# Patient Record
Sex: FEMALE | Race: WHITE | NOT HISPANIC OR LATINO | Employment: OTHER | URBAN - METROPOLITAN AREA
[De-identification: names, ages, dates, MRNs, and addresses within clinical notes are randomized per-mention and may not be internally consistent; named-entity substitution may affect disease eponyms.]

---

## 2017-03-07 ENCOUNTER — AMBULATORY - HEALTHEAST (OUTPATIENT)
Dept: PHYSICAL MEDICINE AND REHAB | Facility: CLINIC | Age: 72
End: 2017-03-07

## 2017-03-07 ENCOUNTER — COMMUNICATION - HEALTHEAST (OUTPATIENT)
Dept: SCHEDULING | Facility: CLINIC | Age: 72
End: 2017-03-07

## 2017-03-08 ENCOUNTER — HOSPITAL ENCOUNTER (OUTPATIENT)
Dept: PHYSICAL MEDICINE AND REHAB | Facility: CLINIC | Age: 72
Discharge: HOME | End: 2017-03-08
Attending: EMERGENCY MEDICINE

## 2017-03-08 DIAGNOSIS — M47.816 LUMBAR FACET ARTHROPATHY: ICD-10-CM

## 2017-03-08 DIAGNOSIS — M51.26 LUMBAR DISC HERNIATION: ICD-10-CM

## 2017-03-08 DIAGNOSIS — M48.061 LUMBAR SPINAL STENOSIS: ICD-10-CM

## 2017-03-08 ASSESSMENT — MIFFLIN-ST. JEOR: SCORE: 1444.77

## 2017-03-09 ENCOUNTER — OFFICE VISIT - HEALTHEAST (OUTPATIENT)
Dept: PHYSICAL THERAPY | Facility: REHABILITATION | Age: 72
End: 2017-03-09

## 2017-03-09 DIAGNOSIS — M62.89 MUSCLE TIGHTNESS: ICD-10-CM

## 2017-03-09 DIAGNOSIS — M48.061 LUMBAR SPINAL STENOSIS: ICD-10-CM

## 2017-03-09 DIAGNOSIS — M53.86 DECREASED ROM OF LUMBAR SPINE: ICD-10-CM

## 2017-03-12 ENCOUNTER — COMMUNICATION - HEALTHEAST (OUTPATIENT)
Dept: FAMILY MEDICINE | Facility: CLINIC | Age: 72
End: 2017-03-12

## 2017-03-15 ENCOUNTER — HOSPITAL ENCOUNTER (OUTPATIENT)
Dept: PHYSICAL MEDICINE AND REHAB | Facility: CLINIC | Age: 72
Discharge: HOME | End: 2017-03-15
Attending: PHYSICIAN ASSISTANT

## 2017-03-15 DIAGNOSIS — M47.816 LUMBAR FACET ARTHROPATHY: ICD-10-CM

## 2017-03-15 DIAGNOSIS — M51.26 LUMBAR DISC HERNIATION: ICD-10-CM

## 2017-03-15 DIAGNOSIS — M48.061 LUMBAR SPINAL STENOSIS: ICD-10-CM

## 2017-03-15 ASSESSMENT — MIFFLIN-ST. JEOR: SCORE: 1447.49

## 2017-03-20 ENCOUNTER — AMBULATORY - HEALTHEAST (OUTPATIENT)
Dept: NURSING | Facility: CLINIC | Age: 72
End: 2017-03-20

## 2017-03-20 ENCOUNTER — COMMUNICATION - HEALTHEAST (OUTPATIENT)
Dept: FAMILY MEDICINE | Facility: CLINIC | Age: 72
End: 2017-03-20

## 2017-03-20 DIAGNOSIS — Z23 NEED FOR TD VACCINE: ICD-10-CM

## 2017-05-08 ENCOUNTER — RECORDS - HEALTHEAST (OUTPATIENT)
Dept: MAMMOGRAPHY | Facility: CLINIC | Age: 72
End: 2017-05-08

## 2017-05-08 DIAGNOSIS — Z12.31 ENCOUNTER FOR SCREENING MAMMOGRAM FOR MALIGNANT NEOPLASM OF BREAST: ICD-10-CM

## 2017-08-08 ENCOUNTER — COMMUNICATION - HEALTHEAST (OUTPATIENT)
Dept: FAMILY MEDICINE | Facility: CLINIC | Age: 72
End: 2017-08-08

## 2017-09-25 ENCOUNTER — RECORDS - HEALTHEAST (OUTPATIENT)
Dept: ADMINISTRATIVE | Facility: OTHER | Age: 72
End: 2017-09-25

## 2017-09-25 ENCOUNTER — OFFICE VISIT - HEALTHEAST (OUTPATIENT)
Dept: FAMILY MEDICINE | Facility: CLINIC | Age: 72
End: 2017-09-25

## 2017-09-25 ENCOUNTER — RECORDS - HEALTHEAST (OUTPATIENT)
Dept: BONE DENSITY | Facility: CLINIC | Age: 72
End: 2017-09-25

## 2017-09-25 DIAGNOSIS — E78.5 HYPERLIPIDEMIA: ICD-10-CM

## 2017-09-25 DIAGNOSIS — Z00.00 ROUTINE GENERAL MEDICAL EXAMINATION AT A HEALTH CARE FACILITY: ICD-10-CM

## 2017-09-25 DIAGNOSIS — Z78.0 ASYMPTOMATIC MENOPAUSAL STATE: ICD-10-CM

## 2017-09-25 DIAGNOSIS — Z78.0 MENOPAUSE: ICD-10-CM

## 2017-09-25 LAB
CHOLEST SERPL-MCNC: 180 MG/DL
FASTING STATUS PATIENT QL REPORTED: YES
HDLC SERPL-MCNC: 41 MG/DL
LDLC SERPL CALC-MCNC: 116 MG/DL
TRIGL SERPL-MCNC: 113 MG/DL

## 2017-09-25 ASSESSMENT — MIFFLIN-ST. JEOR: SCORE: 1391.47

## 2018-11-15 ENCOUNTER — COMMUNICATION - HEALTHEAST (OUTPATIENT)
Dept: FAMILY MEDICINE | Facility: CLINIC | Age: 73
End: 2018-11-15

## 2018-11-26 ENCOUNTER — OFFICE VISIT - HEALTHEAST (OUTPATIENT)
Dept: FAMILY MEDICINE | Facility: CLINIC | Age: 73
End: 2018-11-26

## 2018-11-26 DIAGNOSIS — M48.061 SPINAL STENOSIS OF LUMBAR REGION WITHOUT NEUROGENIC CLAUDICATION: ICD-10-CM

## 2018-11-26 DIAGNOSIS — Z00.00 ROUTINE GENERAL MEDICAL EXAMINATION AT A HEALTH CARE FACILITY: ICD-10-CM

## 2018-11-26 DIAGNOSIS — M85.852 OSTEOPENIA OF NECKS OF BOTH FEMURS: ICD-10-CM

## 2018-11-26 DIAGNOSIS — M85.851 OSTEOPENIA OF NECKS OF BOTH FEMURS: ICD-10-CM

## 2018-11-26 DIAGNOSIS — E78.2 MIXED HYPERLIPIDEMIA: ICD-10-CM

## 2018-11-26 DIAGNOSIS — Z13.0 SCREENING FOR DEFICIENCY ANEMIA: ICD-10-CM

## 2018-11-26 DIAGNOSIS — R03.0 ELEVATED BLOOD PRESSURE READING WITHOUT DIAGNOSIS OF HYPERTENSION: ICD-10-CM

## 2018-11-26 LAB
ALBUMIN SERPL-MCNC: 3.8 G/DL (ref 3.5–5)
ALP SERPL-CCNC: 87 U/L (ref 45–120)
ALT SERPL W P-5'-P-CCNC: 16 U/L (ref 0–45)
ANION GAP SERPL CALCULATED.3IONS-SCNC: 8 MMOL/L (ref 5–18)
AST SERPL W P-5'-P-CCNC: 14 U/L (ref 0–40)
BILIRUB SERPL-MCNC: 0.6 MG/DL (ref 0–1)
BUN SERPL-MCNC: 16 MG/DL (ref 8–28)
CALCIUM SERPL-MCNC: 10.1 MG/DL (ref 8.5–10.5)
CHLORIDE BLD-SCNC: 107 MMOL/L (ref 98–107)
CHOLEST SERPL-MCNC: 174 MG/DL
CO2 SERPL-SCNC: 26 MMOL/L (ref 22–31)
CREAT SERPL-MCNC: 0.71 MG/DL (ref 0.6–1.1)
ERYTHROCYTE [DISTWIDTH] IN BLOOD BY AUTOMATED COUNT: 12.8 % (ref 11–14.5)
FASTING STATUS PATIENT QL REPORTED: YES
GFR SERPL CREATININE-BSD FRML MDRD: >60 ML/MIN/1.73M2
GLUCOSE BLD-MCNC: 85 MG/DL (ref 70–125)
HCT VFR BLD AUTO: 43.5 % (ref 35–47)
HDLC SERPL-MCNC: 47 MG/DL
HGB BLD-MCNC: 14.2 G/DL (ref 12–16)
LDLC SERPL CALC-MCNC: 105 MG/DL
MCH RBC QN AUTO: 29.2 PG (ref 27–34)
MCHC RBC AUTO-ENTMCNC: 32.7 G/DL (ref 32–36)
MCV RBC AUTO: 89 FL (ref 80–100)
PLATELET # BLD AUTO: 284 THOU/UL (ref 140–440)
PMV BLD AUTO: 6.9 FL (ref 7–10)
POTASSIUM BLD-SCNC: 4.5 MMOL/L (ref 3.5–5)
PROT SERPL-MCNC: 6.6 G/DL (ref 6–8)
RBC # BLD AUTO: 4.88 MILL/UL (ref 3.8–5.4)
SODIUM SERPL-SCNC: 141 MMOL/L (ref 136–145)
TRIGL SERPL-MCNC: 109 MG/DL
WBC: 7.1 THOU/UL (ref 4–11)

## 2018-11-26 ASSESSMENT — MIFFLIN-ST. JEOR: SCORE: 1398.27

## 2018-11-27 LAB
25(OH)D3 SERPL-MCNC: 30.7 NG/ML (ref 30–80)
25(OH)D3 SERPL-MCNC: 30.7 NG/ML (ref 30–80)

## 2018-12-10 ENCOUNTER — OFFICE VISIT - HEALTHEAST (OUTPATIENT)
Dept: FAMILY MEDICINE | Facility: CLINIC | Age: 73
End: 2018-12-10

## 2018-12-10 DIAGNOSIS — I10 BENIGN ESSENTIAL HYPERTENSION: ICD-10-CM

## 2019-02-06 ENCOUNTER — OFFICE VISIT - HEALTHEAST (OUTPATIENT)
Dept: FAMILY MEDICINE | Facility: CLINIC | Age: 74
End: 2019-02-06

## 2019-02-06 DIAGNOSIS — I10 BENIGN ESSENTIAL HYPERTENSION: ICD-10-CM

## 2019-02-06 DIAGNOSIS — H69.91 DYSFUNCTION OF RIGHT EUSTACHIAN TUBE: ICD-10-CM

## 2019-02-06 LAB
ANION GAP SERPL CALCULATED.3IONS-SCNC: 8 MMOL/L (ref 5–18)
BUN SERPL-MCNC: 26 MG/DL (ref 8–28)
CALCIUM SERPL-MCNC: 10 MG/DL (ref 8.5–10.5)
CHLORIDE BLD-SCNC: 107 MMOL/L (ref 98–107)
CO2 SERPL-SCNC: 27 MMOL/L (ref 22–31)
CREAT SERPL-MCNC: 0.8 MG/DL (ref 0.6–1.1)
GFR SERPL CREATININE-BSD FRML MDRD: >60 ML/MIN/1.73M2
GLUCOSE BLD-MCNC: 62 MG/DL (ref 70–125)
POTASSIUM BLD-SCNC: 3.7 MMOL/L (ref 3.5–5)
SODIUM SERPL-SCNC: 142 MMOL/L (ref 136–145)

## 2019-02-06 ASSESSMENT — MIFFLIN-ST. JEOR: SCORE: 1396.57

## 2019-03-04 ENCOUNTER — OFFICE VISIT - HEALTHEAST (OUTPATIENT)
Dept: FAMILY MEDICINE | Facility: CLINIC | Age: 74
End: 2019-03-04

## 2019-03-04 DIAGNOSIS — I10 BENIGN ESSENTIAL HYPERTENSION: ICD-10-CM

## 2019-03-04 DIAGNOSIS — L84 CORN OR CALLUS: ICD-10-CM

## 2019-03-04 LAB
ANION GAP SERPL CALCULATED.3IONS-SCNC: 11 MMOL/L (ref 5–18)
BUN SERPL-MCNC: 25 MG/DL (ref 8–28)
CALCIUM SERPL-MCNC: 9.6 MG/DL (ref 8.5–10.5)
CHLORIDE BLD-SCNC: 107 MMOL/L (ref 98–107)
CO2 SERPL-SCNC: 25 MMOL/L (ref 22–31)
CREAT SERPL-MCNC: 0.83 MG/DL (ref 0.6–1.1)
GFR SERPL CREATININE-BSD FRML MDRD: >60 ML/MIN/1.73M2
GLUCOSE BLD-MCNC: 90 MG/DL (ref 70–125)
POTASSIUM BLD-SCNC: 4.3 MMOL/L (ref 3.5–5)
SODIUM SERPL-SCNC: 143 MMOL/L (ref 136–145)

## 2019-03-04 ASSESSMENT — MIFFLIN-ST. JEOR: SCORE: 1395.44

## 2019-06-04 ENCOUNTER — OFFICE VISIT - HEALTHEAST (OUTPATIENT)
Dept: FAMILY MEDICINE | Facility: CLINIC | Age: 74
End: 2019-06-04

## 2019-06-04 DIAGNOSIS — R33.8 ACUTE URINARY RETENTION: ICD-10-CM

## 2019-06-04 LAB
ANION GAP SERPL CALCULATED.3IONS-SCNC: 11 MMOL/L (ref 5–18)
BUN SERPL-MCNC: 20 MG/DL (ref 8–28)
CALCIUM SERPL-MCNC: 10.2 MG/DL (ref 8.5–10.5)
CHLORIDE BLD-SCNC: 102 MMOL/L (ref 98–107)
CO2 SERPL-SCNC: 27 MMOL/L (ref 22–31)
CREAT SERPL-MCNC: 0.86 MG/DL (ref 0.6–1.1)
GFR SERPL CREATININE-BSD FRML MDRD: >60 ML/MIN/1.73M2
GLUCOSE BLD-MCNC: 94 MG/DL (ref 70–125)
POTASSIUM BLD-SCNC: 4.1 MMOL/L (ref 3.5–5)
SODIUM SERPL-SCNC: 140 MMOL/L (ref 136–145)

## 2019-06-04 ASSESSMENT — MIFFLIN-ST. JEOR: SCORE: 1368.22

## 2019-06-12 ENCOUNTER — COMMUNICATION - HEALTHEAST (OUTPATIENT)
Dept: FAMILY MEDICINE | Facility: CLINIC | Age: 74
End: 2019-06-12

## 2019-06-12 DIAGNOSIS — I10 BENIGN ESSENTIAL HYPERTENSION: ICD-10-CM

## 2019-08-27 ENCOUNTER — RECORDS - HEALTHEAST (OUTPATIENT)
Dept: ADMINISTRATIVE | Facility: OTHER | Age: 74
End: 2019-08-27

## 2019-10-04 ENCOUNTER — AMBULATORY - HEALTHEAST (OUTPATIENT)
Dept: NURSING | Facility: CLINIC | Age: 74
End: 2019-10-04

## 2019-10-04 DIAGNOSIS — Z23 FLU VACCINE NEED: ICD-10-CM

## 2019-10-23 ENCOUNTER — COMMUNICATION - HEALTHEAST (OUTPATIENT)
Dept: SCHEDULING | Facility: CLINIC | Age: 74
End: 2019-10-23

## 2019-10-24 ENCOUNTER — COMMUNICATION - HEALTHEAST (OUTPATIENT)
Dept: FAMILY MEDICINE | Facility: CLINIC | Age: 74
End: 2019-10-24

## 2019-10-24 ENCOUNTER — HOSPITAL ENCOUNTER (OUTPATIENT)
Dept: CT IMAGING | Facility: CLINIC | Age: 74
Discharge: HOME | End: 2019-10-24
Attending: NURSE PRACTITIONER

## 2019-10-24 ENCOUNTER — AMBULATORY - HEALTHEAST (OUTPATIENT)
Dept: FAMILY MEDICINE | Facility: CLINIC | Age: 74
End: 2019-10-24

## 2019-10-24 ENCOUNTER — OFFICE VISIT - HEALTHEAST (OUTPATIENT)
Dept: FAMILY MEDICINE | Facility: CLINIC | Age: 74
End: 2019-10-24

## 2019-10-24 DIAGNOSIS — R82.998 LEUKOCYTES IN URINE: ICD-10-CM

## 2019-10-24 DIAGNOSIS — I10 BENIGN ESSENTIAL HYPERTENSION: ICD-10-CM

## 2019-10-24 DIAGNOSIS — R10.32 LLQ ABDOMINAL PAIN: ICD-10-CM

## 2019-10-24 DIAGNOSIS — R19.7 DIARRHEA, UNSPECIFIED TYPE: ICD-10-CM

## 2019-10-24 LAB
ALBUMIN SERPL-MCNC: 2.8 G/DL (ref 3.5–5)
ALBUMIN UR-MCNC: ABNORMAL MG/DL
ALP SERPL-CCNC: 94 U/L (ref 45–120)
ALT SERPL W P-5'-P-CCNC: 16 U/L (ref 0–45)
ANION GAP SERPL CALCULATED.3IONS-SCNC: 10 MMOL/L (ref 5–18)
APPEARANCE UR: CLEAR
AST SERPL W P-5'-P-CCNC: 13 U/L (ref 0–40)
BACTERIA #/AREA URNS HPF: ABNORMAL HPF
BILIRUB SERPL-MCNC: 1 MG/DL (ref 0–1)
BILIRUB UR QL STRIP: ABNORMAL
BUN SERPL-MCNC: 31 MG/DL (ref 8–28)
CALCIUM SERPL-MCNC: 9 MG/DL (ref 8.5–10.5)
CHLORIDE BLD-SCNC: 100 MMOL/L (ref 98–107)
CO2 SERPL-SCNC: 25 MMOL/L (ref 22–31)
COLOR UR AUTO: YELLOW
CREAT SERPL-MCNC: 0.91 MG/DL (ref 0.6–1.1)
ERYTHROCYTE [DISTWIDTH] IN BLOOD BY AUTOMATED COUNT: 11.6 % (ref 11–14.5)
GFR SERPL CREATININE-BSD FRML MDRD: 60 ML/MIN/1.73M2
GLUCOSE BLD-MCNC: 97 MG/DL (ref 70–125)
GLUCOSE UR STRIP-MCNC: NEGATIVE MG/DL
HCT VFR BLD AUTO: 39.7 % (ref 35–47)
HGB BLD-MCNC: 12.9 G/DL (ref 12–16)
HGB UR QL STRIP: ABNORMAL
KETONES UR STRIP-MCNC: NEGATIVE MG/DL
LEUKOCYTE ESTERASE UR QL STRIP: ABNORMAL
MCH RBC QN AUTO: 29.7 PG (ref 27–34)
MCHC RBC AUTO-ENTMCNC: 32.6 G/DL (ref 32–36)
MCV RBC AUTO: 91 FL (ref 80–100)
MUCOUS THREADS #/AREA URNS LPF: ABNORMAL LPF
NITRATE UR QL: NEGATIVE
PH UR STRIP: 6.5 [PH] (ref 5–8)
PLATELET # BLD AUTO: 376 THOU/UL (ref 140–440)
PMV BLD AUTO: 7.3 FL (ref 7–10)
POTASSIUM BLD-SCNC: 3.2 MMOL/L (ref 3.5–5)
PROT SERPL-MCNC: 6 G/DL (ref 6–8)
RBC # BLD AUTO: 4.36 MILL/UL (ref 3.8–5.4)
RBC #/AREA URNS AUTO: ABNORMAL HPF
SODIUM SERPL-SCNC: 135 MMOL/L (ref 136–145)
SP GR UR STRIP: 1.01 (ref 1–1.03)
SQUAMOUS #/AREA URNS AUTO: ABNORMAL LPF
TRANS CELLS #/AREA URNS HPF: ABNORMAL LPF
UROBILINOGEN UR STRIP-ACNC: ABNORMAL
WBC #/AREA URNS AUTO: ABNORMAL HPF
WBC: 22.2 THOU/UL (ref 4–11)

## 2019-10-24 ASSESSMENT — MIFFLIN-ST. JEOR: SCORE: 1304.72

## 2019-10-25 ENCOUNTER — AMBULATORY - HEALTHEAST (OUTPATIENT)
Dept: FAMILY MEDICINE | Facility: CLINIC | Age: 74
End: 2019-10-25

## 2019-10-25 ENCOUNTER — COMMUNICATION - HEALTHEAST (OUTPATIENT)
Dept: FAMILY MEDICINE | Facility: CLINIC | Age: 74
End: 2019-10-25

## 2019-10-25 DIAGNOSIS — R19.7 DIARRHEA, UNSPECIFIED TYPE: ICD-10-CM

## 2019-10-25 LAB — BACTERIA SPEC CULT: NO GROWTH

## 2019-10-27 ENCOUNTER — SURGERY - HEALTHEAST (OUTPATIENT)
Dept: GASTROENTEROLOGY | Facility: HOSPITAL | Age: 74
End: 2019-10-27

## 2019-10-27 ASSESSMENT — MIFFLIN-ST. JEOR
SCORE: 1299.84
SCORE: 1299.84

## 2019-10-28 ENCOUNTER — ANESTHESIA - HEALTHEAST (OUTPATIENT)
Dept: SURGERY | Facility: HOSPITAL | Age: 74
End: 2019-10-28

## 2019-10-28 ENCOUNTER — COMMUNICATION - HEALTHEAST (OUTPATIENT)
Dept: FAMILY MEDICINE | Facility: CLINIC | Age: 74
End: 2019-10-28

## 2019-10-28 ENCOUNTER — SURGERY - HEALTHEAST (OUTPATIENT)
Dept: SURGERY | Facility: HOSPITAL | Age: 74
End: 2019-10-28

## 2019-10-29 ASSESSMENT — MIFFLIN-ST. JEOR
SCORE: 1304.83
SCORE: 1304.83

## 2019-10-30 ASSESSMENT — MIFFLIN-ST. JEOR
SCORE: 1300.75
SCORE: 1300.75

## 2019-11-01 ENCOUNTER — HOME CARE/HOSPICE - HEALTHEAST (OUTPATIENT)
Dept: HOME HEALTH SERVICES | Facility: HOME HEALTH | Age: 74
End: 2019-11-01

## 2019-11-01 ASSESSMENT — MIFFLIN-ST. JEOR
SCORE: 1375.59
SCORE: 1375.59

## 2019-11-02 ASSESSMENT — MIFFLIN-ST. JEOR
SCORE: 1369.24
SCORE: 1369.24

## 2019-11-03 ASSESSMENT — MIFFLIN-ST. JEOR
SCORE: 1377.86
SCORE: 1377.86

## 2019-11-05 ENCOUNTER — COMMUNICATION - HEALTHEAST (OUTPATIENT)
Dept: CARE COORDINATION | Facility: CLINIC | Age: 74
End: 2019-11-05

## 2019-11-06 ENCOUNTER — OFFICE VISIT - HEALTHEAST (OUTPATIENT)
Dept: FAMILY MEDICINE | Facility: CLINIC | Age: 74
End: 2019-11-06

## 2019-11-06 ENCOUNTER — COMMUNICATION - HEALTHEAST (OUTPATIENT)
Dept: FAMILY MEDICINE | Facility: CLINIC | Age: 74
End: 2019-11-06

## 2019-11-06 DIAGNOSIS — K61.1 PERIRECTAL ABSCESS: ICD-10-CM

## 2019-11-06 DIAGNOSIS — I10 ESSENTIAL HYPERTENSION, BENIGN: ICD-10-CM

## 2019-11-06 DIAGNOSIS — Z93.3 COLOSTOMY IN PLACE (H): ICD-10-CM

## 2019-11-06 LAB
ANION GAP SERPL CALCULATED.3IONS-SCNC: 9 MMOL/L (ref 5–18)
BUN SERPL-MCNC: 11 MG/DL (ref 8–28)
CALCIUM SERPL-MCNC: 9.1 MG/DL (ref 8.5–10.5)
CHLORIDE BLD-SCNC: 106 MMOL/L (ref 98–107)
CO2 SERPL-SCNC: 26 MMOL/L (ref 22–31)
CREAT SERPL-MCNC: 0.7 MG/DL (ref 0.6–1.1)
ERYTHROCYTE [DISTWIDTH] IN BLOOD BY AUTOMATED COUNT: 12 % (ref 11–14.5)
GFR SERPL CREATININE-BSD FRML MDRD: >60 ML/MIN/1.73M2
GLUCOSE BLD-MCNC: 85 MG/DL (ref 70–125)
HCT VFR BLD AUTO: 37 % (ref 35–47)
HGB BLD-MCNC: 12 G/DL (ref 12–16)
MAGNESIUM SERPL-MCNC: 1.9 MG/DL (ref 1.8–2.6)
MCH RBC QN AUTO: 29.1 PG (ref 27–34)
MCHC RBC AUTO-ENTMCNC: 32.4 G/DL (ref 32–36)
MCV RBC AUTO: 90 FL (ref 80–100)
PLATELET # BLD AUTO: 563 THOU/UL (ref 140–440)
PMV BLD AUTO: 6.5 FL (ref 7–10)
POTASSIUM BLD-SCNC: 3.8 MMOL/L (ref 3.5–5)
RBC # BLD AUTO: 4.11 MILL/UL (ref 3.8–5.4)
SODIUM SERPL-SCNC: 141 MMOL/L (ref 136–145)
WBC: 7.2 THOU/UL (ref 4–11)

## 2019-11-21 ENCOUNTER — RECORDS - HEALTHEAST (OUTPATIENT)
Dept: ADMINISTRATIVE | Facility: OTHER | Age: 74
End: 2019-11-21

## 2019-12-16 ENCOUNTER — AMBULATORY - HEALTHEAST (OUTPATIENT)
Dept: SCHEDULING | Facility: CLINIC | Age: 74
End: 2019-12-16

## 2019-12-16 DIAGNOSIS — Z71.89 OSTOMY NURSE CONSULTATION: ICD-10-CM

## 2019-12-17 ENCOUNTER — OFFICE VISIT - HEALTHEAST (OUTPATIENT)
Dept: WOUND CARE | Facility: HOSPITAL | Age: 74
End: 2019-12-17

## 2019-12-17 DIAGNOSIS — Z71.89 OSTOMY NURSE CONSULTATION: ICD-10-CM

## 2019-12-19 ENCOUNTER — COMMUNICATION - HEALTHEAST (OUTPATIENT)
Dept: VASCULAR SURGERY | Facility: CLINIC | Age: 74
End: 2019-12-19

## 2019-12-20 ENCOUNTER — AMBULATORY - HEALTHEAST (OUTPATIENT)
Dept: CARE COORDINATION | Facility: CLINIC | Age: 74
End: 2019-12-20

## 2019-12-20 DIAGNOSIS — I10 ESSENTIAL HYPERTENSION, BENIGN: ICD-10-CM

## 2019-12-20 DIAGNOSIS — M48.061 LUMBAR STENOSIS: ICD-10-CM

## 2019-12-23 ENCOUNTER — COMMUNICATION - HEALTHEAST (OUTPATIENT)
Dept: NURSING | Facility: CLINIC | Age: 74
End: 2019-12-23

## 2019-12-26 ENCOUNTER — COMMUNICATION - HEALTHEAST (OUTPATIENT)
Dept: FAMILY MEDICINE | Facility: CLINIC | Age: 74
End: 2019-12-26

## 2019-12-26 DIAGNOSIS — E78.2 MIXED HYPERLIPIDEMIA: ICD-10-CM

## 2019-12-27 ENCOUNTER — COMMUNICATION - HEALTHEAST (OUTPATIENT)
Dept: NURSING | Facility: CLINIC | Age: 74
End: 2019-12-27

## 2020-01-21 ENCOUNTER — RECORDS - HEALTHEAST (OUTPATIENT)
Dept: ADMINISTRATIVE | Facility: OTHER | Age: 75
End: 2020-01-21

## 2020-01-22 ENCOUNTER — HOSPITAL ENCOUNTER (OUTPATIENT)
Dept: MRI IMAGING | Facility: HOSPITAL | Age: 75
Discharge: HOME | End: 2020-01-22
Attending: COLON & RECTAL SURGERY

## 2020-01-22 DIAGNOSIS — K60.40 RECTAL FISTULA: ICD-10-CM

## 2020-01-22 LAB
CREAT BLD-MCNC: 0.9 MG/DL (ref 0.6–1.1)
GFR SERPL CREATININE-BSD FRML MDRD: >60 ML/MIN/1.73M2

## 2020-02-07 ENCOUNTER — COMMUNICATION - HEALTHEAST (OUTPATIENT)
Dept: SCHEDULING | Facility: CLINIC | Age: 75
End: 2020-02-07

## 2020-02-12 ENCOUNTER — OFFICE VISIT - HEALTHEAST (OUTPATIENT)
Dept: FAMILY MEDICINE | Facility: CLINIC | Age: 75
End: 2020-02-12

## 2020-02-12 DIAGNOSIS — M48.061 SPINAL STENOSIS OF LUMBAR REGION WITHOUT NEUROGENIC CLAUDICATION: ICD-10-CM

## 2020-02-12 DIAGNOSIS — I10 ESSENTIAL HYPERTENSION, BENIGN: ICD-10-CM

## 2020-02-12 DIAGNOSIS — G58.9 MONONEUROPATHY: ICD-10-CM

## 2020-02-12 DIAGNOSIS — N32.81 OVERACTIVE BLADDER: ICD-10-CM

## 2020-02-12 DIAGNOSIS — E78.2 MIXED HYPERLIPIDEMIA: ICD-10-CM

## 2020-02-12 DIAGNOSIS — Z93.3 COLOSTOMY IN PLACE (H): ICD-10-CM

## 2020-03-06 ENCOUNTER — RECORDS - HEALTHEAST (OUTPATIENT)
Dept: ADMINISTRATIVE | Facility: OTHER | Age: 75
End: 2020-03-06

## 2020-03-06 ENCOUNTER — TRANSFERRED RECORDS (OUTPATIENT)
Dept: HEALTH INFORMATION MANAGEMENT | Facility: CLINIC | Age: 75
End: 2020-03-06
Payer: COMMERCIAL

## 2020-03-12 ENCOUNTER — RECORDS - HEALTHEAST (OUTPATIENT)
Dept: ADMINISTRATIVE | Facility: OTHER | Age: 75
End: 2020-03-12

## 2020-03-13 ENCOUNTER — RECORDS - HEALTHEAST (OUTPATIENT)
Dept: ADMINISTRATIVE | Facility: OTHER | Age: 75
End: 2020-03-13

## 2020-03-13 ENCOUNTER — AMBULATORY - HEALTHEAST (OUTPATIENT)
Dept: LAB | Facility: CLINIC | Age: 75
End: 2020-03-13

## 2020-03-13 DIAGNOSIS — R19.00 ABDOMINAL LUMP: ICD-10-CM

## 2020-03-13 DIAGNOSIS — R19.00 PELVIC MASS IN FEMALE: ICD-10-CM

## 2020-03-13 DIAGNOSIS — R97.8 OTHER ABNORMAL TUMOR MARKERS: ICD-10-CM

## 2020-03-17 ENCOUNTER — COMMUNICATION - HEALTHEAST (OUTPATIENT)
Dept: FAMILY MEDICINE | Facility: CLINIC | Age: 75
End: 2020-03-17

## 2020-04-29 ENCOUNTER — RECORDS - HEALTHEAST (OUTPATIENT)
Dept: ADMINISTRATIVE | Facility: OTHER | Age: 75
End: 2020-04-29

## 2020-05-19 ENCOUNTER — TRANSFERRED RECORDS (OUTPATIENT)
Dept: HEALTH INFORMATION MANAGEMENT | Facility: CLINIC | Age: 75
End: 2020-05-19
Payer: COMMERCIAL

## 2020-05-19 ENCOUNTER — RECORDS - HEALTHEAST (OUTPATIENT)
Dept: ADMINISTRATIVE | Facility: OTHER | Age: 75
End: 2020-05-19

## 2020-05-20 ENCOUNTER — RECORDS - HEALTHEAST (OUTPATIENT)
Dept: ADMINISTRATIVE | Facility: OTHER | Age: 75
End: 2020-05-20

## 2020-05-29 ENCOUNTER — COMMUNICATION - HEALTHEAST (OUTPATIENT)
Dept: FAMILY MEDICINE | Facility: CLINIC | Age: 75
End: 2020-05-29

## 2020-05-29 DIAGNOSIS — E78.00 PURE HYPERCHOLESTEROLEMIA: ICD-10-CM

## 2020-05-29 DIAGNOSIS — I10 ESSENTIAL HYPERTENSION, BENIGN: ICD-10-CM

## 2020-07-08 ENCOUNTER — AMBULATORY - HEALTHEAST (OUTPATIENT)
Dept: LAB | Facility: CLINIC | Age: 75
End: 2020-07-08

## 2020-07-08 DIAGNOSIS — R19.00 ABDOMINAL LUMP: ICD-10-CM

## 2020-07-08 DIAGNOSIS — R97.8 OTHER ABNORMAL TUMOR MARKERS: ICD-10-CM

## 2020-07-08 LAB — CEA SERPL-MCNC: 5.6 NG/ML (ref 0–3)

## 2020-07-14 ENCOUNTER — AMBULATORY - HEALTHEAST (OUTPATIENT)
Dept: LAB | Facility: CLINIC | Age: 75
End: 2020-07-14

## 2020-07-14 DIAGNOSIS — E78.00 PURE HYPERCHOLESTEROLEMIA: ICD-10-CM

## 2020-07-14 DIAGNOSIS — I10 ESSENTIAL HYPERTENSION, BENIGN: ICD-10-CM

## 2020-07-15 ENCOUNTER — COMMUNICATION - HEALTHEAST (OUTPATIENT)
Dept: FAMILY MEDICINE | Facility: CLINIC | Age: 75
End: 2020-07-15

## 2020-07-15 LAB
ALBUMIN SERPL-MCNC: 3.8 G/DL (ref 3.5–5)
ALP SERPL-CCNC: 79 U/L (ref 45–120)
ALT SERPL W P-5'-P-CCNC: 14 U/L (ref 0–45)
ANION GAP SERPL CALCULATED.3IONS-SCNC: 14 MMOL/L (ref 5–18)
AST SERPL W P-5'-P-CCNC: 12 U/L (ref 0–40)
BILIRUB SERPL-MCNC: 0.4 MG/DL (ref 0–1)
BUN SERPL-MCNC: 15 MG/DL (ref 8–28)
CALCIUM SERPL-MCNC: 9.9 MG/DL (ref 8.5–10.5)
CHLORIDE BLD-SCNC: 104 MMOL/L (ref 98–107)
CHOLEST SERPL-MCNC: 211 MG/DL
CO2 SERPL-SCNC: 25 MMOL/L (ref 22–31)
CREAT SERPL-MCNC: 0.77 MG/DL (ref 0.6–1.1)
FASTING STATUS PATIENT QL REPORTED: YES
GFR SERPL CREATININE-BSD FRML MDRD: >60 ML/MIN/1.73M2
GLUCOSE BLD-MCNC: 86 MG/DL (ref 70–125)
HDLC SERPL-MCNC: 44 MG/DL
LDLC SERPL CALC-MCNC: 139 MG/DL
MAGNESIUM SERPL-MCNC: 2.2 MG/DL (ref 1.8–2.6)
POTASSIUM BLD-SCNC: 4.5 MMOL/L (ref 3.5–5)
PROT SERPL-MCNC: 6.5 G/DL (ref 6–8)
SODIUM SERPL-SCNC: 143 MMOL/L (ref 136–145)
TRIGL SERPL-MCNC: 139 MG/DL

## 2020-07-27 ENCOUNTER — HOSPITAL ENCOUNTER (OUTPATIENT)
Dept: MRI IMAGING | Facility: HOSPITAL | Age: 75
Discharge: HOME | End: 2020-07-27
Attending: COLON & RECTAL SURGERY

## 2020-07-27 DIAGNOSIS — R19.00 PELVIC MASS IN FEMALE: ICD-10-CM

## 2020-09-06 ENCOUNTER — COMMUNICATION - HEALTHEAST (OUTPATIENT)
Dept: FAMILY MEDICINE | Facility: CLINIC | Age: 75
End: 2020-09-06

## 2020-09-06 DIAGNOSIS — I10 BENIGN ESSENTIAL HYPERTENSION: ICD-10-CM

## 2020-11-30 ENCOUNTER — AMBULATORY - HEALTHEAST (OUTPATIENT)
Dept: OTHER | Facility: CLINIC | Age: 75
End: 2020-11-30

## 2020-12-09 ENCOUNTER — OFFICE VISIT - HEALTHEAST (OUTPATIENT)
Dept: FAMILY MEDICINE | Facility: CLINIC | Age: 75
End: 2020-12-09

## 2020-12-09 ENCOUNTER — RECORDS - HEALTHEAST (OUTPATIENT)
Dept: GENERAL RADIOLOGY | Facility: CLINIC | Age: 75
End: 2020-12-09

## 2020-12-09 DIAGNOSIS — M85.89 OSTEOPENIA OF MULTIPLE SITES: ICD-10-CM

## 2020-12-09 DIAGNOSIS — R42 LIGHTHEADEDNESS: ICD-10-CM

## 2020-12-09 DIAGNOSIS — M16.12 PRIMARY OSTEOARTHRITIS OF LEFT HIP: ICD-10-CM

## 2020-12-09 DIAGNOSIS — G47.33 OSA (OBSTRUCTIVE SLEEP APNEA): ICD-10-CM

## 2020-12-09 DIAGNOSIS — Z00.00 ROUTINE GENERAL MEDICAL EXAMINATION AT A HEALTH CARE FACILITY: ICD-10-CM

## 2020-12-09 DIAGNOSIS — M48.061 SPINAL STENOSIS OF LUMBAR REGION WITHOUT NEUROGENIC CLAUDICATION: ICD-10-CM

## 2020-12-09 DIAGNOSIS — N32.81 OVERACTIVE BLADDER: ICD-10-CM

## 2020-12-09 DIAGNOSIS — R10.32 LEFT LOWER QUADRANT PAIN: ICD-10-CM

## 2020-12-09 DIAGNOSIS — F40.240 CLAUSTROPHOBIA: ICD-10-CM

## 2020-12-09 DIAGNOSIS — I10 ESSENTIAL HYPERTENSION, BENIGN: ICD-10-CM

## 2020-12-09 DIAGNOSIS — R79.9 ABNORMAL FINDING OF BLOOD CHEMISTRY, UNSPECIFIED: ICD-10-CM

## 2020-12-09 DIAGNOSIS — E83.42 HYPOMAGNESEMIA: ICD-10-CM

## 2020-12-09 DIAGNOSIS — K21.9 GASTROESOPHAGEAL REFLUX DISEASE WITHOUT ESOPHAGITIS: ICD-10-CM

## 2020-12-09 DIAGNOSIS — E78.00 PURE HYPERCHOLESTEROLEMIA: ICD-10-CM

## 2020-12-09 LAB
ANION GAP SERPL CALCULATED.3IONS-SCNC: 10 MMOL/L (ref 5–18)
BUN SERPL-MCNC: 26 MG/DL (ref 8–28)
CALCIUM SERPL-MCNC: 10 MG/DL (ref 8.5–10.5)
CHLORIDE BLD-SCNC: 106 MMOL/L (ref 98–107)
CHOLEST SERPL-MCNC: 242 MG/DL
CO2 SERPL-SCNC: 27 MMOL/L (ref 22–31)
CREAT SERPL-MCNC: 0.8 MG/DL (ref 0.6–1.1)
ERYTHROCYTE [DISTWIDTH] IN BLOOD BY AUTOMATED COUNT: 11.8 % (ref 11–14.5)
FASTING STATUS PATIENT QL REPORTED: YES
FERRITIN SERPL-MCNC: 140 NG/ML (ref 10–130)
GFR SERPL CREATININE-BSD FRML MDRD: >60 ML/MIN/1.73M2
GLUCOSE BLD-MCNC: 106 MG/DL (ref 70–125)
HCT VFR BLD AUTO: 46.1 % (ref 35–47)
HDLC SERPL-MCNC: 45 MG/DL
HGB BLD-MCNC: 15.5 G/DL (ref 12–16)
LDLC SERPL CALC-MCNC: 170 MG/DL
MCH RBC QN AUTO: 30.4 PG (ref 27–34)
MCHC RBC AUTO-ENTMCNC: 33.6 G/DL (ref 32–36)
MCV RBC AUTO: 90 FL (ref 80–100)
PLATELET # BLD AUTO: 305 THOU/UL (ref 140–440)
PMV BLD AUTO: 6.7 FL (ref 7–10)
POTASSIUM BLD-SCNC: 4.3 MMOL/L (ref 3.5–5)
RBC # BLD AUTO: 5.1 MILL/UL (ref 3.8–5.4)
SODIUM SERPL-SCNC: 143 MMOL/L (ref 136–145)
TRIGL SERPL-MCNC: 136 MG/DL
WBC: 8.1 THOU/UL (ref 4–11)

## 2020-12-09 RX ORDER — MENTHOL 40 MG/ML
GEL TOPICAL
Status: SHIPPED | COMMUNITY
Start: 2020-12-09

## 2020-12-09 ASSESSMENT — MIFFLIN-ST. JEOR: SCORE: 1378.43

## 2020-12-14 ENCOUNTER — COMMUNICATION - HEALTHEAST (OUTPATIENT)
Dept: FAMILY MEDICINE | Facility: CLINIC | Age: 75
End: 2020-12-14

## 2020-12-14 DIAGNOSIS — E78.00 PURE HYPERCHOLESTEROLEMIA: ICD-10-CM

## 2020-12-17 ENCOUNTER — RECORDS - HEALTHEAST (OUTPATIENT)
Dept: BONE DENSITY | Facility: CLINIC | Age: 75
End: 2020-12-17

## 2020-12-17 ENCOUNTER — RECORDS - HEALTHEAST (OUTPATIENT)
Dept: ADMINISTRATIVE | Facility: OTHER | Age: 75
End: 2020-12-17

## 2020-12-17 DIAGNOSIS — M85.89 OTHER SPECIFIED DISORDERS OF BONE DENSITY AND STRUCTURE, MULTIPLE SITES: ICD-10-CM

## 2020-12-22 ENCOUNTER — RECORDS - HEALTHEAST (OUTPATIENT)
Dept: ADMINISTRATIVE | Facility: OTHER | Age: 75
End: 2020-12-22

## 2020-12-22 ENCOUNTER — HOSPITAL ENCOUNTER (OUTPATIENT)
Dept: MRI IMAGING | Facility: HOSPITAL | Age: 75
Discharge: HOME | End: 2020-12-22
Attending: NURSE PRACTITIONER

## 2020-12-22 DIAGNOSIS — M48.061 SPINAL STENOSIS OF LUMBAR REGION WITHOUT NEUROGENIC CLAUDICATION: ICD-10-CM

## 2021-01-08 ENCOUNTER — COMMUNICATION - HEALTHEAST (OUTPATIENT)
Dept: FAMILY MEDICINE | Facility: CLINIC | Age: 76
End: 2021-01-08

## 2021-01-08 ENCOUNTER — AMBULATORY - HEALTHEAST (OUTPATIENT)
Dept: SURGERY | Facility: CLINIC | Age: 76
End: 2021-01-08

## 2021-01-08 DIAGNOSIS — Z11.59 ENCOUNTER FOR SCREENING FOR OTHER VIRAL DISEASES: ICD-10-CM

## 2021-01-14 ENCOUNTER — OFFICE VISIT - HEALTHEAST (OUTPATIENT)
Dept: FAMILY MEDICINE | Facility: CLINIC | Age: 76
End: 2021-01-14

## 2021-01-14 DIAGNOSIS — G47.33 OSA (OBSTRUCTIVE SLEEP APNEA): ICD-10-CM

## 2021-01-14 DIAGNOSIS — Z93.3 COLOSTOMY IN PLACE (H): ICD-10-CM

## 2021-01-14 DIAGNOSIS — Z20.9 EXPOSURE TO POTENTIAL INFECTION: ICD-10-CM

## 2021-01-14 DIAGNOSIS — Z86.718 HISTORY OF BLOOD CLOTS: ICD-10-CM

## 2021-01-14 DIAGNOSIS — Z01.818 PREOP GENERAL PHYSICAL EXAM: ICD-10-CM

## 2021-01-14 LAB
ATRIAL RATE - MUSE: 62 BPM
DIASTOLIC BLOOD PRESSURE - MUSE: NORMAL
INTERPRETATION ECG - MUSE: NORMAL
P AXIS - MUSE: 59 DEGREES
PR INTERVAL - MUSE: 218 MS
QRS DURATION - MUSE: 80 MS
QT - MUSE: 410 MS
QTC - MUSE: 416 MS
R AXIS - MUSE: -25 DEGREES
SYSTOLIC BLOOD PRESSURE - MUSE: NORMAL
T AXIS - MUSE: -14 DEGREES
VENTRICULAR RATE- MUSE: 62 BPM

## 2021-01-22 ENCOUNTER — AMBULATORY - HEALTHEAST (OUTPATIENT)
Dept: LAB | Facility: CLINIC | Age: 76
End: 2021-01-22

## 2021-01-22 DIAGNOSIS — Z20.9 EXPOSURE TO POTENTIAL INFECTION: ICD-10-CM

## 2021-01-22 DIAGNOSIS — Z11.59 ENCOUNTER FOR SCREENING FOR OTHER VIRAL DISEASES: ICD-10-CM

## 2021-01-22 ASSESSMENT — MIFFLIN-ST. JEOR: SCORE: 1373.32

## 2021-01-23 ENCOUNTER — COMMUNICATION - HEALTHEAST (OUTPATIENT)
Dept: SCHEDULING | Facility: CLINIC | Age: 76
End: 2021-01-23

## 2021-01-26 ENCOUNTER — ANESTHESIA - HEALTHEAST (OUTPATIENT)
Dept: SURGERY | Facility: CLINIC | Age: 76
End: 2021-01-26

## 2021-01-26 ENCOUNTER — SURGERY - HEALTHEAST (OUTPATIENT)
Dept: SURGERY | Facility: CLINIC | Age: 76
End: 2021-01-26

## 2021-01-26 ASSESSMENT — MIFFLIN-ST. JEOR: SCORE: 1374.01

## 2021-01-27 ENCOUNTER — HOME CARE/HOSPICE - HEALTHEAST (OUTPATIENT)
Dept: HOME HEALTH SERVICES | Facility: HOME HEALTH | Age: 76
End: 2021-01-27

## 2021-02-02 ENCOUNTER — RECORDS - HEALTHEAST (OUTPATIENT)
Dept: ADMINISTRATIVE | Facility: OTHER | Age: 76
End: 2021-02-02

## 2021-02-23 ENCOUNTER — AMBULATORY - HEALTHEAST (OUTPATIENT)
Dept: NURSING | Facility: CLINIC | Age: 76
End: 2021-02-23

## 2021-03-05 ENCOUNTER — COMMUNICATION - HEALTHEAST (OUTPATIENT)
Dept: FAMILY MEDICINE | Facility: CLINIC | Age: 76
End: 2021-03-05

## 2021-03-05 DIAGNOSIS — I10 BENIGN ESSENTIAL HYPERTENSION: ICD-10-CM

## 2021-03-08 ENCOUNTER — RECORDS - HEALTHEAST (OUTPATIENT)
Dept: ADMINISTRATIVE | Facility: OTHER | Age: 76
End: 2021-03-08

## 2021-03-09 ENCOUNTER — COMMUNICATION - HEALTHEAST (OUTPATIENT)
Dept: INTERNAL MEDICINE | Facility: CLINIC | Age: 76
End: 2021-03-09

## 2021-03-09 DIAGNOSIS — E78.00 PURE HYPERCHOLESTEROLEMIA: ICD-10-CM

## 2021-03-09 RX ORDER — ROSUVASTATIN CALCIUM 10 MG/1
10 TABLET, COATED ORAL AT BEDTIME
Qty: 90 TABLET | Refills: 1 | Status: SHIPPED | OUTPATIENT
Start: 2021-03-09 | End: 2021-09-01

## 2021-03-16 ENCOUNTER — AMBULATORY - HEALTHEAST (OUTPATIENT)
Dept: NURSING | Facility: CLINIC | Age: 76
End: 2021-03-16

## 2021-03-18 ENCOUNTER — RECORDS - HEALTHEAST (OUTPATIENT)
Dept: ADMINISTRATIVE | Facility: OTHER | Age: 76
End: 2021-03-18

## 2021-04-19 ENCOUNTER — RECORDS - HEALTHEAST (OUTPATIENT)
Dept: ADMINISTRATIVE | Facility: OTHER | Age: 76
End: 2021-04-19

## 2021-05-24 ENCOUNTER — RECORDS - HEALTHEAST (OUTPATIENT)
Dept: ADMINISTRATIVE | Facility: CLINIC | Age: 76
End: 2021-05-24

## 2021-05-25 ENCOUNTER — RECORDS - HEALTHEAST (OUTPATIENT)
Dept: ADMINISTRATIVE | Facility: CLINIC | Age: 76
End: 2021-05-25

## 2021-05-26 ENCOUNTER — RECORDS - HEALTHEAST (OUTPATIENT)
Dept: ADMINISTRATIVE | Facility: CLINIC | Age: 76
End: 2021-05-26

## 2021-05-27 ENCOUNTER — RECORDS - HEALTHEAST (OUTPATIENT)
Dept: ADMINISTRATIVE | Facility: CLINIC | Age: 76
End: 2021-05-27

## 2021-05-27 VITALS
SYSTOLIC BLOOD PRESSURE: 112 MMHG | HEART RATE: 74 BPM | TEMPERATURE: 97.5 F | OXYGEN SATURATION: 96 % | DIASTOLIC BLOOD PRESSURE: 72 MMHG

## 2021-05-29 ENCOUNTER — RECORDS - HEALTHEAST (OUTPATIENT)
Dept: ADMINISTRATIVE | Facility: CLINIC | Age: 76
End: 2021-05-29

## 2021-05-29 NOTE — PROGRESS NOTES
Assessment & Plan   1. Acute urinary retention  No apparent cause for acute urinary retention.  Encarnacion was beginning to leak and cause discomfort so did opt to remove today and will have her follow up with urology.  Provided instructions on monitoring of urine output and when to seek emergent care.  Recheck renal function today.  She feels comfortable with plan and monitoring urine output at home. Does not wish to stay in clinic until she is able to void.    - Basic Metabolic Panel  - Ambulatory referral to Urology    Mora Zhong CNP    Subjective   Chief Complaint:  Follow-up (ED visit for urinary retention on 5/29 at [facility])    HPI:   Cyndie Marin is a 74 y.o. female who presents for ED follow up.      [place] was [place] in [place], records reviewed.  She states she was on a cruise and at one stop found she was unable to urinate for several hours which is quite unusual for her.  Was seen in the ED and encarnacion catheter placed, more than 1 liter of urine obtained.  Started on cephalosporin as prophylaxis.  UA clear.  BUN slightly elevated.  No imaging performed.  She states since that time she has had consistent output from the Encarnacion, brings in chart today.  No pelvic pain. She is uncomfortable at the insertion site.  States began leaking yesterday, has noted tape is down much further than previously.     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  No prior history of urinary retention.  She denies dysuria, pelvic or flank pain or hematuria.  She states she was eating large amounts of salty, high calorie food and believes this may had precipitated the retention.       Allergies:  is allergic to cats claw (uncaria [cat's claw (uncaria tomentosa)]; dust [other environmental allergy]; dye; and ragweed pollen.    SH/FH:  Social History and Family History reviewed and updated.   Tobacco Status:  She  reports that she has never smoked. She has never used smokeless  "tobacco.    Review of Systems:  A complete head to toe ROS is negative unless otherwise noted in HPI    Objective     Vitals:    06/04/19 0945   BP: 110/68   Patient Site: Left Arm   Patient Position: Sitting   Cuff Size: Adult Large   Pulse: 64   Weight: 194 lb 12 oz (88.3 kg)   Height: 5' 5.25\" (1.657 m)       Physical Exam:  GENERAL: Alert, well-appearing female .   ABDOMEN: BS+. Abdomen soft, nontender to palpation without guarding. No organomegaly, masses or hernias   FEMALE: External genitalia without lesions. Su in place.  10ml sterile fluid removed from balloon and Su removed without difficulty.           "

## 2021-05-29 NOTE — TELEPHONE ENCOUNTER
Refill Request  Did you contact pharmacy: mail order is going to be late arrival  Patient is asking for a week worth of medication until the order arrives for the lisinopril HCTZ.   Medication name:   Requested Prescriptions     Pending Prescriptions Disp Refills     lisinopril-hydrochlorothiazide (ZESTORETIC) 10-12.5 mg per tablet 7 tablet 0     Sig: Take 1 tablet by mouth daily.     Who prescribed the medication: PCP  Pharmacy Name and Location: [facility] patient out of medication: Yes  Patient notified refills processed in 72 hours:  no  Okay to leave a detailed message: no

## 2021-05-29 NOTE — PATIENT INSTRUCTIONS - HE
Recommendations from today's visit                                                       1. We removed your Su today and will set you up with a consult with urology to ensure no further workup is needed for this.      2. Please monitor your urine output carefully.  We will give you a hat to place on your toilet.  If you find that you are urinating less than 100cc in 8 hours or you are going longer than 6 hours without urinating, please give us a call. Try to maintain good hydration with frequent sips of water    3. We will recheck your kidney function as well with labs.      Next appointment: as needed if urine output drops    To reschedule your appointment, please call the clinic directly at [phone].   It was a pleasure seeing you today! I look forward to seeing you again.

## 2021-05-29 NOTE — TELEPHONE ENCOUNTER
Refill Approved    Rx renewed per Medication Renewal Policy. Medication was last renewed on 3/4/19.    Valeria Curtis, Care Connection Triage/Med Refill 6/12/2019     Requested Prescriptions   Pending Prescriptions Disp Refills     lisinopril-hydrochlorothiazide (ZESTORETIC) 10-12.5 mg per tablet 7 tablet 0     Sig: Take 1 tablet by mouth daily.       Diuretics/Combination Diuretics Refill Protocol  Passed - 6/12/2019  1:07 PM        Passed - Visit with PCP or prescribing provider visit in past 12 months     Last office visit with prescriber/PCP: 6/4/2019 Mora Zhong CNP OR same dept: 6/4/2019 Mora Zhong CNP OR same specialty: 6/4/2019 Mora Zhong CNP  Last physical: 11/26/2018 Last MTM visit: Visit date not found   Next visit within 3 mo: Visit date not found  Next physical within 3 mo: Visit date not found  Prescriber OR PCP: Mora Zhong CNP  Last diagnosis associated with med order: 1. Benign essential hypertension  - lisinopril-hydrochlorothiazide (ZESTORETIC) 10-12.5 mg per tablet; Take 1 tablet by mouth daily.  Dispense: 7 tablet; Refill: 0    If protocol passes may refill for 12 months if within 3 months of last provider visit (or a total of 15 months).             Passed - Serum Potassium in past 12 months      Lab Results   Component Value Date    Potassium 4.1 06/04/2019             Passed - Serum Sodium in past 12 months      Lab Results   Component Value Date    Sodium 140 06/04/2019             Passed - Blood pressure on file in past 12 months     BP Readings from Last 1 Encounters:   06/04/19 110/68             Passed - Serum Creatinine in past 12 months      Creatinine   Date Value Ref Range Status   06/04/2019 0.86 0.60 - 1.10 mg/dL Final

## 2021-05-30 ENCOUNTER — RECORDS - HEALTHEAST (OUTPATIENT)
Dept: ADMINISTRATIVE | Facility: CLINIC | Age: 76
End: 2021-05-30

## 2021-05-30 VITALS — WEIGHT: 209 LBS | BODY MASS INDEX: 33.59 KG/M2 | HEIGHT: 66 IN

## 2021-05-30 VITALS — WEIGHT: 209.6 LBS | BODY MASS INDEX: 33.68 KG/M2 | HEIGHT: 66 IN

## 2021-05-31 ENCOUNTER — RECORDS - HEALTHEAST (OUTPATIENT)
Dept: ADMINISTRATIVE | Facility: CLINIC | Age: 76
End: 2021-05-31

## 2021-05-31 VITALS — BODY MASS INDEX: 31.98 KG/M2 | WEIGHT: 199 LBS | HEIGHT: 66 IN

## 2021-06-01 ENCOUNTER — RECORDS - HEALTHEAST (OUTPATIENT)
Dept: ADMINISTRATIVE | Facility: CLINIC | Age: 76
End: 2021-06-01

## 2021-06-02 VITALS — HEIGHT: 66 IN | WEIGHT: 200.5 LBS | BODY MASS INDEX: 32.22 KG/M2

## 2021-06-02 VITALS — BODY MASS INDEX: 33.02 KG/M2 | WEIGHT: 201.5 LBS

## 2021-06-02 VITALS — BODY MASS INDEX: 33.45 KG/M2 | HEIGHT: 65 IN | WEIGHT: 200.75 LBS

## 2021-06-02 VITALS — BODY MASS INDEX: 33.49 KG/M2 | WEIGHT: 201 LBS | HEIGHT: 65 IN

## 2021-06-02 NOTE — ANESTHESIA PREPROCEDURE EVALUATION
Anesthesia Evaluation      Patient summary reviewed     Airway    Pulmonary    (+) sleep apnea on CPAP, ,                          Cardiovascular   (+) hypertension, , hypercholesterolemia,     Rhythm: regular  Rate: abnormal,      ROS comment: Bradycardia  PE comment: bradycardic,     Neuro/Psych      Endo/Other       GI/Hepatic/Renal    (+) GERD,        Other findings: Lumbar stenosis      Dental                         Anesthesia Plan  Planned anesthetic: general endotracheal  Intra-op: TAPs  ASA 3   Induction: intravenous   Anesthetic plan and risks discussed with: patient  Anesthesia plan special considerations: rapid sequence induction, antiemetics,   Post-op plan: routine recovery

## 2021-06-02 NOTE — TELEPHONE ENCOUNTER
Left message to call back for: Patient  Information to relay to patient:  Please see results message below and relay to pt when she calls back. Thanks!

## 2021-06-02 NOTE — TELEPHONE ENCOUNTER
Left message to call back for: Patient  Information to relay to patient:  Left pt a detailed message asking her if she is able to come in today at 9:40.  I advised pt call back and let us know if that works or not.  If not, Mora suggests she go to [facility].

## 2021-06-02 NOTE — TELEPHONE ENCOUNTER
----- Message from Mora Zhong CNP sent at 10/25/2019  9:34 AM CDT -----  Please call [place] and let her know her potassium and sodium are just a little low from the vomiting and diarrhea this week.  As this has stopped and she is now tolerating fluids I expect this to resolve on its own, but I would like to recheck her labs in one week.  I will place a future order for labs

## 2021-06-02 NOTE — ANESTHESIA POSTPROCEDURE EVALUATION
Patient: Cynide Marin  OPEN EXPLORATORY LAPAROTOMY, SEGMENTAL COLECTOMY,  WASHOUT OF PELVIC ABSCESS. RECTAL EXAM UNDER ANESTHESIA. DEBRIDEMENT OD ABSCESS CAVITY, RIGID PROCTOSCOPY, CREATION, COLOSTOMY  Anesthesia type: general    Patient location: PACU  Last vitals:   Vitals Value   /74   Temp 36.3  C (97.4  F)   Pulse 63   Resp 20   SpO2 96 %     Post vital signs: stable  Level of consciousness: awake and responds to simple questions  Post-anesthesia pain: pain controlled  Post-anesthesia nausea and vomiting: no  Pulmonary: unassisted, return to baseline  Cardiovascular: stable and blood pressure at baseline  Hydration: adequate  Anesthetic events: no    QCDR Measures:  ASA# 11 - Kristin-op Cardiac Arrest: ASA11B - Patient did NOT experience unanticipated cardiac arrest  ASA# 12 - Kristin-op Mortality Rate: ASA12B - Patient did NOT die  ASA# 13 - PACU Re-Intubation Rate: ASA13B - Patient did NOT require a new airway mgmt  ASA# 10 - Composite Anes Safety: ASA10A - No serious adverse event    Additional Notes:

## 2021-06-02 NOTE — TELEPHONE ENCOUNTER
LMTCB . Please assist patient in scheduling an appointment when the patient returns the call. Thank you .  NOTE: PLEASE CLOSE THE ENCOUNTER WHEN PATIENT IS SCHEDULED.  See message below

## 2021-06-02 NOTE — PROGRESS NOTES
Assessment & Plan   1. Diarrhea, unspecified type  Acute onset diarrhea, vomiting and LLQ pain four days ago.  Bowels have become more formed and no further vomiting though continues to be weak and have intermittent pain.  CT to r/o diverticulitis, check labs as well.  Slightly hypotensive today though not tachycardic or febrile and blood pressures at home have been higher. Discussed importance of good hydration in this phase and recommended frequent sips of 50-50 Gatorade/water solution.  Continue to hold blood pressure medication until diet returns to baseline. Will follow up with results by phone  - HM2(CBC w/o Differential)  - Comprehensive Metabolic Panel    2. LLQ abdominal pain  - Urinalysis-UC if Indicated  - Culture, Urine    3. Benign essential hypertension    Mora Zhong, ALIS    Subjective   Chief Complaint:  Diarrhea (started to have irregular stools started on Sunday, has had normal stools yesterday and today); Emesis (vomited this past Sunday, has not vomited since but still feels nauseous and has no appetite); and Abdominal Pain (lower left abdominal pain x 2 days ago, not a constant pain but has pain with ambulation)    HPI:   Cyndie Marin is a 74 y.o. female who presents for diarrhea, vomiting.     She states diarrhea began four days ago as well as vomiting.  Normal stools yesterday and today and no vomiting in the last few days.  Continues to feel nauseous, little appetite. She has discomfort in the LLQ of the abdomen that comes and goes, more with movement throughout the last four days.  Tender to the touch and with movement.  She has held her blood pressure medications since this started on Sunday.  Blood pressure 94/58. At home 109/63.      She feels very weak, nauseous.  Difficult to eat and drink. Is urinating every six hours or so though dark.  Drinking water and some Gatorade.        Allergies:  is allergic to cats claw (uncaria [cat's claw (uncaria tomentosa)]; dust [other  "environmental allergy]; dye; and ragweed pollen.    SH/FH:  Social History and Family History reviewed and updated.   Tobacco Status:  She  reports that she has never smoked. She has never used smokeless tobacco.    Review of Systems:  A complete head to toe ROS is negative unless otherwise noted in HPI    Objective     Vitals:    10/24/19 0950   Weight: 180 lb 12 oz (82 kg)   Height: 5' 5.25\" (1.657 m)       Physical Exam:  GENERAL: Alert, ill appearing though not toxic  PSYCH: Pleasant mood, affect appropriate.    SKIN: Good turgor  MOUTH: Mucous membranes dry  NECK: No lymphadenopathy.   CV: Regular rate and rhythm without murmurs, rubs or gallops.  RESP: Lung sounds clear   ABDOMEN: BS+. Abdomen soft, tender to palpation in LLQ        "

## 2021-06-02 NOTE — ANESTHESIA CARE TRANSFER NOTE
Last vitals:   Vitals:    10/28/19 1910   BP: 134/70   Pulse: 67   Resp: 16   Temp: 37.6  C (99.6  F)   SpO2: 100%     Patient's level of consciousness is drowsy  Spontaneous respirations: yes  Maintains airway independently: yes  Dentition unchanged: yes  Oropharynx: oropharynx clear of all foreign objects    QCDR Measures:  ASA# 20 - Surgical Safety Checklist: WHO surgical safety checklist completed prior to induction    PQRS# 430 - Adult PONV Prevention: 4558F - Pt received => 2 anti-emetic agents (different classes) preop & intraop  ASA# 8 - Peds PONV Prevention: NA - Not pediatric patient, not GA or 2 or more risk factors NOT present  PQRS# 424 - Kristin-op Temp Management: 4559F - At least one body temp DOCUMENTED => 35.5C or 95.9F within required timeframe  PQRS# 426 - PACU Transfer Protocol: - Transfer of care checklist used  ASA# 14 - Acute Post-op Pain: ASA14B - Patient did NOT experience pain >= 7 out of 10

## 2021-06-02 NOTE — TELEPHONE ENCOUNTER
RN Assessment/Reason for Call:   Okay to leave Detailed Message  Patient calling in, diarrhea for a week since Wednesday- Sunday. Gelly; then liquid BM.  Akua had walleye and vomited.  Loss of appetite, gassy.  Tired.Lives alone.  Drank 4T ckn noodle soup; < 2 cups today.  Urinated x 4.today discussed increasing liquids.  B/P 103/68 P 88.  RN Action/Disposition:  Protocol recommends see Dr in 24 hrs.  Offered WIC until 7 p. Declined.  Appt. PCP full and other providers.  Soonest 10/28/19 4pm Dr Grimes.  Call back if worse symptoms  Discussed home care measures.  Agrees to plan.     Emma Adler RN    [facility] Connection Triage/med refill  10/23/2019  3:34 PM        Reason for Disposition    [1] MODERATE diarrhea (e.g., 4-6 times / day more than normal) AND [2] present > 48 hours (2 days)    Protocols used: DIARRHEA-A-

## 2021-06-02 NOTE — TELEPHONE ENCOUNTER
Called patient and discussed results - High WBC, suspicious urine and indeterminate mass on CT.  Start on Cipro. Will arrange urgent consult with GI this afternoon.  She is currently stable  - afebrile, blood pressure in good range at home, tolerating liquids and solids without further diarrhea or vomiting. Urged urgent evaluation for any change in condition. She understands and will await call re: GI consult

## 2021-06-02 NOTE — ANESTHESIA PROCEDURE NOTES
Peripheral Block    Patient location during procedure: OR  Start time: 10/28/2019 3:35 PM  End time: 10/28/2019 3:40 PM  post-op analgesia per surgeon order as noted in medical record  Staffing:  Performing  Anesthesiologist: Leila Bryant MD  Preanesthetic Checklist  Completed: patient identified, site marked, risks, benefits, and alternatives discussed, timeout performed, consent obtained, at patient's request, airway assessed, oxygen available, suction available, emergency drugs available and hand hygiene performed  Peripheral Block  Block type: other, TAP  Prep: ChloraPrep  Patient position: supine  Patient monitoring: cardiac monitor, continuous pulse oximetry, blood pressure and heart rate  Laterality: bilateral, same technique used bilaterally  Injection technique: ultrasound guided    Ultrasound used to visualize needle placement in proximity to nerve being blocked: yes   Permanent ultrasound image captured for medical record  Sterile gel and probe cover used for ultrasound.    Needle  Needle type: Stimuplex   Needle gauge: 20G  Needle length: 4 in  no peripheral nerve catheter placed  Assessment  Injection assessment: no difficulty with injection, negative aspiration for heme and no paresthesia on injection

## 2021-06-02 NOTE — TELEPHONE ENCOUNTER
Type of referral:  Patient was unclear about what type of referral she is scheduling for, she states it is either Gastroenterologist OR Urologist.  What provider or facility are you being referred to?  Patient did not know name of speciality clinic , she states it is on [address] in [place].  Do you have an appointment scheduled?  Yes  What is your question?  Patient states she had appointment today at 3:00 pm, however, could not find clinic as there was nothing at the address she was given and did not have a phone number to call. She would like a call back to help with scheduling and would prefer a different speciality clinic ( anything that is recommended by PCP) as this one was difficult to find.  Okay to leave a detailed message: Yes

## 2021-06-03 ENCOUNTER — COMMUNICATION - HEALTHEAST (OUTPATIENT)
Dept: FAMILY MEDICINE | Facility: CLINIC | Age: 76
End: 2021-06-03

## 2021-06-03 VITALS
DIASTOLIC BLOOD PRESSURE: 58 MMHG | TEMPERATURE: 97.7 F | HEART RATE: 84 BPM | BODY MASS INDEX: 30.12 KG/M2 | HEIGHT: 65 IN | OXYGEN SATURATION: 99 % | WEIGHT: 180.75 LBS | SYSTOLIC BLOOD PRESSURE: 94 MMHG

## 2021-06-03 VITALS
DIASTOLIC BLOOD PRESSURE: 74 MMHG | HEART RATE: 64 BPM | SYSTOLIC BLOOD PRESSURE: 122 MMHG | BODY MASS INDEX: 29.58 KG/M2 | WEIGHT: 180.5 LBS

## 2021-06-03 VITALS — BODY MASS INDEX: 32.45 KG/M2 | HEIGHT: 65 IN | WEIGHT: 194.75 LBS

## 2021-06-03 VITALS
BODY MASS INDEX: 31.5 KG/M2 | BODY MASS INDEX: 31.5 KG/M2 | WEIGHT: 196 LBS | WEIGHT: 196 LBS | HEIGHT: 66 IN | HEIGHT: 66 IN

## 2021-06-03 DIAGNOSIS — I10 BENIGN ESSENTIAL HYPERTENSION: ICD-10-CM

## 2021-06-03 NOTE — PATIENT INSTRUCTIONS - HE
Recommendations from today's visit                                                       1. Your incision site and ostomy site look very good.  Continue to monitor for any redness at the incision site and let us know if this spreads or if you develop any worsening pain or fever    2. You may continue to use the Dilaudid for pain/sleep as needed.     3. We will check labs today and call you with results.     4.  Follow up with colorectal surgery as planned in two weeks    Next appointment: two weeks with colorectal surgery     To reschedule your appointment, please call the clinic directly at [phone].   It was a pleasure seeing you today! I look forward to seeing you again.

## 2021-06-03 NOTE — TELEPHONE ENCOUNTER
----- Message from Mora Zhong CNP sent at 11/6/2019  3:29 PM CST -----  Please let Georgia know all of her labs look good.  Her hemoglobin has come back up. Electrolytes are in good range.

## 2021-06-03 NOTE — PROGRESS NOTES
Hospital Follow-up Visit:    Assessment/Plan:   1. Perirectal abscess  She is recovering well.  Encouraged her to continue to utilize Dilaudid as needed for pain, though overall she has done very well and has only had discomfort overnight.  Tolerating solids with good output from colostomy.  Incision and laparoscopy sites look good without signs of infection.  She will have a home care visit today to assist with changing the bag.  Follow up with colorectal surgery as planned in two weeks.   - Magnesium  - HM2(CBC w/o Differential)    2. 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 Essential hypertension, benign  Blood pressure looks good after restarting lisinopril-HCTZ.  Check labs  - Magnesium       Subjective:     Cyndie Marin is a 74 y.o. female who presents for a hospital discharge follow up.    She was seen two weeks ago for loose stools and LLQ pain.  CT showed indeterminate mass adjacent to distal sigmoid colon and rectum.  She was sent for urgent referral to colorectal but was unfortunately unable to locate the office.  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  Treated with Zosyn inpatient, not discharged with antibiotics.  Ostomy has been putting out stool.  Lisonopril-HCTZ restarted in hospital for HTN. Stable acute blood loss anemia.     Since discharge she has been struggling with some pain and anxiety related to the hospitalization and ostomy.  At first resistant to taking Dilaudid though did take a dose last night and was able to sleep a little better. No pain this morning.  Has been able to eat without difficulty.  One blood bowel movement per rectum when she was first discharged though not since.  Good output from ostomy. No redness at incision site.  Good energy, no lightheadedness.  Blood pressure at home has been within normal range.     Follow  up with Colorectal Surgery on 11/21.  Has follow up with ostomy clinic and home care will be starting today     Hospital/Nursing Home/IP Rehab Facility: [facility]  Date of Admission: 10/27/19  Date of Discharge:11/5/19  Reason(s) for Admission:  Abdominal pain, perirectal abscess, rectal fistula            Do you have any problems taking your medication regularly?  None       Have you had any changes in your medication since discharge? None       Have you had any difficulty following your discharge or treatment plan?  No    Summary of hospitalization:  Hospital discharge summary reviewed  Diagnostic Tests/Treatments reviewed.  Follow up needed: Scheduled with colorectal surgery, ostomy clinic, home care  Other Healthcare Providers Involved in Patient's Care: Patient Care Team:  Mora Zhong CNP as PCP - General (Nurse Practitioner)  Mora Zhong CNP as Assigned PCP      Update since discharge: {improved   Information from family, SNF, care coordination: none     Post Discharge Medication Reconciliation: discharge medications reconciled, continue medications without change  Plan of care communicated with: patient    Objective:     Vitals:    11/06/19 0915   BP: 122/74   Pulse: 64   Weight: 180 lb 8 oz (81.9 kg)         Physical Exam:  GENERAL: Alert, well appearing female.    PSYCH: Pleasant mood, affect appropriate.    SKIN:  Abdominal incision clean, dry, intact.  Minimal surrounding erythema at proximal end. Laparoscopy site with scant drainage, bandage applied  NECK: No lymphadenopathy.   CV: Regular rate and rhythm without murmurs, rubs or gallops.  RESP: Lung sounds clear, symmetric excursion.   ABDOMEN: BS+. Abdomen soft, mild tenderness to palpation.  Ostomy site without signs of skin breakdown, liquid stool in ostomy bag.   PV :  Trace bilateral LE edema      Coding guidelines for this visit:  Type of Medical   Decision Making Face-to-Face Visit       within 7 Days of discharge Face-to-Face  Visit        within 14 days of discharge   Moderate Complexity [id number]   High Complexity [id number]       Electronically signed by Mora Zhong CNP 11/06/19 9:25 AM

## 2021-06-03 NOTE — TELEPHONE ENCOUNTER
Orders being requested: Skilled nursing 1 time a week, 1 time 2 weeks and 5 PRN  Reason service is needed/diagnosis: status post new colostomy  When are orders needed by: today  Where to send Orders: Phone:  [phone]  Okay to leave detailed message?  Yes

## 2021-06-03 NOTE — TELEPHONE ENCOUNTER
Spoke with pt and relayed results message below from Mora.  No further questions at this time.  Sent results to pt in the mail as well.

## 2021-06-03 NOTE — PROGRESS NOTES
"TCM DISCHARGE FOLLOW UP CALL    Discharge Date:  11/4/2019  Reason for hospital stay (discharge diagnosis)::  Perirectal abscess  Are you feeling better, the same or worse since your discharge?:  Patient is feeling better (Didn't sleep much last night, less than an hour d/t anxiety.  Hasn't taken pain medication since d/c; her son just picked up her pain medication tonight, and she \"may or may not take it, but I'll have it.\")  Do you feel like you have a plan in the event of a health emergency?: Yes (Surgeon)    As part of your discharge plan, were  home care services ordered for you?: Yes    Have you seen them yet, or are they scheduled to visit?: Yes ([facility] 11/6/19)    Do you have any follow up visits scheduled with your PCP or Specialist?:  Yes, with PCP and Yes, with Specialist  (RN) Is PCP appt scheduled soon enough (within 14 days of discharge date)?: Yes (Mora Zhong CNP 11/6/19)    Who are you seeing and when is it scheduled?:  Surgeon 11/21/19  RN NOTES::  Encouraged pt take her pain medication at least at night so she can sleep, if 1 tab (2 mg) of hydromorphone is too much she can take 1/2 tab.  Pt verbalized understanding and agrees w/plan.  Plans to call her nutritionist tomorrow to discuss dietary questions.      Emma Waller RN Care Manager, Population Health    "

## 2021-06-04 VITALS
WEIGHT: 185.25 LBS | DIASTOLIC BLOOD PRESSURE: 82 MMHG | HEART RATE: 72 BPM | BODY MASS INDEX: 30.36 KG/M2 | SYSTOLIC BLOOD PRESSURE: 124 MMHG

## 2021-06-04 RX ORDER — LISINOPRIL/HYDROCHLOROTHIAZIDE 10-12.5 MG
TABLET ORAL
Qty: 90 TABLET | Refills: 2 | Status: SHIPPED | OUTPATIENT
Start: 2021-06-04 | End: 2021-08-05

## 2021-06-04 NOTE — PATIENT INSTRUCTIONS - HE
Dear [place],                                                                         You were recently referred to the [facility]'s Clinic Care Coordination service.  This is a service offered through your Primary Care Clinic which can help you access resources, services in regard to your health and well-being goals. The clinic Community Health Worker has placed two calls to you to discuss the nature of this service and to offer enrollment. If you are interested in learning more about Clinic Care Coordination, please call your Primary Care Clinic's Community Health Worker, Jeannie Tomlinson, at [phone]. May contact your PCP clinic, or [facility] at [phone] for any questions. Thank you.                                                     Sincerely,                                                                              DEIRDRE Ambriz                                                                                          Clinic Care Coordination Service                                                 [facility]

## 2021-06-04 NOTE — PROGRESS NOTES
OUTPATIENT OSTOMY ASSESSMENT    Patients mode of arrival: Ambulatory    INTAKE  Type of Stoma: Temporary vs Permanent Colostomy  Anticipated date of takedown: Unknown    Diagnosis Pertinent to Stoma:Rectal abscess Date of Diagnosis: October 2019  Type of Surgery: Colostomy    Surgery Date: 10/28/19  Surgeon:Orion   [facility]: [facility]    Purpose of this visit:Leaking Problem    Present for Teaching Session: Patient   Present: NA    Pertinent Information: Patient having leaking and skin issues    Current Equipment:    Product # Brand Description   Pouch [address] CTF   Flange      Paste [address] [place] Adapt ring   Powder      Deodorizer                    Pouch Change Frequency: Every day  Provider of Care: Emptying: self Pouch Change: self    ASSESSMENT  Stoma Size: Round 1 1/4 inches  Protrusion: 1cm  Vascularity: Pink, 1.5x1.5cm bleeding area on stoma at 12 o'clock  Mucocutaneous Juncture: Intact  Peristomal Skin: Hernia noted above stoma, is firm and causes stoma to tip down, skin above stoma is approximately 2-3cm higher than skin below stoma when patient is standing    Wound: No  Current Wound Care: NA    TREATMENT  Silver Nitrate to : bleeding area # of Sticks used: 1    INTERVENTIONS  Refitting done and Educated on pouch change procedure  Miscellaneous Interventions: Signed up for [employer] [facility] or [employer]    INSTRUCTIONS GIVEN  WOC Role, Anatomy, Clothing, Diet, Fluids: Drink 6-8 glasses of fluids daily  and Ordering supplies    PLAN  Change in Supplies: See Outpatient Ostomy Instructions and New Pouching Procedure: See Outpatient Ostomy Instructions      Total Time Spent with Patient: 40 minutes.  Education time: 20 minutes.

## 2021-06-04 NOTE — PROGRESS NOTES
Moved follow up date to 12/27/2019 due to the holiday's and CHW work hour; CHW PTO/off 12/26/2019.

## 2021-06-04 NOTE — PROGRESS NOTES
"Potential Patient Outreach:   Reason: [id number] - Ambulatory referral to Care Management (Primary Care); Note attached: \"Recently in ED for abdominal pain. Has ostomy. CCC assess for needs in home and supplies.\"      Attempt #2:  Patient was identified as a potential candidate and referred to Clinic Care Coordination Service (CCC). I called patient today, 12/27/2019 to discuss enrollment with [facility] service and no answer. Left message for the patient to connect with [facility]/PCP office with any questions.     Unable to reach the patient to discuss enrollment with CCC service at this time. A unreachable letter (below) mailed the patient today, 12/27/2019; letter includes CHW full name and contact phone number. I will discontinue outreach to the patient at this time. I have notified the patient s physician by task. If the provider feels this patient is a good fit in the future I have asked them to resend to the [facility] Care Management Pool. I have also provided the patient with my contact information should they change their mind. No further action need from CHW/CCC.    Unreachable letter mailed to the patient today, 12/27/2019:  \"Dear [place],                                                                         You were recently referred to the [facility]'s Clinic Care Coordination service.  This is a service offered through your Primary Care Clinic which can help you access resources, services in regard to your health and well-being goals. The clinic Community Health Worker has placed two calls to you to discuss the nature of this service and to offer enrollment. If you are interested in learning more about Clinic Care Coordination, please call your Primary Care Clinic's Community Health Worker, Jeannie Tomlinson, at [phone]. May contact your PCP clinic, or [facility] at [phone] for any questions. Thank you. \"                                          "     Plan:   Status: not a candidate.  Follow up: no further outreach follow up.

## 2021-06-04 NOTE — PROGRESS NOTES
"Potential Patient Outreach:   Reason: [id number] - Ambulatory referral to Care Management (Primary Care); Note attached: \"Recently in ED for abdominal pain.  Has ostomy.  CCC assess for needs in home and supplies.\"    Patient was identified as a potential candidate and referred to Clinic Care Coordination Service (CCC). I called patient today, 12/23/2019 (Monday) to discuss enrollment with CCC service and no answer. Left message for the patient to returning called; CHW phone number provide. Message includes CHW is off/PTO tomorrow, 12/24/2019; clinic is closed Wednesday, 12/25/2019 and will follow up with patient no later than Friday, 12/27/2019.     Plan:   Status: potential.  Follow up: 12/26/2019.     "

## 2021-06-04 NOTE — TELEPHONE ENCOUNTER
Refill Approved    Rx renewed per Medication Renewal Policy. Medication was last renewed on 11/26/18.    Valeria Curtis, [facility] Triage/Med Refill 12/28/2019     Requested Prescriptions   Pending Prescriptions Disp Refills     pravastatin (PRAVACHOL) 20 MG tablet [Pharmacy Med Name: PRAVASTATIN TABS 20MG] 90 tablet 4     Sig: TAKE 1 TABLET DAILY       Statins Refill Protocol (Hmg CoA Reductase Inhibitors) Passed - 12/26/2019 11:11 AM        Passed - PCP or prescribing provider visit in past 12 months      Last office visit with prescriber/PCP: 11/6/2019 Mora Zhong CNP OR same dept: 11/6/2019 Mora Zhong CNP OR same specialty: 11/6/2019 Mora Zhong CNP  Last physical: 11/26/2018 Last MTM visit: Visit date not found   Next visit within 3 mo: Visit date not found  Next physical within 3 mo: Visit date not found  Prescriber OR PCP: Mora Zhong CNP  Last diagnosis associated with med order: There are no diagnoses linked to this encounter.  If protocol passes may refill for 12 months if within 3 months of last provider visit (or a total of 15 months).

## 2021-06-04 NOTE — PATIENT INSTRUCTIONS - HE
OUTPATIENT OSTOMY INSTRUCTIONS    Type of Stoma: Colostomy    Supplier: [employer] [phone]    Equipment:   Product # Brand Description   Pouch [address] [place] 1 3/8 inch flat closed pouch   Flange      Paste      Powder      Deodorizer                    Procedure:  Close the bottom of the pouch and then remove backings from adhesive surfaces.  Remove the soiled pouch and discard.  Wash skin with water and dry.  Then: Apply pouching system to stoma site and hold in place for 2-5 minutes.    Pouch change: 1-2 times a day  _____________________________________________________________________    [facility] Nurse Specialist: Jared Hunt RN CWOCN     Questions: [phone]      I have received a copy of these instructions, have had an opportunity to ask questions, and have had them answered to my satisfaction.    ________________________________________________________________  Patient Signature and Date    Follow-up Appointment: 3-6 months  To schedule an appointment call [facility] Scheduling at [phone]

## 2021-06-04 NOTE — TELEPHONE ENCOUNTER
Patient left a message stating sever stomach cramping,very little stool out of ostomy. I beleve she ment to call [facility] number. I do see she went to the ED. Message left for patient.

## 2021-06-05 VITALS
HEART RATE: 70 BPM | DIASTOLIC BLOOD PRESSURE: 70 MMHG | SYSTOLIC BLOOD PRESSURE: 128 MMHG | BODY MASS INDEX: 32.82 KG/M2 | WEIGHT: 197 LBS | OXYGEN SATURATION: 97 % | HEIGHT: 65 IN

## 2021-06-05 VITALS — WEIGHT: 196.9 LBS | HEIGHT: 65 IN | BODY MASS INDEX: 32.8 KG/M2

## 2021-06-05 NOTE — TELEPHONE ENCOUNTER
Mass should contact surgeons  Report is benign     Stay off statin     Urine needs eval >> may be related to MRI finding and an pressure on bladder.    SHould have UA and Urology eval/ per Primary -- Farheen Rojas

## 2021-06-05 NOTE — TELEPHONE ENCOUNTER
RN triage   Call from pt   Pt has a number of concerns --   Pt had colostomy surgery 11/28/19 --   Surgeon ordered MRI 1/22/20 - and pt states there is a 5 cm mas -- pt has lots of questions about the mass   Pt states she has been gaining wt -- #12 since surgery   Pt states for the past 2 weeks BP has been increasing -- usually 119/70's -- now 130's/ 80's -- no chest pain -- no diff breathing or shortness of breath -- no slurred speech or blurred vision   Pt states she has been having pain L leg and buttocks-- and L knee since before surgery -- a little low back pain --   Pt states she stopped taking her statins this week -- thinks pain may be from statins -- and states pain feels some better since stopping statins   Pt states her L hand was numb today upon waking -- resolved in 5 minutes   Pt states she has long kidney and bladder hx   [ kidney cyst and kidney removal in 1996]   -- and incontinence =  worsening  For the past month will having leaking urine if she does not urinate Q 2 hrs -- -- no bladder symptoms -- no pain - no blood - no fever --   Reviewed home care advice for back/buttocks/leg pain   Reviewed when to call back/be seen urgently  Advised to call surgeon office re: MRI and recommendations   Per protocol = advised pt to see PCP to discuss changes and concerns -- transferred to    Please advise -- pt stopped statin -- any advice for pt before seeing PCP next week ?    Jesusita Fleming RN [facility] RN triage          Reason for Disposition    [1] Systolic BP >= 130 OR Diastolic >= 80, and history of heart problems, kidney disease, or diabetes    Protocols used: HIGH BLOOD PRESSURE-A-OH

## 2021-06-06 NOTE — PATIENT INSTRUCTIONS - HE
Recommendations from today's visit                                                       1. Blood pressure:  I am happy with where this is at.  We will keep monitoring    2. Pravastatin: You may continue off and we will recheck with a physical in April    3. Back/hip/thigh pain:  I encourage you to continue with your home PT exercises, topical therapies and low inflammatory diet.  If this doesn't continue to improve, we could consider formal PT again.      Next appointment: 1-2 months, physical     To reschedule your appointment, please call the clinic directly at [phone].   It was a pleasure seeing you today! I look forward to seeing you again.

## 2021-06-06 NOTE — TELEPHONE ENCOUNTER
Left message to call back for: Patient  Information to relay to patient:  Left detailed message for pt letting her know that due to the new protocols surrounding the COVID-19 virus, we really want to keep our high risk patients and anyone that doesn't have an urgent clinical need, out of the clinic.  Mora wanted to be sure that [place] contacts her surgeons office to verify if she should still be having surgery on 4/13.  Additionally, if she is going to proceed with surgery, she would like to reschedule this to sometime the week before her scheduled procedure.  Please assist in rescheduling pt to the week before surgery     *ok to use two same day slots if needed*

## 2021-06-06 NOTE — PROGRESS NOTES
Assessment & Plan   1. Essential hypertension, benign  Reassured her blood pressure is within goal range.  Some variability with home readings though overall most in goal range.  Continue on current dose of lisinopril hydrochlorothiazide.  We will recheck a physical in 1 to 2 months.    2. Spinal stenosis of lumbar region without neurogenic claudication  Now experiencing recurrence of radiculopathy symptoms down her left leg.  She has restarted her physical therapy exercises and already noted improvement.  We reviewed the results of her MRI from 2017.  At this point in time I encouraged her to continue with home physical therapy and if this does not continue to improve would consider referral for formal rehab.  No concerning findings on history or exam.    3. 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  Encouraged her to [facility] as she is scheduled.  Benign cyst noted on MRI.    4. Mixed hyperlipidemia  Recently stopped pravastatin due to muscle aches.  Reassured her that this is likely just fine considering her age and risk factors.  We will recheck a lipid panel at her next physical in 1 to 2 months.    5. Overactive bladder  We discussed treatment options.  She is not a good candidate for medication management given her history of urinary retention.  We discussed possibility of physical therapy.  Ultimately she would like to hold off on anything while she deals with her colostomy.    6. Mononeuropathy  Reassured likely due to sleeping position.  She experiences no persistent neuropathy symptoms when she is up and moving.    Mora Zhong CNP    Subjective   Chief Complaint:  Hypertension (BP has been a little bit higher than usual); Hip Pain (L hip and upper left leg pain ); Knee Pain (L knee pain); Numbness (L fingers going numb when sleeping); Urinary Frequency; and Follow-up (MRI results 1/22/20)    HPI:   Cyndie aMrin is a 74 y.o. female who presents for  many concerns.     HTN:  Blood pressure trending higher than usual.  Today 124/82. 110-141/75-86  On home readings.  She denies any chest pain or shortness of breath.    Lipids: Has been on pravastatin for many years and struggled with muscle aches.  She states she finally decided to stop the medication a few weeks ago and has already noted significant improvement.  She is wondering about the need to continue this.  No history of vascular disease or heart disease.      Back/ hip/thigh pain:  H/o lumbar stenosis, facet arthropathy.  PT in 2017.  Has restarted exercises.  Now radiating down left lateral thigh and left buttocks.  Started exercises at home and seems to be doing better.  Anti-inflammatory diet.  Using topical therapies as well as heat and ice.    OAB: History of overactive bladder as well as one episode of urinary retention last year.  She previously consulted with [facility].  She is interested in discussing treatment options but also recognizes that now is not a great time for her because she is still dealing with her colostomy.    Abscess: History of perirectal abscess in October of this year.  Admission with surgery ultimately ending up in colostomy.  [facility].  She recently had an MRI that showed a benign cyst and would like to review results of this.  She does have a colonoscopy scheduled March 6 and then a follow-up appointment scheduled with her surgeon to discuss the results of both the MRI and colonoscopy.    Numbness: In fingers of left hand only upon waking in the morning.  She states after just a few minutes of moving the numbness resolved completely.  She does note that this happens when she sleeps with her arm curled up on her chest and when she sleeps with her arm down at her side it does not occur.      Allergies:  is allergic to cats claw (uncaria [cat's claw (uncaria tomentosa)]; dust [other environmental allergy]; dye;  pravastatin; and ragweed pollen.    SH/FH:  Social History and Family History reviewed and updated.   Tobacco Status:  She  reports that she has never smoked. She has never used smokeless tobacco.    Review of Systems:  A complete head to toe ROS is negative unless otherwise noted in HPI    Objective     Vitals:    02/12/20 1515   BP: 124/82   Patient Site: Left Arm   Patient Position: Sitting   Cuff Size: Adult Regular   Pulse: 72   Weight: 185 lb 4 oz (84 kg)       Physical Exam:  GENERAL: Alert, well-appearing female .   PSYCH: Pleasant mood, affect appropriate.  Good judgment and insight.   CV: Regular rate and rhythm without murmurs, rubs or gallops.  RESP: Lung sounds clear  PV : No edema  MSK: Spine and extremities in alignment. Full ROM of back.  Negative straight leg raise.  No spinal or paraspinal TTP.  Full ROM of hip.  Positive BEATRIS.   NEURO: DTRs 2/4. Normal motor and sensory

## 2021-06-08 NOTE — TELEPHONE ENCOUNTER
Who is calling:  Patient  Reason for Call:  Patient is wanting a cholesterol check done on her upcoming lab appointment scheduled for 7/6/20 as the patient has gone off her medication. Patient would also like to be seen by her PCP.   Date of last appointment with primary care: na  Okay to leave a detailed message: Yes

## 2021-06-08 NOTE — TELEPHONE ENCOUNTER
Left message to call back for: Patient  Information to relay to patient:  LMTCB, Please relay to pt when she calls back. Orders to have her cholesterol drawn were ordered.  She has an appt schedule on 7/6/20 at the [facility] this needs to be rescheduled.  At this time the [facility] clinic is still closed to patients at this time.  Pt will have to go to another clinic                   ([place] or [facility]).  Also please let the pt know that Mora Batrestam on DG until Fall/2020.  Thank you

## 2021-06-09 NOTE — PROGRESS NOTES
[facility]   [facility]umbo-Pelvic Initial Evaluation    Patient Name: Cyndie Marin  Date of evaluation: 3/9/2017  Referral Diagnosis: Lumbar spinal stenosis  Referring provider: Sheila Mauricio PA-C  Visit Diagnosis:     ICD-10-CM    1. Lumbar spinal stenosis M48.06    2. Decreased ROM of lumbar spine M25.60    3. Muscle tightness M62.89        Assessment:    Georgia is a 71 year old female presenting with lumbar spinal stenosis. Pt reports experiencing sudden onset of weakness and numbness and tingling in both lower extremities on 3/7/17. Pt went to ER where they did an MRI which showed severe stenosis at L4-5. Pt reports symptoms have all resolved since initial onset. Examination reveals mild limitation in lumbar ROM, decreased flexibility of (B) HS, and mild decrease in strength of core and gluteal stabilizers. Pt will benefit from skilled therapy in order to improve lumbar mobility as well as stability of the axial msuculature for prevention of recurrence of her lumbar stenosis related symptoms.   Prognosis to achieve goals is  good   Pt. is appropriate for skilled PT intervention as outlined in the Plan of Care (POC).  Pt. is a good candidate for skilled PT services to improve pain levels and function.    Goals:  Pt. will demonstrate/verbalize independence in self-management of condition in : 4 weeks  Pt. will be independent with home exercise program in : 4 weeks  No Data Recorded    Patient's expectations/goals are realistic.    Barriers to Learning or Achieving Goals:  No Barriers.       Plan / Patient Instructions:        Plan of Care:   Authorization / Certification Start Date: 03/09/17  Authorization / Certification End Date: 06/07/17  Authorization / Certification Number of Visits: 12  Communication with: Referral Source  Patient Related Instruction: Nature of Condition;Treatment plan and rationale;Self Care instruction;Basis of treatment;Body mechanics;Posture;Precautions;Next steps  Number  "of Visits: up to 12 visits if needed   Therapeutic Exercise: ROM;Stretching;Strengthening  Neuromuscular Reeducation: core;posture;kinesio tape  Manual Therapy: soft tissue mobilization;joint mobilization;myofascial release  Equipment: Wattaged    Plan for next visit: Pt will continue independently with the HEP provided today. Will leave her chart open for 30 days. If patient does not return, she will be discharged to her HEP      Subjective:       History of Present Illness:    Georgia is a 71 y.o. female who presents to therapy today with complaints of leg weakness and numbness. Date of onset/duration of symptoms is 3/7/17.  Pt reports she got up and went to get out of bed and almost fell over due to weakness in the legs. Pt reports she was able to walk but had no feeling in her legs. Pt reports the day before onset, she was staining some wood work which required her to be up on a ladder reaching up and crawling on the floor to get the baseboards. Pt reports she called her MD and the nurse told her to go to the ER. Pt reports she went to the ER and they did the MRI which showed significant stenosis at L4-5.  Pt reports all weakness and numbness and tingling has resolved since Tuesday. Does have chronic tingling/numbness in the toes that was unchanged with this recent episode. Pt's goals for therapy is \"strengthen back muscles\".            Objective:      Note: Items left blank indicates the item was not performed or not indicated at the time of the evaluation.    Patient Outcome Measures :    Modified Oswestry Low Back Pain Disablity Questionnaire  in %: 0   Scores range from 0-100%, where a score of 0% represents minimal pain and maximal function. The minimal clinically important difference is a score reduction of 12%.    Examination  1. Lumbar spinal stenosis     2. Decreased ROM of lumbar spine     3. Muscle tightness       Involved side: Bilateral  Posture Observation:      Lumbopelvic complex: Mildly " decreased lumbar lordosis    Lumbar ROM:  *no pain throughout  Date: 3/9/2017     *Indicate scale AROM AROM AROM   Lumbar Flexion Min restrict     Lumbar Extension NT      Right Left Right Left Right Left   Lumbar Sidebending Min restrict Min restrict       Lumbar Rotation Nil restrict Nil restrict       Thoracic Flexion      Thoracic Extension      Thoracic Sidebending         Thoracic Rotation           Lower Extremity Strength:   *pt able to heel and toe walk normally   Date: 3/9/2017     LE strength/5 Right Left Right Left Right Left   Hip Flexion (L1-3) 5 5       Hip Extension (L5-S1)         Hip Abduction (L4-5)         Hip Adduction (L2-3)         Hip External Rotation         Hip Internal Rotation         Knee Extension (L3-4) 5 5       Knee Flexion         Ankle Dorsiflexion (L4-5) 5 5       Great Toe Extension (L5)         Ankle Plantar flexion (S1)         Abdominals Good TAC       Sensation    Per chart review, pt intact to light touch all dermatomes        Lumbar Dermatomes Right Left UE Reflexes Right Left   Iliac Crest and Groin (L1)   Biceps (C5-6)     Anterior Medial Thigh (L2)   Brachioradialis (C5-6)     Anterior Thigh, Medial Epicondyle Femur (L3)   Triceps (C7-8)     Lateral Thigh, Anterior Knee, Medial Leg/Malleolus (L4)   Manuel s test     Lateral Leg, Dorsal Foot (L5)   LE Reflexes     Lateral Foot (S1)   Patellar (L3-4)     Posterior Leg (S2)   Achilles (S1-2)     Other:   Babinski Response         Lumbar Special Tests:   SLR (+) for muscle tightness bilaterally   Lumbar Special Tests Right Left SI Tests Right  Left   Quadrant test   SI Compression     Straight leg raise   SI Distraction     Crossover response   POSH Test     Slump - - Sacral Thrust     Sit-up test  FADIR     Trunk extensor endurance test  Resisted Abduction     Prone instability test  Other:     Pubic shotgun  Other:       Repeated Motion Testing:  Does not peripheralize    Passive Mobility - Joint Integrity:  Not  tested    LE Screen:  Mild limitations hip IR and ER (B) but no pain throughout     Treatment Today     TREATMENT MINUTES COMMENTS   Evaluation 16    Self-care/ Home management     Manual therapy     Neuromuscular Re-education     Therapeutic Activity     Therapeutic Exercises 24 Educated patient about PT diagnosis, prognosis, POC, and HEP; see exercise flow sheet for HEP    Gait training     Modality__________________                Total 40    Blank areas are intentional and mean the treatment did not include these items.     PT Evaluation Code: (Please list factors)  Patient History/Comorbidities: Obesity   Examination: Low back   Clinical Presentation: Stable   Clinical Decision Making: Low     Patient History/  Comorbidities Examination  (body structures and functions, activity limitations, and/or participation restrictions) Clinical Presentation Clinical Decision Making (Complexity)   No documented Comorbidities or personal factors 1-2 Elements Stable and/or uncomplicated Low   1-2 documented comorbidities or personal factor 3 Elements Evolving clinical presentation with changing characteristics Moderate   3-4 documented comorbidities or personal factors 4 or more Unstable and unpredictable High                Fabi Gil  3/9/2017  2:19 PM               [facility] Discharge Summary  Patient Name: Cyndie Marin  Date: 4/3/2017  Referral Diagnosis: Lumbar spinal stenosis   Referring provider: Sheila Mauricio PA-C  Visit Diagnosis:   1. Lumbar spinal stenosis     2. Decreased ROM of lumbar spine     3. Muscle tightness         Goals:  Pt. will demonstrate/verbalize independence in self-management of condition in : 4 weeks  Pt. will be independent with home exercise program in : 4 weeks  No Data Recorded    Patient was seen for 1 visit on 3/9/17 with 0 missed appointments.  The patient wanted to continue independently with her exercise program. Per chart review, [facility], the patient indicated she did not want to return for any follow up sessions and will continue with the home exercise program provided at the first visit.     Therapy will be discontinued at this time.  The patient will need a new referral to resume.    Thank you for your referral.  Fabi Gil  4/3/2017  11:43 AM

## 2021-06-09 NOTE — PROGRESS NOTES
Assessment:   Cyndie Marin is a 71 y.o. y.o. female with past medical history significant for hyperlipidemia, sleep apnea who presents today for follow-up regarding an episode of numbness and weakness in the lower extremities which occurred on March 7, 2017 and resolved within 1 day.  An MRI of the lumbar spine showed severe lumbar spinal stenosis at L4-5 secondary to a disc bulge, eccentric to the right, and facet arthropathy.  The patient has not had any additional episodes of numbness or weakness in her legs since I saw her on March 8, 2017.  She is neurologically intact.       Plan:     A shared decision making plan was used.  The patient's values and choices were respected.  The following represents what was discussed and decided upon by the physician assistant and the patient.      1.  DIAGNOSTIC TESTS: I reviewed the MRI of lumbar spine.  No further diagnostic tests were ordered.    2.  PHYSICAL THERAPY: The patient attended 1 session of physical therapy.  She indicated that she is not interested in attending additional sessions.  She plans on doing the home exercises on her own and going to [employer].    3.  MEDICATIONS: No changes are made to the patient's medications.  She is currently asymptomatic.  She is not taking any pain relief medications.    4.  INTERVENTIONS: No interventions were ordered as the patient is completely symptom-free.  If the patient's symptoms were to recur, she may benefit from a bilateral L4-5 transfemoral epidural steroid injection.  If that does not help, we could consider an L5-S1 interlaminar epidural steroid injection.  The patient does have a history of contrast dye allergy.    5.  PATIENT EDUCATION: I again reiterated to the patient that if she were to develop numbness and tingling again in her legs, weakness, or loss of bowel or bladder control she should present to the emergency department.  -I discussed with the patient that I recommend that she avoid heavy  lifting and repetitive bending and twisting in order to prevent future flareups of symptoms.  -We discussed the importance of working on core strengthening.  -The patient is in agreement with the above plan.  All questions were answered.    6.  FOLLOW-UP: I offered to follow back up with the patient in several weeks but she declined.  If she has any questions or concerns, she should not hesitate to call.    Subjective:     Cyndie Marin is a 71 y.o. female who presents today for follow-up regarding an episode of bilateral lower extremity numbness and weakness which occurred on March 7, 2017 and resolved within 1 day.  I saw the patient in consultation on March 8, 2017.  At that time I referred the patient to physical therapy.  The patient attended 1 session of physical therapy on March 9, 2017.  The patient states that she has not had any additional occurrences of lower extremity numbness or weakness and she was last seen.    The patient denies any back or leg pain whatsoever.  The patient states that she has chronic numbness and tingling in the toes of both feet which is unchanged.  She rates her pain today as a 0 out of 10.  She denies any weakness.  The patient denies any change in her bowel or bladder function.  The patient states that she tends to become constipated and she uses Metamucil for this.  She denies any loss of bowel control.  The patient also states that she has a history of stress urinary incontinence. She states that she leaks some urine when she coughs or sneezes.  This is stable.  She denies any saddle anesthesia.    As mentioned above, the patient went to one session of physical therapy.  She tells me that she is not planning on returning for any additional sessions.  She does do her home exercises and she goes to [employer].  The patient is not taking any medications for pain.    Past medical history is reviewed and is unchanged in the interim.    Family history is reviewed and is  unchanged in the interim.    Review of Systems:  Negative for numbness/tingling, loss of bowel/bladder control, footdrop, weakness, headache, dizziness, nausea/vomiting, blurry vision, balance changes.     Objective:   CONSTITUTIONAL:  Vital signs as above.  No acute distress.  The patient is well nourished and well groomed.    PSYCHIATRIC:  The patient is awake, alert, oriented to person, place and time.  The patient is answering questions appropriately with clear speech.  Normal affect.  HEENT: Normocephalic, atraumatic.  Sclera clear.    SKIN:  Skin over the face, posterior torso, bilateral upper and lower extremities is clean, dry, intact without rashes.  MUSCULOSKELETAL:  Gait is non-antalgic.  The patient has 5/5 strength for the bilateral hip flexors, knee flexors/extensors, ankle dorsiflexors/plantar flexors, ankle evertors/invertors.    NEUROLOGICAL:    Sensation to light touch is intact in the bilateral L4, L5, and S1 dermatomes.       RESULTS:  MRI of the lumbar spine from [facility] stated March 7, 2017 is reviewed. At L4-5 there is moderate to advanced disc and endplate degeneration with moderate facet arthropathy. There is a circumferential disc bulge and more focal right paracentral protrusion which results in severe spinal canal stenosis and mild to moderate foraminal stenosis. There are less pronounced degenerative changes elsewhere in the lumbar spine without any additional high-grade central canal or neural foraminal stenosis. Please see report for further details.

## 2021-06-09 NOTE — PROGRESS NOTES
ASSESSMENT: Cyndie Marin is a 71 y.o. female with past medical history significant for hyperlipidemia, sleep apnea who presents today for new patient evaluation of a 1 day history of a sensation of weakness and numbness in the record and left lower extremity.  An MRI of the brain was negative.  An MRI of the lumbar spine showed severe lumbar spinal stenosis at L4-5 secondary to a disc bulge, eccentric to the right, and facet arthropathy.  The patient's symptoms have completely resolved over the past 12-16 hours.  She is neurologically intact on exam other than chronic subjective altered sensation in the toes of both feet.  The patient's strength is normal throughout the lower extremities.  She does not have any bowel or bladder dysfunction.  She does not have any saddle anesthesia.    VASQUEZ: 0  WHO 5: 20    PLAN:  A shared decision making model was used.  The patient's values and choices were respected.  The following represents what was discussed and decided upon by the physician assistant and the patient.      1.  DIAGNOSTIC TESTS:  I reviewed the MRI of the lumbar spine.  No further diagnostic tests were ordered.    2.  PHYSICAL THERAPY:  Discussed the importance of core strengthening, ROM, stretching exercises with the patient and how each of these entities is important in decreasing pain.  Explained to the patient that the purpose of physical therapy is to teach the patient a home exercise program.  These exercises need to be performed every day in order to decrease pain and prevent future occurrences of pain.  I placed an order for the patient to begin physical therapy at the [place] [facility].    3.  MEDICATIONS:  No changes are made to the patient's medications.  She is not having any pain whatsoever.  She prefers not to take pain relief medications.  She avoids NSAIDs because she only has one kidney.  I did not start gabapentin as her symptoms have resolved and she states that she has  some baseline dizziness due to vertigo.  Since dizziness is a common side effect of gabapentin, I would not want to increase her fall risk further.    4.  INTERVENTIONS:  No interventions were ordered.  If the patient's pain were to return and not responding to conservative treatment, she may benefit from interventional pain management.  I would likely start with a bilateral L4-5 transforaminal epidural steroid injection.  If that did not help, we could consider an L5-S1 intralaminar epidural steroid injection.  The patient does have a history of contrast allergy.    5.  PATIENT EDUCATION: Since the patient's symptoms have completely resolved and her neurologic exam was normal, I did not refer the patient to a surgeon today.  I did tell the patient that sometimes people with spinal stenosis due ultimately need surgery.   I discussed with the patient that if she were to develop numbness and tingling again in her legs, weakness, or loss of bowel or bladder control she should present to the emergency department again.  She voiced understanding to this.    -The patient was in agreement with the above plan.  All questions were answered.    6.  FOLLOW-UP:   I will see the patient back in clinic in 1 week.  If she has any questions or concerns in the meantime, she should not hesitate to contact our clinic.      SUBJECTIVE:  Cyndie Marin  Is a 71 y.o. female who presents today as an emergency Department referral for new patient evaluation of bilateral lower extremity numbness and weakness.  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  She states that the following morning she woke up and upon standing she sensed numbness in the right greater than left lower extremity and she had a sensation of weakness.  The patient states that the entire right leg felt numb from the thigh down to the foot.  On the left she only felt  numbness in the lower leg and foot.  She was able to ambulate independently, but she states she felt that her balance was different.  Due to these changes she went to the emergency department.  The patient states that when she was examined by the emergency department physician she was unable to rise on her toes and heels.  An MRI of the head was obtained which was negative.  An MRI of the lumbar spine was also obtained which revealed severe spinal stenosis at L4-5 secondary to facet arthropathy and a broad-based disc bulge with a right paracentral protrusion..  She was referred to our clinic for further evaluation and treatment.      The patient states that after returning home from the emergency department she rested and by the time she went to bed her numbness and weakness had improved significantly.  The patient states that upon waking up today, her symptoms have completely resolved.  She states that she no longer feels any weakness in her legs.  She states that she has chronic numbness and tingling in the toes of both feet which is intermittent and unchanged.  She states that the diffuse numbness in the right leg and the numbness in the left lower leg and foot that she experienced yesterday has completely resolved.  The patient denies any saddle anesthesia.  She denies any change in her bowel or bladder function.  The patient states that yesterday she was unable to identify any aggravating or alleviating factors for her symptoms.  The numb and weak sensation was present regardless of whether she was standing, walking, sitting, or laying down.      The patient states that prior to yesterday's incident, she did not have any back problems.  She did not have any limited walking tolerance.  The patient denies any neck pain.  She denies any pain radiating down her arms.  She denies any numbness, tingling, or weakness in her upper extremities.  The patient does state that she has had a problem with her balance which  she attributes to vertigo.  The patient has not had any treatment for back problems in the past.  She does not go to a chiropractor.  She hasn't had physical therapy.  She has never had any spine surgery or spinal injections.  The patient does not take any pain relieving medications.    Current Outpatient Prescriptions on File Prior to Encounter   Medication Sig Dispense Refill     loratadine-pseudoephedrine (CLARITIN D 12-HR) 5-120 mg Tb12 Take 1 tablet by mouth every 12 (twelve) hours as needed.        pravastatin (PRAVACHOL) 20 MG tablet Take 20 mg by mouth bedtime.           Allergies   Allergen Reactions     Dye Hives and Swelling     Cat scan dye         Past Medical History:   Diagnosis Date     GERD (gastroesophageal reflux disease)      Hyperlipidemia      Hypertension      Seasonal allergies         Past Surgical History:   Procedure Laterality Date     NM  DELIVERY ONLY      Description:  Section;  Recorded: 2008;  Comments: x2     NM EXCIS BARTHOLIN GLAND/CYST      Description: Excision Of Bartholin's Gland Or Cyst;  Recorded: 2013;     NM REMV KIDNEY,W/RIB RESECTION      Description: Nephrectomy;  Recorded: 2008;     TOOTH EXTRACTION         Family History   Problem Relation Age of Onset     Breast cancer Paternal Grandmother      Prostate cancer Father      Heart disease Other      many on fathers side     Ovarian cancer Mother      Heart disease Maternal Grandfather      Brain cancer Cousin 47    paternal grandfather and grandmother had a stroke  Maternal grandfather had a brain aneurysm    Social History     Social History     Marital status:      Spouse name: N/A     Number of children: N/A     Years of education: N/A     Social History Main Topics     Smoking status: Passive Smoke Exposure - Never Smoker     Smokeless tobacco: Never Used     Alcohol use 0.0 oz/week     0 Standard drinks or equivalent per week     Drug use: No     Sexual activity: Yes      Partners: Male     Other Topics Concern     None     Social History Narrative     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  Specifically negative for bowel/bladder dysfunction, fevers,chills, appetite changes, unexplained weight loss.   Otherwise 13 systems reviewed are negative.  Please see the patient's intake questionnaire from today for details.      OBJECTIVE:  PHYSICAL EXAMINATION:    CONSTITUTIONAL:  Vital signs as above.  No acute distress.  The patient is well nourished and well groomed.  PSYCHIATRIC:  The patient is awake, alert, oriented to person, place, time and answering questions appropriately with clear speech.    HEENT: Normocephalic, atraumatic.  Sclera clear.  Neck is supple.  SKIN:  Skin over the face, bilateral lower extremities, and posterior torso is clean, dry, intact without rashes.    GAIT:  Gait is non-antalgic.  The patient is able to rise on her toes and heels without any difficulty.  Tandem gait is intact.  STANDING EXAMINATION:  Mild tenderness to palpation of the bilateral lower lumbar paraspinous muscles.  Lumbar range of motion is within normal limits.  MUSCLE STRENGTH:  The patient has 5/5 strength for the bilateral hip flexors, knee flexors/extensors, ankle dorsiflexors/plantar flexors, great toe extensors, ankle evertors/invertors.  NEUROLOGICAL:  1+ biceps, triceps, brachioradialis, patellar, and achilles reflexes bilaterally.  Negative Manuel's bilaterally.  Negative Babinski's bilaterally.  No ankle clonus bilaterally.  Subjective diminished/altered sensation over the toes of the feet bilaterally, chronic per the patient.  Sensation to light touch is intact throughout the remainder of the bilateral upper and lower extremities.  VASCULAR:  2/4 dorsalis pedis pulses bilaterally.  Bilateral lower extremities are warm.  There is no pitting edema of the bilateral lower extremities.  ABDOMINAL:  Soft, non-distended,  non-tender throughout all quadrants.  No pulsatile mass palpated in the left lower quadrant.  LYMPH NODES:  No palpable or tender inguinal lymph nodes.  MUSCULOSKELETAL:  Straight leg raise is negative bilaterally.    RESULTS:  MRI of the lumbar spine from [facility] stated March 7, 2017 is reviewed.  At L4-5 there is moderate to advanced disc and endplate degeneration with moderate facet arthropathy.  There is a circumferential disc bulge and more focal right paracentral protrusion which results in severe spinal canal stenosis and mild to moderate foraminal stenosis.  There are less pronounced degenerative changes elsewhere in the lumbar spine without any additional high-grade central canal or neural foraminal stenosis.  Please see report for further details.

## 2021-06-11 NOTE — TELEPHONE ENCOUNTER
Refill Approved    Rx renewed per Medication Renewal Policy. Medication was last renewed on 06/12/2019.  Last office visit was 02/12/2020 with PCP.    Mirela Lees, [facility] Triage/Med Refill 9/7/2020     Requested Prescriptions   Pending Prescriptions Disp Refills     lisinopriL-hydrochlorothiazide (PRINZIDE,ZESTORETIC) 10-12.5 mg per tablet [Pharmacy Med Name: LISINOPRIL/HCTZ TABS 10/12.5MG] 90 tablet 3     Sig: TAKE 1 TABLET DAILY       Diuretics/Combination Diuretics Refill Protocol  Passed - 9/6/2020  5:27 AM        Passed - Visit with PCP or prescribing provider visit in past 12 months     Last office visit with prescriber/PCP: 2/12/2020 Mora Zhong CNP OR same dept: 2/12/2020 Mora Zhong CNP OR same specialty: 2/12/2020 Mora Zhong CNP  Last physical: 11/26/2018 Last MTM visit: Visit date not found   Next visit within 3 mo: Visit date not found  Next physical within 3 mo: Visit date not found  Prescriber OR PCP: Mora Zhong CNP  Last diagnosis associated with med order: 1. Benign essential hypertension  - lisinopriL-hydrochlorothiazide (PRINZIDE,ZESTORETIC) 10-12.5 mg per tablet [Pharmacy Med Name: LISINOPRIL/HCTZ TABS 10/12.5MG]; TAKE 1 TABLET DAILY  Dispense: 90 tablet; Refill: 3    If protocol passes may refill for 12 months if within 3 months of last provider visit (or a total of 15 months).             Passed - Serum Potassium in past 12 months      Lab Results   Component Value Date    Potassium 4.5 07/14/2020             Passed - Serum Sodium in past 12 months      Lab Results   Component Value Date    Sodium 143 07/14/2020             Passed - Blood pressure on file in past 12 months     BP Readings from Last 1 Encounters:   02/12/20 124/82             Passed - Serum Creatinine in past 12 months      Creatinine   Date Value Ref Range Status   07/14/2020 0.77 0.60 - 1.10 mg/dL Final

## 2021-06-13 NOTE — PROGRESS NOTES
FEMALE ADULT PREVENTIVE EXAM    CHIEF COMPLAINT:  Female preventive exam.    SUBJECTIVE:  Cyndie Marin is a 72 y.o. female who presents for her routine physical exam.    Patient would like to address the following concerns today: Feeling well; denies any problems      GYNE HISTORY  Menses: no  Sexually Active:   yes  Contraception:   no  Last Pap:  na  Abnormal Pap:  no  Vaginal Discharge:  no      She  has a past medical history of GERD (gastroesophageal reflux disease); Hyperlipidemia; Hypertension; and Seasonal allergies.    Lab Results   Component Value Date    WBC 6.0 2017    HGB 15.1 2017    HCT 45.6 2017    MCV 91 2017     2017     2017    K 4.1 2017    BUN 20 2017     Lab Results   Component Value Date    CHOL 184 2016    HDL 44 (L) 2016    LDLCALC 120 2016    TRIG 99 2016     Lab Results   Component Value Date    TSH 2.9 10/06/2009     BP Readings from Last 3 Encounters:   17 118/88   03/15/17 142/77   17 178/85       Surgeries:    Past Surgical History:   Procedure Laterality Date     HI  DELIVERY ONLY      Description:  Section;  Recorded: 2008;  Comments: x2     HI EXCIS BARTHOLIN GLAND/CYST      Description: Excision Of Bartholin's Gland Or Cyst;  Recorded: 2013;     HI REMV KIDNEY,W/RIB RESECTION      Description: Nephrectomy;  Recorded: 2008;     TOOTH EXTRACTION         Family History:  Her family history includes Brain cancer (age of onset: 47) in her cousin; Breast cancer in her paternal grandmother; Heart disease in her maternal grandfather and other; Ovarian cancer in her mother; Prostate cancer in her father.    Social History:  She  reports that she is a non-smoker but has been exposed to tobacco smoke. She has never used smokeless tobacco. She reports that she drinks alcohol. She reports that she does not use illicit drugs.    Medications:    Current  Outpatient Prescriptions:      loratadine-pseudoephedrine (CLARITIN-D 24-HOUR)  mg per 24 hr tablet, Take 1 tablet by mouth daily as needed for allergies., Disp: , Rfl:      pravastatin (PRAVACHOL) 20 MG tablet, TAKE 1 TABLET BY MOUTH DAILY, Disp: 90 tablet, Rfl: 2  HELD MEDICATIONS: None.    Allergies:  No latex allergies.  Allergies   Allergen Reactions     Dye Hives and Swelling     Cat scan dye              RISK BEHAVIOR & HEALTH HABITS  Self Breast Exam:  no  Regular Exercise:  yes  Balanced diet:  yes  Seat Belt Use: yes  Calcium intake/Osteoporosis prevention:  Discussed calcium and Vitamin D recommendations  Dexa:  11-10 DUE FOR THIS  Colonoscopy: 8-13  Mammogram:  DUE FOR THIS    Guns: NO  Sun Screen: YES  Dental Care: YES    REVIEW OF SYSTEMS:  Complete head to toe review of systems is otherwise negative except as above.    OBJECTIVE:  VITAL SIGNS:    Vitals:    09/25/17 1049   BP: 118/88   Pulse: (!) 59   Resp: 20   Temp: 97.4  F (36.3  C)   SpO2: 97%     GENERAL:  Patient alert, in no acute distress.  EYES: PERRLA. Extraoccular movements intact, pupils equal, reactive to light and accommodation.  ENT:  Hearing grossly normal.  Normal appearance to ears and nose.  Bilateral TM s, external canals, oropharynx normal. Normal lips, gums and teeth.  Normal nasal mucosa, septum and turbinates.  NECK:  Supple, without thyromegaly or mass.  RESP:  Clear to auscultation without crackles, wheezes or distress.  Normal respiratory effort.   CV:  Regular rate and rhythm without murmurs, rubs or gallops.  Normal carotid, abdominal aorta, femoral and pedal pulses.  No varicosities or edema.  ABDOMEN:  Soft, non-tender, without hepatosplenomegaly, masses, or hernias.   BREASTS:  Nontender, without masses, nipple discharge, erythema, or axillary adenopathy.    :    External genitalia:  Normal without lesions.  Urethra: Normal appearing  Vagina: Normal without discharge  Cervix: Cervix palpably normal. No  tenderness on cervical motion.  Uterus:  Nontender, mobile, without masses  Ovaries: Normal without masses  LYMPHATIC: Normal palpation of neck, groin and axilla..  No lymphadenopathy.  No bruising.  NEURO:  Non-focal and intact.  PSYCHIATRIC:  Alert & oriented with normal mood and affect.  Good judgment and insight.  SKIN:  Normal inspection and palpation.  MUSCULOSKELETAL: Normal gait and station.      ASSESSMENT & PLAN  [place] was seen today for annual exam and flu vaccine.    Diagnoses and all orders for this visit:    Hyperlipidemia  -     HM1(CBC and Differential)  -     Lipid Cascade  -     Comprehensive Metabolic Panel  -     [facility] (CBC with Diff)    Menopause  -     DXA Bone Density Scan; Future    Other orders  -     pravastatin (PRAVACHOL) 20 MG tablet; TAKE 1 TABLET BY MOUTH DAILY  -     Influenza High Dose, Seasonal 65+ yrs      General  Immunizations reviewed and updated .  Recommended adequate calcium intake/osteoporosis prevention.  Discussed colon cancer screening at age 50, 45 if -American.  Diet and exercise reviewed,
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
## 2021-06-14 NOTE — TELEPHONE ENCOUNTER
New Appointment Needed  What is the reason for the visit:    Pre-Op Appt Request  When is the surgery? :  1/26/2021  Where is the surgery?:   [facility]  Who is the surgeon? :  Dr Yancey  What type of surgery is being done?: hip replacement  Provider Preference: PCP only  How soon do you need to be seen?: before 1/26  Waitlist offered?: No  Okay to leave a detailed message:  Yes

## 2021-06-14 NOTE — ANESTHESIA POSTPROCEDURE EVALUATION
Patient: Cyndie Marin  Procedure(s):  DIRECT ANTERIOR TOTAL HIP ARTHROPLASTY (Left)  Anesthesia type: spinal    Patient location: PACU  Last vitals:   Vitals Value Taken Time   BP 78/50 01/26/21 1307   Temp 36.2  C (97.1  F) 01/26/21 1324   Pulse 40 01/26/21 1329   Resp 12 01/26/21 1324   SpO2 100 % 01/26/21 1324     Post vital signs: stable  Level of consciousness: awake and return to baseline  Post-anesthesia pain: pain controlled  Post-anesthesia nausea and vomiting: no  Pulmonary: unassisted, return to baseline  Cardiovascular: stable and blood pressure at baseline  Hydration: adequate  Anesthetic events: no, SAB resolving    QCDR Measures:  ASA# 11 - Kristin-op Cardiac Arrest: ASA11B - Patient did NOT experience unanticipated cardiac arrest  ASA# 12 - Kristin-op Mortality Rate: ASA12B - Patient did NOT die  ASA# 13 - PACU Re-Intubation Rate: NA - No ETT / LMA used for case  ASA# 10 - Composite Anes Safety: ASA10A - No serious adverse event    Additional Notes:

## 2021-06-14 NOTE — ANESTHESIA PROCEDURE NOTES
Spinal Block    Patient location during procedure: OR  Start time: 1/26/2021 10:12 AM  End time: 1/26/2021 10:16 AM  Reason for block: primary anesthetic    Staffing:  Performing  Anesthesiologist: Wan Clark MD    Preanesthetic Checklist  Completed: patient identified, risks, benefits, and alternatives discussed, timeout performed, consent obtained, airway assessed, oxygen available, suction available, emergency drugs available and hand hygiene performed  Spinal Block  Patient position: sitting  Prep: ChloraPrep and site prepped and draped  Patient monitoring: heart rate, cardiac monitor, continuous pulse ox and blood pressure  Approach: midline  Location: L3-4  Injection technique: single-shot  Needle type: pencil-tip   Needle gauge: 24 G    Assessment  Sensory level: T6

## 2021-06-14 NOTE — TELEPHONE ENCOUNTER
01/08/21 I left a message for pt to call back to schedule preop with [facility] does not have any openings

## 2021-06-14 NOTE — ANESTHESIA CARE TRANSFER NOTE
Last vitals:   Vitals:    01/26/21 1201   BP: 106/54   Pulse: 60   Resp: 12   Temp: 35.9  C (96.7  F)   SpO2: 100%     Patient's level of consciousness is drowsy  Spontaneous respirations: yes  Maintains airway independently: yes  Dentition unchanged: yes  Oropharynx: oropharynx clear of all foreign objects    QCDR Measures:  ASA# 20 - Surgical Safety Checklist: WHO surgical safety checklist completed prior to induction    PQRS# 430 - Adult PONV Prevention: 4558F - Pt received => 2 anti-emetic agents (different classes) preop & intraop  ASA# 8 - Peds PONV Prevention: NA - Not pediatric patient, not GA or 2 or more risk factors NOT present  PQRS# 424 - Kristin-op Temp Management: 4559F - At least one body temp DOCUMENTED => 35.5C or 95.9F within required timeframe  PQRS# 426 - PACU Transfer Protocol: - Transfer of care checklist used  ASA# 14 - Acute Post-op Pain: ASA14B - Patient did NOT experience pain >= 7 out of 10

## 2021-06-14 NOTE — PATIENT INSTRUCTIONS - HE
"Nice to meet you [place]    Stay clinically well  Report any changes in your clinical condition    You should not have to hold any of your regular medications  Unless otherwise told by your surgeon you can take these with clear sips of liquids the day before and hold on the day of surgery    Avoid any aspirin, ibuprofen (Motrin, Advil) and naproxen (Aleve) as these are blood thinners 7 days prior    Avoid any supplements 7 days prior    Your COVID-19 test will be done on January 22 we will try to help you schedule this    Continue to use your CPAP machine regularly        Preparing for Your Surgery  Getting started  A surgery nurse will call you to review your health history and instructions. They will give you an arrival time based on your scheduled surgery time.  Please be ready to share the following:    Your doctor's clinic name and phone number    Your medical, surgical and anesthesia history    A list of allergies and sensitivities    A list of medicines, including herbal treatments and over-the-counter drugs    Whether the patient has a legal guardian (ask how to send us the papers in advance)  If your child is having surgery, please ask for a copy of Preparing for Your Child's Surgery.    Preparing for surgery    Within 30 days of surgery: Have an exam at your family clinic (primary care clinic), or go to a pre-operative clinic. This exam is called a \"History and Physical,\" or H&P.    At your H&P exam, talk to your care team about all medicines you take. If you need to stop any medicines before surgery, ask when to start taking them again.  ? We do this for your safety. Many medicines can make you bleed too much during surgery. Some change how well surgery (anesthesia) drugs work.    Call your insurance company to see what it will and won't pay for. Ask if they need to pre-approve the surgery. (If no insurance, call [phone].)    Call your surgeon's clinic if there's any change in your health. This " includes signs of a cold or flu (sore throat, runny nose, cough, rash, fever). It also includes a scrape or scratch near the surgery site.    If you have questions on the day of surgery, call your surgery center.  Eating and drinking guidelines  For your safety: Unless your surgeon tells you otherwise, follow the guidelines below.    Eat and drink as usual until 8 hours before surgery. After that, no food or milk.    Drink clear liquids until 2 hours before surgery. These are liquids you can see through, like water, Gatorade and Propel Water. You may also have black coffee and tea (no cream or milk).    Nothing by mouth within 2 hours of surgery. This includes gum, candy and breath mints.    Stop alcohol the midnight before surgery.    If your family clinic tells you to take medicine on the morning of surgery, it's okay to take it with a sip of water.  Preventing infection    Shower or bathe the night before and morning of your surgery. Follow the instructions your clinic gave you. (If no instructions, use regular soap.)    Don't shave or clip hair near your surgery site. This can lead to skin infection.    Don't smoke the morning of surgery. Smoking increases the risk of infection. You may chew nicotine gum up to 2 hours before surgery. A nicotine patch is okay.  ? Note: Some surgeries require you to completely quit smoking and nicotine. Check with your surgeon.    Your care team will make every effort to keep you safe from infection. We will:  ? Clean our hands often with soap and water (or an alcohol-based hand rub).  ? Clean the skin at your surgery site with a special soap that kills germs. We'll also remove hair from the site as needed.  ? Wear special hair covers, masks, gowns and gloves during surgery.  ? Give antibiotic medicine, if prescribed. Not all surgeries need antibiotics.  What to bring on the day of surgery    Photo ID and insurance card    Copy of your health care directive, if you have  one    Glasses and hearing aides (bring cases)  ? You can't wear contacts during surgery    Inhaler and eye drops, if you use them (tell us about these when you arrive)    CPAP machine or breathing device, if you use them    A few personal items, if spending the night    If you have . . .  ? A pacemaker or ICD (cardiac defibrillator): Bring the ID card.  ? An implanted stimulator: Bring the remote control.  ? A legal guardian: Bring a copy of the certified (court-stamped) guardianship papers.  Please remove any jewelry, including body piercings. Leave jewelry and other valuables at home.  If you're going home the day of surgery  Important: If you don't follow the rules below, we must cancel your surgery.     Arrange for someone to drive you home after surgery. You may not drive, take a taxi or take public transportation by yourself (unless you'll have local anesthesia only).    Arrange for a responsible adult to stay with you overnight. If you don't, we may keep you in the hospital overnight, and you may need to pay the costs yourself.  Questions?   If you have any questions for your care team, list them here: _________________________________________________________________________________________________________________________________________________________________________________________________________________________________________________________________________________________________________________________  For informational purposes only. Not to replace the advice of your health care provider. Copyright   7381-6173 [facility]. All rights reserved. Clinically reviewed by Maggy Lino MD. [employer] [id number] - REV 07/19.

## 2021-06-14 NOTE — ANESTHESIA PREPROCEDURE EVALUATION
Anesthesia Evaluation      Patient summary reviewed   No history of anesthetic complications     Airway   Mallampati: II  Neck ROM: full   Pulmonary - negative ROS and normal exam    breath sounds clear to auscultation  (+) sleep apnea on CPAP, ,                          Cardiovascular - negative ROS and normal exam  (+) hypertension, ,     ECG reviewed  Rhythm: regular  Rate: normal,         Neuro/Psych - negative ROS     Endo/Other    (+) arthritis, obesity,      GI/Hepatic/Renal    (+) GERD,        Other findings: Fair dentition      Dental                         Anesthesia Plan  Planned anesthetic: spinal    ASA 3     Anesthetic plan and risks discussed with: patient  Anesthesia plan special considerations: antiemetics,   Post-op plan: routine recovery

## 2021-06-14 NOTE — PROGRESS NOTES
[facility]  [address] [facility] [address]  Dept: [phone]  Dept Fax: [phone]  Primary Provider: Mora Zhong CNP  Pre-op Performing Provider: ANA ROSA LOPEZ    PREOPERATIVE EVALUATION:  Today's date: 1/14/2021    Cyndie Marin is a 75 y.o. female who presents for a preoperative evaluation.    Surgical Information:  Surgery/Procedure: Left hip replacement.   Surgery Location: [facility]  Surgeon: Dr. Alexander Owens   Surgery Date: 1/26/2021   Time of Surgery: TBD   Where patient plans to recover: At home with family  Fax number for surgical facility: Note does not need to be faxed, will be available electronically in Epic.    Type of Anesthesia Anticipated: to be determined    Subjective     HPI related to upcoming procedure:     Left Hip   Couple of years  Now progressively worse  \"thought is was arthritis\"  11/2019 had infection in intestines Colostomy   8/2020   Pain   \"Can't walk or sleep\"        Preop Questions 1/14/2021   Have you ever had a heart attack or stroke? No   Have you ever had surgery on your heart or blood vessels, such as a stent placement, a coronary artery bypass, or surgery on an artery in your head, neck, heart, or legs? No   Do you have chest pain with activity? YES - If I go up stairs \"rarely\"  Don't do activity  \"I can't do it because of this hip\"     Do you have a history of  heart failure? No   Do you currently have a cold, bronchitis or symptoms of other infection? No   Do you have a cough, shortness of breath, or wheezing? NO    Do you or anyone in your family have previous history of blood clots? YES - Prior Blot CLot \"2000, working and bruised foot , chest pain, 2007, Coumadin 1 year   Do you or does anyone in your family have a serious bleeding problem such as prolonged bleeding following surgeries or cuts? No   Have you ever had problems with anemia or been told to take iron pills? No   Have you had any abnormal blood " loss such as black, tarry or bloody stools, or abnormal vaginal bleeding? No   Have you ever had a blood transfusion? No   Are you willing to have a blood transfusion if it is medically needed before, during, or after your surgery? Yes   Have you or any of your relatives ever had problems with anesthesia? No   Do you have sleep apnea, excessive snoring or daytime drowsiness? YES - CPAP / SHAUNA    Do you have a CPAP machine? Yes  7 CM H20    Do you have any artifical heart valves or other implanted medical devices like a pacemaker, defibrillator, or continuous glucose monitor? No   Do you have artificial joints? No   Are you allergic to latex? No     Health Care Directive:  Patient has a Health Care Directive on file.     Preoperative Review of :    reviewed - no record of controlled substances prescribed.  69}  SLEEP PROBLEM - Patient has a longstanding history of no sleeping well .. Patient has tried OTC medications with limited success.       Review of Systems  Constitutional, neuro, ENT, endocrine, pulmonary, cardiac, gastrointestinal, genitourinary, musculoskeletal, integument and psychiatric systems are negative, except as otherwise noted.      Patient Active Problem List    Diagnosis Date Noted     Overactive bladder 02/12/2020     Gastroesophageal reflux disease without esophagitis      Hypomagnesemia      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     Perirectal abscess 10/29/2019     Essential hypertension, benign 12/10/2018     Allergic rhinitis due to pollen 11/26/2018     Lumbar stenosis 11/26/2018     SHAUNA (obstructive sleep apnea)      Hyperlipidemia      Past Medical History:   Diagnosis Date     GERD (gastroesophageal reflux disease)      Hyperlipidemia      Hypertension      Seasonal allergies      Sleep apnea     uses CPAP     Past Surgical History:   Procedure Laterality Date     COLOSTOMY N/A 10/28/2019    Procedure: CREATION, COLOSTOMY;  Surgeon: Mylene Sears MD;  Location: [facility];   Service: General     OH  DELIVERY ONLY      Description:  Section;  Recorded: 2008;  Comments: x2     OH EXCIS BARTHOLIN GLAND/CYST      Description: Excision Of Bartholin's Gland Or Cyst;  Recorded: 2013;     OH PART REMOVAL COLON W ANASTOMOSIS N/A 10/28/2019    Procedure: OPEN EXPLORATORY LAPAROTOMY, SEGMENTAL COLECTOMY,  WASHOUT OF PELVIC ABSCESS. RECTAL EXAM UNDER ANESTHESIA. DEBRIDEMENT OD ABSCESS CAVITY;  Surgeon: Mylene Sears MD;  Location: [facility];  Service: General     OH REMV KIDNEY,W/RIB RESECTION      Description: Nephrectomy;  Recorded: 2008;     PROCTOSCOPY N/A 10/28/2019    Procedure: RIGID PROCTOSCOPY;  Surgeon: Mylene Sears MD;  Location: [facility];  Service: General     SIGMOIDOSCOPY N/A 10/27/2019    Procedure: SIGMOIDOSCOPY with biopsy;  Surgeon: Tj Gavin MD;  Location: [facility];  Service: Gastroenterology     TOOTH EXTRACTION       Current Outpatient Medications   Medication Sig Dispense Refill     acetaminophen (TYLENOL) 500 MG tablet Take 500 mg by mouth every 6 (six) hours as needed for pain.       cholecalciferol, vitamin D3, 5,000 unit Tab Take by mouth.       lisinopriL-hydrochlorothiazide (PRINZIDE,ZESTORETIC) 10-12.5 mg per tablet TAKE 1 TABLET DAILY 90 tablet 1     menthol (BIOFREEZE, MENTHOL,) 4 % Gel Apply topically.       pseudoephedrine HCl (SUDAFED 24 HR) 240 mg Tb24 tablet Take 240 mg by mouth daily.       rosuvastatin (CRESTOR) 10 MG tablet Take 1 tablet (10 mg total) by mouth at bedtime. 90 tablet 1     No current facility-administered medications for this visit.        Allergies   Allergen Reactions     Cats Claw (Uncaria [Cat's Claw (Uncaria Tomentosa)]      Dust [Other Environmental Allergy]      Dye Hives and Swelling     Cat scan dye       Ragweed Pollen        Social History     Tobacco Use     Smoking status: Never Smoker     Smokeless tobacco: Never Used   Substance Use Topics     Alcohol use: Yes      Alcohol/week: 0.0 standard drinks      No significant family history  Social History     Substance and Sexual Activity   Drug Use No        Objective     /72 (Patient Site: Left Arm, Patient Position: Sitting, Cuff Size: Adult Regular)   Pulse 74   Temp 97.5  F (36.4  C) (Oral)   SpO2 96%   Physical Exam      Physical:  General Appearance: Healthy-appearingy.   Head:  No deformity  Eyes: Sclerae white, pupils equal and reactive, extra ocular movements intact mild cataracts  Ears: Well-positioned, well-formed pinnae; TM pearly white, translucent, no bulging   Throat: Lips, tongue, and mucosa are moist, pink and intact; tongue no thrush oral pharynx no injection or lesions no dentures  Neck: Supple, symmetric ROM no nodes   No carotid Buits  Chest: Lungs clear to auscultation, no retractions  Heart: Regular rate & rhythm, S1 S2, no murmur  Abdomen: Soft, non-tender, no masses; umbilical area normal large pericolostomy hernia nontender nonerythematous  Pulses: Equal femoral pulses  Extremities: Well-perfused, warm and dry, No Edema  Palpable Pulses Bilateral  Neuro: Easily aroused good tone NO tremor   Skin  No Rash  ;    Recent Labs   Lab Test 12/09/20  0949 07/14/20  1422 12/19/19  1405 10/27/19  0843 10/27/19  0843   HGB 15.5  --  14.6   < > 12.0     --  405   < > 472*   INR  --   --   --   --  1.05    143 138   < > 138   K 4.3 4.5 3.7   < > 3.2*   CREATININE 0.80 0.77 0.75   < > 0.78    < > = values in this interval not displayed.        PRE-OP Diagnostics:   No labs were ordered during this visit.  EKG: appears normal, NSR, pending EKG shows flipped T waves V2 V3 these were present in 2019 as well as 2007 old Q waves in aVF    REVISED CARDIAC RISK INDEX (RCRI)   The patient has the following serious cardiovascular risks for perioperative complications:   - High risk surgery (>5% cardiac complication risk) = 1 point    RCRI INTERPRETATION: 1 point: Class II (low risk - 0.9% complication  rate)         Assessment & Plan      The proposed surgical procedure is considered INTERMEDIATE risk.    [place] was seen today for pre-op exam.    Diagnoses and all orders for this visit:    Exposure to potential infection  -     Asymptomatic COVID-19 Virus (CORONAVIRUS) PCR; Future    Colostomy in place (H)    History of blood clots    Preop general physical exam  -     Electrocardiogram Perform and Read    SHAUNA (obstructive sleep apnea)         Risks and Recommendations:  The patient has the following additional risks and recommendations for perioperative complications:   - No identified additional risk factors other than previously addressed   -Patient has fairly sedentary lifestyle  No worsening chest pain chest discomfort shortness of breath  She is limited in activity by her left hip    She does have a history of a deep venous thrombosis left thigh post trauma  Coumadin for 1 year no recurrence      Medication Instructions:  Patient is to take all scheduled medications on the day before surgery    RECOMMENDATION:  APPROVAL GIVEN to proceed with proposed procedure, without further diagnostic evaluation.    Signed Electronically by: Toby Henson MD    Copy of this evaluation report is provided to requesting physician.    Preop [facility] Preop Guidelines    Revised Cardiac Risk Index

## 2021-06-15 NOTE — TELEPHONE ENCOUNTER
Reason for Call:  Medication or medication refill:    Do you use a [facility]?  Name of the pharmacy and phone number for the current request: [facility]    Name of the medication requested:   ROSUVASTATIN 10MG      Other request: N/A    Can we leave a detailed message on this number? Yes    Phone number patient can be reached at: Home number on file [phone] (home)    Best Time: ANYTIME    Call taken on 3/9/2021 at 1:19 PM by Elizabeth Faye

## 2021-06-16 PROBLEM — M48.061 LUMBAR STENOSIS: Status: ACTIVE | Noted: 2018-11-26

## 2021-06-16 PROBLEM — I10 ESSENTIAL HYPERTENSION, BENIGN: Status: ACTIVE | Noted: 2018-12-10

## 2021-06-16 PROBLEM — N32.81 OVERACTIVE BLADDER: Status: ACTIVE | Noted: 2020-02-12

## 2021-06-16 PROBLEM — Z93.3 COLOSTOMY IN PLACE (H): Status: ACTIVE | Noted: 2019-10-29

## 2021-06-16 PROBLEM — J30.1 ALLERGIC RHINITIS DUE TO POLLEN: Status: ACTIVE | Noted: 2018-11-26

## 2021-06-17 NOTE — TELEPHONE ENCOUNTER
Telephone Encounter by Jennifer Pitts LPN at 6/4/2020 11:31 AM     Author: Jennifer Pitts LPN Service: -- Author Type: Licensed Nurse    Filed: 6/4/2020 11:33 AM Encounter Date: 5/29/2020 Status: Signed    : Jennifer Pitts LPN (Licensed Nurse)       Patient Returning Call  Reason for call:  Patient returning call   Information relayed to patient:  Marlene Mendoza CMA           10:27 AM   Note      Left message to call back for: Patient  Information to relay to patient:  LMTCB, Please relay to pt when she calls back. Orders to have her cholesterol drawn were ordered.  She has an appt schedule on 7/6/20 at the [facility] this needs to be rescheduled.  At this time the [facility] clinic is still closed to patients at this time.  Pt will have to go to another clinic                   ([place] or [facility]).  Also please let the pt know that Mora Zhong on DG until Fall/2020.  Thank you       Patient has additional questions:  No  If YES, what are your questions/concerns:  Patient verbalized understanding above message and transferred to scheduling  Okay to leave a detailed message?: No

## 2021-06-19 NOTE — LETTER
Letter by Mora Zhong CNP at      Author: Mora Zhong CNP Service: -- Author Type: --    Filed:  Encounter Date: 11/6/2019 Status: Signed         [place] MITCH Tania  [address]  [place] [address]             November 6, 2019         Dear MsZaheer Marin,    Below are the results from your recent visit:    Resulted Orders   Magnesium   Result Value Ref Range    Magnesium 1.9 1.8 - 2.6 mg/dL   HM2(CBC w/o Differential)   Result Value Ref Range    WBC 7.2 4.0 - 11.0 thou/uL    RBC 4.11 3.80 - 5.40 mill/uL    Hemoglobin 12.0 12.0 - 16.0 g/dL    Hematocrit 37.0 35.0 - 47.0 %    MCV 90 80 - 100 fL    MCH 29.1 27.0 - 34.0 pg    MCHC 32.4 32.0 - 36.0 g/dL    RDW 12.0 11.0 - 14.5 %    Platelets 563 (H) 140 - 440 thou/uL    MPV 6.5 (L) 7.0 - 10.0 fL   Basic Metabolic Panel   Result Value Ref Range    Sodium 141 136 - 145 mmol/L    Potassium 3.8 3.5 - 5.0 mmol/L    Chloride 106 98 - 107 mmol/L    CO2 26 22 - 31 mmol/L    Anion Gap, Calculation 9 5 - 18 mmol/L    Glucose 85 70 - 125 mg/dL    Calcium 9.1 8.5 - 10.5 mg/dL    BUN 11 8 - 28 mg/dL    Creatinine 0.70 0.60 - 1.10 mg/dL    GFR MDRD Af Amer >60 >60 mL/min/1.73m2    GFR MDRD Non Af Amer >60 >60 mL/min/1.73m2    Narrative    Fasting Glucose reference range is 70-99 mg/dL per  American Diabetes Association (ADA) guidelines.     COMMENT:   Her labs look good.  Her hemoglobin has come back up. Electrolytes are in good range.     Please call with questions or contact us using [employer].    Sincerely,        Electronically signed by Mora Zhong CNP

## 2021-06-19 NOTE — LETTER
Letter by Mora Zhong CNP at      Author: Mora Zhong CNP Service: -- Author Type: --    Filed:  Encounter Date: 11/6/2019 Status: Signed         November 6, 2019     Patient: Cyndie Marin   YOB: 1945   Date of Visit: 11/6/2019       To Whom It May Concern:    It is my medical opinion that Cyndie Marin may return to mild to moderate physical activity as tolerated.  She may walk on the treadmill, do a stationary bike, upper body resistance exercise and lower body resistance exercise.  She is advised to refrain from anything that would increase intra-abdominal pressure such as crunches, sit ups, leg press.      If you have any questions or concerns, please don't hesitate to call.    Sincerely,        Electronically signed by Mora Zhong CNP

## 2021-06-21 NOTE — PROGRESS NOTES
Assessment and Plan:   1. Routine general medical examination at a health care facility    2. Elevated blood pressure reading without diagnosis of hypertension  Elevated reading today, no prior hypertension.  Will have her return in two weeks for recheck and start on antihypertensive if still elevated at that time.  Encouraged her to check at the pharmacy and bring in readings.    - Comprehensive Metabolic Panel    3. Osteopenia of necks of both femurs  - Vitamin D, Total (25-Hydroxy)    4. Mixed hyperlipidemia  - Lipid Cascade    5. Screening for deficiency anemia  - HM2(CBC w/o Differential)    6. Spinal stenosis of lumbar region without neurogenic claudication:  Asymptomatic currently.     The patient's current medical problems were reviewed.    I have had an Advance Directives discussion with the patient.  The following health maintenance schedule was reviewed with the patient and provided in printed form in the after visit summary:   Health Maintenance   Topic Date Due     ZOSTER VACCINES (2 of 3) 01/30/2008     INFLUENZA VACCINE RULE BASED (1) 08/01/2018     DXA SCAN  09/25/2019     FALL RISK ASSESSMENT  11/26/2019     COLONOSCOPY  08/05/2023     ADVANCE DIRECTIVES DISCUSSED WITH PATIENT  11/26/2023     TD 18+ HE  03/20/2027     PNEUMOCOCCAL POLYSACCHARIDE VACCINE AGE 65 AND OVER  Completed     PNEUMOCOCCAL CONJUGATE VACCINE FOR ADULTS (PCV13 OR PREVNAR)  Completed        Subjective:   Chief Complaint: Cyndie Marin is an 73 y.o. female here for an Annual Wellness visit.   HPI:    She is a previous patient of Dr. Shoemaker.  PMH of hyperlipidemia, SHAUNA, lumbar stenosis.     She has been well.   passed away in December of last year.  She has been coping okay.  She lives with her son and her adoptive son just had a new baby.      BP elevated today.  144/90, 160/98 on recheck.  She notes history of HTN in the distant past prior to nephrectomy for renal cyst in the 90s.  Since that time denies  elevated blood pressure.     Review of Systems: Please see above.  The rest of the review of systems are negative for all systems.    Patient Care Team:  Mora Zhong CNP as PCP - General (Nurse Practitioner)     Patient Active Problem List   Diagnosis     Hyperlipidemia     Adult Sleep Apnea     Allergic rhinitis due to pollen     Past Medical History:   Diagnosis Date     GERD (gastroesophageal reflux disease)      Hyperlipidemia      Hypertension      Seasonal allergies       Past Surgical History:   Procedure Laterality Date     CO  DELIVERY ONLY      Description:  Section;  Recorded: 2008;  Comments: x2     CO EXCIS BARTHOLIN GLAND/CYST      Description: Excision Of Bartholin's Gland Or Cyst;  Recorded: 2013;     CO REMV KIDNEY,W/RIB RESECTION      Description: Nephrectomy;  Recorded: 2008;     TOOTH EXTRACTION        Family History   Problem Relation Age of Onset     Breast cancer Paternal Grandmother      Prostate cancer Father      Heart disease Other         many on fathers side     Ovarian cancer Mother      Heart disease Maternal Grandfather      Brain cancer Cousin 47      Social History     Socioeconomic History     Marital status:      Spouse name: Not on file     Number of children: Not on file     Years of education: Not on file     Highest education level: Not on file   Social Needs     Financial resource strain: Not on file     Food insecurity - worry: Not on file     Food insecurity - inability: Not on file     Transportation needs - medical: Not on file     Transportation needs - non-medical: Not on file   Occupational History     Not on file   Tobacco Use     Smoking status: Never Smoker     Smokeless tobacco: Never Used   Substance and Sexual Activity     Alcohol use: Yes     Alcohol/week: 0.0 oz     Drug use: No     Sexual activity: Yes     Partners: Male   Other Topics Concern     Not on file   Social History Narrative     gravdia 3 para 2 is a  "retired       Current Outpatient Medications   Medication Sig Dispense Refill     aspirin 325 MG tablet Take 325 mg by mouth daily.       loratadine-pseudoephedrine (CLARITIN-D 24-HOUR)  mg per 24 hr tablet Take 1 tablet by mouth daily as needed for allergies.       polyethylene glycol (GLYCOLAX) 17 gram/dose powder Take 17 g by mouth daily.       pravastatin (PRAVACHOL) 20 MG tablet TAKE 1 TABLET BY MOUTH DAILY 90 tablet 3     No current facility-administered medications for this visit.       Objective:   Vital Signs:   Visit Vitals  /90 (Patient Site: Left Arm, Patient Position: Sitting, Cuff Size: Adult Large)   Pulse 72   Ht 5' 5.5\" (1.664 m)   Wt 200 lb 8 oz (90.9 kg)   BMI 32.86 kg/m         VisionScreening:  No exam data present     PHYSICAL EXAM  GENERAL: Alert, well appearing female  PSYCH: Pleasant mood, affect appropriate.    SKIN: No atypical lesions.  SKs, acrochordon  EYES: Conjunctiva pink, sclera white, no exudates. DANIEL.  EOMs intact. Corneal light reflex bilaterally, red reflex present.Undilated fundoscopic exam normal  EARS: TMs pearly grey, no bulging, redness, retraction.   MOUTH: Pharynx moist, pink without exudate. No tonsillar enlargement  NECK: No lymphadenopathy. Thyroid borders smooth without enlargement, nodules.   CV: Regular rate and rhythm without murmurs, rubs or gallops.  RESP: Lung sounds clear  ABDOMEN: BS+. Abdomen soft, nontender to palpation without guarding. No organomegaly  PV : LEs warm, pink, symmetric hair distribution. No edema  BREASTS: Breasts symmetric, no dimpling, masses or skin discolorations seen. Areolas and nipples symmetric without discharge. On palpation, breast tissue supple and nontender. No masses or nodules. Axillary and epitrochlear lymph nodes nonpalpable.       Assessment Results 11/26/2018   Activities of Daily Living No help needed   Instrumental Activities of Daily Living No help needed   Get Up and Go Score Less than 12 " seconds   Mini Cog Total Score 5   Some recent data might be hidden     A Mini-Cog score of 0-2 suggests the possibility of dementia, score of 3-5 suggests no dementia    Identified Health Risks:     Information on urinary incontinence and treatment options given to patient.  She is at risk for falling and has been provided with information to reduce the risk of falling at home.  Patient's advanced directive was discussed and I am comfortable with the patient's wishes.

## 2021-06-22 ENCOUNTER — TRANSFERRED RECORDS (OUTPATIENT)
Dept: HEALTH INFORMATION MANAGEMENT | Facility: CLINIC | Age: 76
End: 2021-06-22
Payer: COMMERCIAL

## 2021-06-22 NOTE — PROGRESS NOTES
Assessment & Plan   1. Benign essential hypertension  Blood pressure persistently elevated on multiple occassions. Will start on HCTZ 12.5mg and plant to recheck in 3-4 weeks.  Advised on possible side effects.  Encouraged healthy dietary changes and regular exercise as well.  She has a cuff at home and will bring log of readings to her next appointment.   - hydroCHLOROthiazide (HYDRODIURIL) 12.5 MG tablet; Take 1 tablet (12.5 mg total) by mouth daily.  Dispense: 90 tablet; Refill: 0    Mora Zhong CNP    Subjective   Chief Complaint:  Hypertension    HPI:   Cyndie Marin is a 73 y.o. female who presents for blood pressure check.     She was seen two weeks ago for physical and BP elevated at that time. No prior history of HTN. 144/90, 16/98.  Today 142/96, 142/94.  She has not been checking at home.  She does not exercise regularly.  Diet has been admittedly poor since the passing of her .  Labs checked at last visit unremarkable.       Allergies:  is allergic to cats claw (uncaria [cat's claw (uncaria tomentosa)]; dust [other environmental allergy]; dye; and ragweed pollen.    SH/FH:  Social History and Family History reviewed and updated.   Tobacco Status:  She  reports that  has never smoked. she has never used smokeless tobacco.    Review of Systems:  A complete head to toe ROS is negative unless otherwise noted in HPI    Objective     Vitals:    12/10/18 0905   BP: (!) 142/96   Patient Site: Right Arm   Patient Position: Sitting   Cuff Size: Adult Large   Pulse: 76   Weight: 201 lb 8 oz (91.4 kg)       Physical Exam:  GENERAL: Alert, well-appearing female  CV: Regular rate and rhythm without murmurs, rubs or gallops.  RESP: Lung sounds clear  PV :  No edema

## 2021-06-23 NOTE — PROGRESS NOTES
Assessment & Plan   1. Benign essential hypertension  Diastolic elevated today in clinic and majority of home readings elevated for both systolic and diastolic.  We will add on lisinopril and plan to recheck in 1 month.  BMP today.  Advised on possible side effects.  - lisinopril-hydrochlorothiazide (ZESTORETIC) 10-12.5 mg per tablet; Take 1 tablet by mouth daily.  Dispense: 30 tablet; Refill: 1  - Basic Metabolic Panel    2. Dysfunction of right eustachian tube  Right-sided effusion on exam.  Recommended use of nasal corticosteroid spray.  This is beginning to resolve on its own so she will prefer to monitor at this time.    Mora Zhong CNP    Subjective   Chief Complaint:  Blood Pressure Check; Medication Check; and Cerumen Impaction    HPI:   Cyndie Marin is a 73 y.o. female who presents for medication check.      HTN:  Started on HCTZ two months ago for persistently elevated blood pressure.  She states she has been tolerating the medication well.  No side effects noted.  She brings in blood pressure records from home.  She has been measuring daily and readings range from 135-160 systolic and  diastolic.  Vast majority above goal range with either systolic or diastolic.  Blood pressure today in clinic 118/90, 112/92 on recheck.    Ears: Vacation to [place] couple of weeks ago.  Since that time she has noted persistent pressure in the right ear.  Does seem to be improving slightly.      Allergies:  is allergic to cats claw (uncaria [cat's claw (uncaria tomentosa)]; dust [other environmental allergy]; dye; and ragweed pollen.    SH/FH:  Social History and Family History reviewed and updated.   Tobacco Status:  She  reports that  has never smoked. she has never used smokeless tobacco.    Review of Systems:  A complete head to toe ROS is negative unless otherwise noted in HPI    Objective     Vitals:    02/06/19 0905   BP: 118/90   Patient Site: Left Arm   Patient Position: Sitting   Cuff Size: Adult  "Regular   Pulse: 68   SpO2: 98%   Weight: 201 lb (91.2 kg)   Height: 5' 5.25\" (1.657 m)       Physical Exam:  GENERAL: Alert, well-appearing female .   EARS: Right sided effusion. TMs pearly grey, no bulging, redness, retraction.   CV: Regular rate and rhythm without murmurs, rubs or gallops.  RESP: Lung sounds clear        "

## 2021-06-23 NOTE — PATIENT INSTRUCTIONS - HE
Recommendations from today's visit                                                       1. Blood pressure:  We will switch your medication to a combination pill.  Discard the medication you have at home and restart the new pill.  We will recheck in 3-4 weeks.      Next appointment: 3-4 weeks     To reschedule your appointment, please call the clinic directly at [phone].   It was a pleasure seeing you today! I look forward to seeing you again.

## 2021-06-24 NOTE — PATIENT INSTRUCTIONS - HE
Recommendations from today's visit                                                       1. For the corn, soak in warm water and exfoliate prior to applying over the counter salicylic acid 40% this can come in a pad or liquid form.     2. Blood pressure: looks much improved! We will continue on the lisinopril-HCTZ and recheck in six months.  You can schedule your physical in the fall.      Next appointment: 6-8 months    To reschedule your appointment, please call the clinic directly at [phone].   It was a pleasure seeing you today! I look forward to seeing you again.

## 2021-06-24 NOTE — PROGRESS NOTES
"Assessment & Plan   1. Benign essential hypertension  Now within goal range on lisinopril hydrochlorothiazide.  Home readings consistent as well.  We will recheck BMP today and refill for 6 months.  Physical and med check at that time.  - lisinopril-hydrochlorothiazide (ZESTORETIC) 10-12.5 mg per tablet; Take 1 tablet by mouth daily.  Dispense: 90 tablet; Refill: 1  - Basic Metabolic Panel    2. Corn or callus  Appearance consistent with corn.  Pared down today and advised on over-the-counter treatment with salicylic acid.    Mora Zhong CNP    Subjective   Chief Complaint:  Hypertension (f/u) and Foot look at    HPI:   Cyndie Marin is a 73 y.o. female who presents for hypertension.     HTN : On lisinopril-HCTZ 10-12.5.  Started three months ago for new diagnosis.  Lisinopril added one month ago.  She has been tolerating this well.  Today in clinic 126/72.  She brings in blood pressure logs from home.  Initially elevated after beginning the lisinopril though after 2 weeks the vast majority within normal range.  Occasional diastolic in the low 90s though typically 120s over 80s.  She has not noted any side effects with the medication.    Sore: Left her lateral side of foot at the base of the fifth metatarsal.  Has been using a pad on this area that she purchased over-the-counter without improvement.    Allergies:  is allergic to cats claw (uncaria [cat's claw (uncaria tomentosa)]; dust [other environmental allergy]; dye; and ragweed pollen.    SH/FH:  Social History and Family History reviewed and updated.   Tobacco Status:  She  reports that  has never smoked. she has never used smokeless tobacco.    Review of Systems:  A complete head to toe ROS is negative unless otherwise noted in HPI    Objective     Vitals:    03/04/19 0904   BP: 126/72   Patient Site: Left Arm   Patient Position: Sitting   Cuff Size: Adult Large   Pulse: 70   SpO2: 93%   Weight: 200 lb 12 oz (91.1 kg)   Height: 5' 5.25\" (1.657 m) "       Physical Exam:  GENERAL: Alert, well-appearing female .   SKIN: Base of left fifth metatarsal, lateral side with a 1.5 cm hyperkeratotic lesion.  Pared down with a #15 blade.  CV: Regular rate and rhythm without murmurs, rubs or gallops.  RESP: Lung sounds clear  PV : No edema

## 2021-06-25 NOTE — TELEPHONE ENCOUNTER
Refill Approved    Rx renewed per Medication Renewal Policy. Medication was last renewed on 3/5/21.    Issac Anderson, [facility] Triage/Med Refill 6/4/2021     Requested Prescriptions   Pending Prescriptions Disp Refills     lisinopriL-hydrochlorothiazide (PRINZIDE,ZESTORETIC) 10-12.5 mg per tablet [Pharmacy Med Name: LISINOPRIL/HCTZ TABS 10/12.5MG] 90 tablet 3     Sig: TAKE 1 TABLET DAILY       Diuretics/Combination Diuretics Refill Protocol  Passed - 6/3/2021 12:27 AM        Passed - Visit with PCP or prescribing provider visit in past 12 months     Last office visit with prescriber/PCP: 2/12/2020 Mora Zhong CNP OR same dept: Visit date not found OR same specialty: 2/12/2020 Mora Zhong CNP  Last physical: 12/9/2020 Last MTM visit: Visit date not found   Next visit within 3 mo: Visit date not found  Next physical within 3 mo: Visit date not found  Prescriber OR PCP: Mora Zhong CNP  Last diagnosis associated with med order: 1. Benign essential hypertension  - lisinopriL-hydrochlorothiazide (PRINZIDE,ZESTORETIC) 10-12.5 mg per tablet [Pharmacy Med Name: LISINOPRIL/HCTZ TABS 10/12.5MG]; TAKE 1 TABLET DAILY  Dispense: 90 tablet; Refill: 3    If protocol passes may refill for 12 months if within 3 months of last provider visit (or a total of 15 months).             Passed - Serum Potassium in past 12 months      Lab Results   Component Value Date    Potassium 4.3 01/27/2021             Passed - Serum Sodium in past 12 months      Lab Results   Component Value Date    Sodium 143 12/09/2020             Passed - Blood pressure on file in past 12 months     BP Readings from Last 1 Encounters:   01/27/21 106/53             Passed - Serum Creatinine in past 12 months      Creatinine   Date Value Ref Range Status   01/27/2021 0.79 0.60 - 1.10 mg/dL Final

## 2021-06-26 ENCOUNTER — HEALTH MAINTENANCE LETTER (OUTPATIENT)
Age: 76
End: 2021-06-26

## 2021-08-05 ENCOUNTER — OFFICE VISIT (OUTPATIENT)
Dept: FAMILY MEDICINE | Facility: CLINIC | Age: 76
End: 2021-08-05
Payer: COMMERCIAL

## 2021-08-05 VITALS
HEIGHT: 65 IN | DIASTOLIC BLOOD PRESSURE: 68 MMHG | HEART RATE: 58 BPM | WEIGHT: 201.4 LBS | OXYGEN SATURATION: 98 % | BODY MASS INDEX: 33.55 KG/M2 | TEMPERATURE: 97.5 F | SYSTOLIC BLOOD PRESSURE: 118 MMHG

## 2021-08-05 DIAGNOSIS — E83.42 HYPOMAGNESEMIA: ICD-10-CM

## 2021-08-05 DIAGNOSIS — Z13.0 SCREENING FOR DEFICIENCY ANEMIA: ICD-10-CM

## 2021-08-05 DIAGNOSIS — Z96.642 S/P HIP REPLACEMENT, LEFT: ICD-10-CM

## 2021-08-05 DIAGNOSIS — E78.2 MIXED HYPERLIPIDEMIA: ICD-10-CM

## 2021-08-05 DIAGNOSIS — I10 ESSENTIAL HYPERTENSION, BENIGN: ICD-10-CM

## 2021-08-05 DIAGNOSIS — Z00.00 ENCOUNTER FOR MEDICARE ANNUAL WELLNESS EXAM: Primary | ICD-10-CM

## 2021-08-05 DIAGNOSIS — M85.89 OSTEOPENIA OF MULTIPLE SITES: ICD-10-CM

## 2021-08-05 DIAGNOSIS — Z11.59 NEED FOR HEPATITIS C SCREENING TEST: ICD-10-CM

## 2021-08-05 LAB
ALBUMIN SERPL-MCNC: 3.7 G/DL (ref 3.5–5)
ALP SERPL-CCNC: 82 U/L (ref 45–120)
ALT SERPL W P-5'-P-CCNC: 32 U/L (ref 0–45)
ANION GAP SERPL CALCULATED.3IONS-SCNC: 11 MMOL/L (ref 5–18)
AST SERPL W P-5'-P-CCNC: 28 U/L (ref 0–40)
BILIRUB SERPL-MCNC: 0.6 MG/DL (ref 0–1)
BUN SERPL-MCNC: 17 MG/DL (ref 8–28)
CALCIUM SERPL-MCNC: 10.1 MG/DL (ref 8.5–10.5)
CHLORIDE BLD-SCNC: 106 MMOL/L (ref 98–107)
CHOLEST SERPL-MCNC: 146 MG/DL
CO2 SERPL-SCNC: 25 MMOL/L (ref 22–31)
CREAT SERPL-MCNC: 0.77 MG/DL (ref 0.6–1.1)
ERYTHROCYTE [DISTWIDTH] IN BLOOD BY AUTOMATED COUNT: 14.4 % (ref 10–15)
FASTING STATUS PATIENT QL REPORTED: YES
GFR SERPL CREATININE-BSD FRML MDRD: 74 ML/MIN/1.73M2
GLUCOSE BLD-MCNC: 83 MG/DL (ref 70–125)
HCT VFR BLD AUTO: 44.7 % (ref 35–47)
HDLC SERPL-MCNC: 46 MG/DL
HGB BLD-MCNC: 14.1 G/DL (ref 11.7–15.7)
LDLC SERPL CALC-MCNC: 79 MG/DL
MAGNESIUM SERPL-MCNC: 2.1 MG/DL (ref 1.8–2.6)
MCH RBC QN AUTO: 28.8 PG (ref 26.5–33)
MCHC RBC AUTO-ENTMCNC: 31.5 G/DL (ref 31.5–36.5)
MCV RBC AUTO: 91 FL (ref 78–100)
PLATELET # BLD AUTO: 316 10E3/UL (ref 150–450)
POTASSIUM BLD-SCNC: 4.5 MMOL/L (ref 3.5–5)
PROT SERPL-MCNC: 6.5 G/DL (ref 6–8)
RBC # BLD AUTO: 4.9 10E6/UL (ref 3.8–5.2)
SODIUM SERPL-SCNC: 142 MMOL/L (ref 136–145)
TRIGL SERPL-MCNC: 103 MG/DL
WBC # BLD AUTO: 6.6 10E3/UL (ref 4–11)

## 2021-08-05 PROCEDURE — 85027 COMPLETE CBC AUTOMATED: CPT | Performed by: NURSE PRACTITIONER

## 2021-08-05 PROCEDURE — 36415 COLL VENOUS BLD VENIPUNCTURE: CPT | Performed by: NURSE PRACTITIONER

## 2021-08-05 PROCEDURE — 80053 COMPREHEN METABOLIC PANEL: CPT | Performed by: NURSE PRACTITIONER

## 2021-08-05 PROCEDURE — 69209 REMOVE IMPACTED EAR WAX UNI: CPT | Performed by: NURSE PRACTITIONER

## 2021-08-05 PROCEDURE — 80061 LIPID PANEL: CPT | Performed by: NURSE PRACTITIONER

## 2021-08-05 PROCEDURE — 86803 HEPATITIS C AB TEST: CPT | Performed by: NURSE PRACTITIONER

## 2021-08-05 PROCEDURE — 82306 VITAMIN D 25 HYDROXY: CPT | Performed by: NURSE PRACTITIONER

## 2021-08-05 PROCEDURE — 83735 ASSAY OF MAGNESIUM: CPT | Performed by: NURSE PRACTITIONER

## 2021-08-05 PROCEDURE — 99397 PER PM REEVAL EST PAT 65+ YR: CPT | Mod: 25 | Performed by: NURSE PRACTITIONER

## 2021-08-05 RX ORDER — LISINOPRIL/HYDROCHLOROTHIAZIDE 10-12.5 MG
TABLET ORAL
Qty: 90 TABLET | Refills: 3 | Status: SHIPPED | OUTPATIENT
Start: 2021-08-05 | End: 2022-03-31

## 2021-08-05 ASSESSMENT — ACTIVITIES OF DAILY LIVING (ADL): CURRENT_FUNCTION: NO ASSISTANCE NEEDED

## 2021-08-05 ASSESSMENT — MIFFLIN-ST. JEOR: SCORE: 1396.91

## 2021-08-05 NOTE — PROGRESS NOTES
"SUBJECTIVE:   Cyndie Marin is a 76 year old female who presents for Preventive Visit.    She has been well. Surgery earlier this year for left hip replacement and has recovered well.  Very active with swimming and water aerobics.  Son getting  in October.       HTN:  Lisinopril hydrochlorothiazide.  Blood pressure well controlled.     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 [facility], Dr. Sears.  Planning for more surgery.  Significant herniation. Cyst looks stable.  Likely not reversing colostomy. Considering pushing this off into the winter.      Cataract surgery planned for October.     Hyperlipidemia:  Stopped pravastatin last year d/t muscle aches.      Patient has been advised of split billing requirements and indicates understanding: Yes   Are you in the first 12 months of your Medicare coverage?  No    Healthy Habits:     In general, how would you rate your overall health?  Fair    Frequency of exercise:  6-7 days/week    Duration of exercise:  Greater than 60 minutes    Do you usually eat at least 4 servings of fruit and vegetables a day, include whole grains    & fiber and avoid regularly eating high fat or \"junk\" foods?  Yes    Taking medications regularly:  No    Barriers to taking medications:  Not applicable    Medication side effects:  None    Ability to successfully perform activities of daily living:  No assistance needed    Home Safety:  No safety concerns identified    Hearing Impairment:  Need to ask people to speak up or repeat themselves    In the past 6 months, have you been bothered by leaking of urine? Yes    In general, how would you rate your overall mental or emotional health?  Good      PHQ-2 Total Score: 0    Additional concerns today:  Yes    Do you feel safe in your environment? Yes    Have you ever done Advance Care Planning? (For example, a Health Directive, POLST, or a discussion with a medical provider or your loved " ones about your wishes): Yes, patient states has an Advance Care Planning document and will bring a copy to the clinic.       Fall risk  Fallen 2 or more times in the past year?: No  Any fall with injury in the past year?: No    Cognitive Screening   1) Repeat 3 items (Leader, Season, Table)    2) Clock draw: NORMAL  3) 3 item recall: Recalls 3 objects  Results: 3 items recalled: COGNITIVE IMPAIRMENT LESS LIKELY    Mini-CogTM Copyright RICKIE Crowe. Licensed by the author for use in [facility]; reprinted with permission (elana@[email or web address]). All rights reserved.      Do you have sleep apnea, excessive snoring or daytime drowsiness?: yes    Reviewed and updated as needed this visit by clinical staff  Tobacco   Meds              Reviewed and updated as needed this visit by Provider                Social History     Tobacco Use     Smoking status: Never Smoker     Smokeless tobacco: Never Used   Substance Use Topics     Alcohol use: Not Currently     Alcohol/week: 0.0 standard drinks         Alcohol Use 8/5/2021   Prescreen: >3 drinks/day or >7 drinks/week? No               Current providers sharing in care for this patient include:   Patient Care Team:  Mora Zhong CNP as PCP - General  Mora Zhong CNP as Assigned PCP    The following health maintenance items are reviewed in Epic and correct as of today:  Health Maintenance Due   Topic Date Due     ANNUAL REVIEW OF HM ORDERS  Never done     HEPATITIS C SCREENING  Never done     ZOSTER IMMUNIZATION (2 of 3) 01/30/2008     Lab work is in process  Mammogram Screening: Mammogram Screening - Patient over age 75, has elected to continue with screening.    Mammogram Screening - Patient over age 75, has elected to continue with screening.  Pertinent mammograms are reviewed under the imaging tab.    Review of Systems  Constitutional, HEENT, cardiovascular, pulmonary, gi and gu systems are negative, except as otherwise noted.    OBJECTIVE:   /68 (BP Location:  "Left arm, Patient Position: Sitting, Cuff Size: Adult Large)   Pulse 58   Temp 97.5  F (36.4  C) (Tympanic)   Ht 1.655 m (5' 5.16\")   Wt 91.4 kg (201 lb 6.4 oz)   SpO2 98%   BMI 33.35 kg/m   Estimated body mass index is 33.35 kg/m  as calculated from the following:    Height as of this encounter: 1.655 m (5' 5.16\").    Weight as of this encounter: 91.4 kg (201 lb 6.4 oz).  Physical Exam  GENERAL APPEARANCE: healthy, alert and no distress  EYES: Eyes grossly normal to inspection, PERRL and conjunctivae and sclerae normal  HENT: ear canals and TM's normal, nose and mouth without ulcers or lesions, oropharynx clear and oral mucous membranes moist  NECK: no adenopathy, no asymmetry, masses, or scars and thyroid normal to palpation  RESP: lungs clear to auscultation - no rales, rhonchi or wheezes  BREAST: normal without masses, tenderness or nipple discharge and no palpable axillary masses or adenopathy  CV: regular rate and rhythm, normal S1 S2, no S3 or S4, no murmur, click or rub, no peripheral edema and peripheral pulses strong  ABDOMEN: soft, nontender and colostomy bag site clean dry and intact  MS: no musculoskeletal defects are noted and gait is age appropriate without ataxia  SKIN: no suspicious lesions or rashes  NEURO: Normal strength and tone, sensory exam grossly normal, mentation intact and speech normal  PSYCH: mentation appears normal and affect normal/bright    Diagnostic Test Results:  Labs reviewed in Epic    ASSESSMENT / PLAN:   1. Encounter for Medicare annual wellness exam  Fasting labs.  Counseled on mammography guidelines and she has opted to continue screening.      2. Essential hypertension, benign  Well controlled on lisinopril hydrochlorothiazide.  Check labs and meds refilled.    - Comprehensive metabolic panel (BMP + Alb, Alk Phos, ALT, AST, Total. Bili, TP); Future  - lisinopril-hydrochlorothiazide (ZESTORETIC) 10-12.5 MG tablet; [LISINOPRIL-HYDROCHLOROTHIAZIDE (PRINZIDE,ZESTORETIC) " "10-12.5 MG PER TABLET] TAKE 1 TABLET DAILY  Dispense: 90 tablet; Refill: 3  - Comprehensive metabolic panel (BMP + Alb, Alk Phos, ALT, AST, Total. Bili, TP)    3. S/P hip replacement, left  Recovering well    4. Mixed hyperlipidemia  Stopped statin last year. Recheck lipid panel  - Lipid Profile (Chol, Trig, HDL, LDL calc); Future  - Lipid Profile (Chol, Trig, HDL, LDL calc)    5. Hypomagnesemia  - Magnesium; Future  - Magnesium    6. Need for hepatitis C screening test  - Hepatitis C Screen Reflex to HCV RNA Quant and Genotype; Future  - Hepatitis C Screen Reflex to HCV RNA Quant and Genotype    7. Screening for deficiency anemia  - CBC with platelets; Future  - CBC with platelets    8. Osteopenia of multiple sites  DEXA last year. Check vitamin D  - Vitamin D Deficiency; Future  - Vitamin D Deficiency    Patient has been advised of split billing requirements and indicates understanding: Yes  COUNSELING:  Reviewed preventive health counseling, as reflected in patient instructions       Regular exercise       Healthy diet/nutrition    Estimated body mass index is 33.35 kg/m  as calculated from the following:    Height as of this encounter: 1.655 m (5' 5.16\").    Weight as of this encounter: 91.4 kg (201 lb 6.4 oz).    Weight management plan: Discussed healthy diet and exercise guidelines    She reports that she has never smoked. She has never used smokeless tobacco.      Appropriate preventive services were discussed with this patient, including applicable screening as appropriate for cardiovascular disease, diabetes, osteopenia/osteoporosis, and glaucoma.  As appropriate for age/gender, discussed screening for colorectal cancer, prostate cancer, breast cancer, and cervical cancer. Checklist reviewing preventive services available has been given to the patient.    Reviewed patients plan of care and provided an AVS. The Basic Care Plan (routine screening as documented in Health Maintenance) for [place] meets the Care " Plan requirement. This Care Plan has been established and reviewed with the Patient.    Counseling Resources:  ATP IV Guidelines  Pooled Cohorts Equation Calculator  Breast Cancer Risk Calculator  Breast Cancer: Medication to Reduce Risk  FRAX Risk Assessment  ICSI Preventive Guidelines  Dietary Guidelines for Americans, 2010  USDA's MyPlate  ASA Prophylaxis  Lung CA Screening    Mora Zhong CNP  [facility]    Identified Health Risks:      The patient was provided with suggestions to help her develop a healthy physical lifestyle.  The patient was provided with written information regarding signs of hearing loss.  Information on urinary incontinence and treatment options given to patient.

## 2021-08-05 NOTE — PROGRESS NOTES
Cyndie Marin is a 76 year old female who presents in clinic with complaint of impacted ear wax (cerumen).  Per the order of Mora Zhong, ear wax was removed from right side by flushing with warm water and hydrogen peroxide solution and manual debridement has been performed. Patient denies pain/dizziness/discharge/drainage  (if yes, stop procedure and huddle with provider).  Ear wax has been successfully removed. (If not, huddle with provider).   Zack Cook MA

## 2021-08-05 NOTE — PATIENT INSTRUCTIONS
Patient Education   Personalized Prevention Plan  You are due for the preventive services outlined below.  Your care team is available to assist you in scheduling these services.  If you have already completed any of these items, please share that information with your care team to update in your medical record.  Health Maintenance Due   Topic Date Due     ANNUAL REVIEW OF  ORDERS  Never done     Hepatitis C Screening  Never done     Zoster (Shingles) Vaccine (2 of 3) 01/30/2008       Signs of Hearing Loss      Hearing much better with one ear can be a sign of hearing loss.   Hearing loss is a problem shared by many people. In fact, it is one of the most common health problems, particularly as people age. Most people age 65 and older have some hearing loss. By age 80, almost everyone does. Hearing loss often occurs slowly over the years. So you may not realize your hearing has gotten worse.  Have your hearing checked  Call your healthcare provider if you:    Have to strain to hear normal conversation    Have to watch other people s faces very carefully to follow what they re saying    Need to ask people to repeat what they ve said    Often misunderstand what people are saying    Turn the volume of the television or radio up so high that others complain    Feel that people are mumbling when they re talking to you    Find that the effort to hear leaves you feeling tired and irritated    Notice, when using the phone, that you hear better with one ear than the other  [employer] last reviewed this educational content on 1/1/2020 2000-2021 [employer]. All rights reserved. This information is not intended as a substitute for professional medical care. Always follow your healthcare professional's instructions.          Urinary Incontinence, Female (Adult)   Urinary incontinence means loss of bladder control. This problem affects many women, especially as they get older. If you have incontinence, you may  be embarrassed to ask for help. But know that this problem can be treated.   Types of Incontinence  There are different types of incontinence. Two of the main types are described here. You can have more than one type.     Stress incontinence. With this type, urine leaks when pressure (stress) is put on the bladder. This may happen when you cough, sneeze, or laugh. Stress incontinence most often occurs because the pelvic floor muscles that support the bladder and urethra are weak. This can happen after pregnancy and vaginal childbirth or a hysterectomy. It can also be due to excess body weight or hormone changes.    Urge incontinence (also called overactive bladder). With this type, a sudden urge to urinate is felt often. This may happen even though there may not be much urine in the bladder. The need to urinate often during the night is common. Urge incontinence most often occurs because of bladder spasms. This may be due to bladder irritation or infection. Damage to bladder nerves or pelvic muscles, constipation, and certain medicines can also lead to urge incontinence.  Treatment depends on the cause. Further evaluation is needed to find the type you have. This will likely include an exam and certain tests. Based on the results, you and your healthcare provider can then plan treatment. Until a diagnosis is made, the home care tips below can help ease symptoms.   Home care    Do pelvic floor muscle exercises, if they are prescribed. The pelvic floor muscles help support the bladder and urethra. Many women find that their symptoms improve when doing special exercises that strengthen these muscles. To do the exercises, contract the muscles you would use to stop your stream of urine. But do this when you re not urinating. Hold for 10 seconds, then relax. Repeat 10 to 20 times in a row, at least 3 times a day. Your healthcare provider may give you other instructions for how to do the exercises and how often.    Keep a  bladder diary. This helps track how often and how much you urinate over a set period of time. Bring this diary with you to your next visit with the provider. The information can help your provider learn more about your bladder problem.    Lose weight, if advised to by your provider. Extra weight puts pressure on the bladder. Your provider can help you create a weight-loss plan that s right for you. This may include exercising more and making certain diet changes.    Don't have foods and drinks that may irritate the bladder. These can include alcohol and caffeinated drinks.    Quit smoking. Smoking and other tobacco use can lead to a long-term (chronic) cough that strains the pelvic floor muscles. Smoking may also damage the bladder and urethra. Talk with your provider about treatments or methods you can use to quit smoking.    If drinking large amounts of fluid makes you have symptoms, you may be advised to limit your fluid intake. You may also be advised to drink most of your fluids during the day and to limit fluids at night.    If you re worried about urine leakage or accidents, you may wear absorbent pads to catch urine. Change the pads often. This helps reduce discomfort. It may also reduce the risk of skin or bladder infections.    Follow-up care  Follow up with your healthcare provider, or as directed. It may take some to find the right treatment for your problem. But healthy lifestyle changes can be made right away. These include such things as exercising on a regular basis, eating a healthy diet, losing weight (if needed), and quitting smoking. Your treatment plan may include special therapies or medicines. Certain procedures or surgery may also be options. Talk about any questions you have with your provider.   When to seek medical advice  Call the healthcare provider right away if any of these occur:    Fever of 100.4 F (38 C) or higher, or as directed by your provider    Bladder pain or fullness    Belly  swelling    Nausea or vomiting    Back pain    Weakness, dizziness, or fainting  [employer] last reviewed this educational content on 1/1/2020 2000-2021 [employer]. All rights reserved. This information is not intended as a substitute for professional medical care. Always follow your healthcare professional's instructions.

## 2021-08-06 LAB
DEPRECATED CALCIDIOL+CALCIFEROL SERPL-MC: 38 UG/L (ref 30–80)
HCV AB SERPL QL IA: NONREACTIVE

## 2021-08-06 NOTE — PATIENT INSTRUCTIONS - HE
Patient Instructions by Mora Zhong CNP at 12/9/2020  8:40 AM     Author: Mora Zhong CNP Service: -- Author Type: Nurse Practitioner    Filed: 12/9/2020  9:45 AM Encounter Date: 12/9/2020 Status: Signed    : Mora Zhong CNP (Nurse Practitioner)    Related Notes: Original Note by Mora Zhong CNP (Nurse Practitioner) filed at 12/9/2020  8:56 AM       Recommendations from today's visit                                                       1. Check with your insurance on coverage for the Shingrix vaccine. This will likely be covered at your pharmacy.      2. Left hip/leg pain:  I suspect this is related to lumbar spinal stenosis. We will update your  MRI and refer you to the spine center for consult.  We will also check a hip xray today due to the groin pain.      3. Urinary incontinence:  Please let me know if you would like to pursue physical therapy for this next year.     4.  Your blood pressure looks great. We will keep your medication stable.      5.  Dizziness:  If you have ANY other dizziness spells, please let me know.      Next appointment: one year, annual physical     To reschedule your appointment, please call the clinic directly at [phone].   It was a pleasure seeing you today! I look forward to seeing you again.            Patient Education     Your Health Risk Assessment indicates you feel you are not in good physical health.    A healthy lifestyle helps keep the body fit and the mind alert. It helps protect you from disease, helps you fight disease, and helps prevent chronic disease (disease that doesn't go away) from getting worse. This is important as you get older and begin to notice twinges in muscles and joints and a decline in the strength and stamina you once took for granted. A healthy lifestyle includes good healthcare, good nutrition, weight control, recreation, and regular exercise. Avoid harmful substances and do what you can to keep safe. Another part of a healthy  lifestyle is stay mentally active and socially involved.    Good healthcare     Have a wellness visit every year.     If you have new symptoms, let us know right away. Don't wait until the next checkup.     Take medicines exactly as prescribed and keep your medicines in a safe place. Tell us if your medicine causes problems.   Healthy diet and weight control     Eat 3 or 4 small, nutritious, low-fat, high-fiber meals a day. Include a variety of fruits, vegetables, and whole-grain foods.     Make sure you get enough calcium in your diet. Calcium, vitamin D, and exercise help prevent osteoporosis (bone thinning).     If you live alone, try eating with others when you can. That way you get a good meal and have company while you eat it.     Try to keep a healthy weight. If you eat more calories than your body uses for energy, it will be stored as fat and you will gain weight.     Recreation   Recreation is not limited to sports and team events. It includes any activity that provides relaxation, interest, enjoyment, and exercise. Recreation provides an outlet for physical, mental, and social energy. It can give a sense of worth and achievement. It can help you stay healthy.       Patient Education     Exercise for a Healthier Heart  You may wonder how you can improve the health of your heart. If youre thinking about exercise, youre on the right track. You dont need to become an athlete, but you do need a certain amount of brisk exercise to help strengthen your heart. If you have been diagnosed with a heart condition, your doctor may recommend exercise to help stabilize your condition. To help make exercise a habit, choose safe, fun activities.       Be sure to check with your health care provider before starting an exercise program.    Why exercise?  Exercising regularly offers many healthy rewards. It can help you do all of the following:    Improve your blood cholesterol levels to help prevent further heart  trouble    Lower your blood pressure to help prevent a stroke or heart attack    Control diabetes, or reduce your risk of getting this disease    Improve your heart and lung function    Reach and maintain a healthy weight    Make your muscles stronger and more limber so you can stay active    Prevent falls and fractures by slowing the loss of bone mass (osteoporosis)    Manage stress better  Exercise tips  Ease into your routine. Set small goals. Then build on them.  Exercise on most days. Aim for a total of 150 or more minutes of moderate to  vigorous intensity activity each week. Consider 40 minutes, 3 to 4 times a week. For best results, activity should last for 40 minutes on average. It is OK to work up to the 40 minute period over time. Examples of moderate-intensity activity is walking one mile in 15 minutes or 30 to 45 minutes of yard work.  Step up your daily activity level. Along with your exercise program, try being more active throughout the day. Walk instead of drive. Do more household tasks or yard work.  Choose one or more activities you enjoy. Walking is one of the easiest things you can do. You can also try swimming, riding a bike, or taking an exercise class.  Stop exercising and call your doctor if you:    Have chest pain or feel dizzy or lightheaded    Feel burning, tightness, pressure, or heaviness in your chest, neck, shoulders, back, or arms    Have unusual shortness of breath    Have increased joint or muscle pain    Have palpitations or an irregular heartbeat      [id number] [employer]. [address] [place] [id number]. All rights reserved. This information is not intended as a substitute for professional medical care. Always follow your healthcare professional's instructions.         Patient Education   Signs of Hearing Loss  Hearing loss is a problem shared by many people. In fact, it is one of the most common health conditions, particularly as people age. Most people  over age 65 have some hearing loss, and by age 80, almost everyone does. Because hearing loss usually occurs slowly over the years, you may not realize your hearing ability has gotten worse.       Have your hearing checked  Contact your [facility] care provider if you:    Have to strain to hear normal conversation.    Have to watch other peoples faces very carefully to follow what theyre saying.    Need to ask people to repeat what theyve said.    Often misunderstand what people are saying.    Turn the volume of the television or radio up so high that others complain.    Feel that people are mumbling when theyre talking to you.    Find that the effort to hear leaves you feeling tired and irritated.    Notice, when using the phone, that you hear better with 1 ear than the other.    [id number] [employer]. [address] [place] [address], [place] [address]. All rights reserved. This information is not intended as a substitute for professional medical care. Always follow your healthcare professional's instructions.         Patient Education   Urinary Incontinence, Female (Adult)  Urinary incontinence means loss of control of the bladder. This problem affects many women, especially as they get older. If you have incontinence, you may be embarrassed to ask for help. But know that this problem can be treated.  Types of Incontinence  There are different types of incontinence. Two of the main types are described here. You can have more than one type.    Stress incontinence. With this type, urine leaks when pressure (stress) is put on the bladder. This may happen when you cough, sneeze, or laugh. Stress incontinence most often occurs because the pelvic floor muscles that support the bladder and urethra are weak. This can happen after pregnancy and vaginal childbirth or a hysterectomy. It can also be due to excess body weight or hormone changes.    Urge incontinence (also called overactive bladder). With this type, a sudden  urge to urinate is felt often. This may happen even though there may not be much urine in the bladder. The need to urinate often during the night is common. Urge incontinence most often occurs because of bladder spasms. This may be due to bladder irritation or infection. Damage to bladder nerves or pelvic muscles, constipation, and certain medicines can also lead to urge incontinence.  Treatment of urinary incontinence depends on the cause. Further evaluation is needed to find the type you have. This will likely include an exam and certain tests. Based on the results, you and your healthcare provider can then plan treatment. Until a diagnosis is made, the home care tips below can help relieve symptoms.  Home care    Do pelvic floor muscle exercises, if they are prescribed. The pelvic floor muscles help support the bladder and urethra. Many women find that their symptoms improve when doing special exercises that strengthen these muscles. To do the exercises contract the muscles you would use to stop your stream of urine, but do this when youre not urinating. Hold for 10 seconds, then relax. Repeat 10 to 20 times in a row, at least 3 times a day. Your provider may give you other instructions for how to do the exercises and how often.    Keep a bladder diary. This helps track how often and how much you urinate over a set period of time. Bring this diary with you to your next visit with the provider. The information can help your provider learn more about your bladder problem.    Lose weight, if advised to by your provider. Excess weight puts pressure on the bladder. Your provider can help you create a weight-loss plan thats right for you. This may include exercising more and making certain diet changes.    Don't consume foods and drinks that may irritate the bladder. These can include alcohol and caffeinated drinks.    Quit smoking. Smoking and other tobacco use can lead to chronic cough that strains the pelvic floor  muscles. Smoking may also damage the bladder and urethra. Talk with your provider about treatments or methods you can use to quit smoking.    If drinking large amounts of fluid causes you to have symptoms, you may be advised to limit your fluid intake. You may also be advised to drink most of your fluids during the day and to limit fluids at night.    If youre worried about urine leakage or accidents, you may wear absorbent pads to catch urine. Change the pads often. This helps reduce discomfort. It may also reduce the risk of skin or bladder infections.  Follow-up care  Follow up with your healthcare provider, or as directed. It may take some to find the right treatment for your problem. Your treatment plan may include special therapies or medicines. Certain procedures or surgery may also be options. Be sure to discuss any questions you have with your provider.  When to seek medical advice  Call the healthcare provider right away if any of these occur:    Fever of 100.4 F (38 C) or higher, or as directed by your provider    Bladder pain or fullness    Abdominal swelling    Nausea or vomiting    Back pain    Weakness, dizziness or fainting  Date Last Reviewed: 10/1/2017    8428-2839 [employer]. [address] [place] [address]. All rights reserved. This information is not intended as a substitute for professional medical care. Always follow your healthcare professional's instructions.     Patient Education   Your Health Risk Assessment indicates you feel you are not in good emotional health.    Recreation   Recreation is not limited to sports and team events. It includes any activity that provides relaxation, interest, enjoyment, and exercise. Recreation provides an outlet for physical, mental, and social energy. It can give a sense of worth and achievement. It can help you stay healthy.    Mental Exercise and Social Involvement  Mental and emotional health is as important as physical health.  Keep in touch with friends and family. Stay as active as possible. Continue to learn and challenge yourself.   Things you can do to stay mentally active are:    Learn something new, like a foreign language or musical instrument.     Play SCRABBLE or do crossword puzzles. If you cannot find people to play these games with you at home, you can play them with others on your computer through the Internet.     Join a games club--anything from card games to chess or checkers or lawn bowling.     Start a new hobby.     Go back to school.     Volunteer.     Read.     Keep up with world events.       Patient Education   Preventing Falls in the Home  As you get older, falls are more likely. Thats because your reaction time slows. Your muscles and joints may also get stiffer, making them less flexible. Illness, medications, and vision changes can also affect your balance. A fall could leave you unable to live on your own. To make your home safer, follow these tips:    Floors    Put nonskid pads under area rugs.    Remove throw rugs.    Replace worn floor coverings.    Tack carpets firmly to each step on carpeted stairs. Put nonskid strips on the edges of uncarpeted stairs.    Keep floors and stairs free of clutter and cords.    Arrange furniture so there are clear pathways.    Clean up any spills right away.    Bathrooms    Install grab bars in the tub or shower.    Apply nonskid strips or put a nonskid rubber mat in the tub or shower.    Sit on a bath chair to bathe.    Use bathmats with nonskid backing.    Lighting    Keep a flashlight in each room.    Put a nightlight along the pathway between the bedroom and the bathroom.    [id number] [employer]. [address] [place] [address]. All rights reserved. This information is not intended as a substitute for professional medical care. Always follow your healthcare professional's instructions.           Advance Directive  Patients advance directive was  discussed and I am comfortable with the patients wishes.  Patient Education   Personalized Prevention Plan  You are due for the preventive services outlined below.  Your care team is available to assist you in scheduling these services.  If you have already completed any of these items, please share that information with your care team to update in your medical record.  Health Maintenance   Topic Date Due   ? HEPATITIS C SCREENING  1945   ? ZOSTER VACCINES (2 of 3) 01/30/2008   ? DEXA SCAN  09/25/2020   ? MEDICARE ANNUAL WELLNESS VISIT  12/09/2021   ? FALL RISK ASSESSMENT  12/09/2021   ? LIPID  07/14/2025   ? ADVANCE CARE PLANNING  11/30/2025   ? TD 18+ HE  03/20/2027   ? Pneumococcal Vaccine: 65+ Years  Completed   ? INFLUENZA VACCINE RULE BASED  Completed   ? Pneumococcal Vaccine: Pediatrics (0 to 5 Years) and At-Risk Patients (6 to 64 Years)  Aged Out   ? HEPATITIS B VACCINES  Aged Out

## 2021-09-01 DIAGNOSIS — E78.00 PURE HYPERCHOLESTEROLEMIA: ICD-10-CM

## 2021-09-01 RX ORDER — ROSUVASTATIN CALCIUM 10 MG/1
10 TABLET, COATED ORAL AT BEDTIME
Qty: 90 TABLET | Refills: 3 | Status: SHIPPED | OUTPATIENT
Start: 2021-09-01 | End: 2022-08-29

## 2021-09-01 NOTE — TELEPHONE ENCOUNTER
"Last Written Prescription Date:  3/9/21  Last Fill Quantity: 90,  # refills: 1   Last office visit provider:  8/5/21     Requested Prescriptions   Pending Prescriptions Disp Refills     rosuvastatin (CRESTOR) 10 MG tablet [Pharmacy Med Name: ROSUVASTATIN TABS 10MG] 90 tablet 3     Sig: TAKE 1 TABLET AT BEDTIME       Statins Protocol Passed - 9/1/2021 12:37 AM        Passed - LDL on file in past 12 months     Recent Labs   Lab Test 08/05/21  1229   LDL 79             Passed - No abnormal creatine kinase in past 12 months     No lab results found.             Passed - Recent (12 mo) or future (30 days) visit within the authorizing provider's specialty     Patient has had an office visit with the authorizing provider or a provider within the authorizing providers department within the previous 12 mos or has a future within next 30 days. See \"Patient Info\" tab in inbasket, or \"Choose Columns\" in Meds & Orders section of the refill encounter.              Passed - Medication is active on med list        Passed - Patient is age 18 or older        Passed - No active pregnancy on record        Passed - No positive pregnancy test in past 12 months             Issac Anderson RN 09/01/21 3:22 PM  "

## 2021-09-15 ENCOUNTER — OFFICE VISIT (OUTPATIENT)
Dept: FAMILY MEDICINE | Facility: CLINIC | Age: 76
End: 2021-09-15
Payer: COMMERCIAL

## 2021-09-15 VITALS
BODY MASS INDEX: 32.96 KG/M2 | WEIGHT: 199 LBS | SYSTOLIC BLOOD PRESSURE: 112 MMHG | HEART RATE: 52 BPM | DIASTOLIC BLOOD PRESSURE: 70 MMHG | OXYGEN SATURATION: 98 %

## 2021-09-15 DIAGNOSIS — G47.33 OSA (OBSTRUCTIVE SLEEP APNEA): ICD-10-CM

## 2021-09-15 DIAGNOSIS — Z01.818 PREOP GENERAL PHYSICAL EXAM: Primary | ICD-10-CM

## 2021-09-15 DIAGNOSIS — I10 ESSENTIAL HYPERTENSION, BENIGN: ICD-10-CM

## 2021-09-15 DIAGNOSIS — H25.9 AGE-RELATED CATARACT OF BOTH EYES, UNSPECIFIED AGE-RELATED CATARACT TYPE: ICD-10-CM

## 2021-09-15 PROCEDURE — 90662 IIV NO PRSV INCREASED AG IM: CPT | Performed by: NURSE PRACTITIONER

## 2021-09-15 PROCEDURE — G0008 ADMIN INFLUENZA VIRUS VAC: HCPCS | Performed by: NURSE PRACTITIONER

## 2021-09-15 PROCEDURE — 99214 OFFICE O/P EST MOD 30 MIN: CPT | Mod: 25 | Performed by: NURSE PRACTITIONER

## 2021-09-15 NOTE — PATIENT INSTRUCTIONS

## 2021-09-15 NOTE — PROGRESS NOTES
[facility]  [address] [place] [address]  Phone: [phone]  Fax: [phone]  Primary Provider: Roderick Bright  Pre-op Performing Provider: RODERICK BRIGHT      PREOPERATIVE EVALUATION:  Today's date: 9/15/2021    Cyndie Marin is a 76 year old female who presents for a preoperative evaluation.    Surgical Information:  Surgery/Procedure: Cataract,   Surgery Location: [facility] in [place]  Surgeon: Dr Felix Eng  Surgery Date: 10/15-right, 10/29 left  Time of Surgery: TBT  Where patient plans to recover: At home with family  Fax number for surgical facility: [facility]    Type of Anesthesia Anticipated: Local with MAC    Assessment & Plan     The proposed surgical procedure is considered LOW risk.    1. Preop general physical exam    2. Age-related cataract of both eyes, unspecified age-related cataract type    3. Essential hypertension, benign  Well controlled with current medications. May take with small sip of water day of surgery    4. SHAUNA (obstructive sleep apnea)  Wears CPAP nightly         Risks and Recommendations:  The patient has the following additional risks and recommendations for perioperative complications:   - No identified additional risk factors other than previously addressed    Medication Instructions:  Patient is to take all scheduled medications on the day of surgery    RECOMMENDATION:  APPROVAL GIVEN to proceed with proposed procedure, without further diagnostic evaluation.    Subjective     HPI related to upcoming procedure:     Will be undergoing cataract surgery.     History otherwise notable for hypertension, SHAUNA, colostomy.     HTN well controlled with lisinopril-hydrochlorothiazide 10-12.5.     SHAUNA: wears CPAP nightly    Preop Questions 9/13/2021   1. Have you ever had a heart attack or stroke? No   2. Have you ever had surgery on your heart or blood vessels, such as a stent placement, a coronary artery bypass, or surgery on  an artery in your head, neck, heart, or legs? No   3. Do you have chest pain with activity? No   4. Do you have a history of  heart failure? No   5. Do you currently have a cold, bronchitis or symptoms of other infection? No   6. Do you have a cough, shortness of breath, or wheezing? No   7. Do you or anyone in your family have previous history of blood clots? YES - not in immediate famil   8. Do you or does anyone in your family have a serious bleeding problem such as prolonged bleeding following surgeries or cuts? No   9. Have you ever had problems with anemia or been told to take iron pills? No   10. Have you had any abnormal blood loss such as black, tarry or bloody stools, or abnormal vaginal bleeding? No   11. Have you ever had a blood transfusion? No   12. Are you willing to have a blood transfusion if it is medically needed before, during, or after your surgery? Yes   13. Have you or any of your relatives ever had problems with anesthesia? No   14. Do you have sleep apnea, excessive snoring or daytime drowsiness? YES - SHAUNA treated with CPAP   14a. Do you have a CPAP machine? Yes   15. Do you have any artifical heart valves or other implanted medical devices like a pacemaker, defibrillator, or continuous glucose monitor? No   16. Do you have artificial joints? YES - Left hip    17. Are you allergic to latex? No       Health Care Directive:  Patient does not have a Health Care Directive or Living Will: Advance Directive received and scanned. Click on Code in the patient header to view.    Preoperative Review of :   reviewed - no record of controlled substances prescribed.      Status of Chronic Conditions:  See problem list for active medical problems.  Problems all longstanding and stable, except as noted/documented.  See ROS for pertinent symptoms related to these conditions.      Review of Systems  CONSTITUTIONAL: NEGATIVE for fever, chills, change in weight  ENT/MOUTH: NEGATIVE for ear, mouth and  throat problems  RESP: NEGATIVE for significant cough or SOB  CV: NEGATIVE for chest pain, palpitations or peripheral edema    Patient Active Problem List    Diagnosis Date Noted     S/P hip replacement, left 2021     Priority: Medium     SHAUNA (obstructive sleep apnea)      Priority: Medium     Created by Conversion  Replacement Utility updated for latest IMO load         Overactive bladder 2020     Priority: Medium     Gastroesophageal reflux disease without esophagitis      Priority: Medium     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     Priority: Medium     Essential hypertension, benign 12/10/2018     Priority: Medium     Allergic rhinitis due to pollen 2018     Priority: Medium     Lumbar stenosis 2018     Priority: Medium     Hyperlipidemia      Priority: Medium     Created by Conversion          Past Medical History:   Diagnosis Date     Abdominal tumor      Arthritis      Claustrophobia      Claustrophobia      GERD (gastroesophageal reflux disease)      History of blood clots      Hyperlipidemia      Hypertension      Incontinence      Perirectal abscess 10/29/2019     Seasonal allergies      Sleep apnea     uses CPAP     Past Surgical History:   Procedure Laterality Date     C  DELIVERY ONLY      Description:  Section;  Recorded: 2008;  Comments: x2     C PART REMOVAL COLON W ANASTOMOSIS N/A 10/28/2019    Procedure: OPEN EXPLORATORY LAPAROTOMY, SEGMENTAL COLECTOMY,  WASHOUT OF PELVIC ABSCESS. RECTAL EXAM UNDER ANESTHESIA. DEBRIDEMENT OD ABSCESS CAVITY;  Surgeon: Mylene Sears MD;  Location: [facility] OR;  Service: General     C REMV KIDNEY,W/RIB RESECTION      Description: Nephrectomy;  Recorded: 2008;     C TOTAL HIP ARTHROPLASTY Left 2021    Procedure: LEFT DIRECT ANTERIOR TOTAL HIP ARTHROPLASTY;  Surgeon: Alexander Yancey MD;  Location: [facility] OR;  Service: Orthopedics     COLOSTOMY N/A 10/28/2019    Procedure: CREATION, COLOSTOMY;   Surgeon: Mylene Sears MD;  Location: [facility] OR;  Service: General     MA EXCIS BARTHOLIN GLAND/CYST      Description: Excision Of Bartholin's Gland Or Cyst;  Recorded: 03/31/2013;     PROCTOSCOPY N/A 10/28/2019    Procedure: RIGID PROCTOSCOPY;  Surgeon: Mylene Sears MD;  Location: [facility] OR;  Service: General     SIGMOIDOSCOPY FLEXIBLE N/A 10/27/2019    Procedure: SIGMOIDOSCOPY with biopsy;  Surgeon: Tj Gavin MD;  Location: [facility] GI;  Service: Gastroenterology     TOOTH EXTRACTION       Current Outpatient Medications   Medication Sig Dispense Refill     lisinopril-hydrochlorothiazide (ZESTORETIC) 10-12.5 MG tablet [LISINOPRIL-HYDROCHLOROTHIAZIDE (PRINZIDE,ZESTORETIC) 10-12.5 MG PER TABLET] TAKE 1 TABLET DAILY 90 tablet 3     loratadine (CLARITIN) 10 mg tablet [LORATADINE (CLARITIN) 10 MG TABLET] Take 10 mg by mouth daily as needed.        menthol (BIOFREEZE, MENTHOL,) 4 % Gel [MENTHOL (BIOFREEZE, MENTHOL,) 4 % GEL] Apply topically at bedtime as needed.        rosuvastatin (CRESTOR) 10 MG tablet Take 1 tablet (10 mg) by mouth At Bedtime 90 tablet 3     acetaminophen (TYLENOL) 325 MG tablet [ACETAMINOPHEN (TYLENOL) 325 MG TABLET] Take 2 tablets (650 mg total) by mouth every 4 (four) hours as needed for pain (mild pain). 100 tablet 0     cholecalciferol, vitamin D3, 5,000 unit Tab Take 1,000 Units by mouth daily       polyethylene glycol (MIRALAX) 17 gram packet [POLYETHYLENE GLYCOL (MIRALAX) 17 GRAM PACKET] Take 1 packet (17 g total) by mouth daily. 7 packet 0       Allergies   Allergen Reactions     Cats Claw (Uncaria [Uncaria Tomentosa (Cats Claw)] Unknown     Dye [External Allergen Needs Reconciliation - See Comment] Hives and Swelling     Cat scan dye     Other Drug Allergy (See Comments) Hives and Swelling     Cat scan dye     Other Environmental Allergy Unknown     DUST     Ragweed Pollen [Ragweeds] Unknown        Social History     Tobacco Use     Smoking status: Never  Smoker     Smokeless tobacco: Never Used   Substance Use Topics     Alcohol use: Not Currently     Alcohol/week: 0.0 standard drinks     Family History   Problem Relation Age of Onset     Breast Cancer Paternal Grandmother      Prostate Cancer Father      Heart Disease Other         many on fathers side     Ovarian Cancer Mother      Heart Disease Maternal Grandfather      Brain Cancer Cousin 47.00     History   Drug Use No         Objective     There were no vitals taken for this visit.    Physical Exam  GENERAL APPEARANCE: healthy, alert and no distress  HENT: ear canals and TM's normal and nose and mouth without ulcers or lesions  RESP: lungs clear to auscultation - no rales, rhonchi or wheezes  CV: regular rate and rhythm, normal S1 S2, no S3 or S4 and no murmur, click or rub   ABDOMEN: soft, nontender, no HSM or masses and bowel sounds normal  NEURO: Normal strength and tone, sensory exam grossly normal, mentation intact and speech normal    Recent Labs   Lab Test 08/05/21  1229 01/27/21  0608 01/26/21  1346 01/26/21  0807 12/09/20  0949 10/27/19  1940 10/27/19  0843   0000   HGB 14.1 9.5*   < >  --  15.5   < > 12.0  --      --   --   --  305   < > 472*  --    INR  --   --   --  1.02  --   --  1.05  --      --   --   --  143   < > 138  --    POTASSIUM 4.5 4.3  --   --  4.3   < > 3.2*   < >   CR 0.77 0.79  --   --  0.80   < > 0.78   < >    < > = values in this interval not displayed.        Diagnostics:  No labs were ordered during this visit.   No EKG required for low risk surgery (cataract, skin procedure, breast biopsy, etc).    Revised Cardiac Risk Index (RCRI):  The patient has the following serious cardiovascular risks for perioperative complications:   - No serious cardiac risks = 0 points     RCRI Interpretation: 0 points: Class I (very low risk - 0.4% complication rate)           Signed Electronically by: Mora Zhong CNP  Copy of this evaluation report is provided to requesting  physician.

## 2021-12-22 ENCOUNTER — IMMUNIZATION (OUTPATIENT)
Dept: NURSING | Facility: CLINIC | Age: 76
End: 2021-12-22
Payer: COMMERCIAL

## 2021-12-22 PROCEDURE — 91300 PR COVID VAC PFIZER DIL RECON 30 MCG/0.3 ML IM: CPT

## 2021-12-22 PROCEDURE — 0004A PR COVID VAC PFIZER DIL RECON 30 MCG/0.3 ML IM: CPT

## 2022-02-28 DIAGNOSIS — I10 ESSENTIAL HYPERTENSION, BENIGN: ICD-10-CM

## 2022-03-01 RX ORDER — LISINOPRIL/HYDROCHLOROTHIAZIDE 10-12.5 MG
TABLET ORAL
Qty: 90 TABLET | Refills: 3 | OUTPATIENT
Start: 2022-03-01

## 2022-03-24 ENCOUNTER — TRANSFERRED RECORDS (OUTPATIENT)
Dept: HEALTH INFORMATION MANAGEMENT | Facility: CLINIC | Age: 77
End: 2022-03-24
Payer: COMMERCIAL

## 2022-03-24 ENCOUNTER — MEDICAL CORRESPONDENCE (OUTPATIENT)
Dept: HEALTH INFORMATION MANAGEMENT | Facility: CLINIC | Age: 77
End: 2022-03-24
Payer: COMMERCIAL

## 2022-03-25 ENCOUNTER — TRANSCRIBE ORDERS (OUTPATIENT)
Dept: OTHER | Age: 77
End: 2022-03-25
Payer: COMMERCIAL

## 2022-03-25 DIAGNOSIS — K62.89 RECTAL CYST: Primary | ICD-10-CM

## 2022-03-31 DIAGNOSIS — I10 ESSENTIAL HYPERTENSION, BENIGN: ICD-10-CM

## 2022-03-31 RX ORDER — LISINOPRIL/HYDROCHLOROTHIAZIDE 10-12.5 MG
TABLET ORAL
Qty: 90 TABLET | Refills: 1 | Status: SHIPPED | OUTPATIENT
Start: 2022-03-31 | End: 2022-09-09

## 2022-03-31 NOTE — TELEPHONE ENCOUNTER
Reason for Call:  Medication or medication refill:    Do you use a [facility]?  Name of the pharmacy and phone number for the current request:  [facility]    Name of the medication requested:   lisinopril-hydrochlorothiazide (ZESTORETIC) 10-12.5 MG tablet 90 tablet 3 8/5/2021  No   Sig: [LISINOPRIL-HYDROCHLOROTHIAZIDE (PRINZIDE,ZESTORETIC) 10-12.5 MG PER TABLET] TAKE 1 TABLET DAILY         Other request: was sent to [facility] in August...    Can we leave a detailed message on this number? YES    Phone number patient can be reached at: Home number on file [phone] (home)    Best Time: any    Call taken on 3/31/2022 at 4:32 PM by Pam J. Behr

## 2022-04-18 ENCOUNTER — IMMUNIZATION (OUTPATIENT)
Dept: NURSING | Facility: CLINIC | Age: 77
End: 2022-04-18
Payer: COMMERCIAL

## 2022-04-18 PROCEDURE — 0054A COVID-19,PF,PFIZER (12+ YRS): CPT

## 2022-04-18 PROCEDURE — 91305 COVID-19,PF,PFIZER (12+ YRS): CPT

## 2022-06-14 NOTE — PROGRESS NOTES
[facility] Note    RE: Cyndie Marin.  : 1945.  JUANA: 2022.    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    HPI:     77 year old female who presented with pelvic abscess back in 2019.  Underwent exploratory laparotomy with pelvic dissection by Dr. Mylene Sears on 10/28/2019.  There was an abscess cavity below the peritoneal reflection that required extensive pelvic dissection.  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  This area was drained and debrided and fecal diversion was performed at that time with a an end sigmoid colostomy.  Dr. Sears also performed a partial resection of the colon before maturing the colostomy.  Initial imaging showed:    CT A/P (10/24/19):  IMPRESSION:  1.  Large collection adjacent to the distal sigmoid colon and rectum containing air and and fluid / debris. Possible tract extending from the rectum into this collection. Uncertain if findings reflect severe colitis / proctitis with perforation or necrotic neoplasm. Recommend GI consultation.  2.  Minimal air seen in the vagina and uterus suggesting fistula.  3.  Pelvic inflammatory changes and minimal free fluid noted.  4.  Remaining bowel unremarkable.  5.  Cholelithiasis.  6.  Absent left kidney.  7.  Other findings in the report.    Pathology showed:        Georgia recovered well after this and follow-up imaging showed:    MR Pelvis (20)  IMPRESSION:  1.  In retrospect, there is a benign-appearing cystic mass inferior right mesorectal space that has broad contact with the right lateral wall of the inferior rectum at  CT which could represent a tailgut cyst. Perforation/erosion into this cystic structure likely accounts for the current clinical situation.  2.  Persistent 0.5 cm defect right lateral wall lower rectum leading to the thick-walled right  mesorectal space cavity and a second smaller thin-walled posterior cavity (detailed above) which have decreased in size.      Colonoscopy (3/6/20)          MR Pelvis (20)          MR Pelvis (21)        Latest Reference Range & Units 10/27/19 08:55 20 13:37   CEA 0.0 - 3.0 ng/mL 13.0 (H) 5.6 (H)       [place]ia is currently doing well.  She is tolerating her diet well.  Denies abdominal pain fevers or chills.  Her colostomy is functioning well and denies pouching issues.  She did have some mucus and inspissated stool per rectum right after her abdominal operation but this has since subsided.  Denies pelvic pressure or pain.    Medical history:  Past Medical History:   Diagnosis Date     Abdominal tumor      Arthritis      Claustrophobia      Claustrophobia      GERD (gastroesophageal reflux disease)      History of blood clots      Hyperlipidemia      Hypertension      Incontinence      Perirectal abscess 10/29/2019     Seasonal allergies      Sleep apnea     uses CPAP       Surgical history:  Past Surgical History:   Procedure Laterality Date     COLOSTOMY N/A 10/28/2019    Procedure: CREATION, COLOSTOMY;  Surgeon: Mylene Sears MD;  Location[facility];  Service: General     PA EXCIS BARTHOLIN GLAND/CYST      Description: Excision Of Bartholin's Gland Or Cyst;  Recorded: 2013;     PROCTOSCOPY N/A 10/28/2019    Procedure: RIGID PROCTOSCOPY;  Surgeon: Mylene Sears MD;  Location: [facility] OR;  Service: General     SIGMOIDOSCOPY FLEXIBLE N/A 10/27/2019    Procedure: SIGMOIDOSCOPY with biopsy;  Surgeon: Tj Gavin MD;  Location: [facility] GI;  Service: Gastroenterology     TOOTH EXTRACTION       [facility]  DELIVERY ONLY      Description:  Section;  Recorded: 2008;  Comments: x2     C PART REMOVAL COLON W ANASTOMOSIS N/A 10/28/2019    Procedure: OPEN EXPLORATORY LAPAROTOMY, SEGMENTAL COLECTOMY,  WASHOUT OF PELVIC ABSCESS. RECTAL EXAM UNDER ANESTHESIA.  DEBRIDEMENT OD ABSCESS CAVITY;  Surgeon: Mylene Sears MD;  Location: [facility] OR;  Service: General     [facility] REMV KIDNEY,W/RIB RESECTION      Description: Nephrectomy;  Recorded: 06/26/2008;     [facility] TOTAL HIP ARTHROPLASTY Left 1/26/2021    Procedure: LEFT DIRECT ANTERIOR TOTAL HIP ARTHROPLASTY;  Surgeon: Alexander Yancey MD;  Location: [facility] OR;  Service: Orthopedics       Family history:  Family History   Problem Relation Age of Onset     Breast Cancer Paternal Grandmother      Prostate Cancer Father      Heart Disease Other         many on fathers side     Ovarian Cancer Mother      Heart Disease Maternal Grandfather      Brain Cancer Cousin 47.00       Medications:  Current Outpatient Medications   Medication Sig Dispense Refill     cholecalciferol, vitamin D3, 5,000 unit Tab Take 1,000 Units by mouth daily       lisinopril-hydrochlorothiazide (ZESTORETIC) 10-12.5 MG tablet [LISINOPRIL-HYDROCHLOROTHIAZIDE (PRINZIDE,ZESTORETIC) 10-12.5 MG PER TABLET] TAKE 1 TABLET DAILY 90 tablet 1     loratadine (CLARITIN) 10 mg tablet [LORATADINE (CLARITIN) 10 MG TABLET] Take 10 mg by mouth daily as needed.        menthol (BIOFREEZE, MENTHOL,) 4 % Gel [MENTHOL (BIOFREEZE, MENTHOL,) 4 % GEL] Apply topically at bedtime as needed.        Naproxen Sodium (ALEVE PO)        rosuvastatin (CRESTOR) 10 MG tablet Take 1 tablet (10 mg) by mouth At Bedtime 90 tablet 3     acetaminophen (TYLENOL) 325 MG tablet [ACETAMINOPHEN (TYLENOL) 325 MG TABLET] Take 2 tablets (650 mg total) by mouth every 4 (four) hours as needed for pain (mild pain). 100 tablet 0     polyethylene glycol (MIRALAX) 17 gram packet [POLYETHYLENE GLYCOL (MIRALAX) 17 GRAM PACKET] Take 1 packet (17 g total) by mouth daily. 7 packet 0       Allergies:  Allergies   Allergen Reactions     Cats Claw (Uncaria [Uncaria Tomentosa (Cats Claw)] Unknown     Dye [External Allergen Needs Reconciliation - See Comment] Hives and Swelling     Cat scan dye     Other Drug  "Allergy (See Comments) Hives and Swelling     Cat scan dye     Other Environmental Allergy Unknown     DUST     Ragweed Pollen [Ragweeds] Unknown       Social history:   Social History     Tobacco Use     Smoking status: Never Smoker     Smokeless tobacco: Never Used   Substance Use Topics     Alcohol use: Not Currently     Alcohol/week: 0.0 standard drinks     Marital status: .    Physical Examination:  /74   Pulse 63   Ht 5' 5.16\"   Wt 205 lb 14.4 oz   SpO2 97%   BMI 34.10 kg/m    General: Well hydrated. No acute distress.  Abdomen: Obese, soft, and NT.  Left-sided colostomy viable and functioning.  Large parastomal hernia with surrounding loss of abdominal domain.  No evidence of hernia strangulation or incarceration.      Investigations:  None.    Procedures:  None.    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  Underwent exploratory laparotomy with abscess drainage and pelvic dissection at that time, as well as fecal diversion with an end sigmoid colostomy.  Imaging has shown no significant changes in the radiologic characteristics of her presacral cyst apart from the fistula towards the rectum.  No concerning findings for malignancy or increasing infection or abscess.  The patient is currently doing well clinically.  We had a long discussion with regards to excision of her tailgut cyst that would require resection of the majority of the rectum, the possibility of reestablishing GI continuity with colostomy takedown which would be a very low colorectal or a coloanal anastomosis with significant functional sequela I and the need for diverting loop ileostomy.  I also discussed with Vito woodall that the risk of malignant degeneration is exceedingly rare. The risk of recurrent or persistent pelvic infection is slightly higher although with current fecal diversion this is probably " much lower. She understands this well.  All questions were answered to her satisfaction.    PLAN  1.  [place]ia would like to avoid having more surgery and is happy with her colostomy at this time.  She will be purchasing a hernia belt for her parastomal hernia.  2.  Would recommend 3T MR pelvis in 6 months and if there is no evidence of growth of the tailgut cyst, infection, or signs of malignant degeneration, would recommend repeat MR pelvis in 1 year.    60 minutes spent on the date of encounter (excluding time performing procedures with or without biopsy) performing chart review, history and exam, documentation and further activities as noted above.      Alex Cameron M.D., M.Sc.     Division of Colon and Rectal Surgery  [facility]    Referring Provider:  Mylene Sears MD  [facility]  [address]  [place] [address]     Primary Care Provider:  Mora Zhong

## 2022-06-29 ENCOUNTER — PRE VISIT (OUTPATIENT)
Dept: SURGERY | Facility: CLINIC | Age: 77
End: 2022-06-29

## 2022-06-29 NOTE — TELEPHONE ENCOUNTER
Diagnosis, Referred by & from: Dx: Presacral Cyst Referred by Dr. Mylene Sears at [facility]   Appt date: 07/08/22   NOTES STATUS DETAILS   OFFICE NOTE from referring provider Internal  03/25/2022 [facility]   OFFICE NOTE from other specialist N/A      DISCHARGE SUMMARY from hospital N/A      DISCHARGE REPORT from the ER N/A    OPERATIVE REPORT N/A    MEDICATION LIST Internal    LABS     ANAL PAP N/A    BIOPSIES/PATHOLOGY RELATED TO DIAGNOSIS Internal 03/29/2022 [facility]  10/28/2019  10/27/2019   DIAGNOSTIC PROCEDURES     PFC TESTING (from the Pelvic floor center includes Manometry, PDNL, EMG, etc.) N/A    COLONOSCOPY Internal 03/29/2022 at [facility]  [facility]11/2020 at [facility]        UPPER ENDOSCOPY (EGD) N/A      FLEX SIGMOIDOSCOPY N/A      ERCP N/A    IMAGING (DISC & REPORT)      CT Internal 10/27/2019 Chest  10/24/2019 Abdomen    MRI Internal 06/22/2021 Pelvis   07/27/2020 Pelvis      XRAY Internal 01/26/2021 Pelvis   ULTRASOUND  (ENDOANAL/ENDORECTAL) N/A

## 2022-07-08 ENCOUNTER — OFFICE VISIT (OUTPATIENT)
Dept: SURGERY | Facility: CLINIC | Age: 77
End: 2022-07-08
Payer: COMMERCIAL

## 2022-07-08 VITALS
OXYGEN SATURATION: 97 % | HEART RATE: 63 BPM | WEIGHT: 205.9 LBS | BODY MASS INDEX: 34.3 KG/M2 | DIASTOLIC BLOOD PRESSURE: 74 MMHG | SYSTOLIC BLOOD PRESSURE: 114 MMHG | HEIGHT: 65 IN

## 2022-07-08 DIAGNOSIS — Z93.3 COLOSTOMY STATUS (H): Primary | ICD-10-CM

## 2022-07-08 DIAGNOSIS — R19.09 PRESACRAL MASS: ICD-10-CM

## 2022-07-08 PROCEDURE — 99205 OFFICE O/P NEW HI 60 MIN: CPT | Performed by: COLON & RECTAL SURGERY

## 2022-07-08 ASSESSMENT — PAIN SCALES - GENERAL: PAINLEVEL: NO PAIN (0)

## 2022-07-08 NOTE — LETTER
2022         RE: Cyndie Marin  [address] [place] [address]        Dear Colleague,    Thank you for referring your patient, Cyndie Marin, to the [facility] [place]. Please see a copy of my visit note below.    Colon and Rectal Surgery Clinic Note    RE: Cyndie Marin.  : 1945.  JUANA: 2022.    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    HPI:     77 year old female who presented with pelvic abscess back in 2019.  Underwent exploratory laparotomy with pelvic dissection by Dr. Mylene Sears on 10/28/2019.  There was an abscess cavity below the peritoneal reflection that required extensive pelvic dissection.  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  This area was drained and debrided and fecal diversion was performed at that time with a an end sigmoid colostomy.  Dr. Sears also performed a partial resection of the colon before maturing the colostomy.  Initial imaging showed:    CT A/P (10/24/19):  IMPRESSION:  1.  Large collection adjacent to the distal sigmoid colon and rectum containing air and and fluid / debris. Possible tract extending from the rectum into this collection. Uncertain if findings reflect severe colitis / proctitis with perforation or necrotic neoplasm. Recommend GI consultation.  2.  Minimal air seen in the vagina and uterus suggesting fistula.  3.  Pelvic inflammatory changes and minimal free fluid noted.  4.  Remaining bowel unremarkable.  5.  Cholelithiasis.  6.  Absent left kidney.  7.  Other findings in the report.    Pathology showed:        Georgia recovered well after this and follow-up imaging showed:    MR Pelvis (20)  IMPRESSION:  1.  In retrospect, there is a benign-appearing cystic mass inferior right mesorectal space that has broad contact with the right lateral wall of the inferior  rectum at  CT which could represent a tailgut cyst. Perforation/erosion into this cystic structure likely accounts for the current clinical situation.  2.  Persistent 0.5 cm defect right lateral wall lower rectum leading to the thick-walled right mesorectal space cavity and a second smaller thin-walled posterior cavity (detailed above) which have decreased in size.      Colonoscopy (3/6/20)          MR Pelvis (20)          MR Pelvis (21)        Latest Reference Range & Units 10/27/19 08:55 20 13:37   CEA 0.0 - 3.0 ng/mL 13.0 (H) 5.6 (H)       Georgia is currently doing well.  She is tolerating her diet well.  Denies abdominal pain fevers or chills.  Her colostomy is functioning well and denies pouching issues.  She did have some mucus and inspissated stool per rectum right after her abdominal operation but this has since subsided.  Denies pelvic pressure or pain.    Medical history:  Past Medical History:   Diagnosis Date     Abdominal tumor      Arthritis      Claustrophobia      Claustrophobia      GERD (gastroesophageal reflux disease)      History of blood clots      Hyperlipidemia      Hypertension      Incontinence      Perirectal abscess 10/29/2019     Seasonal allergies      Sleep apnea     uses CPAP       Surgical history:  Past Surgical History:   Procedure Laterality Date     COLOSTOMY N/A 10/28/2019    Procedure: CREATION, COLOSTOMY;  Surgeon: Mylene Sears MD;  [place] [facility] [place];  Service: General     VA EXCIS BARTHOLIN GLAND/CYST      Description: Excision Of Bartholin's Gland Or Cyst;  Recorded: 2013;     PROCTOSCOPY N/A 10/28/2019    Procedure: RIGID PROCTOSCOPY;  Surgeon: Mylene Sears MD;  Location: [facility] OR;  Service: General     SIGMOIDOSCOPY FLEXIBLE N/A 10/27/2019    Procedure: SIGMOIDOSCOPY with biopsy;  Surgeon: Tj Gavin MD;  Location: [facility] GI;  Service: Gastroenterology     TOOTH EXTRACTION       ZZC  DELIVERY ONLY       Description:  Section;  Recorded: 2008;  Comments: x2     [facility] PART REMOVAL COLON W ANASTOMOSIS N/A 10/28/2019    Procedure: OPEN EXPLORATORY LAPAROTOMY, SEGMENTAL COLECTOMY,  WASHOUT OF PELVIC ABSCESS. RECTAL EXAM UNDER ANESTHESIA. DEBRIDEMENT OD ABSCESS CAVITY;  Surgeon: Mylene Sears MD;  Location: [facility] OR;  Service: General     [facility] REMV KIDNEY,W/RIB RESECTION      Description: Nephrectomy;  Recorded: 2008;     [facility] TOTAL HIP ARTHROPLASTY Left 2021    Procedure: LEFT DIRECT ANTERIOR TOTAL HIP ARTHROPLASTY;  Surgeon: Alexander Yancey MD;  Location: [facility] OR;  Service: Orthopedics       Family history:  Family History   Problem Relation Age of Onset     Breast Cancer Paternal Grandmother      Prostate Cancer Father      Heart Disease Other         many on fathers side     Ovarian Cancer Mother      Heart Disease Maternal Grandfather      Brain Cancer Cousin 47.00       Medications:  Current Outpatient Medications   Medication Sig Dispense Refill     cholecalciferol, vitamin D3, 5,000 unit Tab Take 1,000 Units by mouth daily       lisinopril-hydrochlorothiazide (ZESTORETIC) 10-12.5 MG tablet [LISINOPRIL-HYDROCHLOROTHIAZIDE (PRINZIDE,ZESTORETIC) 10-12.5 MG PER TABLET] TAKE 1 TABLET DAILY 90 tablet 1     loratadine (CLARITIN) 10 mg tablet [LORATADINE (CLARITIN) 10 MG TABLET] Take 10 mg by mouth daily as needed.        menthol (BIOFREEZE, MENTHOL,) 4 % Gel [MENTHOL (BIOFREEZE, MENTHOL,) 4 % GEL] Apply topically at bedtime as needed.        Naproxen Sodium (ALEVE PO)        rosuvastatin (CRESTOR) 10 MG tablet Take 1 tablet (10 mg) by mouth At Bedtime 90 tablet 3     acetaminophen (TYLENOL) 325 MG tablet [ACETAMINOPHEN (TYLENOL) 325 MG TABLET] Take 2 tablets (650 mg total) by mouth every 4 (four) hours as needed for pain (mild pain). 100 tablet 0     polyethylene glycol (MIRALAX) 17 gram packet [POLYETHYLENE GLYCOL (MIRALAX) 17 GRAM PACKET] Take 1 packet (17 g total) by mouth  "daily. 7 packet 0       Allergies:  Allergies   Allergen Reactions     Cats Claw (Uncaria [Uncaria Tomentosa (Cats Claw)] Unknown     Dye [External Allergen Needs Reconciliation - See Comment] Hives and Swelling     Cat scan dye     Other Drug Allergy (See Comments) Hives and Swelling     Cat scan dye     Other Environmental Allergy Unknown     DUST     Ragweed Pollen [Ragweeds] Unknown       Social history:   Social History     Tobacco Use     Smoking status: Never Smoker     Smokeless tobacco: Never Used   Substance Use Topics     Alcohol use: Not Currently     Alcohol/week: 0.0 standard drinks     Marital status: .    Physical Examination:  /74   Pulse 63   Ht 5' 5.16\"   Wt 205 lb 14.4 oz   SpO2 97%   BMI 34.10 kg/m    General: Well hydrated. No acute distress.  Abdomen: Obese, soft, and NT.  Left-sided colostomy viable and functioning.  Large parastomal hernia with surrounding loss of abdominal domain.  No evidence of hernia strangulation or incarceration.      Investigations:  None.    Procedures:  None.    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  Underwent exploratory laparotomy with abscess drainage and pelvic dissection at that time, as well as fecal diversion with an end sigmoid colostomy.  Imaging has shown no significant changes in the radiologic characteristics of her presacral cyst apart from the fistula towards the rectum.  No concerning findings for malignancy or increasing infection or abscess.  The patient is currently doing well clinically.  We had a long discussion with regards to excision of her tailgut cyst that would require resection of the majority of the rectum, the possibility of reestablishing GI continuity with colostomy takedown which would be a very low colorectal or a coloanal anastomosis with significant functional sequela I and the need for " diverting loop ileostomy.  I also discussed with Vito her that the risk of malignant degeneration is exceedingly rare. The risk of recurrent or persistent pelvic infection is slightly higher although with current fecal diversion this is probably much lower. She understands this well.  All questions were answered to her satisfaction.    PLAN  1.  Georgia would like to avoid having more surgery and is happy with her colostomy at this time.  She will be purchasing a hernia belt for her parastomal hernia.  2.  Would recommend 3T MR pelvis in 6 months and if there is no evidence of growth of the tailgut cyst, infection, or signs of malignant degeneration, would recommend repeat MR pelvis in 1 year.    60 minutes spent on the date of encounter (excluding time performing procedures with or without biopsy) performing chart review, history and exam, documentation and further activities as noted above.      Alex Cameron M.D., M.Sc.     Division of Colon and Rectal Surgery  [facility]    Referring Provider:  Mylene Sears MD  [facility]  [address] [place] [address] [address]  [place] [address]     Primary Care Provider:  Mora Zhong      Again, thank you for allowing me to participate in the care of your patient.        Sincerely,        Alex Cameron MD

## 2022-08-08 ENCOUNTER — OFFICE VISIT (OUTPATIENT)
Dept: INTERNAL MEDICINE | Facility: CLINIC | Age: 77
End: 2022-08-08

## 2022-08-08 VITALS
HEIGHT: 65 IN | OXYGEN SATURATION: 98 % | WEIGHT: 204 LBS | SYSTOLIC BLOOD PRESSURE: 126 MMHG | DIASTOLIC BLOOD PRESSURE: 76 MMHG | HEART RATE: 72 BPM | BODY MASS INDEX: 33.99 KG/M2

## 2022-08-08 DIAGNOSIS — E78.5 HYPERLIPIDEMIA LDL GOAL <100: ICD-10-CM

## 2022-08-08 DIAGNOSIS — M25.511 CHRONIC RIGHT SHOULDER PAIN: ICD-10-CM

## 2022-08-08 DIAGNOSIS — I10 ESSENTIAL HYPERTENSION, BENIGN: ICD-10-CM

## 2022-08-08 DIAGNOSIS — K21.9 GASTROESOPHAGEAL REFLUX DISEASE WITHOUT ESOPHAGITIS: ICD-10-CM

## 2022-08-08 DIAGNOSIS — G89.29 CHRONIC RIGHT SHOULDER PAIN: ICD-10-CM

## 2022-08-08 DIAGNOSIS — K80.20 CALCULUS OF GALLBLADDER WITHOUT CHOLECYSTITIS WITHOUT OBSTRUCTION: ICD-10-CM

## 2022-08-08 DIAGNOSIS — G47.33 OSA (OBSTRUCTIVE SLEEP APNEA): Primary | ICD-10-CM

## 2022-08-08 DIAGNOSIS — Z87.448 HISTORY OF URINARY TRACT OBSTRUCTION: ICD-10-CM

## 2022-08-08 DIAGNOSIS — K63.89 COLONIC MASS: ICD-10-CM

## 2022-08-08 DIAGNOSIS — N39.3 SUI (STRESS URINARY INCONTINENCE, FEMALE): ICD-10-CM

## 2022-08-08 DIAGNOSIS — Z93.3 COLOSTOMY IN PLACE (H): ICD-10-CM

## 2022-08-08 LAB
ALBUMIN SERPL BCG-MCNC: 4.2 G/DL (ref 3.5–5.2)
ALP SERPL-CCNC: 92 U/L (ref 35–104)
ALT SERPL W P-5'-P-CCNC: 14 U/L (ref 10–35)
ANION GAP SERPL CALCULATED.3IONS-SCNC: 13 MMOL/L (ref 7–15)
AST SERPL W P-5'-P-CCNC: 23 U/L (ref 10–35)
BILIRUB SERPL-MCNC: 0.5 MG/DL
BUN SERPL-MCNC: 16.6 MG/DL (ref 8–23)
CALCIUM SERPL-MCNC: 10.2 MG/DL (ref 8.8–10.2)
CHLORIDE SERPL-SCNC: 103 MMOL/L (ref 98–107)
CHOLEST SERPL-MCNC: 124 MG/DL
CREAT SERPL-MCNC: 0.69 MG/DL (ref 0.51–0.95)
DEPRECATED HCO3 PLAS-SCNC: 25 MMOL/L (ref 22–29)
ERYTHROCYTE [DISTWIDTH] IN BLOOD BY AUTOMATED COUNT: 13.1 % (ref 10–15)
GFR SERPL CREATININE-BSD FRML MDRD: 88 ML/MIN/1.73M2
GLUCOSE SERPL-MCNC: 84 MG/DL (ref 70–99)
HCT VFR BLD AUTO: 45.8 % (ref 35–47)
HDLC SERPL-MCNC: 49 MG/DL
HGB BLD-MCNC: 15 G/DL (ref 11.7–15.7)
LDLC SERPL CALC-MCNC: 59 MG/DL
MCH RBC QN AUTO: 29.5 PG (ref 26.5–33)
MCHC RBC AUTO-ENTMCNC: 32.8 G/DL (ref 31.5–36.5)
MCV RBC AUTO: 90 FL (ref 78–100)
NONHDLC SERPL-MCNC: 75 MG/DL
PLATELET # BLD AUTO: 253 10E3/UL (ref 150–450)
POTASSIUM SERPL-SCNC: 4.1 MMOL/L (ref 3.4–5.3)
PROT SERPL-MCNC: 7.2 G/DL (ref 6.4–8.3)
RBC # BLD AUTO: 5.09 10E6/UL (ref 3.8–5.2)
SODIUM SERPL-SCNC: 141 MMOL/L (ref 136–145)
TRIGL SERPL-MCNC: 82 MG/DL
WBC # BLD AUTO: 9 10E3/UL (ref 4–11)

## 2022-08-08 PROCEDURE — 80053 COMPREHEN METABOLIC PANEL: CPT | Performed by: INTERNAL MEDICINE

## 2022-08-08 PROCEDURE — 80061 LIPID PANEL: CPT | Performed by: INTERNAL MEDICINE

## 2022-08-08 PROCEDURE — G0439 PPPS, SUBSEQ VISIT: HCPCS | Performed by: INTERNAL MEDICINE

## 2022-08-08 PROCEDURE — 99214 OFFICE O/P EST MOD 30 MIN: CPT | Mod: 25 | Performed by: INTERNAL MEDICINE

## 2022-08-08 PROCEDURE — 36415 COLL VENOUS BLD VENIPUNCTURE: CPT | Performed by: INTERNAL MEDICINE

## 2022-08-08 PROCEDURE — 85027 COMPLETE CBC AUTOMATED: CPT | Performed by: INTERNAL MEDICINE

## 2022-08-08 NOTE — LETTER
August 9, 2022      Cyndie Marin  [address]  [place] [address]        Dear ,    We are writing to inform you of your test results.    Your tests are all good.     Resulted Orders   CBC with platelets   Result Value Ref Range    WBC Count 9.0 4.0 - 11.0 10e3/uL    RBC Count 5.09 3.80 - 5.20 10e6/uL    Hemoglobin 15.0 11.7 - 15.7 g/dL    Hematocrit 45.8 35.0 - 47.0 %    MCV 90 78 - 100 fL    MCH 29.5 26.5 - 33.0 pg    MCHC 32.8 31.5 - 36.5 g/dL    RDW 13.1 10.0 - 15.0 %    Platelet Count 253 150 - 450 10e3/uL   Lipid panel reflex to direct LDL Fasting   Result Value Ref Range    Cholesterol 124 <200 mg/dL    Triglycerides 82 <150 mg/dL    Direct Measure HDL 49 (L) >=50 mg/dL    LDL Cholesterol Calculated 59 <=100 mg/dL    Non HDL Cholesterol 75 <130 mg/dL    Narrative    Cholesterol  Desirable:  <200 mg/dL    Triglycerides  Normal:  Less than 150 mg/dL  Borderline High:  150-199 mg/dL  High:  200-499 mg/dL  Very High:  Greater than or equal to 500 mg/dL    Direct Measure HDL  Female:  Greater than or equal to 50 mg/dL   Male:  Greater than or equal to 40 mg/dL    LDL Cholesterol  Desirable:  <100mg/dL  Above Desirable:  100-129 mg/dL   Borderline High:  130-159 mg/dL   High:  160-189 mg/dL   Very High:  >= 190 mg/dL    Non HDL Cholesterol  Desirable:  130 mg/dL  Above Desirable:  130-159 mg/dL  Borderline High:  160-189 mg/dL  High:  190-219 mg/dL  Very High:  Greater than or equal to 220 mg/dL   Comprehensive metabolic panel   Result Value Ref Range    Sodium 141 136 - 145 mmol/L    Potassium 4.1 3.4 - 5.3 mmol/L    Creatinine 0.69 0.51 - 0.95 mg/dL    Urea Nitrogen 16.6 8.0 - 23.0 mg/dL    Chloride 103 98 - 107 mmol/L    Carbon Dioxide (CO2) 25 22 - 29 mmol/L    Anion Gap 13 7 - 15 mmol/L    Glucose 84 70 - 99 mg/dL    Calcium 10.2 8.8 - 10.2 mg/dL    Protein Total 7.2 6.4 - 8.3 g/dL    Albumin 4.2 3.5 - 5.2 g/dL    Bilirubin Total 0.5 <=1.2 mg/dL    Alkaline Phosphatase 92 35 - 104 U/L     AST 23 10 - 35 U/L    ALT 14 10 - 35 U/L    GFR Estimate 88 >60 mL/min/1.73m2      Comment:      Effective December 21, 2021 eGFRcr in adults is calculated using the 2021 CKD-EPI creatinine equation which includes age and gender (Nadia et al., NEJM, DOI: 10.1056/HJLVvt9674319)       If you have any questions or concerns, please call the clinic at the number listed above.       Sincerely,      Roni Horowitz MD

## 2022-08-08 NOTE — PROGRESS NOTES
SUBJECTIVE:   Cyndie Marin is a 77 year old female who presents for Preventive Visit.    HPI:  She continues to explore having rectal surgery for mass that is not thought to be cancer.  Functioning colostomy now.  Plan is repeat MRI later this year.  No bleeding or pain.  Having increase in chronic right shoulder pain, particularly with raising above the shoulder.  No trauma.  Ongoing eye evaluation for her cataracts and ?glaucoma.  No unusual dyspnea or cough or dysuria.  Some left costal margin pain, first thing in the AM.    Are you in the first 12 months of your Medicare coverage?  No    HPI  Do you feel safe in your environment? Yes    Cognitive Screening   1) Repeat 3 items (Leader, Season, Table)    2) Clock draw: NORMAL  3) 3 item recall: Recalls 3 objects  Results: 3 items recalled: COGNITIVE IMPAIRMENT LESS LIKELY    Mini-CogTM Copyright S Sebastien. Licensed by the author for use in [facility]; reprinted with permission ([email or web address]@[email or web address]). All rights reserved.      Do you have sleep apnea, excessive snoring or daytime drowsiness?: yes    Reviewed and updated as needed this visit by clinical staff   Tobacco  Allergies  Meds           Reviewed and updated as needed this visit by Provider     Social History     Tobacco Use     Smoking status: Never Smoker     Smokeless tobacco: Never Used   Substance Use Topics     Alcohol use: Not Currently     Alcohol/week: 0.0 standard drinks     Alcohol Use 8/5/2021   Prescreen: >3 drinks/day or >7 drinks/week? No     Current providers sharing in care for this patient include:   Patient Care Team:  Roni Horowitz MD Assigned PCP  Alex Cameron MD as Assigned Surgical Provider    The following health maintenance items are reviewed in Epic and correct as of today:  Health Maintenance Due   Topic Date Due     ZOSTER IMMUNIZATION (2 of 3) 01/30/2008     PHQ-2 (once per calendar year)  01/01/2022     ANNUAL REVIEW OF HM ORDERS  08/05/2022     FALL  "RISK ASSESSMENT  08/05/2022     MEDICARE ANNUAL WELLNESS VISIT  08/05/2022     Pertinent mammograms are reviewed under the imaging tab.    Review of Systems  See HPI    OBJECTIVE:   /76 (BP Location: Left arm, Patient Position: Sitting, Cuff Size: Adult Regular)   Pulse 72   Ht 1.651 m (5' 5\")   Wt 92.5 kg (204 lb)   SpO2 98%   BMI 33.95 kg/m   Estimated body mass index is 33.95 kg/m  as calculated from the following:    Height as of this encounter: 1.651 m (5' 5\").    Weight as of this encounter: 92.5 kg (204 lb).    PHYSICAL EXAM:  General Appearance: In no acute distress  NECK:  without cervical or axillary adenopathy  RESPIRATORY: Clear  CARDIOVASCULAR: S1, S2  ABDOMEN: soft, flat, and non-tender, without mass, rebound, or guarding, colostomy is in place  EXTREMITIES: pain and limitation of abduction on the right shoulder   NEUROLOGIC: No arm or leg  weakness, speech is clear, gait is normal    ASSESSMENT / PLAN:     Georgia was seen today for wellness visit.    Diagnoses and all orders for this visit:    SHAUNA (obstructive sleep apnea)  Uses CPAP    Essential hypertension  Good control with current treatment.     Colostomy in place     Colonic mass  Ongoing plan for MRI later this year.     Hyperlipidemia LDL goal <100  On statin therapy.     Gastroesophageal reflux disease without esophagitis    Chronic right shoulder pain  Xray reveals what looks like old AC arthritis and glenohumeral arthritis. Will refer to orthopedic surgery.   -     XR Shoulder Right G/E 3 Views; Future    Stress urinary incontinence  Managing with diapers, not severe.     Patient has been advised of split billing requirements and indicates understanding: Yes    COUNSELING:  Reviewed preventive health counseling, as reflected in patient instructions       Regular exercise       Healthy diet/nutrition       Vision screening       Colon cancer screening       (Kristin)menopause management    Weight management plan: Discussed healthy " diet and exercise guidelines    She reports that she has never smoked. She has never used smokeless tobacco.    Appropriate preventive services were discussed with this patient, including applicable screening as appropriate for cardiovascular disease, diabetes, osteopenia/osteoporosis, and glaucoma.  As appropriate for age/gender, discussed screening for colorectal cancer, prostate cancer, breast cancer, and cervical cancer. Checklist reviewing preventive services available has been given to the patient.    Reviewed patients plan of care and provided an AVS. The Basic Care Plan (routine screening as documented in Health Maintenance) for Georgia meets the Care Plan requirement. This Care Plan has been established and reviewed with the Patient.    Roni Horowitz MD  [facility]    Identified Health Risks: colostomy, rotator cuff shoulder injury

## 2022-08-15 NOTE — TELEPHONE ENCOUNTER
DIAGNOSIS: chronic R shoulder pain/Dr. Horowitz/XR   APPOINTMENT DATE: 8.25.22   NOTES STATUS DETAILS   OFFICE NOTE from referring provider Internal 8.8.22 Dr Roni Horowitz, [facility] IM   MEDICATION LIST Internal    DEXA (osteoporosis/bone health) Internal    XRAYS (IMAGES & REPORTS) Internal 8.8.22 L shoulder

## 2022-08-24 NOTE — PROGRESS NOTES
[facility]  Sports Medicine Clinic  Clinics and Surgery Center           SUBJECTIVE       Cyndie Marin is a 77 year old female presenting to clinic today with left shoulder pain. Today, the patient reports that she has had left shoulder pain for 1.5 years without injury. 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 The pain is located over the posterior shoulder and radiates to the anterior aspect of the shoulder. Pain is worse with overhead activities. She does a swimming aerobics class for exercise. She has never received any steroid injections or been to physical therapy for the left shoulder. She is right hand dominant.     Background:   Date of injury: Chronic  Duration of symptoms: 1.5 years   Mechanism of Injury: Chronic   Intensity: 6/10   Aggravating factors: overhead activities, lifting   Relieving Factors: Aleve    Prior Evaluation: Yes, PCP  Study Result    Narrative & Impression   EXAM: XR SHOULDER LEFT G/E 3 VIEWS  LOCATION: [facility]  DATE/TIME: 8/8/2022 2:43 PM     INDICATION:  Chronic right shoulder pain, Chronic right shoulder pain  COMPARISON: None.                                                                      IMPRESSION: No fracture. Severe degenerative arthritis of the glenohumeral joint. Ossific bodies projecting over the scapula near the coracoid process probably within subscapular joint recess. Mild degenerative arthrosis AC joint.     Degenerative changes in the spine.          PMH, Medications and Allergies were reviewed and updated as needed.    ROS:  As noted above otherwise negative.    Patient Active Problem List   Diagnosis     SHAUNA (obstructive sleep apnea)     Allergic rhinitis due to pollen     Lumbar stenosis     Essential hypertension, benign     Colostomy in place (H)     Gastroesophageal reflux disease without esophagitis     Overactive bladder     S/P hip replacement,  "left     Colonic mass     Hyperlipidemia LDL goal <100     Shoulder pain, right     Calculus of gallbladder without cholecystitis without obstruction     History of urinary tract obstruction     RAJ (stress urinary incontinence, female)       Current Outpatient Medications   Medication Sig Dispense Refill     acetaminophen (TYLENOL) 325 MG tablet [ACETAMINOPHEN (TYLENOL) 325 MG TABLET] Take 2 tablets (650 mg total) by mouth every 4 (four) hours as needed for pain (mild pain). 100 tablet 0     cholecalciferol, vitamin D3, 5,000 unit Tab Take 1,000 Units by mouth daily       lisinopril-hydrochlorothiazide (ZESTORETIC) 10-12.5 MG tablet [LISINOPRIL-HYDROCHLOROTHIAZIDE (PRINZIDE,ZESTORETIC) 10-12.5 MG PER TABLET] TAKE 1 TABLET DAILY 90 tablet 1     loratadine (CLARITIN) 10 mg tablet [LORATADINE (CLARITIN) 10 MG TABLET] Take 10 mg by mouth daily as needed.        menthol (BIOFREEZE, MENTHOL,) 4 % Gel [MENTHOL (BIOFREEZE, MENTHOL,) 4 % GEL] Apply topically at bedtime as needed.        Naproxen Sodium (ALEVE PO)        polyethylene glycol (MIRALAX) 17 gram packet [POLYETHYLENE GLYCOL (MIRALAX) 17 GRAM PACKET] Take 1 packet (17 g total) by mouth daily. 7 packet 0     rosuvastatin (CRESTOR) 10 MG tablet Take 1 tablet (10 mg) by mouth At Bedtime 90 tablet 3            OBJECTIVE:       Vitals:   Vitals:    08/25/22 0936   Weight: 92.5 kg (204 lb)   Height: 1.651 m (5' 5\")     BMI: Body mass index is 33.95 kg/m .    Gen:  Well nourished and in no acute distress  HEENT: Extraocular movement intact  Neck: Supple  Pulm:  Breathing Comfortably. No increased respiratory effort.  Psych: Euthymic. Appropriately answers questions    MSK: Left shoulder without visible asymmetry, ecchymosis, or erythema.  No discernible tenderness to palpation of the anterior, lateral, or posterior shoulder topically.  Tenderness to palpation at the AC joint.  Limited range of motion in flexion and abduction to 90 and 90 respectively.  Rotator cuff " strength testing mildly painful of the supraspinatus and infraspinatus, strength at a 4.5/5.  Positive cross body and empty can.  Positive [place].  Negative [place] bilaterally.      XRAY : Discussed in great detail with the patient.          ASSESSMENT and PLAN:     Georgia was seen today for pain.    Diagnoses and all orders for this visit:    Localized osteoarthritis of left shoulder  -     Physical Therapy Referral; Future    Arthrosis of left acromioclavicular joint  -     Physical Therapy Referral; Future    Tendinopathy of left rotator cuff  -     Physical Therapy Referral; Future    Other orders  -     Orthopedic  Referral    77-year-old female presenting to clinic today with concerns for chronic left shoulder pain.  The patient has a history of a left hip replacement as well as a kidney removal in the past, which has limited her use of NSAIDs.  X-ray was obtained in August which did show severe degenerative changes of the before meals and glenohumeral joints.  The patient and her presentation are consistent with the x-ray on my physical exam.  Given the fact the patient has not tried any conservative measures to assist in this pain, I do think an ultrasound-guided glenohumeral injection would be ideal.  The patient is interested.  We have assisted her in scheduling this.  Secondly, the patient may need an AC joint injection, but I do think addressing the more problematic area first would be more beneficial for her.  We also placed the patient in physical therapy.      Options for treatment and/or follow-up care were reviewed with the patient was actively involved in the decision making process. Patient verbalized understanding and was in agreement with the plan.    Geovanny Mcneal DO  , Sports Medicine  Department of Family [facility] and [facility]

## 2022-08-25 ENCOUNTER — OFFICE VISIT (OUTPATIENT)
Dept: ORTHOPEDICS | Facility: CLINIC | Age: 77
End: 2022-08-25
Attending: INTERNAL MEDICINE
Payer: COMMERCIAL

## 2022-08-25 ENCOUNTER — PRE VISIT (OUTPATIENT)
Dept: ORTHOPEDICS | Facility: CLINIC | Age: 77
End: 2022-08-25

## 2022-08-25 VITALS — HEIGHT: 65 IN | BODY MASS INDEX: 33.99 KG/M2 | WEIGHT: 204 LBS

## 2022-08-25 DIAGNOSIS — M67.912 TENDINOPATHY OF LEFT ROTATOR CUFF: ICD-10-CM

## 2022-08-25 DIAGNOSIS — M25.519 PAIN IN JOINT, SHOULDER REGION: Primary | ICD-10-CM

## 2022-08-25 DIAGNOSIS — M19.012 ARTHROSIS OF LEFT ACROMIOCLAVICULAR JOINT: ICD-10-CM

## 2022-08-25 DIAGNOSIS — M19.012 LOCALIZED OSTEOARTHRITIS OF LEFT SHOULDER: Primary | ICD-10-CM

## 2022-08-25 PROCEDURE — 99204 OFFICE O/P NEW MOD 45 MIN: CPT | Performed by: STUDENT IN AN ORGANIZED HEALTH CARE EDUCATION/TRAINING PROGRAM

## 2022-08-25 NOTE — LETTER
8/25/2022      RE: Cyndie Marin  [address] [place] [address]     Dear Colleague,    Thank you for referring your patient, Cyndie Marin, to the [facility]. Please see a copy of my visit note below.    [facility]  Sports Medicine Clinic  Clinics and Surgery Center           SUBJECTIVE       Cyndie Marin is a 77 year old female presenting to clinic today with left shoulder pain. Today, the patient reports that she has had left shoulder pain for 1.5 years without injury. 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 The pain is located over the posterior shoulder and radiates to the anterior aspect of the shoulder. Pain is worse with overhead activities. She does a swimming aerobics class for exercise. She has never received any steroid injections or been to physical therapy for the left shoulder. She is right hand dominant.     Background:   Date of injury: Chronic  Duration of symptoms: 1.5 years   Mechanism of Injury: Chronic   Intensity: 6/10   Aggravating factors: overhead activities, lifting   Relieving Factors: Aleve    Prior Evaluation: Yes, PCP  Study Result    Narrative & Impression   EXAM: XR SHOULDER LEFT G/E 3 VIEWS  LOCATION: [facility]  DATE/TIME: 8/8/2022 2:43 PM     INDICATION:  Chronic right shoulder pain, Chronic right shoulder pain  COMPARISON: None.                                                                      IMPRESSION: No fracture. Severe degenerative arthritis of the glenohumeral joint. Ossific bodies projecting over the scapula near the coracoid process probably within subscapular joint recess. Mild degenerative arthrosis AC joint.     Degenerative changes in the spine.          PMH, Medications and Allergies were reviewed and updated as needed.    ROS:  As noted above otherwise negative.    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 100 tablet 0     cholecalciferol, vitamin D3, 5,000 unit Tab Take 1,000 Units by mouth daily       lisinopril-hydrochlorothiazide (ZESTORETIC) 10-12.5 MG tablet [LISINOPRIL-HYDROCHLOROTHIAZIDE (PRINZIDE,ZESTORETIC) 10-12.5 MG PER TABLET] TAKE 1 TABLET DAILY 90 tablet 1     loratadine (CLARITIN) 10 mg tablet [LORATADINE (CLARITIN) 10 MG TABLET] Take 10 mg by mouth daily as needed.        menthol (BIOFREEZE, MENTHOL,) 4 % Gel [MENTHOL (BIOFREEZE, MENTHOL,) 4 % GEL] Apply topically at bedtime as needed.        Naproxen Sodium (ALEVE PO)        polyethylene glycol (MIRALAX) 17 gram packet [POLYETHYLENE GLYCOL (MIRALAX) 17 GRAM PACKET] Take 1 packet (17 g total) by mouth daily. 7 packet 0     rosuvastatin (CRESTOR) 10 MG tablet Take 1 tablet (10 mg) by mouth At Bedtime 90 tablet 3            OBJECTIVE:       Vitals:   Vitals:    08/25/22 0936   Weight: 92.5 kg (204 lb)   Height: 1.651 m (5' 5\")     BMI: Body mass index is 33.95 kg/m .    Gen:  Well nourished and in no acute distress  HEENT: Extraocular movement intact  Neck: Supple  Pulm:  Breathing Comfortably. No increased respiratory effort.  Psych: Euthymic. Appropriately answers questions    MSK: Left shoulder without visible " asymmetry, ecchymosis, or erythema.  No discernible tenderness to palpation of the anterior, lateral, or posterior shoulder topically.  Tenderness to palpation at the AC joint.  Limited range of motion in flexion and abduction to 90 and 90 respectively.  Rotator cuff strength testing mildly painful of the supraspinatus and infraspinatus, strength at a 4.5/5.  Positive cross body and empty can.  Positive [place].  Negative [place] bilaterally.      XRAY : Discussed in great detail with the patient.          ASSESSMENT and PLAN:     [place] was seen today for pain.    Diagnoses and all orders for this visit:    Localized osteoarthritis of left shoulder  -     Physical Therapy Referral; Future    Arthrosis of left acromioclavicular joint  -     Physical Therapy Referral; Future    Tendinopathy of left rotator cuff  -     Physical Therapy Referral; Future    Other orders  -     Orthopedic  Referral    77-year-old female presenting to clinic today with concerns for chronic left shoulder pain.  The patient has a history of a left hip replacement as well as a kidney removal in the past, which has limited her use of NSAIDs.  X-ray was obtained in August which did show severe degenerative changes of the before meals and glenohumeral joints.  The patient and her presentation are consistent with the x-ray on my physical exam.  Given the fact the patient has not tried any conservative measures to assist in this pain, I do think an ultrasound-guided glenohumeral injection would be ideal.  The patient is interested.  We have assisted her in scheduling this.  Secondly, the patient may need an AC joint injection, but I do think addressing the more problematic area first would be more beneficial for her.  We also placed the patient in physical therapy.      Options for treatment and/or follow-up care were reviewed with the patient was actively involved in the decision making process. Patient verbalized understanding and  was in agreement with the plan.    Geovanny Mcneal DO  , Sports Medicine  Department of Family Medicine and [facility]

## 2022-08-29 DIAGNOSIS — E78.00 PURE HYPERCHOLESTEROLEMIA: ICD-10-CM

## 2022-08-29 RX ORDER — ROSUVASTATIN CALCIUM 10 MG/1
10 TABLET, COATED ORAL AT BEDTIME
Qty: 90 TABLET | Refills: 3 | Status: SHIPPED | OUTPATIENT
Start: 2022-08-29 | End: 2023-08-22

## 2022-08-29 NOTE — TELEPHONE ENCOUNTER
"Routing refill request to provider for review/approval because:  No PCP    Last Written Prescription Date:  9/1/21  Last Fill Quantity: 90,  # refills: 3   Last office visit provider:  8/8/22     Requested Prescriptions   Pending Prescriptions Disp Refills     rosuvastatin (CRESTOR) 10 MG tablet [Pharmacy Med Name: ROSUVASTATIN TABS 10MG] 90 tablet 3     Sig: TAKE 1 TABLET AT BEDTIME       Statins Protocol Passed - 8/29/2022 10:19 AM        Passed - LDL on file in past 12 months     Recent Labs   Lab Test 08/08/22  1536   LDL 59             Passed - No abnormal creatine kinase in past 12 months     No lab results found.             Passed - Recent (12 mo) or future (30 days) visit within the authorizing provider's specialty     Patient has had an office visit with the authorizing provider or a provider within the authorizing providers department within the previous 12 mos or has a future within next 30 days. See \"Patient Info\" tab in inbasket, or \"Choose Columns\" in Meds & Orders section of the refill encounter.              Passed - Medication is active on med list        Passed - Patient is age 18 or older        Passed - No active pregnancy on record        Passed - No positive pregnancy test in past 12 months             Issac Anderson RN 08/29/22 10:19 AM  "

## 2022-09-08 DIAGNOSIS — I10 ESSENTIAL HYPERTENSION, BENIGN: ICD-10-CM

## 2022-09-09 RX ORDER — LISINOPRIL/HYDROCHLOROTHIAZIDE 10-12.5 MG
TABLET ORAL
Qty: 90 TABLET | Refills: 3 | Status: SHIPPED | OUTPATIENT
Start: 2022-09-09 | End: 2023-09-04

## 2022-09-09 NOTE — TELEPHONE ENCOUNTER
"Last Written Prescription Date:  3/31/22  Last Fill Quantity: 90,  # refills: 1   Last office visit provider:  8/8/22     Requested Prescriptions   Pending Prescriptions Disp Refills     lisinopril-hydrochlorothiazide (ZESTORETIC) 10-12.5 MG tablet [Pharmacy Med Name: LISINOPRIL/HCTZ TABS 10/12.5MG] 90 tablet 3     Sig: TAKE 1 TABLET DAILY       Diuretics (Including Combos) Protocol Passed - 9/8/2022 11:37 PM        Passed - Blood pressure under 140/90 in past 12 months     BP Readings from Last 3 Encounters:   08/08/22 126/76   07/08/22 114/74   09/15/21 112/70                 Passed - Recent (12 mo) or future (30 days) visit within the authorizing provider's specialty     Patient has had an office visit with the authorizing provider or a provider within the authorizing providers department within the previous 12 mos or has a future within next 30 days. See \"Patient Info\" tab in inbasket, or \"Choose Columns\" in Meds & Orders section of the refill encounter.              Passed - Medication is active on med list        Passed - Patient is age 18 or older        Passed - No active pregancy on record        Passed - Normal serum creatinine on file in past 12 months     Recent Labs   Lab Test 08/08/22  1536   CR 0.69              Passed - Normal serum potassium on file in past 12 months     Recent Labs   Lab Test 08/08/22  1536   POTASSIUM 4.1                    Passed - Normal serum sodium on file in past 12 months     Recent Labs   Lab Test 08/08/22  1536                 Passed - No positive pregnancy test in past 12 months       ACE Inhibitors (Including Combos) Protocol Passed - 9/8/2022 11:37 PM        Passed - Blood pressure under 140/90 in past 12 months     BP Readings from Last 3 Encounters:   08/08/22 126/76   07/08/22 114/74   09/15/21 112/70                 Passed - Recent (12 mo) or future (30 days) visit within the authorizing provider's specialty     Patient has had an office visit with the " "authorizing provider or a provider within the authorizing providers department within the previous 12 mos or has a future within next 30 days. See \"Patient Info\" tab in inbasket, or \"Choose Columns\" in Meds & Orders section of the refill encounter.              Passed - Medication is active on med list        Passed - Patient is age 18 or older        Passed - No active pregnancy on record        Passed - Normal serum creatinine on file in past 12 months     Recent Labs   Lab Test 08/08/22  1536   CR 0.69       Ok to refill medication if creatinine is low          Passed - Normal serum potassium on file in past 12 months     Recent Labs   Lab Test 08/08/22  1536   POTASSIUM 4.1             Passed - No positive pregnancy test within past 12 months             Valeria Curtis RN 09/09/22 5:21 PM  "

## 2022-09-12 ENCOUNTER — OFFICE VISIT (OUTPATIENT)
Dept: ORTHOPEDICS | Facility: CLINIC | Age: 77
End: 2022-09-12
Payer: COMMERCIAL

## 2022-09-12 DIAGNOSIS — M19.012 LOCALIZED OSTEOARTHRITIS OF LEFT SHOULDER: Primary | ICD-10-CM

## 2022-09-12 PROCEDURE — 99207 PR DROP WITH A PROCEDURE: CPT | Performed by: STUDENT IN AN ORGANIZED HEALTH CARE EDUCATION/TRAINING PROGRAM

## 2022-09-12 PROCEDURE — 20611 DRAIN/INJ JOINT/BURSA W/US: CPT | Mod: LT | Performed by: STUDENT IN AN ORGANIZED HEALTH CARE EDUCATION/TRAINING PROGRAM

## 2022-09-12 RX ORDER — TRIAMCINOLONE ACETONIDE 40 MG/ML
40 INJECTION, SUSPENSION INTRA-ARTICULAR; INTRAMUSCULAR
Status: DISCONTINUED | OUTPATIENT
Start: 2022-09-12 | End: 2024-06-25

## 2022-09-12 RX ORDER — LIDOCAINE HYDROCHLORIDE 10 MG/ML
4 INJECTION, SOLUTION EPIDURAL; INFILTRATION; INTRACAUDAL; PERINEURAL
Status: DISCONTINUED | OUTPATIENT
Start: 2022-09-12 | End: 2024-06-25

## 2022-09-12 RX ADMIN — TRIAMCINOLONE ACETONIDE 40 MG: 40 INJECTION, SUSPENSION INTRA-ARTICULAR; INTRAMUSCULAR at 16:34

## 2022-09-12 RX ADMIN — LIDOCAINE HYDROCHLORIDE 4 ML: 10 INJECTION, SOLUTION EPIDURAL; INFILTRATION; INTRACAUDAL; PERINEURAL at 16:34

## 2022-09-12 NOTE — PATIENT INSTRUCTIONS
Steroid Injection Information:    A corticosteroid injection was administered at your visit today.  The area of injection may be sore, slightly swollen for 1-2 days afterward.  Immediately after injection, you may have an area of numbness, which is caused by the local anesthetic, lidocaine (similar to novacaine) in the shot.  This medicine will wear off in about 4 hours.  In addition to the lidocaine, a steroid medication was injected, called triamcinolone acetate.  This medication is intended to provide long-acting antiinflammatory / pain relief.  It may take 2-5 days for this medication to provide noticeable relief.      After an injection, Dr. Welch recommends:    Protecting the area wearing a bandage or gauze pad for at least 24 hours.    Using ice; ice bag or frozen vegetables over the injection site every one to two hours when able (for 15 minutes at a time).      Avoid any strenuous activity even if the knee is already feeling better immediately afterward. Light stretching is encouraged but refrain from home exercise program and increasing activity level for about 48 hours.    Avoid soaking in a hot tub, bath, or pool for 48 hours after injection. Showering is fine!    Watch for signs of infection, including increasing pain, redness and swelling that last more than 48 hours after injection.

## 2022-09-12 NOTE — LETTER
2022      RE: Cyndie Marin  [address] [place] [address]     Dear Colleague,    Thank you for referring your patient, Cyndie Marin, to the [facility] MEDIC[facility] CLINIC [place]. Please see a copy of my visit note below.    PROCEDURE ENCOUNTER    [facility]  Geovanny Mcneal, DO  2022     Name: Cyndie Marin  MRN: [id number]  Age: 77 year old  : 1945    Referring provider: self  Diagnosis: L GH OA    22:    77-year-old female presenting to clinic today with concerns for chronic left shoulder pain.  The patient has a history of a left hip replacement as well as a kidney removal in the past, which has limited her use of NSAIDs.  X-ray was obtained in August which did show severe degenerative changes of the before meals and glenohumeral joints.  The patient and her presentation are consistent with the x-ray on my physical exam.  Given the fact the patient has not tried any conservative measures to assist in this pain, I do think an ultrasound-guided glenohumeral injection would be ideal.  The patient is interested.  We have assisted her in scheduling this.  Secondly, the patient may need an AC joint injection, but I do think addressing the more problematic area first would be more beneficial for her.  We also placed the patient in physical therapy.      Glenohumeral Injection - Ultrasound Guided  The patient was informed of the risks and the benefits of the procedure and a written consent was signed.  The patient s left shoulder was prepped with chlorhexidine in sterile fashion.   40 mg of triamcinolone suspension was drawn up into a 5 mL syringe with 4 mL of 1% lidocaine w/o Epi.  Injection was performed using sterile technique.  Under ultrasound guidance a 3.5-inch 22-gauge needle was used to enter the left glenohumeral joint.  Posterior approach was used with the patient in lateral recumbent position, arm in neutral position at the side.   Needle placement was visualized and documented with ultrasound.  Ultrasound visualization was necessary due to the small joint space entered.  Injection performed long axis to the probe.  Injection solution visualized within the joint space.  Images were permanently stored for the patient's record.  There were no complications. The patient tolerated the procedure well. There was negligible bleeding.   Therapy scheduled to follow for mobilization.  The patient was instructed to call or go to the emergency room with any unusual pain, swelling, redness, or if otherwise concerned.      Geovanny Mcneal D.O., [facility]  , Sports Medicine  Department of Family [facility] and [facility]          Large Joint Injection: L glenohumeral joint    Date/Time: 9/12/2022 4:34 PM  Performed by: Geovanny Mcneal DO  Authorized by: Geovanny Mcneal DO     Indications:  Pain and osteoarthritis  Needle Size:  22 G  Guidance: ultrasound    Approach:  Posterolateral  Location:  Shoulder      Site:  L glenohumeral joint  Medications:  40 mg triamcinolone 40 MG/ML; 4 mL lidocaine (PF) 1 %  Outcome:  Tolerated well, no immediate complications  Procedure discussed: discussed risks, benefits, and alternatives    Consent Given by:  Patient  Timeout: timeout called immediately prior to procedure    Prep: patient was prepped and draped in usual sterile fashion     Michael Olmstead ATC on 9/12/2022 at 4:35 PM        Again, thank you for allowing me to participate in the care of your patient.      Sincerely,    Geovanny Mcneal DO

## 2022-09-12 NOTE — PROGRESS NOTES
Large Joint Injection: L glenohumeral joint    Date/Time: 9/12/2022 4:34 PM  Performed by: Geovanny Mcneal DO  Authorized by: Geovanny Mcneal DO     Indications:  Pain and osteoarthritis  Needle Size:  22 G  Guidance: ultrasound    Approach:  Posterolateral  Location:  Shoulder      Site:  L glenohumeral joint  Medications:  40 mg triamcinolone 40 MG/ML; 4 mL lidocaine (PF) 1 %  Outcome:  Tolerated well, no immediate complications  Procedure discussed: discussed risks, benefits, and alternatives    Consent Given by:  Patient  Timeout: timeout called immediately prior to procedure    Prep: patient was prepped and draped in usual sterile fashion     Michael Olmstead ATC on 9/12/2022 at 4:35 PM

## 2022-09-12 NOTE — NURSING NOTE
[address] [address]  Dept: [phone]  ______________________________________________________________________________    Patient: Cyndie Marin   : 1945   MRN: [id number]   2022    INVASIVE PROCEDURE SAFETY CHECKLIST    Date: 22   Procedure: Left GH USG CSI  Patient Name: Cyndie Marin  MRN: [id number]  YOB: 1945    Action: Complete sections as appropriate. Any discrepancy results in a HARD COPY until resolved.     PRE PROCEDURE:  Patient ID verified with 2 identifiers (name and  or MRN): Yes  Procedure and site verified with patient/designee (when able): Yes  Accurate consent documentation in medical record: Yes  H&P (or appropriate assessment) documented in medical record: Yes  H&P must be up to 20 days prior to procedure and updates within 24 hours of procedure as applicable: NA  Relevant diagnostic and radiology test results appropriately labeled and displayed as applicable: Yes  Procedure site(s) marked with provider initials: NA    TIMEOUT:  Time-Out performed immediately prior to starting procedure, including verbal and active participation of all team members addressing the following:Yes  * Correct patient identify  * Confirmed that the correct side and site are marked  * An accurate procedure consent form  * Agreement on the procedure to be done  * Correct patient position  * Relevant images and results are properly labeled and appropriately displayed  * The need to administer antibiotics or fluids for irrigation purposes during the procedure as applicable   * Safety precautions based on patient history or medication use    DURING PROCEDURE: Verification of correct person, site, and procedures any time the responsibility for care of the patient is transferred to another member of the care team.       Prior to injection, verified patient identity using patient's name and date of  birth.  Due to injection administration, patient instructed to remain in clinic for 15 minutes  afterwards, and to report any adverse reaction to me immediately.    Joint injection was performed.      Drug Amount Wasted:  None.  Vial/Syringe: Single dose vial  Expiration Date:  6/1/23      Michael Olmstead, ATC  September 12, 2022

## 2022-09-12 NOTE — PROGRESS NOTES
PROCEDURE ENCOUNTER    [facility]  Geovanny Mcneal, DO  2022     Name: Cyndie Marin  MRN: [id number]  Age: 77 year old  : 1945    Referring provider: self  Diagnosis: MITCH GH OA    22:    77-year-old female presenting to clinic today with concerns for chronic left shoulder pain.  The patient has a history of a left hip replacement as well as a kidney removal in the past, which has limited her use of NSAIDs.  X-ray was obtained in August which did show severe degenerative changes of the before meals and glenohumeral joints.  The patient and her presentation are consistent with the x-ray on my physical exam.  Given the fact the patient has not tried any conservative measures to assist in this pain, I do think an ultrasound-guided glenohumeral injection would be ideal.  The patient is interested.  We have assisted her in scheduling this.  Secondly, the patient may need an AC joint injection, but I do think addressing the more problematic area first would be more beneficial for her.  We also placed the patient in physical therapy.      Glenohumeral Injection - Ultrasound Guided  The patient was informed of the risks and the benefits of the procedure and a written consent was signed.  The patient s left shoulder was prepped with chlorhexidine in sterile fashion.   40 mg of triamcinolone suspension was drawn up into a 5 mL syringe with 4 mL of 1% lidocaine w/o Epi.  Injection was performed using sterile technique.  Under ultrasound guidance a 3.5-inch 22-gauge needle was used to enter the left glenohumeral joint.  Posterior approach was used with the patient in lateral recumbent position, arm in neutral position at the side.  Needle placement was visualized and documented with ultrasound.  Ultrasound visualization was necessary due to the small joint space entered.  Injection performed long axis to the probe.  Injection solution visualized within the joint space.  Images were permanently  stored for the patient's record.  There were no complications. The patient tolerated the procedure well. There was negligible bleeding.   Therapy scheduled to follow for mobilization.  The patient was instructed to call or go to the emergency room with any unusual pain, swelling, redness, or if otherwise concerned.      Geovanny Mcneal D.O., [facility]  , Sports Medicine  Department of Family Medicine and [facility]

## 2022-09-16 ENCOUNTER — THERAPY VISIT (OUTPATIENT)
Dept: PHYSICAL THERAPY | Facility: CLINIC | Age: 77
End: 2022-09-16
Attending: STUDENT IN AN ORGANIZED HEALTH CARE EDUCATION/TRAINING PROGRAM
Payer: COMMERCIAL

## 2022-09-16 DIAGNOSIS — M19.012 ARTHROSIS OF LEFT ACROMIOCLAVICULAR JOINT: ICD-10-CM

## 2022-09-16 DIAGNOSIS — M19.012 LOCALIZED OSTEOARTHRITIS OF LEFT SHOULDER: ICD-10-CM

## 2022-09-16 DIAGNOSIS — M67.912 TENDINOPATHY OF LEFT ROTATOR CUFF: ICD-10-CM

## 2022-09-16 PROCEDURE — 97161 PT EVAL LOW COMPLEX 20 MIN: CPT | Mod: GP | Performed by: PHYSICAL THERAPIST

## 2022-09-16 PROCEDURE — 97530 THERAPEUTIC ACTIVITIES: CPT | Mod: GP | Performed by: PHYSICAL THERAPIST

## 2022-09-16 PROCEDURE — 97110 THERAPEUTIC EXERCISES: CPT | Mod: 59 | Performed by: PHYSICAL THERAPIST

## 2022-09-16 NOTE — PROGRESS NOTES
KEY PT FINDINGS:  1) Limited range of motion (active = passive)  2) No pain with resisted testing  3) Crepitus with active range of motion, no reports of pain  Does not like to lay down due to colostomy bag + ESTHELA    Physical Therapy Initial Evaluation: Subjective History     Injury/Condition Details:  Presenting Complaint Left shoulder (right hand dominant)   Onset Timing/Date 9/12/2022 MD visit/injection   Mechanism It has been painful for the past couple of years. The hip was worse and so had ESTHELA last year.   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 This tumor is on her left side so she wonders if this impacts the blood flow.   Goals: return to cleaning, gardening, swimming aerobics     Symptom Behavior Details    Primary Symptoms Sporadic symptoms; Activity/position dependent, pain (Location: tenderness to the front of the shoulder, a little pain on the top of the shoulder Quality: Aching/Throbbing), catching/locking/clicking, weakness   Worst Pain 3/10 (with see below)   Symptom Provocators Lifting above her head  Doing her hair  Dressing  Since, less pain, still clicking. Continued issues with sleeping.    Best Pain 0/10    Symptom Relievers Biofreeze    Time of day dependent? No   Recent symptom change? symptoms improving     Prior Testing/Intervention for current condition:  Prior Tests  x-ray   Prior Treatment Injections: steroid (yes helpful)     Lifestyle & General Medical History:  Employment Retired -  in past years.    Usual physical activities  (within past year) Swimming aerobics.      Orthopaedic history See Epic Chart   Notable medical history See Epic Chart   Patient Reported Health good         Physical Therapy Initial Evaluation: Objective Testing  SHOULDER:    Cervical Screen: Limited range of motion into rotation, no provocation of shoulder pain    Posture: Forward head, rounded shoulders.     Shoulder: No pain with resisted testing   ROM L ROM R MMT/Resisted Testing L  MMT/Resisted Testing R   Flexion 105 135     Abduction 105++ 130 3+/5    IR L4++ T12 4-/5    ER 35 65 3+/5    Mid Trap MMT: Did not test/5  Low Trap MMT: Did not test/5    Scapulothoracic Screen: Limited testing due to limited range of motion    Special Tests:   L R   Impingement     Neer's     Hawkin's-Clint     Posterior/Internal Impingement     AC Joint     Shear -    Hoonah's (ACJ/superficial pain)     Labral     Hoonah's (deep pain)     Instability     Apprehension-Relocation (anterior)     Anterior Load & Shift     Posterior Load & Shift     Multi-Directional Instability      Sulcus     Biceps      Speed's     Rotator Cuff Tear     Drop Arm     Lift off        Palpation: AC joint.     Assessment/Plan:  Patient is a 77 year old female with left side shoulder complaints.    Patient has the following significant findings with corresponding treatment plan.                Diagnosis 1:  Left shoulder OA  Pain -  hot/cold therapy, manual therapy, self management and education  Decreased ROM/flexibility - manual therapy, therapeutic exercise and home program  Decreased joint mobility - manual therapy, therapeutic exercise and home program  Decreased strength - therapeutic exercise, therapeutic activities and home program  Impaired muscle performance - neuro re-education and home program  Decreased function - therapeutic activities and home program    Therapy Evaluation Codes:   1) History comprised of:   Personal factors that impact the plan of care:      None.    Comorbidity factors that impact the plan of care are:      None.     Medications impacting care: None.  2) Examination of Body Systems comprised of:   Body structures and functions that impact the plan of care:      Shoulder.   Activity limitations that impact the plan of care are:      Grasping and Lifting.  3) Clinical presentation characteristics are:   Stable/Uncomplicated.  4) Decision-Making    Low complexity using standardized patient assessment  instrument and/or measureable assessment of functional outcome.  Cumulative Therapy Evaluation is: Low complexity.    Previous and current functional limitations:  (See Goal Flow Sheet for this information)    Short term and Long term goals: (See Goal Flow Sheet for this information)     Communication ability:  Patient appears to be able to clearly communicate and understand verbal and written communication and follow directions correctly.  Treatment Explanation - The following has been discussed with the patient:   RX ordered/plan of care  Anticipated outcomes  Possible risks and side effects  This patient would benefit from PT intervention to resume normal activities.   Rehab potential is good.    Frequency:  1 X week, once daily  Duration:  for 4 weeks  Discharge Plan:  Achieve all LTG.  Independent in home treatment program.  Reach maximal therapeutic benefit.    Please refer to the daily flowsheet for treatment today, total treatment time and time spent performing 1:1 timed codes.

## 2022-09-16 NOTE — PROGRESS NOTES
[facility]    OUTPATIENT Physical Therapy ORTHOPEDIC EVALUATION  PLAN OF TREATMENT FOR OUTPATIENT REHABILITATION  (COMPLETE FOR INITIAL CLAIMS ONLY)  Patient's Last Name, First Name, M.I.  YOB: 1945  Cyndie Marin    Provider s Name:  [facility]   Medical Record No.  [id number]   Start of Care Date:  09/16/22   Onset Date:   09/12/22   Type:     _X__PT   ___OT Medical Diagnosis:    Encounter Diagnoses   Name Primary?    Localized osteoarthritis of left shoulder     Arthrosis of left acromioclavicular joint     Tendinopathy of left rotator cuff         Treatment Diagnosis:  Left shoulder OA        Goals:     09/16/22 0500   Body Part   Goals listed below are for Left shoulder   Goal #1   Goal #1 reaching   Previous Functional Level No restrictions   Current Functional Level Cannot reach    Performance level overhead to do her hair, has pain   STG Target Performance Reach    Performance level to shoulder level without pain with light weight   Rationale for dressing   Due date 09/30/22   LTG Target Performance Reach   Performance Level overhead to do her hair without pain   Rationale for independent personal hygiene   Due date 10/14/22       Therapy Frequency:  1x/week  Predicted Duration of Therapy Intervention:  4 weeks    Anusha Koch, PT                 I CERTIFY THE NEED FOR THESE SERVICES FURNISHED UNDER        THIS PLAN OF TREATMENT AND WHILE UNDER MY CARE     (Physician attestation of this document indicates review and certification of the therapy plan).                     Certification Date From:  09/16/22   Certification Date To:  10/14/22    Referring Provider:  Geovanny Mcneal    Initial Assessment        See Epic Evaluation SOC Date: 09/16/22

## 2022-09-26 ENCOUNTER — IMMUNIZATION (OUTPATIENT)
Dept: FAMILY MEDICINE | Facility: CLINIC | Age: 77
End: 2022-09-26
Payer: COMMERCIAL

## 2022-09-26 DIAGNOSIS — Z23 IMMUNIZATION DUE: Primary | ICD-10-CM

## 2022-09-26 PROCEDURE — 99207 PR NO CHARGE NURSE ONLY: CPT

## 2022-09-26 PROCEDURE — 90662 IIV NO PRSV INCREASED AG IM: CPT

## 2022-09-26 PROCEDURE — 91312 COVID-19,PF,PFIZER BOOSTER BIVALENT: CPT

## 2022-09-26 PROCEDURE — 0124A COVID-19,PF,PFIZER BOOSTER BIVALENT: CPT

## 2022-09-26 PROCEDURE — G0008 ADMIN INFLUENZA VIRUS VAC: HCPCS

## 2022-10-07 ENCOUNTER — THERAPY VISIT (OUTPATIENT)
Dept: PHYSICAL THERAPY | Facility: CLINIC | Age: 77
End: 2022-10-07
Payer: COMMERCIAL

## 2022-10-07 DIAGNOSIS — M19.012 LOCALIZED OSTEOARTHRITIS OF LEFT SHOULDER: Primary | ICD-10-CM

## 2022-10-07 DIAGNOSIS — M19.012 ARTHROSIS OF LEFT ACROMIOCLAVICULAR JOINT: ICD-10-CM

## 2022-10-07 DIAGNOSIS — M67.912 TENDINOPATHY OF LEFT ROTATOR CUFF: ICD-10-CM

## 2022-10-07 PROCEDURE — 97110 THERAPEUTIC EXERCISES: CPT | Mod: GP | Performed by: PHYSICAL THERAPIST

## 2022-10-27 NOTE — PROGRESS NOTES
[facility]  Sports Medicine Clinic  Cl[facility] and [facility]           SUBJECTIVE       Cyndie Marin is a 77 year old female presenting to clinic today for a f/u of the left shoulder    8/25/22:    77-year-old female presenting to clinic today with concerns for chronic left shoulder pain.  The patient has a history of a left hip replacement as well as a kidney removal in the past, which has limited her use of NSAIDs.  X-ray was obtained in August which did show severe degenerative changes of the ac and glenohumeral joints.  The patient and her presentation are consistent with the x-ray on my physical exam.  Given the fact the patient has not tried any conservative measures to assist in this pain, I do think an ultrasound-guided glenohumeral injection would be ideal.  The patient is interested.  We have assisted her in scheduling this.  Secondly, the patient may need an AC joint injection, but I do think addressing the more problematic area first would be more beneficial for her.  We also placed the patient in physical therapy.    9/12/22: L Shoulder GH CSI Inj.       Interval hx:  Patient returns to clinic status post left shoulder glenohumeral ultrasound-guided injection.  She is overall doing extremely well.  Her range of motion is improving her pain is low.  She has completed physical therapy back in August, and has not been since..    PMH, Medications and Allergies were reviewed and updated as needed.    ROS:  As noted above otherwise negative.    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 100 tablet 0     cholecalciferol, vitamin D3, 5,000 unit Tab Take 1,000 Units by mouth daily       lisinopril-hydrochlorothiazide (ZESTORETIC) 10-12.5 MG tablet TAKE 1 TABLET DAILY 90 tablet 3     loratadine (CLARITIN) 10 mg tablet [LORATADINE (CLARITIN) 10 MG TABLET] Take 10 mg by mouth daily as needed.        menthol (BIOFREEZE, MENTHOL,) 4 % Gel [MENTHOL (BIOFREEZE, MENTHOL,) 4 % GEL] Apply topically at bedtime as needed.        Naproxen Sodium (ALEVE PO)        polyethylene glycol (MIRALAX) 17 gram packet [POLYETHYLENE GLYCOL (MIRALAX) 17 GRAM PACKET] Take 1 packet (17 g total) by mouth daily. 7 packet 0     rosuvastatin (CRESTOR) 10 MG tablet Take 1 tablet (10 mg) by mouth At Bedtime 90 tablet 3            OBJECTIVE:       Vitals:   Vitals:    10/28/22 1449   Weight: 92.5 kg (204 lb)   Height: 1.651 m (5' 5\")     BMI: Body mass index is 33.95 kg/m .    Gen:  Well nourished and in no acute distress  HEENT: Extraocular movement intact  Neck: Supple  Pulm:  Breathing Comfortably. No increased respiratory effort.  Psych: Euthymic. Appropriately answers questions    MSK: Pain-free range of motion of the bilateral shoulders, improved on the left and flexion.  Abduction, external rotation, internal rotation roughly the same as before.  The patient is without pain, guarding, or sensations of instability.           ASSESSMENT and PLAN:     Georgia was seen today for recheck.    Diagnoses and all orders for this visit:    Localized osteoarthritis of left shoulder    Arthrosis of left acromioclavicular joint      77-year-old female returning to clinic today for follow-up of the localized arthritis of " the left shoulder as well as arthrosis of the acromioclavicular joint on the left.  We did an injection for the glenohumeral joint on the left and the patient has successfully reduced the amount of pain she is having.  I do think her to continue with physical therapy at this point to ensure prolonged benefit from the injection would be ideal.  The patient currently has her exercises and would like to do this on her own.  At this point.  The patient can follow-up with us on an as-needed basis.  Did discuss that injections are typically done every 3 months as needed.  She will contact the clinic if she is having any regression of her currently very good progress.     Options for treatment and/or follow-up care were reviewed with the patient was actively involved in the decision making process. Patient verbalized understanding and was in agreement with the plan.    Geovanny Mcneal DO  , Sports Medicine  Department of Family Medicine and [facility]

## 2022-10-28 ENCOUNTER — OFFICE VISIT (OUTPATIENT)
Dept: ORTHOPEDICS | Facility: CLINIC | Age: 77
End: 2022-10-28
Payer: COMMERCIAL

## 2022-10-28 VITALS — HEIGHT: 65 IN | WEIGHT: 204 LBS | BODY MASS INDEX: 33.99 KG/M2

## 2022-10-28 DIAGNOSIS — M19.012 ARTHROSIS OF LEFT ACROMIOCLAVICULAR JOINT: ICD-10-CM

## 2022-10-28 DIAGNOSIS — M19.012 LOCALIZED OSTEOARTHRITIS OF LEFT SHOULDER: Primary | ICD-10-CM

## 2022-10-28 PROCEDURE — 99213 OFFICE O/P EST LOW 20 MIN: CPT | Performed by: STUDENT IN AN ORGANIZED HEALTH CARE EDUCATION/TRAINING PROGRAM

## 2022-11-25 ENCOUNTER — VIRTUAL VISIT (OUTPATIENT)
Dept: FAMILY MEDICINE | Facility: CLINIC | Age: 77
End: 2022-11-25
Payer: COMMERCIAL

## 2022-11-25 DIAGNOSIS — U07.1 INFECTION DUE TO 2019 NOVEL CORONAVIRUS: Primary | ICD-10-CM

## 2022-11-25 PROCEDURE — 99442 PR PHYSICIAN TELEPHONE EVALUATION 11-20 MIN: CPT | Mod: CS | Performed by: FAMILY MEDICINE

## 2022-11-25 NOTE — PATIENT INSTRUCTIONS

## 2022-11-25 NOTE — PROGRESS NOTES
Georgia is a 77 year old who is being evaluated via a billable telephone visit.      What phone number would you like to be contacted at? [phone]  How would you like to obtain your AVS? Mail a copy    Assessment & Plan     Infection due to 2019 novel coronavirus    - nirmatrelvir and ritonavir (PAXLOVID) therapy pack  Dispense: 30 tablet; Refill: 0      Discussed   - do not take rosuvastatin 24 hours before/ after taking paxlovid   - rebound   - quarantine   - side effects   - return to normal activities slowly    Return if symptoms worsen or fail to improve.    Comfort Corbin MD  [facility]   Georgia is a 77 year old accompanied by her self, presenting for the following health issues:  Office Visit (Tested positive for Covid 11/24/2022. Began coughing on 11/23/2022. Runny nose, left arm hurt from shoulder injection of steroid injection.Currently body aches, and mucus in runny  nose, headache.) and Recheck Medication    Had to cancel Thanksgiving yesterday due to symptoms, and had a positive home test today      HPI       COVID-19 Symptom Review  How many days ago did these symptoms start? one    Are any of the following symptoms significant for you?    New or worsening difficulty breathing? No    Worsening cough? Yes, I am coughing up mucus.    Fever or chills? Yes, the highest temperature was 100.0, had chills and turned the heat up    Headache: YES - better    Sore throat: YES - then got better    Chest pain: No    Diarrhea: No - but has a colostomy  - [address] rd changed bag at 6 pm  - all day yesterday didn't fill bag - now starting to fill  Up again    Body aches? YES - better    What treatments has patient tried? Son gave her vitamin C, Nyquil and Dayquil and Muciniex .She also takes Vitamin D    Does patient live in a nursing home, group home, or shelter? No  Does patient have a way to get food/medications during quarantined? Yes, I have a friend or family member who  can help me.    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 - was supposed to get colostomy take down take down but this was delayed - discussed this should not affected treatment with paxlovid            Objective    Vitals - Patient Reported  Systolic (Patient Reported): 107  Diastolic (Patient Reported): 85  Temperature (Patient Reported): 100  F (37.8  C)            Physical Exam     PSYCH: Alert and oriented times 3; coherent speech, normal   rate and volume, able to articulate logical thoughts, able   to abstract reason, no tangential thoughts, no hallucinations   or delusions  Her affect is normal  RESP: on and off cough, no audible wheezing, able to talk in full sentences  Remainder of exam unable to be completed due to telephone visits            Phone call duration: 20 minutes  10:45 - 11:05 AM

## 2023-01-15 ENCOUNTER — ANCILLARY PROCEDURE (OUTPATIENT)
Dept: MRI IMAGING | Facility: CLINIC | Age: 78
End: 2023-01-15
Attending: COLON & RECTAL SURGERY
Payer: COMMERCIAL

## 2023-01-15 DIAGNOSIS — Z93.3 COLOSTOMY STATUS (H): ICD-10-CM

## 2023-01-15 DIAGNOSIS — R19.09 PRESACRAL MASS: ICD-10-CM

## 2023-01-15 PROCEDURE — 72197 MRI PELVIS W/O & W/DYE: CPT | Performed by: RADIOLOGY

## 2023-01-15 PROCEDURE — A9585 GADOBUTROL INJECTION: HCPCS | Performed by: RADIOLOGY

## 2023-01-15 RX ORDER — GADOBUTROL 604.72 MG/ML
10 INJECTION INTRAVENOUS ONCE
Status: COMPLETED | OUTPATIENT
Start: 2023-01-15 | End: 2023-01-15

## 2023-01-15 RX ADMIN — GADOBUTROL 10 ML: 604.72 INJECTION INTRAVENOUS at 10:33

## 2023-03-05 NOTE — PROGRESS NOTES
PROCEDURE ENCOUNTER    [facility]  Vahezain Mcneal,   2023     Name: Cyndie Marin  MRN: [id number]  Age: 77 year old  : 1945    Referring provider: Self  Diagnosis: L Shoulder OA    10/2022: Localized osteoarthritis of left shoulder     Arthrosis of left acromioclavicular joint        77-year-old female returning to clinic today for follow-up of the localized arthritis of the left shoulder as well as arthrosis of the acromioclavicular joint on the left.  We did an injection for the glenohumeral joint on the left and the patient has successfully reduced the amount of pain she is having.  I do think her to continue with physical therapy at this point to ensure prolonged benefit from the injection would be ideal.  The patient currently has her exercises and would like to do this on her own.  At this point.  The patient can follow-up with us on an as-needed basis.  Did discuss that injections are typically done every 3 months as needed.  She will contact the clinic if she is having any regression of her currently very good progress.      Glenohumeral Injection - Ultrasound Guided  The patient was informed of the risks and the benefits of the procedure and a written consent was signed.  The patient s left shoulder was prepped with chlorhexidine in sterile fashion.   40 mg of triamcinolone suspension was drawn up into a 5 mL syringe with 4 mL of 1% lidocaine w/o Epi.  Injection was performed using sterile technique.  Under ultrasound guidance a 3.5-inch 22-gauge needle was used to enter the left glenohumeral joint.  Posterior approach was used with the patient in lateral recumbent position, arm in neutral position at the side.  Needle placement was visualized and documented with ultrasound.  Ultrasound visualization was necessary due to the small joint space entered.  Injection performed long axis to the probe.  Injection solution visualized within the joint space.  Images were permanently  stored for the patient's record.  There were no complications. The patient tolerated the procedure well. There was negligible bleeding.   Therapy scheduled to follow for mobilization.  The patient was instructed to call or go to the emergency room with any unusual pain, swelling, redness, or if otherwise concerned.      Geovanny Mcneal D.O., [facility]  , Sports Medicine  Department of Family [facility] and [facility]    Large Joint Injection: L glenohumeral joint    Date/Time: 3/6/2023 2:01 PM  Performed by: Geovanny Mcneal DO  Authorized by: Geovanny Mcneal DO     Indications:  Pain  Needle Size:  22 G  Guidance: ultrasound    Approach:  Posterior  Location:  Shoulder      Site:  L glenohumeral joint  Medications:  40 mg triamcinolone 40 MG/ML; 4 mL lidocaine (PF) 1 %  Outcome:  Tolerated well, no immediate complications  Procedure discussed: discussed risks, benefits, and alternatives    Consent Given by:  Patient  Timeout: timeout called immediately prior to procedure    Prep: patient was prepped and draped in usual sterile fashion

## 2023-03-06 ENCOUNTER — OFFICE VISIT (OUTPATIENT)
Dept: ORTHOPEDICS | Facility: CLINIC | Age: 78
End: 2023-03-06
Payer: COMMERCIAL

## 2023-03-06 DIAGNOSIS — M19.012 LOCALIZED OSTEOARTHRITIS OF LEFT SHOULDER: Primary | ICD-10-CM

## 2023-03-06 PROCEDURE — 99207 PR DROP WITH A PROCEDURE: CPT | Performed by: STUDENT IN AN ORGANIZED HEALTH CARE EDUCATION/TRAINING PROGRAM

## 2023-03-06 PROCEDURE — 20611 DRAIN/INJ JOINT/BURSA W/US: CPT | Mod: LT | Performed by: STUDENT IN AN ORGANIZED HEALTH CARE EDUCATION/TRAINING PROGRAM

## 2023-03-06 RX ORDER — TRIAMCINOLONE ACETONIDE 40 MG/ML
40 INJECTION, SUSPENSION INTRA-ARTICULAR; INTRAMUSCULAR
Status: DISCONTINUED | OUTPATIENT
Start: 2023-03-06 | End: 2024-06-25

## 2023-03-06 RX ORDER — LIDOCAINE HYDROCHLORIDE 10 MG/ML
4 INJECTION, SOLUTION EPIDURAL; INFILTRATION; INTRACAUDAL; PERINEURAL
Status: DISCONTINUED | OUTPATIENT
Start: 2023-03-06 | End: 2024-06-25

## 2023-03-06 RX ADMIN — TRIAMCINOLONE ACETONIDE 40 MG: 40 INJECTION, SUSPENSION INTRA-ARTICULAR; INTRAMUSCULAR at 14:01

## 2023-03-06 RX ADMIN — LIDOCAINE HYDROCHLORIDE 4 ML: 10 INJECTION, SOLUTION EPIDURAL; INFILTRATION; INTRACAUDAL; PERINEURAL at 14:01

## 2023-03-06 NOTE — LETTER
Date:March 7, 2023      Patient was self referred, no letter generated. Do not send.        [facility] Health Information

## 2023-03-06 NOTE — NURSING NOTE
[address] [address]  Dept: [phone]  ______________________________________________________________________________    Patient: Cyndie Marin   : 1945   MRN: [id number]   2023    INVASIVE PROCEDURE SAFETY CHECKLIST    Date: 2023   Procedure: Left shoulder glenohumeral joint ultrasound guided injection  Patient Name: Cyndie Marin  MRN: [id number]  YOB: 1945    Action: Complete sections as appropriate. Any discrepancy results in a HARD COPY until resolved.     PRE PROCEDURE:  Patient ID verified with 2 identifiers (name and  or MRN): Yes  Procedure and site verified with patient/designee (when able): Yes  Accurate consent documentation in medical record: Yes  H&P (or appropriate assessment) documented in medical record: Yes  H&P must be up to 20 days prior to procedure and updates within 24 hours of procedure as applicable: NA  Relevant diagnostic and radiology test results appropriately labeled and displayed as applicable: Yes  Procedure site(s) marked with provider initials: NA    TIMEOUT:  Time-Out performed immediately prior to starting procedure, including verbal and active participation of all team members addressing the following:Yes  * Correct patient identify  * Confirmed that the correct side and site are marked  * An accurate procedure consent form  * Agreement on the procedure to be done  * Correct patient position  * Relevant images and results are properly labeled and appropriately displayed  * The need to administer antibiotics or fluids for irrigation purposes during the procedure as applicable   * Safety precautions based on patient history or medication use    DURING PROCEDURE: Verification of correct person, site, and procedures any time the responsibility for care of the patient is transferred to another member of the care team.       Prior to injection, verified patient  identity using patient's name and date of birth.  Due to injection administration, patient instructed to remain in clinic for 15 minutes  afterwards, and to report any adverse reaction to me immediately.    Joint injection was performed.      Drug Amount Wasted:  Yes: 1 mg/ml   Vial/Syringe: Single dose vial  Expiration Date:  12/01/2026      Sarah Castro, ATC  March 6, 2023

## 2023-08-16 NOTE — PROGRESS NOTES
PROCEDURE ENCOUNTER    [facility]  Geovanny Mcneal, DO  2023     Name: Cyndie Marin  MRN: [id number]  Age: 78 year old  : 1945    Referring provider:   Diagnosis: Left GH OA    10/22/22:ocalized osteoarthritis of left shoulder     Arthrosis of left acromioclavicular joint        77-year-old female returning to clinic today for follow-up of the localized arthritis of the left shoulder as well as arthrosis of the acromioclavicular joint on the left.  We did an injection for the glenohumeral joint on the left and the patient has successfully reduced the amount of pain she is having.  I do think her to continue with physical therapy at this point to ensure prolonged benefit from the injection would be ideal.  The patient currently has her exercises and would like to do this on her own.  At this point.  The patient can follow-up with us on an as-needed basis.  Did discuss that injections are typically done every 3 months as needed.  She will contact the clinic if she is having any regression of her currently very good progress.     Today the patient presents with more pain on the top of the shoulder, she has been doing some water aerobics but still notices pain with abduction over her head and the AC joint.  This does seem consistent with her previous diagnosis and we have not injected that area.  The patient is interested in doing a CT ultrasound-guided injection today.  See below procedure note for full details.      PROCEDURE: Left Shoulder US-G AC joint injection     The patient was apprised of the risks and the benefits of the procedure and consented and a written consent was signed by the patient.     The patient s left shoulder was sterilely prepped with Betadine. 10 mg of triamcinolone suspension was drawn up into a 5 mL syringe with 0.5 mL of 1% lidocaine     The patient was injected with a 1.5-inch 25-gauge needle at left AC joint using ultrasound.  Ultrasound was needed due to the  decreased joint space of the left shoulder acromioclavicular joint to ensure proper needle placement into the joint and nowhere else.  There were no complications. The patient tolerated the procedure well.  There was minimal bleeding.      The patient was instructed to ice the shoulder upon leaving clinic and refrain from overuse over the next 3 days.     The patient was instructed to go to the emergency room with any usual pain, swelling, or redness occurred in the injected area.     The patient was given a followup appointment to evaluate response to the injection to their increased range of motion and reduction of pain.      Geovanny Mcneal D.O., [facility]  , Sports Medicine  Department of Family [facility] and [facility]      Medium Joint Injection: L acromioclavicular    Date/Time: 8/17/2023 8:50 AM    Performed by: Geovanny Mcneal DO  Authorized by: Geovanny Mcneal DO    Location:  Shoulder  Site:  L acromioclavicular  Medications:  10 mg triamcinolone acetonide 10 MG/ML; 0.5 mL lidocaine (PF) 1 %  Outcome:  Tolerated well, no immediate complications  Procedure discussed: discussed risks, benefits, and alternatives    Consent Given by:  Patient  Timeout: timeout called immediately prior to procedure    Prep: patient was prepped and draped in usual sterile fashion

## 2023-08-17 ENCOUNTER — OFFICE VISIT (OUTPATIENT)
Dept: ORTHOPEDICS | Facility: CLINIC | Age: 78
End: 2023-08-17
Payer: COMMERCIAL

## 2023-08-17 VITALS — HEIGHT: 65 IN | BODY MASS INDEX: 33.99 KG/M2 | WEIGHT: 204 LBS

## 2023-08-17 DIAGNOSIS — M19.012 ARTHROSIS OF LEFT ACROMIOCLAVICULAR JOINT: Primary | ICD-10-CM

## 2023-08-17 PROCEDURE — 99207 PR DROP WITH A PROCEDURE: CPT | Performed by: STUDENT IN AN ORGANIZED HEALTH CARE EDUCATION/TRAINING PROGRAM

## 2023-08-17 PROCEDURE — 20606 DRAIN/INJ JOINT/BURSA W/US: CPT | Mod: LT | Performed by: STUDENT IN AN ORGANIZED HEALTH CARE EDUCATION/TRAINING PROGRAM

## 2023-08-17 RX ORDER — LIDOCAINE HYDROCHLORIDE 10 MG/ML
0.5 INJECTION, SOLUTION EPIDURAL; INFILTRATION; INTRACAUDAL; PERINEURAL
Status: DISCONTINUED | OUTPATIENT
Start: 2023-08-17 | End: 2024-06-25

## 2023-08-17 RX ADMIN — LIDOCAINE HYDROCHLORIDE 0.5 ML: 10 INJECTION, SOLUTION EPIDURAL; INFILTRATION; INTRACAUDAL; PERINEURAL at 08:50

## 2023-08-17 NOTE — LETTER
2023      RE: Cyndie Marin  [address] [place] [address]     Dear Colleague,    Thank you for referring your patient, Cyndie Marin, to the [facility] [place]. Please see a copy of my visit note below.    PROCEDURE ENCOUNTER    [facility]  Geovanny Mcneal, DO  2023     Name: Cyndie Marin  MRN: [id number]  Age: 78 year old  : 1945    Referring provider:   Diagnosis: Left GH OA    10/22/22:ocalized osteoarthritis of left shoulder     Arthrosis of left acromioclavicular joint        77-year-old female returning to clinic today for follow-up of the localized arthritis of the left shoulder as well as arthrosis of the acromioclavicular joint on the left.  We did an injection for the glenohumeral joint on the left and the patient has successfully reduced the amount of pain she is having.  I do think her to continue with physical therapy at this point to ensure prolonged benefit from the injection would be ideal.  The patient currently has her exercises and would like to do this on her own.  At this point.  The patient can follow-up with us on an as-needed basis.  Did discuss that injections are typically done every 3 months as needed.  She will contact the clinic if she is having any regression of her currently very good progress.     Today the patient presents with more pain on the top of the shoulder, she has been doing some water aerobics but still notices pain with abduction over her head and the AC joint.  This does seem consistent with her previous diagnosis and we have not injected that area.  The patient is interested in doing a CT ultrasound-guided injection today.  See below procedure note for full details.      PROCEDURE: Left Shoulder US-G AC joint injection     The patient was apprised of the risks and the benefits of the procedure and consented and a written consent was signed by the patient.     The patient s left shoulder  was sterilely prepped with Betadine. 10 mg of triamcinolone suspension was drawn up into a 5 mL syringe with 0.5 mL of 1% lidocaine     The patient was injected with a 1.5-inch 25-gauge needle at left AC joint using ultrasound. There were no complications. The patient tolerated the procedure well.  There was minimal bleeding.      The patient was instructed to ice the shoulder upon leaving clinic and refrain from overuse over the next 3 days.     The patient was instructed to go to the emergency room with any usual pain, swelling, or redness occurred in the injected area.     The patient was given a followup appointment to evaluate response to the injection to their increased range of motion and reduction of pain.      Geovanny Mcneal D.O., [facility]  , Sports Medicine  Department of Family [facility]      Medium Joint Injection: L acromioclavicular    Date/Time: 8/17/2023 8:50 AM    Performed by: Geovanny Mcneal DO  Authorized by: Geovanny Mcneal DO    Location:  Shoulder  Site:  L acromioclavicular  Medications:  10 mg triamcinolone acetonide 10 MG/ML; 0.5 mL lidocaine (PF) 1 %  Outcome:  Tolerated well, no immediate complications  Procedure discussed: discussed risks, benefits, and alternatives    Consent Given by:  Patient  Timeout: timeout called immediately prior to procedure    Prep: patient was prepped and draped in usual sterile fashion          Again, thank you for allowing me to participate in the care of your patient.      Sincerely,    Geovanny Mcneal DO

## 2023-08-17 NOTE — NURSING NOTE
Patient was last seen on 3/6/2023 where she received a left GH joint injection. She states this injection provided with decreasing posterior shoulder pain. She states she will had pain over the superior aspect of her left shoulder. She continues to have decreased ROM and strength. Patient has brought a daily journal with her. She states she only received 6 weeks of decreased pain.     She states pain returned in April 2023. She has returned to using Tylenol twice daily.

## 2023-08-17 NOTE — NURSING NOTE
[address] [address]  Dept: [phone]  ______________________________________________________________________________    Patient: Cyndie Marin   : 1945   MRN: [id number]   2023    INVASIVE PROCEDURE SAFETY CHECKLIST    Date: 2023   Procedure: left AC joint injection with kenalog and USG  Patient Name: Cyndie Marin  MRN: [id number]  YOB: 1945    Action: Complete sections as appropriate. Any discrepancy results in a HARD COPY until resolved.     PRE PROCEDURE:  Patient ID verified with 2 identifiers (name and  or MRN): Yes  Procedure and site verified with patient/designee (when able): Yes  Accurate consent documentation in medical record: Yes  H&P (or appropriate assessment) documented in medical record: Yes  H&P must be up to 20 days prior to procedure and updates within 24 hours of procedure as applicable: NA  Relevant diagnostic and radiology test results appropriately labeled and displayed as applicable: NA  Procedure site(s) marked with provider initials: NA    TIMEOUT:  Time-Out performed immediately prior to starting procedure, including verbal and active participation of all team members addressing the following:Yes  * Correct patient identify  * Confirmed that the correct side and site are marked  * An accurate procedure consent form  * Agreement on the procedure to be done  * Correct patient position  * Relevant images and results are properly labeled and appropriately displayed  * The need to administer antibiotics or fluids for irrigation purposes during the procedure as applicable   * Safety precautions based on patient history or medication use    DURING PROCEDURE: Verification of correct person, site, and procedures any time the responsibility for care of the patient is transferred to another member of the care team.       Prior to injection, verified patient identity using  patient's name and date of birth.  Due to injection administration, patient instructed to remain in clinic for 15 minutes  afterwards, and to report any adverse reaction to me immediately.    Joint injection was performed.      Lido   Drug Amount Wasted:  Yes: 4.5 mg/ml   Vial/Syringe: Single dose vial  Expiration Date:  02/01/2027    Kenalog   Drug Amount Wasted:  Yes: 0.75 mg/ml   Vial/Syringe: Single dose vial  Expiration Date: 04/01/2025    Michelle Paul, ATC  August 17, 2023

## 2023-08-21 DIAGNOSIS — E78.00 PURE HYPERCHOLESTEROLEMIA: ICD-10-CM

## 2023-08-22 RX ORDER — ROSUVASTATIN CALCIUM 10 MG/1
10 TABLET, COATED ORAL AT BEDTIME
Qty: 90 TABLET | Refills: 3 | Status: SHIPPED | OUTPATIENT
Start: 2023-08-22 | End: 2024-09-17

## 2023-08-22 NOTE — TELEPHONE ENCOUNTER
"Routing refill request to provider for review/approval because:  Labs not current:  8/8/2022    Last Written Prescription Date:  8/29/2022  Last Fill Quantity: 90,  # refills: 3   Last office visit provider:  11/25/2022   Future visit 6/13/2023  Requested Prescriptions   Pending Prescriptions Disp Refills    rosuvastatin (CRESTOR) 10 MG tablet [Pharmacy Med Name: ROSUVASTATIN TABS 10MG] 90 tablet 3     Sig: TAKE 1 TABLET AT BEDTIME       Statins Protocol Failed - 8/22/2023  1:37 PM        Failed - LDL on file in past 12 months     Recent Labs   Lab Test 08/08/22  1536   LDL 59             Passed - No abnormal creatine kinase in past 12 months     No lab results found.             Passed - Recent (12 mo) or future (30 days) visit within the authorizing provider's specialty     Patient has had an office visit with the authorizing provider or a provider within the authorizing providers department within the previous 12 mos or has a future within next 30 days. See \"Patient Info\" tab in inbasket, or \"Choose Columns\" in Meds & Orders section of the refill encounter.              Passed - Medication is active on med list        Passed - Patient is age 18 or older        Passed - No active pregnancy on record        Passed - No positive pregnancy test in past 12 months             Rita Sarmiento RN 08/22/23 1:37 PM  "

## 2023-09-03 DIAGNOSIS — I10 ESSENTIAL HYPERTENSION, BENIGN: ICD-10-CM

## 2023-09-04 RX ORDER — LISINOPRIL/HYDROCHLOROTHIAZIDE 10-12.5 MG
TABLET ORAL
Qty: 90 TABLET | Refills: 3 | Status: ON HOLD | OUTPATIENT
Start: 2023-09-04 | End: 2024-06-27

## 2023-09-04 NOTE — TELEPHONE ENCOUNTER
"Routing refill request to provider for review/approval because:  Labs not current:  CR, K, NA    Last Written Prescription Date:  9/9/2022  Last Fill Quantity: 90,  # refills: 3   Last office visit: 9/15/2021 ; last virtual visit: 11/25/2022   Future Office Visit:   Next 5 appointments (look out 90 days)      Sep 13, 2023 11:00 AM  (Arrive by 10:40 AM)  Annual Wellness Visit with Roni Horowitz MD  [facility]  ) [address] [place] [id number] [phone]                   Requested Prescriptions   Pending Prescriptions Disp Refills    lisinopril-hydrochlorothiazide (ZESTORETIC) 10-12.5 MG tablet [Pharmacy Med Name: LISINOPRIL/HCTZ TABS 10/12.5MG] 90 tablet 3     Sig: TAKE 1 TABLET DAILY       Diuretics (Including Combos) Protocol Failed - 9/3/2023 11:31 PM        Failed - Normal serum creatinine on file in past 12 months     Recent Labs   Lab Test 08/08/22  1536   CR 0.69              Failed - Normal serum potassium on file in past 12 months     Recent Labs   Lab Test 08/08/22  1536   POTASSIUM 4.1                    Failed - Normal serum sodium on file in past 12 months     Recent Labs   Lab Test 08/08/22  1536                 Passed - Blood pressure under 140/90 in past 12 months     BP Readings from Last 3 Encounters:   08/08/22 126/76   07/08/22 114/74   09/15/21 112/70                 Passed - Recent (12 mo) or future (30 days) visit within the authorizing provider's specialty     Patient has had an office visit with the authorizing provider or a provider within the authorizing providers department within the previous 12 mos or has a future within next 30 days. See \"Patient Info\" tab in inbasket, or \"Choose Columns\" in Meds & Orders section of the refill encounter.              Passed - Medication is active on med list        Passed - Patient is age 18 or older        Passed - No active pregancy on " "record        Passed - No positive pregnancy test in past 12 months       ACE Inhibitors (Including Combos) Protocol Failed - 9/3/2023 11:31 PM        Failed - Normal serum creatinine on file in past 12 months     Recent Labs   Lab Test 08/08/22  1536   CR 0.69       Ok to refill medication if creatinine is low          Failed - Normal serum potassium on file in past 12 months     Recent Labs   Lab Test 08/08/22  1536   POTASSIUM 4.1             Passed - Blood pressure under 140/90 in past 12 months     BP Readings from Last 3 Encounters:   08/08/22 126/76   07/08/22 114/74   09/15/21 112/70                 Passed - Recent (12 mo) or future (30 days) visit within the authorizing provider's specialty     Patient has had an office visit with the authorizing provider or a provider within the authorizing providers department within the previous 12 mos or has a future within next 30 days. See \"Patient Info\" tab in inbasket, or \"Choose Columns\" in Meds & Orders section of the refill encounter.              Passed - Medication is active on med list        Passed - Patient is age 18 or older        Passed - No active pregnancy on record        Passed - No positive pregnancy test within past 12 months             Radha Cruz RN 09/03/23 11:37 PM  "

## 2023-09-13 ENCOUNTER — OFFICE VISIT (OUTPATIENT)
Dept: INTERNAL MEDICINE | Facility: CLINIC | Age: 78
End: 2023-09-13
Payer: COMMERCIAL

## 2023-09-13 VITALS
HEIGHT: 62 IN | RESPIRATION RATE: 15 BRPM | WEIGHT: 182.4 LBS | OXYGEN SATURATION: 98 % | SYSTOLIC BLOOD PRESSURE: 124 MMHG | BODY MASS INDEX: 33.57 KG/M2 | HEART RATE: 57 BPM | DIASTOLIC BLOOD PRESSURE: 70 MMHG | TEMPERATURE: 98.1 F

## 2023-09-13 DIAGNOSIS — E78.5 HYPERLIPIDEMIA LDL GOAL <100: ICD-10-CM

## 2023-09-13 DIAGNOSIS — K43.5 PARASTOMAL HERNIA WITHOUT OBSTRUCTION OR GANGRENE: ICD-10-CM

## 2023-09-13 DIAGNOSIS — H61.21 IMPACTED CERUMEN OF RIGHT EAR: ICD-10-CM

## 2023-09-13 DIAGNOSIS — Z23 ENCOUNTER FOR IMMUNIZATION: ICD-10-CM

## 2023-09-13 DIAGNOSIS — Z23 NEED FOR SHINGLES VACCINE: ICD-10-CM

## 2023-09-13 DIAGNOSIS — M77.8 LEFT SHOULDER TENDONITIS: ICD-10-CM

## 2023-09-13 DIAGNOSIS — Z78.0 MENOPAUSE: ICD-10-CM

## 2023-09-13 DIAGNOSIS — K63.89 COLONIC MASS: Primary | ICD-10-CM

## 2023-09-13 DIAGNOSIS — Z93.3 COLOSTOMY IN PLACE (H): ICD-10-CM

## 2023-09-13 DIAGNOSIS — I10 ESSENTIAL HYPERTENSION: ICD-10-CM

## 2023-09-13 LAB
ERYTHROCYTE [DISTWIDTH] IN BLOOD BY AUTOMATED COUNT: 12.8 % (ref 10–15)
HCT VFR BLD AUTO: 44.1 % (ref 35–47)
HGB BLD-MCNC: 14.4 G/DL (ref 11.7–15.7)
MCH RBC QN AUTO: 28.5 PG (ref 26.5–33)
MCHC RBC AUTO-ENTMCNC: 32.7 G/DL (ref 31.5–36.5)
MCV RBC AUTO: 87 FL (ref 78–100)
PLATELET # BLD AUTO: 240 10E3/UL (ref 150–450)
RBC # BLD AUTO: 5.06 10E6/UL (ref 3.8–5.2)
WBC # BLD AUTO: 7.1 10E3/UL (ref 4–11)

## 2023-09-13 PROCEDURE — 36415 COLL VENOUS BLD VENIPUNCTURE: CPT | Performed by: INTERNAL MEDICINE

## 2023-09-13 PROCEDURE — 80053 COMPREHEN METABOLIC PANEL: CPT | Performed by: INTERNAL MEDICINE

## 2023-09-13 PROCEDURE — 90662 IIV NO PRSV INCREASED AG IM: CPT | Performed by: INTERNAL MEDICINE

## 2023-09-13 PROCEDURE — G0008 ADMIN INFLUENZA VIRUS VAC: HCPCS | Performed by: INTERNAL MEDICINE

## 2023-09-13 PROCEDURE — 99214 OFFICE O/P EST MOD 30 MIN: CPT | Mod: 25 | Performed by: INTERNAL MEDICINE

## 2023-09-13 PROCEDURE — G0439 PPPS, SUBSEQ VISIT: HCPCS | Performed by: INTERNAL MEDICINE

## 2023-09-13 PROCEDURE — 80061 LIPID PANEL: CPT | Performed by: INTERNAL MEDICINE

## 2023-09-13 PROCEDURE — 85027 COMPLETE CBC AUTOMATED: CPT | Performed by: INTERNAL MEDICINE

## 2023-09-13 ASSESSMENT — ENCOUNTER SYMPTOMS
JOINT SWELLING: 0
HEADACHES: 0
NAUSEA: 0
EYE PAIN: 0
DYSURIA: 0
BREAST MASS: 0
WEAKNESS: 1
ARTHRALGIAS: 1
PALPITATIONS: 0
HEMATURIA: 0
FREQUENCY: 1
COUGH: 0
HEMATOCHEZIA: 0
DIARRHEA: 0
FEVER: 0
ABDOMINAL PAIN: 1
CHILLS: 0
PARESTHESIAS: 0
HEARTBURN: 0
MYALGIAS: 1
SHORTNESS OF BREATH: 1
SORE THROAT: 0
CONSTIPATION: 0
DIZZINESS: 0
NERVOUS/ANXIOUS: 1

## 2023-09-13 ASSESSMENT — ACTIVITIES OF DAILY LIVING (ADL): CURRENT_FUNCTION: NO ASSISTANCE NEEDED

## 2023-09-13 NOTE — LETTER
September 18, 2023      Cyndie Marin  [address]  [place] [address]        Dear ,    We are writing to inform you of your test results.    Your tests are all satisfactory. The minor abnormalities are not significant. The cholesterol levels are good.      Resulted Orders   CBC with platelets   Result Value Ref Range    WBC Count 7.1 4.0 - 11.0 10e3/uL    RBC Count 5.06 3.80 - 5.20 10e6/uL    Hemoglobin 14.4 11.7 - 15.7 g/dL    Hematocrit 44.1 35.0 - 47.0 %    MCV 87 78 - 100 fL    MCH 28.5 26.5 - 33.0 pg    MCHC 32.7 31.5 - 36.5 g/dL    RDW 12.8 10.0 - 15.0 %    Platelet Count 240 150 - 450 10e3/uL   Comprehensive metabolic panel   Result Value Ref Range    Sodium 139 136 - 145 mmol/L    Potassium 3.7 3.4 - 5.3 mmol/L    Chloride 105 98 - 107 mmol/L    Carbon Dioxide (CO2) 25 22 - 29 mmol/L    Anion Gap 9 7 - 15 mmol/L    Urea Nitrogen 21.5 8.0 - 23.0 mg/dL    Creatinine 0.62 0.51 - 0.95 mg/dL    Calcium 10.3 (H) 8.8 - 10.2 mg/dL    Glucose 86 70 - 99 mg/dL    Alkaline Phosphatase 113 (H) 35 - 104 U/L    AST 19 0 - 45 U/L      Comment:      Reference intervals for this test were updated on 6/12/2023 to more accurately reflect our healthy population. There may be differences in the flagging of prior results with similar values performed with this method. Interpretation of those prior results can be made in the context of the updated reference intervals.    ALT 18 0 - 50 U/L      Comment:      Reference intervals for this test were updated on 6/12/2023 to more accurately reflect our healthy population. There may be differences in the flagging of prior results with similar values performed with this method. Interpretation of those prior results can be made in the context of the updated reference intervals.      Protein Total 6.4 6.4 - 8.3 g/dL    Albumin 3.9 3.5 - 5.2 g/dL    Bilirubin Total 0.5 <=1.2 mg/dL    GFR Estimate 90 >60 mL/min/1.73m2   Lipid panel reflex to direct LDL Fasting   Result  Value Ref Range    Cholesterol 115 <200 mg/dL    Triglycerides 73 <150 mg/dL    Direct Measure HDL 42 (L) >=50 mg/dL    LDL Cholesterol Calculated 58 <=100 mg/dL    Non HDL Cholesterol 73 <130 mg/dL    Narrative    Cholesterol  Desirable:  <200 mg/dL    Triglycerides  Normal:  Less than 150 mg/dL  Borderline High:  150-199 mg/dL  High:  200-499 mg/dL  Very High:  Greater than or equal to 500 mg/dL    Direct Measure HDL  Female:  Greater than or equal to 50 mg/dL   Male:  Greater than or equal to 40 mg/dL    LDL Cholesterol  Desirable:  <100mg/dL  Above Desirable:  100-129 mg/dL   Borderline High:  130-159 mg/dL   High:  160-189 mg/dL   Very High:  >= 190 mg/dL    Non HDL Cholesterol  Desirable:  130 mg/dL  Above Desirable:  130-159 mg/dL  Borderline High:  160-189 mg/dL  High:  190-219 mg/dL  Very High:  Greater than or equal to 220 mg/dL       If you have any questions or concerns, please call the clinic at the number listed above.       Sincerely,      Roni Horowitz MD

## 2023-09-13 NOTE — NURSING NOTE
Prior to immunization administration, verified patients identity using patient s name and date of birth. Please see Immunization Activity for additional information.     Screening Questionnaire for Adult Immunization    Are you sick today?   No   Do you have allergies to medications, food, a vaccine component or latex?   No   Have you ever had a serious reaction after receiving a vaccination?   No   Do you have a long-term health problem with heart, lung, kidney, or metabolic disease (e.g., diabetes), asthma, a blood disorder, no spleen, complement component deficiency, a cochlear implant, or a spinal fluid leak?  Are you on long-term aspirin therapy?   No   Do you have cancer, leukemia, HIV/AIDS, or any other immune system problem?   No   Do you have a parent, brother, or sister with an immune system problem?   Y   In the past 3 months, have you taken medications that affect  your immune system, such as prednisone, other steroids, or anticancer drugs; drugs for the treatment of rheumatoid arthritis, Crohn s disease, or psoriasis; or have you had radiation treatments?   No   Have you had a seizure, or a brain or other nervous system problem?   No   During the past year, have you received a transfusion of blood or blood    products, or been given immune (gamma) globulin or antiviral drug?   No   For women: Are you pregnant or is there a chance you could become       pregnant during the next month?   No   Have you received any vaccinations in the past 4 weeks?   No     Immunization questionnaire was positive for at least one answer.  Notified yes.      Patient instructed to remain in clinic for 15 minutes afterwards, and to report any adverse reactions.     Screening performed by Gwen Massey MA on 9/13/2023 at 1:16 PM.

## 2023-09-13 NOTE — PROGRESS NOTES
"SUBJECTIVE:   Georgia is a 78 year old who presents for Preventive Visit.    HPI:  here in follow up.  Her colonic mass is likely related to old fistula and consultation has decided not to excise, is asymptomatic.  She overall feels pretty well.  Colostomy is in place. She thinks there is cerumen impaction.  Her parastomal hernia is present, not particularly bothersome.  Voiding OK. Recent injection in left shoulder AC joint with some benefit. She had colonoscopy 3/2022 of both entire colon thru the colostomy and thru rectum to end of rectal stump.  One polyp. Some ileal diversion inflammation.  The cyst opening was noted near the anorectal ring, not draining or inflamed.         9/13/2023    10:54 AM   Additional Questions   Roomed by TAMMI Chamorro     Are you in the first 12 months of your Medicare coverage?  No    Healthy Habits:     In general, how would you rate your overall health?  Fair    Frequency of exercise:  4-5 days/week    Duration of exercise:  45-60 minutes    Do you usually eat at least 4 servings of fruit and vegetables a day, include whole grains    & fiber and avoid regularly eating high fat or \"junk\" foods?  Yes    Taking medications regularly:  Yes    Medication side effects:  Not applicable and None    Ability to successfully perform activities of daily living:  No assistance needed    Home Safety:  No safety concerns identified    Hearing Impairment:  Need to ask people to speak up or repeat themselves and difficulty understanding soft or whispered speech    In the past 6 months, have you been bothered by leaking of urine?  No    In general, how would you rate your overall mental or emotional health?  Fair    Additional concerns today:  Yes    Have you ever done Advance Care Planning? (For example, a Health Directive, POLST, or a discussion with a medical provider or your loved ones about your wishes): No    Hearing is OK with conversation - cerumen is part of current problem.   Fall " risk  Fallen 2 or more times in the past year?: Yes  Any fall with injury in the past year?: No    Cognitive Screening: cognitive function is intact today.     Reviewed and updated as needed this visit by clinical staff  Tobacco  Allergies  Meds           Reviewed and updated as needed this visit by Provider    Social History     Tobacco Use     Smoking status: Never     Smokeless tobacco: Never   Substance Use Topics     Alcohol use: Not Currently     Alcohol/week: 0.0 standard drinks of alcohol         9/13/2023    11:07 AM   Alcohol Use   Prescreen: >3 drinks/day or >7 drinks/week? No     Do you have a current opioid prescription? No  Do you use any other controlled substances or medications that are not prescribed by a provider? None  {  Current providers sharing in care for this patient include:   Patient Care Team:  Alex Cameron MD as Assigned Surgical Provider  Roni Horowitz MD as Assigned PCP  Geovanny Mcneal DO as Assigned Musculoskeletal Provider    The following health maintenance items are reviewed in Epic and correct as of today:  Health Maintenance   Topic Date Due     ZOSTER IMMUNIZATION (2 of 3) 01/30/2008     ANNUAL REVIEW OF HM ORDERS  08/05/2022     COVID-19 Vaccine (6 - Pfizer series) 01/26/2023     MEDICARE ANNUAL WELLNESS VISIT  08/08/2023     INFLUENZA VACCINE (1) 09/01/2023     DEXA  12/17/2023     FALL RISK ASSESSMENT  09/13/2024     COLORECTAL CANCER SCREENING  03/06/2025     ADVANCE CARE PLANNING  08/05/2026     DTAP/TDAP/TD IMMUNIZATION (4 - Td or Tdap) 03/20/2027     LIPID  08/08/2027     PHQ-2 (once per calendar year)  Completed     Pneumococcal Vaccine: 65+ Years  Completed     IPV IMMUNIZATION  Aged Out     HPV IMMUNIZATION  Aged Out     MENINGITIS IMMUNIZATION  Aged Out   Pertinent mammograms are reviewed under the imaging tab.    ROS:  see HPI    OBJECTIVE:     PHYSICAL EXAM:  General Appearance: In no acute distress  /70 (BP Location: Left arm, Patient  "Position: Sitting, Cuff Size: Adult Large)   Pulse 57   Temp 98.1  F (36.7  C) (Tympanic)   Resp 15   Ht 1.575 m (5' 2\")   Wt 82.7 kg (182 lb 6.4 oz)   SpO2 98%   BMI 33.36 kg/m    EYES: Clear  HEENT: right ear cerumen impaction  NECK:  without cervical or axillary adenopathy  RESPIRATORY: Clear   CARDIOVASCULAR: S1, S2  ABDOMEN: soft, flat, and non-tender, palpable parastomal hernia, not tender  EXTREMITIES: No significant edema  ASSESSMENT / PLAN:     Georgia was seen today for annual visit.    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 She is comfortable not having excision.     Need for shingles vaccine  Referred to pharmacy.     Hyperlipidemia LDL goal <100  Ongoing rosuvastatin therapy.  Need to assess control.   -     Lipid panel reflex to direct LDL Fasting    Essential hypertension  Satisfactory control.   -     CBC with platelets  -     Comprehensive metabolic panel    Colostomy in place   Parastomal hernia without obstruction or gangrene    Left AC joint arthritis  Recent injection with some benefit.     Encounter for immunization  -     INFLUENZA VACCINE 65+ (FLUZONE HD)    R/O osteoporosis  She does take vitamin D and oral calcium replacement.   -     DX Hip/Pelvis/Spine; Future    Impacted cerumen of right ear  -     REMOVE IMPACTED CERUMEN    COUNSELING:  Reviewed preventive health counseling, as reflected in patient instructions       Regular exercise       Healthy diet/nutrition       Colon cancer screening    BMI:   Estimated body mass index is 33.36 kg/m  as calculated from the following:    Height as of this encounter: 1.575 m (5' 2\").    Weight as of this encounter: 82.7 kg (182 lb 6.4 oz).     She reports that she has never smoked. She has never used smokeless tobacco.    Appropriate preventive services were discussed with this patient, including applicable screening as appropriate for " cardiovascular disease, diabetes, osteopenia/osteoporosis, and glaucoma.  As appropriate for age/gender, discussed screening for colorectal cancer, prostate cancer, breast cancer, and cervical cancer. Checklist reviewing preventive services available has been given to the patient.    Reviewed patients plan of care and provided an AVS. The Basic Care Plan (routine screening as documented in Health Maintenance) for Georgia meets the Care Plan requirement. This Care Plan has been established and reviewed with the Patient.    Roni Horowitz MD  [facility]    Identified Health Risks:  I have reviewed Opioid Use Disorder and Substance Use Disorder risk factors and made any needed referrals.   Hypertension  Hyperlipidemia  colostomy

## 2023-09-14 LAB
ALBUMIN SERPL BCG-MCNC: 3.9 G/DL (ref 3.5–5.2)
ALP SERPL-CCNC: 113 U/L (ref 35–104)
ALT SERPL W P-5'-P-CCNC: 18 U/L (ref 0–50)
ANION GAP SERPL CALCULATED.3IONS-SCNC: 9 MMOL/L (ref 7–15)
AST SERPL W P-5'-P-CCNC: 19 U/L (ref 0–45)
BILIRUB SERPL-MCNC: 0.5 MG/DL
BUN SERPL-MCNC: 21.5 MG/DL (ref 8–23)
CALCIUM SERPL-MCNC: 10.3 MG/DL (ref 8.8–10.2)
CHLORIDE SERPL-SCNC: 105 MMOL/L (ref 98–107)
CHOLEST SERPL-MCNC: 115 MG/DL
CREAT SERPL-MCNC: 0.62 MG/DL (ref 0.51–0.95)
DEPRECATED HCO3 PLAS-SCNC: 25 MMOL/L (ref 22–29)
EGFRCR SERPLBLD CKD-EPI 2021: 90 ML/MIN/1.73M2
GLUCOSE SERPL-MCNC: 86 MG/DL (ref 70–99)
HDLC SERPL-MCNC: 42 MG/DL
LDLC SERPL CALC-MCNC: 58 MG/DL
NONHDLC SERPL-MCNC: 73 MG/DL
POTASSIUM SERPL-SCNC: 3.7 MMOL/L (ref 3.4–5.3)
PROT SERPL-MCNC: 6.4 G/DL (ref 6.4–8.3)
SODIUM SERPL-SCNC: 139 MMOL/L (ref 136–145)
TRIGL SERPL-MCNC: 73 MG/DL

## 2023-09-21 ENCOUNTER — ANCILLARY PROCEDURE (OUTPATIENT)
Dept: BONE DENSITY | Facility: CLINIC | Age: 78
End: 2023-09-21
Attending: INTERNAL MEDICINE
Payer: COMMERCIAL

## 2023-09-21 DIAGNOSIS — Z78.0 MENOPAUSE: ICD-10-CM

## 2023-09-21 PROCEDURE — 77080 DXA BONE DENSITY AXIAL: CPT | Mod: TC | Performed by: PHYSICIAN ASSISTANT

## 2023-09-21 PROCEDURE — 77081 DXA BONE DENSITY APPENDICULR: CPT | Mod: TC | Performed by: PHYSICIAN ASSISTANT

## 2024-01-24 NOTE — PROGRESS NOTES
[facility]  Sports Medicine Clinic  Clinics and Surgery Center           SUBJECTIVE       Cyndie Marin is a 78 year old female presenting to clinic today with right hip pain.  Patient has a history of a left hip total replacement, and her pain started again here on the right hip a couple weeks ago.  She is having trouble climbing stairs.  She locates the pain to the lateral side of the right hip.  No numbness and tingling.  She does admit to some cramping posteriorly in the hamstrings, which she will take some anti-inflammatories and that will go away.  She is not having any back pain.  Patient typically walks with a cane.    PMH, Medications and Allergies were reviewed and updated as needed.    ROS:  As noted above otherwise negative.    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)    Gastroesophageal reflux disease without esophagitis    Overactive bladder    S/P hip replacement, left    Colonic mass    Hyperlipidemia LDL goal <100    Shoulder pain, right    Calculus of gallbladder without cholecystitis without obstruction    History of urinary tract obstruction    RAJ (stress urinary incontinence, female)    Parastomal hernia    Left shoulder tendonitis    Impacted cerumen of right ear       Current Outpatient Medications   Medication Sig Dispense Refill    acetaminophen (TYLENOL) 325 MG tablet [ACETAMINOPHEN (TYLENOL) 325 MG TABLET] Take 2 tablets (650 mg total) by mouth every 4 (four) hours as needed for pain (mild pain). 100 tablet 0    cholecalciferol, vitamin D3, 5,000 unit Tab Take 1,000 Units by mouth daily      lisinopril-hydrochlorothiazide (ZESTORETIC) 10-12.5 MG tablet TAKE 1 TABLET DAILY 90 tablet 3    loratadine (CLARITIN) 10 mg tablet [LORATADINE (CLARITIN) 10 MG TABLET] Take 10 mg by mouth daily as needed.       menthol (BIOFREEZE, MENTHOL,) 4 %  Gel [MENTHOL (BIOFREEZE, MENTHOL,) 4 % GEL] Apply topically at bedtime as needed.       Naproxen Sodium (ALEVE PO)       rosuvastatin (CRESTOR) 10 MG tablet TAKE 1 TABLET AT BEDTIME 90 tablet 3            OBJECTIVE:       Vitals: There were no vitals filed for this visit.  BMI: There is no height or weight on file to calculate BMI.    Gen:  Well nourished and in no acute distress  HEENT: Extraocular movement intact  Neck: Supple  Pulm:  Breathing Comfortably. No increased respiratory effort.  Psych: Euthymic. Appropriately answers questions    MSK:   Right hip examination without evidence of ASIS asymmetry, leg length discrepancy.  Antalgic gait secondary to chronic cane use.  Tenderness to palpation on the greater trochanter of the right hip, extending distally into the IT band roughly 4 to 5 cm.  Full range of motion of the right hip.  Strength is mildly diminished in abduction, 4/5.  Negative logroll.  Positive FADIR to the inguinal crease and posterior joint line.  BEATRIS positive lateral hip.  Negative straight leg raise.    XRAY : Independent evaluation of the right hip with evidence of intact arthroplasty of the left hip.  Significant degenerative changes noted of the right hip.  Osteopenic appearing bones as well.          ASSESSMENT and PLAN:     [place] was seen today for pain.    Diagnoses and all orders for this visit:    Hip pain  -     X-ray Pelvis w Hip Right G/E 2 Views; Future    Trochanteric bursitis of right hip      78-year-old female presenting to clinic today with concerns for right lateral hip pain.  X-rays were obtained today and discussed in detail with her, which did show evidence of right hip degenerative changes.  However the patient's pain pattern is more extra-articular in the greater trochanteric region, with concomitant IT band syndrome.  We have discussed management options.  The patient would like an injection.  We will proceed with ultrasound-guided injection into this area today.   See below procedure note for full details.  Have also provided the patient a home exercise program for strengthening of the gluteal region, secondary to chronic osteoarthrosis and altered gait.  The patient is having continued pain, or pain pattern more so in the inguinal region, can return to clinic so we can consider ultrasound-guided glenohumeral joint injection.  Return precautions have been advised.    Trochanteric Hip Injection - Ultrasound Guided  The patient was informed of the risks and the benefits of the procedure and a written consent was signed.  The patient s right lateral hip was prepped with chlorhexidine in sterile fashion.   40 mg of triamcinolone suspension was drawn up into a 5 mL syringe with 4 mL of 1% lidocaine.  Injection was performed using sterile technique.  Under ultrasound guidance a 3.5-inch 22-gauge needle was used to enter the philip-trochanteric tissue.  Needle placement was visualized and documented with ultrasound which was deemed necessary to ensure medication did not enter the tendon itself, which could potentially cause tendon damage.   Injection performed long axis to the probe with probe in short axis to the greater trochanter.  Expansion of the philip-trochanteric tissue was visualized under ultrasound upon injection.  Images were permanently stored for the patient's record.  There were no complications. The patient tolerated the procedure well. There was negligible bleeding.       Options for treatment and/or follow-up care were reviewed with the patient was actively involved in the decision making process. Patient verbalized understanding and was in agreement with the plan.    Geovanny Mcneal DO  , Sports Medicine  Department of [facility] and [facility]    Large Joint Injection: R greater trochanteric bursa    Date/Time: 1/29/2024 9:26 AM    Performed by: Geovanny Mcneal DO  Authorized by: Geovanny Mcneal DO     Indications:  Pain  Needle Size:  22 G  Guidance: ultrasound    Approach:  Lateral  Location:  Hip      Site:  R greater trochanteric bursa  Medications:  40 mg triamcinolone 40 MG/ML; 4 mL lidocaine (PF) 1 %  Outcome:  Tolerated well, no immediate complications  Procedure discussed: discussed risks, benefits, and alternatives    Consent Given by:  Patient  Timeout: timeout called immediately prior to procedure    Prep: patient was prepped and draped in usual sterile fashion

## 2024-01-29 ENCOUNTER — ANCILLARY PROCEDURE (OUTPATIENT)
Dept: GENERAL RADIOLOGY | Facility: CLINIC | Age: 79
End: 2024-01-29
Attending: STUDENT IN AN ORGANIZED HEALTH CARE EDUCATION/TRAINING PROGRAM
Payer: COMMERCIAL

## 2024-01-29 ENCOUNTER — OFFICE VISIT (OUTPATIENT)
Dept: ORTHOPEDICS | Facility: CLINIC | Age: 79
End: 2024-01-29
Payer: COMMERCIAL

## 2024-01-29 DIAGNOSIS — M70.61 TROCHANTERIC BURSITIS OF RIGHT HIP: Primary | ICD-10-CM

## 2024-01-29 DIAGNOSIS — M25.559 HIP PAIN: ICD-10-CM

## 2024-01-29 PROCEDURE — 99207 PR DROP WITH A PROCEDURE: CPT | Performed by: STUDENT IN AN ORGANIZED HEALTH CARE EDUCATION/TRAINING PROGRAM

## 2024-01-29 PROCEDURE — 73502 X-RAY EXAM HIP UNI 2-3 VIEWS: CPT | Mod: GC | Performed by: RADIOLOGY

## 2024-01-29 PROCEDURE — 20611 DRAIN/INJ JOINT/BURSA W/US: CPT | Mod: RT | Performed by: STUDENT IN AN ORGANIZED HEALTH CARE EDUCATION/TRAINING PROGRAM

## 2024-01-29 RX ORDER — LIDOCAINE HYDROCHLORIDE 10 MG/ML
4 INJECTION, SOLUTION EPIDURAL; INFILTRATION; INTRACAUDAL; PERINEURAL
Status: DISCONTINUED | OUTPATIENT
Start: 2024-01-29 | End: 2024-06-25

## 2024-01-29 RX ORDER — TRIAMCINOLONE ACETONIDE 40 MG/ML
40 INJECTION, SUSPENSION INTRA-ARTICULAR; INTRAMUSCULAR
Status: DISCONTINUED | OUTPATIENT
Start: 2024-01-29 | End: 2024-06-25

## 2024-01-29 RX ADMIN — TRIAMCINOLONE ACETONIDE 40 MG: 40 INJECTION, SUSPENSION INTRA-ARTICULAR; INTRAMUSCULAR at 09:26

## 2024-01-29 RX ADMIN — LIDOCAINE HYDROCHLORIDE 4 ML: 10 INJECTION, SOLUTION EPIDURAL; INFILTRATION; INTRACAUDAL; PERINEURAL at 09:26

## 2024-01-29 NOTE — PROGRESS NOTES
Today, patient reports that she has had right hip pain for several weeks that has worsened without injury. She states that she's been cleaning out her attic though and she has to go up and down about 11 stairs. Her right hip pain is significantly more painful than the left shoulder and would like her right hip to be evaluated instead today. Pain is preventing her from sleep. Pain is located over the lateral and posterior hip. Denies any radiating pain down the leg. Hx of left ESTHELA. She is taking Naproxen for the pain. She uses a cane to ambulate.

## 2024-01-29 NOTE — NURSING NOTE
[address] [address]  Dept: [phone]  ______________________________________________________________________________    Patient: Cyndie Marin   : 1945   MRN: [id number]   2024    INVASIVE PROCEDURE SAFETY CHECKLIST    Date: 2024   Procedure: Right hip greater trochanteric bursa cortisone injection with US guidance  Patient Name: Cyndie Marin  MRN: [id number]  YOB: 1945    Action: Complete sections as appropriate. Any discrepancy results in a HARD COPY until resolved.     PRE PROCEDURE:  Patient ID verified with 2 identifiers (name and  or MRN): Yes  Procedure and site verified with patient/designee (when able): Yes  Accurate consent documentation in medical record: Yes  H&P (or appropriate assessment) documented in medical record: Yes  H&P must be up to 20 days prior to procedure and updates within 24 hours of procedure as applicable: NA  Relevant diagnostic and radiology test results appropriately labeled and displayed as applicable: Yes  Procedure site(s) marked with provider initials: NA    TIMEOUT:  Time-Out performed immediately prior to starting procedure, including verbal and active participation of all team members addressing the following:Yes  * Correct patient identify  * Confirmed that the correct side and site are marked  * An accurate procedure consent form  * Agreement on the procedure to be done  * Correct patient position  * Relevant images and results are properly labeled and appropriately displayed  * The need to administer antibiotics or fluids for irrigation purposes during the procedure as applicable   * Safety precautions based on patient history or medication use    DURING PROCEDURE: Verification of correct person, site, and procedures any time the responsibility for care of the patient is transferred to another member of the care team.       Prior to injection,  verified patient identity using patient's name and date of birth.  Due to injection administration, patient instructed to remain in clinic for 15 minutes  afterwards, and to report any adverse reaction to me immediately.    Bursa injection was performed.      Drug Amount Wasted:  Yes: 1 mg/ml   Vial/Syringe: Single dose vial  Expiration Date:  04/2026      Sarah Castro, SAM  January 29, 2024

## 2024-01-29 NOTE — LETTER
1/29/2024      RE: Cyndie Marin  [address] [place] [address]     Dear Colleague,    Thank you for referring your patient, Cyndie Marin, to the [facility] [place]. Please see a copy of my visit note below.    [facility]  Sports Medicine Clinic  Clinics and Surgery Center           SUBJECTIVE       Cyndie Marin is a 78 year old female presenting to clinic today with right hip pain.  Patient has a history of a left hip total replacement, and her pain started again here on the right hip a couple weeks ago.  She is having trouble climbing stairs.  She locates the pain to the lateral side of the right hip.  No numbness and tingling.  She does admit to some cramping posteriorly in the hamstrings, which she will take some anti-inflammatories and that will go away.  She is not having any back pain.  Patient typically walks with a cane.    PMH, Medications and Allergies were reviewed and updated as needed.    ROS:  As noted above otherwise negative.    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)     Gastroesophageal reflux disease without esophagitis     Overactive bladder     S/P hip replacement, left     Colonic mass     Hyperlipidemia LDL goal <100     Shoulder pain, right     Calculus of gallbladder without cholecystitis without obstruction     History of urinary tract obstruction     RAJ (stress urinary incontinence, female)     Parastomal hernia     Left shoulder tendonitis     Impacted cerumen of right ear       Current Outpatient Medications   Medication Sig Dispense Refill     acetaminophen (TYLENOL) 325 MG tablet [ACETAMINOPHEN (TYLENOL) 325 MG TABLET] Take 2 tablets (650 mg total) by mouth every 4 (four) hours as needed for pain (mild pain). 100 tablet 0     cholecalciferol, vitamin D3, 5,000 unit Tab  Take 1,000 Units by mouth daily       lisinopril-hydrochlorothiazide (ZESTORETIC) 10-12.5 MG tablet TAKE 1 TABLET DAILY 90 tablet 3     loratadine (CLARITIN) 10 mg tablet [LORATADINE (CLARITIN) 10 MG TABLET] Take 10 mg by mouth daily as needed.        menthol (BIOFREEZE, MENTHOL,) 4 % Gel [MENTHOL (BIOFREEZE, MENTHOL,) 4 % GEL] Apply topically at bedtime as needed.        Naproxen Sodium (ALEVE PO)        rosuvastatin (CRESTOR) 10 MG tablet TAKE 1 TABLET AT BEDTIME 90 tablet 3            OBJECTIVE:       Vitals: There were no vitals filed for this visit.  BMI: There is no height or weight on file to calculate BMI.    Gen:  Well nourished and in no acute distress  HEENT: Extraocular movement intact  Neck: Supple  Pulm:  Breathing Comfortably. No increased respiratory effort.  Psych: Euthymic. Appropriately answers questions    MSK:   Right hip examination without evidence of ASIS asymmetry, leg length discrepancy.  Antalgic gait secondary to chronic cane use.  Tenderness to palpation on the greater trochanter of the right hip, extending distally into the IT band roughly 4 to 5 cm.  Full range of motion of the right hip.  Strength is mildly diminished in abduction, 4/5.  Negative logroll.  Positive FADIR to the inguinal crease and posterior joint line.  BEATRIS positive lateral hip.  Negative straight leg raise.    XRAY : Independent evaluation of the right hip with evidence of intact arthroplasty of the left hip.  Significant degenerative changes noted of the right hip.  Osteopenic appearing bones as well.          ASSESSMENT and PLAN:     [place] was seen today for pain.    Diagnoses and all orders for this visit:    Hip pain  -     X-ray Pelvis w Hip Right G/E 2 Views; Future    Trochanteric bursitis of right hip      78-year-old female presenting to clinic today with concerns for right lateral hip pain.  X-rays were obtained today and discussed in detail with her, which did show evidence of right hip degenerative  changes.  However the patient's pain pattern is more extra-articular in the greater trochanteric region, with concomitant IT band syndrome.  We have discussed management options.  The patient would like an injection.  We will proceed with ultrasound-guided injection into this area today.  See below procedure note for full details.  Have also provided the patient a home exercise program for strengthening of the gluteal region, secondary to chronic osteoarthrosis and altered gait.  The patient is having continued pain, or pain pattern more so in the inguinal region, can return to clinic so we can consider ultrasound-guided glenohumeral joint injection.  Return precautions have been advised.    Trochanteric Hip Injection - Ultrasound Guided  The patient was informed of the risks and the benefits of the procedure and a written consent was signed.  The patient s right lateral hip was prepped with chlorhexidine in sterile fashion.   40 mg of triamcinolone suspension was drawn up into a 5 mL syringe with 4 mL of 1% lidocaine.  Injection was performed using sterile technique.  Under ultrasound guidance a 3.5-inch 22-gauge needle was used to enter the philip-trochanteric tissue.  Needle placement was visualized and documented with ultrasound which was deemed necessary to ensure medication did not enter the tendon itself, which could potentially cause tendon damage.   Injection performed long axis to the probe with probe in short axis to the greater trochanter.  Expansion of the philip-trochanteric tissue was visualized under ultrasound upon injection.  Images were permanently stored for the patient's record.  There were no complications. The patient tolerated the procedure well. There was negligible bleeding.       Options for treatment and/or follow-up care were reviewed with the patient was actively involved in the decision making process. Patient verbalized understanding and was in agreement with the plan.    Geovanny Mcneal,    , Sports Medicine  Department of [facility] and [facility]    Large Joint Injection: R greater trochanteric bursa    Date/Time: 1/29/2024 9:26 AM    Performed by: Geovanny Mcneal DO  Authorized by: Geovanny Mcneal DO    Indications:  Pain  Needle Size:  22 G  Guidance: ultrasound    Approach:  Lateral  Location:  Hip      Site:  R greater trochanteric bursa  Medications:  40 mg triamcinolone 40 MG/ML; 4 mL lidocaine (PF) 1 %  Outcome:  Tolerated well, no immediate complications  Procedure discussed: discussed risks, benefits, and alternatives    Consent Given by:  Patient  Timeout: timeout called immediately prior to procedure    Prep: patient was prepped and draped in usual sterile fashion            Today, patient reports that she has had right hip pain for several weeks that has worsened without injury. She states that she's been cleaning out her attic though and she has to go up and down about 11 stairs. Her right hip pain is significantly more painful than the left shoulder and would like her right hip to be evaluated instead today. Pain is preventing her from sleep. Pain is located over the lateral and posterior hip. Denies any radiating pain down the leg. Hx of left ESTHELA. She is taking Naproxen for the pain. She uses a cane to ambulate.       Again, thank you for allowing me to participate in the care of your patient.      Sincerely,    Geovanny Mcneal DO

## 2024-02-26 ENCOUNTER — OFFICE VISIT (OUTPATIENT)
Dept: ORTHOPEDICS | Facility: CLINIC | Age: 79
End: 2024-02-26
Payer: COMMERCIAL

## 2024-02-26 DIAGNOSIS — M19.012 ARTHROSIS OF LEFT ACROMIOCLAVICULAR JOINT: Primary | ICD-10-CM

## 2024-02-26 DIAGNOSIS — M48.061 SPINAL STENOSIS OF LUMBAR REGION, UNSPECIFIED WHETHER NEUROGENIC CLAUDICATION PRESENT: ICD-10-CM

## 2024-02-26 DIAGNOSIS — M16.11 PRIMARY OSTEOARTHRITIS OF RIGHT HIP: ICD-10-CM

## 2024-02-26 DIAGNOSIS — M19.012 LOCALIZED OSTEOARTHRITIS OF LEFT SHOULDER: ICD-10-CM

## 2024-02-26 PROCEDURE — 99214 OFFICE O/P EST MOD 30 MIN: CPT | Performed by: STUDENT IN AN ORGANIZED HEALTH CARE EDUCATION/TRAINING PROGRAM

## 2024-02-26 RX ORDER — METHYLPREDNISOLONE 4 MG
TABLET, DOSE PACK ORAL
Qty: 21 TABLET | Refills: 0 | Status: SHIPPED | OUTPATIENT
Start: 2024-02-26 | End: 2024-06-04

## 2024-02-26 NOTE — PROGRESS NOTES
[facility]  Sports Medicine Clinic  Clinics and Surgery Center           SUBJECTIVE       Cyndie Marin is a 78 year old female presenting to clinic today for follow-up of the left shoulder and right hip.  Patient last had a trochanteric bursa injection about a month ago, did provide outside relief.  She is however developing reticular symptoms down her leg, into the dorsum of her foot without bowel or bladder incontinence, saddle anesthesia, or night pain.  The patient also has intra-articular hip pain in the groin of the right hip now, has become more prominent.  Left shoulder pain as well, worse with arm movement, and limited range of movement.  No new injuries.  No clumsiness or numbness/tingling.    1/29/24: Hip pain  -     X-ray Pelvis w Hip Right G/E 2 Views; Future     Trochanteric bursitis of right hip        78-year-old female presenting to clinic today with concerns for right lateral hip pain.  X-rays were obtained today and discussed in detail with her, which did show evidence of right hip degenerative changes.  However the patient's pain pattern is more extra-articular in the greater trochanteric region, with concomitant IT band syndrome.  We have discussed management options.  The patient would like an injection.  We will proceed with ultrasound-guided injection into this area today.  See below procedure note for full details.  Have also provided the patient a home exercise program for strengthening of the gluteal region, secondary to chronic osteoarthrosis and altered gait.  The patient is having continued pain, or pain pattern more so in the inguinal region, can return to clinic so we can consider ultrasound-guided glenohumeral joint injection.  Return precautions have been advised.    10/22/22:ocalized osteoarthritis of left shoulder     Arthrosis of left acromioclavicular joint        77-year-old female returning to clinic today for follow-up of the localized arthritis of the left  shoulder as well as arthrosis of the acromioclavicular joint on the left.  We did an injection for the glenohumeral joint on the left and the patient has successfully reduced the amount of pain she is having.  I do think her to continue with physical therapy at this point to ensure prolonged benefit from the injection would be ideal.  The patient currently has her exercises and would like to do this on her own.  At this point.  The patient can follow-up with us on an as-needed basis.  Did discuss that injections are typically done every 3 months as needed.  She will contact the clinic if she is having any regression of her currently very good progress.      Today the patient presents with more pain on the top of the shoulder, she has been doing some water aerobics but still notices pain with abduction over her head and the AC joint.  This does seem consistent with her previous diagnosis and we have not injected that area.  The patient is interested in doing a CT ultrasound-guided injection today.  See below procedure note for full details.  PMH, Medications and Allergies were reviewed and updated as needed.    ROS:  As noted above otherwise negative.    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)    Gastroesophageal reflux disease without esophagitis    Overactive bladder    S/P hip replacement, left    Colonic mass    Hyperlipidemia LDL goal <100    Shoulder pain, right    Calculus of gallbladder without cholecystitis without obstruction    History of urinary tract obstruction    RAJ (stress urinary incontinence, female)    Parastomal hernia    Left shoulder tendonitis    Impacted cerumen of right ear       Current Outpatient Medications   Medication Sig Dispense Refill    acetaminophen (TYLENOL) 325 MG tablet [ACETAMINOPHEN (TYLENOL) 325 MG TABLET] Take 2 tablets (650 mg total) by  mouth every 4 (four) hours as needed for pain (mild pain). 100 tablet 0    cholecalciferol, vitamin D3, 5,000 unit Tab Take 1,000 Units by mouth daily      lisinopril-hydrochlorothiazide (ZESTORETIC) 10-12.5 MG tablet TAKE 1 TABLET DAILY 90 tablet 3    loratadine (CLARITIN) 10 mg tablet [LORATADINE (CLARITIN) 10 MG TABLET] Take 10 mg by mouth daily as needed.       menthol (BIOFREEZE, MENTHOL,) 4 % Gel [MENTHOL (BIOFREEZE, MENTHOL,) 4 % GEL] Apply topically at bedtime as needed.       methylPREDNISolone (MEDROL DOSEPAK) 4 MG tablet therapy pack Follow Package Directions 21 tablet 0    Naproxen Sodium (ALEVE PO)       rosuvastatin (CRESTOR) 10 MG tablet TAKE 1 TABLET AT BEDTIME 90 tablet 3            OBJECTIVE:       Vitals: There were no vitals filed for this visit.  BMI: There is no height or weight on file to calculate BMI.    Gen:  Well nourished and in no acute distress  HEENT: Extraocular movement intact  Neck: Supple  Pulm:  Breathing Comfortably. No increased respiratory effort.  Psych: Euthymic. Appropriately answers questions    MSK: Left shoulder with decreased range of motion in flexion to roughly 70 degrees, abduction to 45 degrees, external rotation to roughly 5 degrees, and internal rotation to the level of the iliac crest.  Rotator cuff strength testing is much could be tested, is painful with supraspinatus had a 4/5, 5/5 in internal and external rotation.  Provocative testing has been deferred due to limited range of motion of the left shoulder.  Negative Spurling however.      Study Result    Narrative & Impression   2 views right hip radiographs 1/29/2024 8:52 AM     History: AP/FROG LAT; Hip pain     Additional History from EMR: Several week history of right hip pain  that has progressed without inciting injury.     Comparison: MR pelvis 1/15/2023, pelvic radiograph 1/26/2021     Findings:     AP, frog leg lateral views of the right hip were obtained.      Left hip:     Postsurgical changes of  left total hip arthroplasty without evidence  of hardware complication. No acute osseous abnormality.       Right hip:     No acute osseous abnormality.       Moderate degeneratives change of the hip, including joint space  narrowing, subchondral sclerosis, osteophytosis, and subchondral  cystic change.     Others:     Diffuse bone demineralization. Enthesopathic changes of pelvis and  trochanters. Degenerative changes of the visualized lumbar spine.     Pelvic phlebolith.                                                                       Impression:  1. No acute osseous abnormality  2. Moderate osteoarthrosis of the right hip..  3. Postsurgical changes of left total hip arthroplasty without  evidence of hardware complication.        Study Result    Narrative & Impression   EXAM: XR SHOULDER LEFT G/E 3 VIEWS  LOCATION: [facility]  DATE/TIME: 8/8/2022 2:43 PM     INDICATION:  Chronic right shoulder pain, Chronic right shoulder pain  COMPARISON: None.                                                                      IMPRESSION: No fracture. Severe degenerative arthritis of the glenohumeral joint. Ossific bodies projecting over the scapula near the coracoid process probably within subscapular joint recess. Mild degenerative arthrosis AC join          ASSESSMENT and PLAN:     [place] was seen today for pain.    Diagnoses and all orders for this visit:    Arthrosis of left acromioclavicular joint    Localized osteoarthritis of left shoulder    Primary osteoarthritis of right hip    Spinal stenosis of lumbar region, unspecified whether neurogenic claudication present  -     Spine  Referral; Future  -     methylPREDNISolone (MEDROL DOSEPAK) 4 MG tablet therapy pack; Follow Package Directions      78-year-old female, with a history of 1 kidney, presenting to clinic today for follow-up of the left shoulder as well as right hip.  Trochanteric bursa injection has seemed to help the patient.   Her pain does seem more intra-articular of the right hip at this point.  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  Her left shoulder pain is also more consistent with a significant osteoarthritis, with decreased range of motion that could mimic an adhesive capsulitis, but given the amount of loose bodies and significant degenerative changes, range of motion is likely limited due to the degenerative changes.  I have discussed options for management with the patient.  I do think she would benefit greatly from formal physical therapy for the right shoulder and left hip, this has been ordered today.  The patient does have a longstanding history of neuroforaminal stenosis and central canal stenosis of the lumbar spine, we have referred her to see one of our spine specialist for this as well.  Consequently, I do think it would be ideal to get the patient scheduled for a right hip ultrasound-guided injection into the left shoulder glenohumeral injection, staggered 2 weeks apart, by the patient's choosing.  For the left shoulder, we will do a larger volume, roughly 10 cc, given the lack of range of motion, to assist with hopefully expanding the capsule is much as can be done, followed by more physical therapy.  I have counseled the patient on return precautions.  All questions answered.  We will see her back for the planned scheduled injection.    Options for treatment and/or follow-up care were reviewed with the patient was actively involved in the decision making process. Patient verbalized understanding and was in agreement with the plan.    Geovanny Mcneal DO  , Sports Medicine  Department of Family [facility] and [facility]

## 2024-02-26 NOTE — LETTER
2/26/2024      RE: Cyndie Marin  [address] [place] [address]     Dear Colleague,    Thank you for referring your patient, Cyndie Marin, to the [facility] [place]. Please see a copy of my visit note below.    [facility]  Sports Medicine Clinic  Clinics and Surgery Center           SUBJECTIVE       Cyndie Marin is a 78 year old female presenting to clinic today for follow-up of the left shoulder and right hip.  Patient last had a trochanteric bursa injection about a month ago, did provide outside relief.  She is however developing reticular symptoms down her leg, into the dorsum of her foot without bowel or bladder incontinence, saddle anesthesia, or night pain.  The patient also has intra-articular hip pain in the groin of the right hip now, has become more prominent.  Left shoulder pain as well, worse with arm movement, and limited range of movement.  No new injuries.  No clumsiness or numbness/tingling.    1/29/24: Hip pain  -     X-ray Pelvis w Hip Right G/E 2 Views; Future     Trochanteric bursitis of right hip        78-year-old female presenting to clinic today with concerns for right lateral hip pain.  X-rays were obtained today and discussed in detail with her, which did show evidence of right hip degenerative changes.  However the patient's pain pattern is more extra-articular in the greater trochanteric region, with concomitant IT band syndrome.  We have discussed management options.  The patient would like an injection.  We will proceed with ultrasound-guided injection into this area today.  See below procedure note for full details.  Have also provided the patient a home exercise program for strengthening of the gluteal region, secondary to chronic osteoarthrosis and altered gait.  The patient is having continued pain, or pain pattern more so in the inguinal region, can return to clinic so we can consider ultrasound-guided  glenohumeral joint injection.  Return precautions have been advised.    10/22/22:ocalized osteoarthritis of left shoulder     Arthrosis of left acromioclavicular joint        77-year-old female returning to clinic today for follow-up of the localized arthritis of the left shoulder as well as arthrosis of the acromioclavicular joint on the left.  We did an injection for the glenohumeral joint on the left and the patient has successfully reduced the amount of pain she is having.  I do think her to continue with physical therapy at this point to ensure prolonged benefit from the injection would be ideal.  The patient currently has her exercises and would like to do this on her own.  At this point.  The patient can follow-up with us on an as-needed basis.  Did discuss that injections are typically done every 3 months as needed.  She will contact the clinic if she is having any regression of her currently very good progress.      Today the patient presents with more pain on the top of the shoulder, she has been doing some water aerobics but still notices pain with abduction over her head and the AC joint.  This does seem consistent with her previous diagnosis and we have not injected that area.  The patient is interested in doing a CT ultrasound-guided injection today.  See below procedure note for full details.  PMH, Medications and Allergies were reviewed and updated as needed.    ROS:  As noted above otherwise negative.    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)    Gastroesophageal reflux disease without esophagitis    Overactive bladder    S/P hip replacement, left    Colonic mass    Hyperlipidemia LDL goal <100    Shoulder pain, right    Calculus of gallbladder without cholecystitis without obstruction    History of urinary tract obstruction    RAJ (stress urinary incontinence,  female)    Parastomal hernia    Left shoulder tendonitis    Impacted cerumen of right ear       Current Outpatient Medications   Medication Sig Dispense Refill    acetaminophen (TYLENOL) 325 MG tablet [ACETAMINOPHEN (TYLENOL) 325 MG TABLET] Take 2 tablets (650 mg total) by mouth every 4 (four) hours as needed for pain (mild pain). 100 tablet 0    cholecalciferol, vitamin D3, 5,000 unit Tab Take 1,000 Units by mouth daily      lisinopril-hydrochlorothiazide (ZESTORETIC) 10-12.5 MG tablet TAKE 1 TABLET DAILY 90 tablet 3    loratadine (CLARITIN) 10 mg tablet [LORATADINE (CLARITIN) 10 MG TABLET] Take 10 mg by mouth daily as needed.       menthol (BIOFREEZE, MENTHOL,) 4 % Gel [MENTHOL (BIOFREEZE, MENTHOL,) 4 % GEL] Apply topically at bedtime as needed.       methylPREDNISolone (MEDROL DOSEPAK) 4 MG tablet therapy pack Follow Package Directions 21 tablet 0    Naproxen Sodium (ALEVE PO)       rosuvastatin (CRESTOR) 10 MG tablet TAKE 1 TABLET AT BEDTIME 90 tablet 3            OBJECTIVE:       Vitals: There were no vitals filed for this visit.  BMI: There is no height or weight on file to calculate BMI.    Gen:  Well nourished and in no acute distress  HEENT: Extraocular movement intact  Neck: Supple  Pulm:  Breathing Comfortably. No increased respiratory effort.  Psych: Euthymic. Appropriately answers questions    MSK: Left shoulder with decreased range of motion in flexion to roughly 70 degrees, abduction to 45 degrees, external rotation to roughly 5 degrees, and internal rotation to the level of the iliac crest.  Rotator cuff strength testing is much could be tested, is painful with supraspinatus had a 4/5, 5/5 in internal and external rotation.  Provocative testing has been deferred due to limited range of motion of the left shoulder.  Negative Spurling however.      Study Result    Narrative & Impression   2 views right hip radiographs 1/29/2024 8:52 AM     History: AP/FROG LAT; Hip pain     Additional History from EMR:  Several week history of right hip pain  that has progressed without inciting injury.     Comparison: MR pelvis 1/15/2023, pelvic radiograph 1/26/2021     Findings:     AP, frog leg lateral views of the right hip were obtained.      Left hip:     Postsurgical changes of left total hip arthroplasty without evidence  of hardware complication. No acute osseous abnormality.       Right hip:     No acute osseous abnormality.       Moderate degeneratives change of the hip, including joint space  narrowing, subchondral sclerosis, osteophytosis, and subchondral  cystic change.     Others:     Diffuse bone demineralization. Enthesopathic changes of pelvis and  trochanters. Degenerative changes of the visualized lumbar spine.     Pelvic phlebolith.                                                                       Impression:  1. No acute osseous abnormality  2. Moderate osteoarthrosis of the right hip..  3. Postsurgical changes of left total hip arthroplasty without  evidence of hardware complication.        Study Result    Narrative & Impression   EXAM: XR SHOULDER LEFT G/E 3 VIEWS  LOCATION: [facility]  DATE/TIME: 8/8/2022 2:43 PM     INDICATION:  Chronic right shoulder pain, Chronic right shoulder pain  COMPARISON: None.                                                                      IMPRESSION: No fracture. Severe degenerative arthritis of the glenohumeral joint. Ossific bodies projecting over the scapula near the coracoid process probably within subscapular joint recess. Mild degenerative arthrosis AC join          ASSESSMENT and PLAN:     [place] was seen today for pain.    Diagnoses and all orders for this visit:    Arthrosis of left acromioclavicular joint    Localized osteoarthritis of left shoulder    Primary osteoarthritis of right hip    Spinal stenosis of lumbar region, unspecified whether neurogenic claudication present  -     Spine  Referral; Future  -      methylPREDNISolone (MEDROL DOSEPAK) 4 MG tablet therapy pack; Follow Package Directions      78-year-old female, with a history of 1 kidney, presenting to clinic today for follow-up of the left shoulder as well as right hip.  Trochanteric bursa injection has seemed to help the patient.  Her pain does seem more intra-articular of the right hip at this point.  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  Her left shoulder pain is also more consistent with a significant osteoarthritis, with decreased range of motion that could mimic an adhesive capsulitis, but given the amount of loose bodies and significant degenerative changes, range of motion is likely limited due to the degenerative changes.  I have discussed options for management with the patient.  I do think she would benefit greatly from formal physical therapy for the right shoulder and left hip, this has been ordered today.  The patient does have a longstanding history of neuroforaminal stenosis and central canal stenosis of the lumbar spine, we have referred her to see one of our spine specialist for this as well.  Consequently, I do think it would be ideal to get the patient scheduled for a right hip ultrasound-guided injection into the left shoulder glenohumeral injection, staggered 2 weeks apart, by the patient's choosing.  For the left shoulder, we will do a larger volume, roughly 10 cc, given the lack of range of motion, to assist with hopefully expanding the capsule is much as can be done, followed by more physical therapy.  I have counseled the patient on return precautions.  All questions answered.  We will see her back for the planned scheduled injection.    Options for treatment and/or follow-up care were reviewed with the patient was actively involved in the decision making process. Patient verbalized understanding and  was in agreement with the plan.    Geovanny Mcneal DO  , Sports Medicine  Department of Family Medicine and [facility]

## 2024-02-28 NOTE — TELEPHONE ENCOUNTER
DIAGNOSIS: Spinal stenosis of lumbar region, unspecified whether neurogenic claudication present [M48.061]   APPOINTMENT DATE: 03/01/2024   NOTES STATUS DETAILS   OFFICE NOTE from referring provider Internal 02/26/2024 - Geovanny Mcneal DO - [facility] Sports Med   OPERATIVE REPORT Care Everywhere:   [facility] 01/26/2021 - Left ESTHELA   DEXA Internal    (IMAGES & REPORTS) Internal

## 2024-02-29 DIAGNOSIS — M54.50 LUMBAR BACK PAIN: Primary | ICD-10-CM

## 2024-03-01 ENCOUNTER — OFFICE VISIT (OUTPATIENT)
Dept: ORTHOPEDICS | Facility: CLINIC | Age: 79
End: 2024-03-01
Payer: COMMERCIAL

## 2024-03-01 ENCOUNTER — PRE VISIT (OUTPATIENT)
Dept: ORTHOPEDICS | Facility: CLINIC | Age: 79
End: 2024-03-01

## 2024-03-01 ENCOUNTER — ANCILLARY PROCEDURE (OUTPATIENT)
Dept: GENERAL RADIOLOGY | Facility: CLINIC | Age: 79
End: 2024-03-01
Attending: ORTHOPAEDIC SURGERY
Payer: COMMERCIAL

## 2024-03-01 VITALS — WEIGHT: 182 LBS | HEIGHT: 65 IN | BODY MASS INDEX: 30.32 KG/M2

## 2024-03-01 DIAGNOSIS — M48.061 SPINAL STENOSIS OF LUMBAR REGION, UNSPECIFIED WHETHER NEUROGENIC CLAUDICATION PRESENT: Primary | ICD-10-CM

## 2024-03-01 PROCEDURE — 72110 X-RAY EXAM L-2 SPINE 4/>VWS: CPT | Performed by: STUDENT IN AN ORGANIZED HEALTH CARE EDUCATION/TRAINING PROGRAM

## 2024-03-01 PROCEDURE — 99204 OFFICE O/P NEW MOD 45 MIN: CPT | Performed by: PHYSICIAN ASSISTANT

## 2024-03-01 NOTE — PROGRESS NOTES
.  Spine Surgery Consultation    REASON FOR CONSULTATION: Consult (NEW SURGICAL SPINE )     REFERRING PHYSICIAN: Geovanny Mcneal     HISTORY OF PRESENT ILLNESS: Cyndie Marin is a pleasant 78 year old female who presents with symptoms down the RLE since 2/11/24. She has seen Dr. Geovanny Mcneal in [facility] for right hip symptoms and planning for R hip injection. Symptoms mostly present in the coccyx region and in the lateral shin.  Previously diagnosed with lumbar stenosis and years ago did PT which helped.     No significant back pain, pain is located in coccyx. Pain gets up to 4/10 with walking, mostly in the leg. Has numbness in both feet. She has a lot of pain in her right hip secondary to hip OA. The hip pain is her most severe pain. 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    Conservative measures:  Injections: R GTB injection 1/29/24- provided some relief  Medications: tylenol, ibuprofen, CBD/cannabis salve  Therapy: no PT     Spine Surgical History:  none    Bone Health History:  DEXA 9/21/23 most negative T score -1.6 at R femoral neck (osteopenia), but hip fracture risk > 3%     Oswestry (VASQUEZ) Questionnaire        3/1/2024     2:48 PM   OSWESTRY DISABILITY INDEX   Count 9   Sum 16   Oswestry Score (%) 35.56 %       PROMIS-10 Scores  Global Mental Health Score: 13  Global Physical Health Score: 11   PROMIS TOTAL - SUBSCORES: 24           3/1/2024     2:53 PM   VISUAL ANALOG PAIN SCALE   Back Pain Scale 0-10 0   Right leg pain 7   Left leg pain 0   Neck Pain Scale 0-10 0   Right arm pain 0   Left arm pain 6         REVIEW OF SYSTEMS:   See HPI for pertinent positive and Epic intake form    Allergies   Allergen Reactions    Cats Claw (Uncaria [Uncaria Tomentosa (Cats Claw)] Unknown    Dye [External Allergen Needs Reconciliation - See Comment] Hives and Swelling     Cat scan dye    Other Drug Allergy (See  Comments) Hives and Swelling     Cat scan dye [iodine-based contrast?]    Other Environmental Allergy Unknown     DUST    Ragweed Pollen [Ragweeds] Unknown       Past Medical History:   Diagnosis Date    Calculus of gallbladder without cholecystitis without obstruction 2022    Claustrophobia     Colonic mass 2022    Essential hypertension     GERD (gastroesophageal reflux disease)     History of blood clots     History of urinary tract obstruction 2022    Hyperlipidemia LDL goal <100 2022    Left shoulder tendonitis     Parastomal hernia 2023    Perirectal abscess 10/29/2019    Seasonal allergies     Sleep apnea     uses CPAP    RAJ (stress urinary incontinence, female) 2022       Past Surgical History:   Procedure Laterality Date    COLOSTOMY N/A 10/28/2019    Procedure: CREATION, COLOSTOMY;  Surgeon: Mylene Sears MD;  Location: [facility];  Service: General    VT EXCIS BARTHOLIN GLAND/CYST      Description: Excision Of Bartholin's Gland Or Cyst;  Recorded: 2013;    PROCTOSCOPY N/A 10/28/2019    Procedure: RIGID PROCTOSCOPY;  Surgeon: Mylene Sears MD;  Location: [facility];  Service: General    SIGMOIDOSCOPY FLEXIBLE N/A 10/27/2019    Procedure: SIGMOIDOSCOPY with biopsy;  Surgeon: Tj Gavin MD;  Location: [facility];  Service: Gastroenterology    TOOTH EXTRACTION      [facility]  DELIVERY ONLY      Description:  Section;  Recorded: 2008;  Comments: x2    [facility] PART REMOVAL COLON W ANASTOMOSIS N/A 10/28/2019    Procedure: OPEN EXPLORATORY LAPAROTOMY, SEGMENTAL COLECTOMY,  WASHOUT OF PELVIC ABSCESS. RECTAL EXAM UNDER ANESTHESIA. DEBRIDEMENT OD ABSCESS CAVITY;  Surgeon: Mylene Sears MD;  Location: [facility];  Service: General    [facility] REMV KIDNEY,W/RIB RESECTION      Description: Nephrectomy;  Recorded: 2008;    [facility] TOTAL HIP ARTHROPLASTY Left 2021    Procedure: LEFT DIRECT ANTERIOR TOTAL HIP ARTHROPLASTY;   "Surgeon: Alexander Yancey MD;  Location: [facility] OR;  Service: Orthopedics       Family History   Problem Relation Age of Onset    Breast Cancer Paternal Grandmother     Prostate Cancer Father     Heart Disease Other         many on fathers side    Ovarian Cancer Mother     Heart Disease Maternal Grandfather     Brain Cancer Cousin 47.00       Social History     Tobacco Use    Smoking status: Never    Smokeless tobacco: Never   Substance Use Topics    Alcohol use: Not Currently     Alcohol/week: 0.0 standard drinks of alcohol       Current Outpatient Medications   Medication    acetaminophen (TYLENOL) 325 MG tablet    cholecalciferol, vitamin D3, 5,000 unit Tab    lisinopril-hydrochlorothiazide (ZESTORETIC) 10-12.5 MG tablet    loratadine (CLARITIN) 10 mg tablet    menthol (BIOFREEZE, MENTHOL,) 4 % Gel    methylPREDNISolone (MEDROL DOSEPAK) 4 MG tablet therapy pack    Naproxen Sodium (ALEVE PO)    rosuvastatin (CRESTOR) 10 MG tablet     Current Facility-Administered Medications   Medication    lidocaine (PF) (XYLOCAINE) 1 % injection 0.5 mL    lidocaine (PF) (XYLOCAINE) 1 % injection 4 mL    lidocaine (PF) (XYLOCAINE) 1 % injection 4 mL    lidocaine (PF) (XYLOCAINE) 1 % injection 4 mL    triamcinolone (KENALOG-40) injection 40 mg    triamcinolone (KENALOG-40) injection 40 mg    triamcinolone (KENALOG-40) injection 40 mg    triamcinolone acetonide (KENALOG-10) injection 10 mg     Facility-Administered Medications Ordered in Other Visits   Medication    glucagon injection 1 mg       PHYSICAL EXAM:  Ht 1.651 m (5' 5\")   Wt 82.6 kg (182 lb)   BMI 30.29 kg/m    Constitutional - Patient is healthy, well-nourished and appears stated age  Respiratory - Patient is breathing normally and in no respiratory distress.  Skin - No suspicious rashes or lesions.  Gait- raises from chair without assistance. Non-antalgic gait.   Neurologic - Sensation decreased over the entire feet bilaterally, especially the plantar aspect " of R foot. DTRs patella 0, ankle 0. Cleveland negative, ankle clonus 0 beats, plantar reflex downgoing.    Spine: overall adequate sagittal balance, loss of lordosis and increased thoracic kyphosis.   Thoracic Spine: increased kyphosis  Palpation - Non-tender to palpation  Lumbar Spine:  Appearance - decreased lordosis  Palpation - Non-tender to palpation  ROM - flexion limited to 40 deg, unable to extend past midline  Facets non-tender to palpation, facet loading negative   Straight leg raise positive on the right  Musculoskeletal:  Motor -  4+/5 R iliopsoas secondary to pain, 5/5 L iliopsoas, 5/5 quadriceps, 5/5 hamstrings, 5/5 anterior tibialis, 4+/5 extensor hallucis longus, 5/5 gastrocnemius.    Hips:  BEATRIS + R, pain R ROM    IMAGING: all imaging is personally reviewed and interpreted during the visit.   MRI lumbar  Spine, dated 12/22/20  Moderate to severe central stenosis L4-L5 secondary to disc bulge and facet arthropathy.    AP/lat/flex/ex Lumbar Spine XR, dated 3/1/2024   Severe L4/L5 disc height loss with mild vacuum phenomenon.  No instability with flexion compared to extension.  Postoperative changes of left total hip arthroplasty.  Moderate osteoarthritis changes of the right hip.    ASSESSMENT:   78 year old female with L4-L5 spinal stenosis, mild right radiculopathy, right hip osteoarthritis.    PLAN:   We discussed strategies for managing her lumbar issues. She has a medrol dose pack, encouraged her to start this. She has PT scheduled, will have them work on core exercises as well (referral placed).  If symptoms are not improved with the Medrol Dosepak, we could start gabapentin 100 mg at bedtime and gradually titrate up to 3 times daily and then 300 mg 3 times daily.    If symptoms are not manageable with the above measures, have instructed her to call or MyChart we can obtain a lumbar MRI and consider a lumbar epidural steroid injection, which would probably be at the L4-L5 level.    Follow-up as  needed    All questions and concerns were answered to the patient's apparent satisfaction before leaving the clinic. We used the patient's imaging, diagrams, models to explain the pathophysiology of their disease as well as surgical and non-surgical treatment options.   Thank you for allowing us to be a part of this patient's care.     Respectfully,  Nedra Nichols PA-C   Orthopedic Spine Surgery     Total time spent on chart reviewing, interpreting imaging, examining and counseling patient is > 45 minutes on the date of the encounter.

## 2024-03-01 NOTE — PATIENT INSTRUCTIONS
If the steroids don't work, we could start gabapentin.    You were referred to PT.    If symptoms down the right leg aren't improving, we can get a lumbar MRI updated and consider an injection in the spine, probably L4-L5 epidural steroid injection.     You can use [employer] to contact me, call our number, or we can schedule a follow up (virtual or in person).

## 2024-03-01 NOTE — LETTER
3/1/2024         RE: Cyndie Marin  [address] [place] [address]        Dear Colleague,    Thank you for referring your patient, Cyndie Marin, to the [facility] [place]. Please see a copy of my visit note below.    .  Spine Surgery Consultation    REASON FOR CONSULTATION: Consult (NEW SURGICAL SPINE )     REFERRING PHYSICIAN: Geovanny Mcneal     HISTORY OF PRESENT ILLNESS: Cyndie Marin is a pleasant 78 year old female who presents with symptoms down the RLE since 2/11/24. She has seen Dr. Geovanny Mcneal in [facility] for right hip symptoms and planning for R hip injection. Symptoms mostly present in the coccyx region and in the lateral shin.  Previously diagnosed with lumbar stenosis and years ago did PT which helped.     No significant back pain, pain is located in coccyx. Pain gets up to 4/10 with walking, mostly in the leg. Has numbness in both feet. She has a lot of pain in her right hip secondary to hip OA. The hip pain is her most severe pain. 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    Conservative measures:  Injections: R GTB injection 1/29/24- provided some relief  Medications: tylenol, ibuprofen, CBD/cannabis salve  Therapy: no PT     Spine Surgical History:  none    Bone Health History:  DEXA 9/21/23 most negative T score -1.6 at R femoral neck (osteopenia), but hip fracture risk > 3%     Oswestry (VASQUEZ) Questionnaire        3/1/2024     2:48 PM   OSWESTRY DISABILITY INDEX   Count 9   Sum 16   Oswestry Score (%) 35.56 %       PROMIS-10 Scores  Global Mental Health Score: 13  Global Physical Health Score: 11   PROMIS TOTAL - SUBSCORES: 24           3/1/2024     2:53 PM   VISUAL ANALOG PAIN SCALE   Back Pain Scale 0-10 0   Right leg pain 7   Left leg pain 0   Neck Pain Scale 0-10 0   Right arm pain 0   Left arm pain 6         REVIEW OF SYSTEMS:   See HPI for  pertinent positive and Epic intake form    Allergies   Allergen Reactions    Cats Claw (Uncaria [Uncaria Tomentosa (Cats Claw)] Unknown    Dye [External Allergen Needs Reconciliation - See Comment] Hives and Swelling     Cat scan dye    Other Drug Allergy (See Comments) Hives and Swelling     Cat scan dye [iodine-based contrast?]    Other Environmental Allergy Unknown     DUST    Ragweed Pollen [Ragweeds] Unknown       Past Medical History:   Diagnosis Date    Calculus of gallbladder without cholecystitis without obstruction 2022    Claustrophobia     Colonic mass 2022    Essential hypertension     GERD (gastroesophageal reflux disease)     History of blood clots     History of urinary tract obstruction 2022    Hyperlipidemia LDL goal <100 2022    Left shoulder tendonitis     Parastomal hernia 2023    Perirectal abscess 10/29/2019    Seasonal allergies     Sleep apnea     uses CPAP    RAJ (stress urinary incontinence, female) 2022       Past Surgical History:   Procedure Laterality Date    COLOSTOMY N/A 10/28/2019    Procedure: CREATION, COLOSTOMY;  Surgeon: Mylene Sears MD;  Location: [facility];  Service: General    DC EXCIS BARTHOLIN GLAND/CYST      Description: Excision Of Bartholin's Gland Or Cyst;  Recorded: 2013;    PROCTOSCOPY N/A 10/28/2019    Procedure: RIGID PROCTOSCOPY;  Surgeon: Mylene Sears MD;  Location: [facility] OR;  Service: General    SIGMOIDOSCOPY FLEXIBLE N/A 10/27/2019    Procedure: SIGMOIDOSCOPY with biopsy;  Surgeon: Tj Gavin MD;  Location: [facility] GI;  Service: Gastroenterology    TOOTH EXTRACTION      [facility]  DELIVERY ONLY      Description:  Section;  Recorded: 2008;  Comments: x2    [facility] PART REMOVAL COLON W ANASTOMOSIS N/A 10/28/2019    Procedure: OPEN EXPLORATORY LAPAROTOMY, SEGMENTAL COLECTOMY,  WASHOUT OF PELVIC ABSCESS. RECTAL EXAM UNDER ANESTHESIA. DEBRIDEMENT OD ABSCESS CAVITY;  Surgeon: Orion  "Mylene MITCHELL MD;  Location: [facility] Main OR;  Service: General    [facility] REMV KIDNEY,W/RIB RESECTION      Description: Nephrectomy;  Recorded: 06/26/2008;    [facility] TOTAL HIP ARTHROPLASTY Left 1/26/2021    Procedure: LEFT DIRECT ANTERIOR TOTAL HIP ARTHROPLASTY;  Surgeon: Alexander Yancey MD;  Location: [facility] Main OR;  Service: Orthopedics       Family History   Problem Relation Age of Onset    Breast Cancer Paternal Grandmother     Prostate Cancer Father     Heart Disease Other         many on fathers side    Ovarian Cancer Mother     Heart Disease Maternal Grandfather     Brain Cancer Cousin 47.00       Social History     Tobacco Use    Smoking status: Never    Smokeless tobacco: Never   Substance Use Topics    Alcohol use: Not Currently     Alcohol/week: 0.0 standard drinks of alcohol       Current Outpatient Medications   Medication    acetaminophen (TYLENOL) 325 MG tablet    cholecalciferol, vitamin D3, 5,000 unit Tab    lisinopril-hydrochlorothiazide (ZESTORETIC) 10-12.5 MG tablet    loratadine (CLARITIN) 10 mg tablet    menthol (BIOFREEZE, MENTHOL,) 4 % Gel    methylPREDNISolone (MEDROL DOSEPAK) 4 MG tablet therapy pack    Naproxen Sodium (ALEVE PO)    rosuvastatin (CRESTOR) 10 MG tablet     Current Facility-Administered Medications   Medication    lidocaine (PF) (XYLOCAINE) 1 % injection 0.5 mL    lidocaine (PF) (XYLOCAINE) 1 % injection 4 mL    lidocaine (PF) (XYLOCAINE) 1 % injection 4 mL    lidocaine (PF) (XYLOCAINE) 1 % injection 4 mL    triamcinolone (KENALOG-40) injection 40 mg    triamcinolone (KENALOG-40) injection 40 mg    triamcinolone (KENALOG-40) injection 40 mg    triamcinolone acetonide (KENALOG-10) injection 10 mg     Facility-Administered Medications Ordered in Other Visits   Medication    glucagon injection 1 mg       PHYSICAL EXAM:  Ht 1.651 m (5' 5\")   Wt 82.6 kg (182 lb)   BMI 30.29 kg/m    Constitutional - Patient is healthy, well-nourished and appears stated age  Respiratory - Patient is " breathing normally and in no respiratory distress.  Skin - No suspicious rashes or lesions.  Gait- raises from chair without assistance. Non-antalgic gait.   Neurologic - Sensation decreased over the entire feet bilaterally, especially the plantar aspect of R foot. DTRs patella 0, ankle 0. Cleveland negative, ankle clonus 0 beats, plantar reflex downgoing.    Spine: overall adequate sagittal balance, loss of lordosis and increased thoracic kyphosis.   Thoracic Spine: increased kyphosis  Palpation - Non-tender to palpation  Lumbar Spine:  Appearance - decreased lordosis  Palpation - Non-tender to palpation  ROM - flexion limited to 40 deg, unable to extend past midline  Facets non-tender to palpation, facet loading negative   Straight leg raise positive on the right  Musculoskeletal:  Motor -  4+/5 R iliopsoas secondary to pain, 5/5 L iliopsoas, 5/5 quadriceps, 5/5 hamstrings, 5/5 anterior tibialis, 4+/5 extensor hallucis longus, 5/5 gastrocnemius.    Hips:  BEATRIS + R, pain R ROM    IMAGING: all imaging is personally reviewed and interpreted during the visit.   MRI lumbar  Spine, dated 12/22/20  Moderate to severe central stenosis L4-L5 secondary to disc bulge and facet arthropathy.    AP/lat/flex/ex Lumbar Spine XR, dated 3/1/2024   Severe L4/L5 disc height loss with mild vacuum phenomenon.  No instability with flexion compared to extension.  Postoperative changes of left total hip arthroplasty.  Moderate osteoarthritis changes of the right hip.    ASSESSMENT:   78 year old female with L4-L5 spinal stenosis, mild right radiculopathy, right hip osteoarthritis.    PLAN:   We discussed strategies for managing her lumbar issues. She has a medrol dose pack, encouraged her to start this. She has PT scheduled, will have them work on core exercises as well (referral placed).  If symptoms are not improved with the Medrol Dosepak, we could start gabapentin 100 mg at bedtime and gradually titrate up to 3 times daily and then 300  mg 3 times daily.    If symptoms are not manageable with the above measures, have instructed her to call or MyChart we can obtain a lumbar MRI and consider a lumbar epidural steroid injection, which would probably be at the L4-L5 level.    Follow-up as needed    All questions and concerns were answered to the patient's apparent satisfaction before leaving the clinic. We used the patient's imaging, diagrams, models to explain the pathophysiology of their disease as well as surgical and non-surgical treatment options.   Thank you for allowing us to be a part of this patient's care.     Respectfully,  Nedra Nichols PA-C   Orthopedic Spine Surgery     Total time spent on chart reviewing, interpreting imaging, examining and counseling patient is > 45 minutes on the date of the encounter.

## 2024-03-06 NOTE — PROGRESS NOTES
PROCEDURE ENCOUNTER    [facility]  Geovanny Mcneal, DO  2024     Name: Cyndie Marin  MRN: [id number]  Age: 78 year old  : 1945    Referring provider:   Diagnosis: Primary osteoarthritis of right hip     Spinal stenosis of lumbar region, unspecified whether neurogenic claudication present  -     Spine  Referral; Future  -     methylPREDNISolone (MEDROL DOSEPAK) 4 MG tablet therapy pack; Follow Package Directions        78-year-old female, with a history of 1 kidney, presenting to clinic today for follow-up of the left shoulder as well as right hip.  Trochanteric bursa injection has seemed to help the patient.  Her pain does seem more intra-articular of the right hip at this point.  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  Her left shoulder pain is also more consistent with a significant osteoarthritis, with decreased range of motion that could mimic an adhesive capsulitis, but given the amount of loose bodies and significant degenerative changes, range of motion is likely limited due to the degenerative changes.  I have discussed options for management with the patient.  I do think she would benefit greatly from formal physical therapy for the right shoulder and left hip, this has been ordered today.  The patient does have a longstanding history of neuroforaminal stenosis and central canal stenosis of the lumbar spine, we have referred her to see one of our spine specialist for this as well.  Consequently, I do think it would be ideal to get the patient scheduled for a right hip ultrasound-guided injection into the left shoulder glenohumeral injection, staggered 2 weeks apart, by the patient's choosing.  For the left shoulder, we will do a larger volume, roughly 10 cc, given the lack of range of motion, to assist with hopefully expanding the capsule is  much as can be done, followed by more physical therapy.  I have counseled the patient on return precautions.  All questions answered.  We will see her back for the planned scheduled injection    Intraarticular Hip Injection - Ultrasound Guided  The patient was informed of the risks and the benefits of the procedure and a written consent was signed.  The patient s right hip was prepped with chlorhexidine in sterile fashion.   40 mg of triamcinolone suspension was drawn up into a 5 mL syringe with 4 mL of 1% lidocaine.  Injection was performed using sterile technique.  Under ultrasound guidance a 3.5-inch 22-gauge needle was used to enter the right femoracetabular joint.  Anterior approach was used, needle placement was visualized and documented with ultrasound.  Ultrasound visualization was necessary due to decreased joint space in the setting of osteoarthritis.  Injection performed long axis to the probe.  Injection solution visualized within the joint space.  Images were permanently stored for the patient's record.  There were no complications. The patient tolerated the procedure well. There was negligible bleeding.   The patient was instructed to ice the hip upon leaving clinic and refrain from overuse over the next 3 days.   The patient was instructed to call or go to the emergency room with any unusual pain, swelling, redness, or if otherwise concerned.  Patient requesting walker, for ambulation. Order has been placed given OA of ight hip with impaired weight bearing.     Geovanny Mcneal D.O., [facility]  , Sports Medicine  Department of Family Medicine and [facility]

## 2024-03-09 ENCOUNTER — OFFICE VISIT (OUTPATIENT)
Dept: ORTHOPEDICS | Facility: CLINIC | Age: 79
End: 2024-03-09
Payer: COMMERCIAL

## 2024-03-09 DIAGNOSIS — M16.11 PRIMARY OSTEOARTHRITIS OF RIGHT HIP: Primary | ICD-10-CM

## 2024-03-09 PROCEDURE — 20611 DRAIN/INJ JOINT/BURSA W/US: CPT | Mod: RT | Performed by: STUDENT IN AN ORGANIZED HEALTH CARE EDUCATION/TRAINING PROGRAM

## 2024-03-09 RX ORDER — LIDOCAINE HYDROCHLORIDE 10 MG/ML
7 INJECTION, SOLUTION EPIDURAL; INFILTRATION; INTRACAUDAL; PERINEURAL
Status: DISCONTINUED | OUTPATIENT
Start: 2024-03-09 | End: 2024-06-25

## 2024-03-09 RX ORDER — TRIAMCINOLONE ACETONIDE 40 MG/ML
40 INJECTION, SUSPENSION INTRA-ARTICULAR; INTRAMUSCULAR
Status: DISCONTINUED | OUTPATIENT
Start: 2024-03-09 | End: 2024-06-25

## 2024-03-09 RX ADMIN — TRIAMCINOLONE ACETONIDE 40 MG: 40 INJECTION, SUSPENSION INTRA-ARTICULAR; INTRAMUSCULAR at 10:50

## 2024-03-09 RX ADMIN — LIDOCAINE HYDROCHLORIDE 7 ML: 10 INJECTION, SOLUTION EPIDURAL; INFILTRATION; INTRACAUDAL; PERINEURAL at 10:50

## 2024-03-09 NOTE — LETTER
3/9/2024      RE: Cyndie Marin  [address] [place] [address]     Dear Colleague,    Thank you for referring your patient, Cyndie Marin, to the [facility] [place]. Please see a copy of my visit note below.    PROCEDURE ENCOUNTER    [facility]  Geovanny Mcneal, DO  2024     Name: Cyndie Marin  MRN: [id number]  Age: 78 year old  : 1945    Referring provider:   Diagnosis: Primary osteoarthritis of right hip     Spinal stenosis of lumbar region, unspecified whether neurogenic claudication present  -     Spine  Referral; Future  -     methylPREDNISolone (MEDROL DOSEPAK) 4 MG tablet therapy pack; Follow Package Directions        78-year-old female, with a history of 1 kidney, presenting to clinic today for follow-up of the left shoulder as well as right hip.  Trochanteric bursa injection has seemed to help the patient.  Her pain does seem more intra-articular of the right hip at this point.  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  Her left shoulder pain is also more consistent with a significant osteoarthritis, with decreased range of motion that could mimic an adhesive capsulitis, but given the amount of loose bodies and significant degenerative changes, range of motion is likely limited due to the degenerative changes.  I have discussed options for management with the patient.  I do think she would benefit greatly from formal physical therapy for the right shoulder and left hip, this has been ordered today.  The patient does have a longstanding history of neuroforaminal stenosis and central canal stenosis of the lumbar spine, we have referred her to see one of our spine specialist for this as well.  Consequently, I do think it would be ideal to get the patient scheduled for a right hip  ultrasound-guided injection into the left shoulder glenohumeral injection, staggered 2 weeks apart, by the patient's choosing.  For the left shoulder, we will do a larger volume, roughly 10 cc, given the lack of range of motion, to assist with hopefully expanding the capsule is much as can be done, followed by more physical therapy.  I have counseled the patient on return precautions.  All questions answered.  We will see her back for the planned scheduled injection    Intraarticular Hip Injection - Ultrasound Guided  The patient was informed of the risks and the benefits of the procedure and a written consent was signed.  The patient s right hip was prepped with chlorhexidine in sterile fashion.   40 mg of triamcinolone suspension was drawn up into a 5 mL syringe with 4 mL of 1% lidocaine.  Injection was performed using sterile technique.  Under ultrasound guidance a 3.5-inch 22-gauge needle was used to enter the right femoracetabular joint.  Anterior approach was used, needle placement was visualized and documented with ultrasound.  Ultrasound visualization was necessary due to decreased joint space in the setting of osteoarthritis.  Injection performed long axis to the probe.  Injection solution visualized within the joint space.  Images were permanently stored for the patient's record.  There were no complications. The patient tolerated the procedure well. There was negligible bleeding.   The patient was instructed to ice the hip upon leaving clinic and refrain from overuse over the next 3 days.   The patient was instructed to call or go to the emergency room with any unusual pain, swelling, redness, or if otherwise concerned.  Patient requesting walker, for ambulation. Order has been placed given OA of ight hip with impaired weight bearing.     Geovanny Mcneal D.O., [facility]  , Sports Medicine  Department of Family Medicine and [facility]      Large Joint  Injection: R hip joint    Date/Time: 3/9/2024 10:50 AM    Performed by: Geovanny Mcneal DO  Authorized by: Geovanny Mcneal DO    Indications:  Pain and osteoarthritis  Needle Size:  22 G  Guidance: ultrasound    Approach:  Anterior  Location:  Hip      Site:  R hip joint  Medications:  40 mg triamcinolone 40 MG/ML; 7 mL lidocaine (PF) 1 %  Outcome:  Tolerated well, no immediate complications  Procedure discussed: discussed risks, benefits, and alternatives    Consent Given by:  Patient  Timeout: timeout called immediately prior to procedure    Prep: patient was prepped and draped in usual sterile fashion       Michael Olmstead M.A., LAT, ATC  Certified Athletic Trainer            Again, thank you for allowing me to participate in the care of your patient.      Sincerely,    Geovanny Mcneal DO

## 2024-03-09 NOTE — PROGRESS NOTES
Large Joint Injection: R hip joint    Date/Time: 3/9/2024 10:50 AM    Performed by: Geovanny Mcneal DO  Authorized by: Geovanny Mcneal DO    Indications:  Pain and osteoarthritis  Needle Size:  22 G  Guidance: ultrasound    Approach:  Anterior  Location:  Hip      Site:  R hip joint  Medications:  40 mg triamcinolone 40 MG/ML; 7 mL lidocaine (PF) 1 %  Outcome:  Tolerated well, no immediate complications  Procedure discussed: discussed risks, benefits, and alternatives    Consent Given by:  Patient  Timeout: timeout called immediately prior to procedure    Prep: patient was prepped and draped in usual sterile fashion       Michael Olmstead M.A., LAT, ATC  Certified Athletic Trainer

## 2024-03-09 NOTE — NURSING NOTE
[address] [address]  Dept: [phone]  ______________________________________________________________________________    Patient: Cyndie Marin   : 1945   MRN: [id number]   2024    INVASIVE PROCEDURE SAFETY CHECKLIST    Date: 3/9/24   Procedure: Right hip IA USG CSI  Patient Name: Cyndie Marin  MRN: [id number]  YOB: 1945    Action: Complete sections as appropriate. Any discrepancy results in a HARD COPY until resolved.     PRE PROCEDURE:  Patient ID verified with 2 identifiers (name and  or MRN): Yes  Procedure and site verified with patient/designee (when able): Yes  Accurate consent documentation in medical record: Yes  H&P (or appropriate assessment) documented in medical record: Yes  H&P must be up to 20 days prior to procedure and updates within 24 hours of procedure as applicable: NA  Relevant diagnostic and radiology test results appropriately labeled and displayed as applicable: Yes  Procedure site(s) marked with provider initials: NA    TIMEOUT:  Time-Out performed immediately prior to starting procedure, including verbal and active participation of all team members addressing the following:Yes  * Correct patient identify  * Confirmed that the correct side and site are marked  * An accurate procedure consent form  * Agreement on the procedure to be done  * Correct patient position  * Relevant images and results are properly labeled and appropriately displayed  * The need to administer antibiotics or fluids for irrigation purposes during the procedure as applicable   * Safety precautions based on patient history or medication use    DURING PROCEDURE: Verification of correct person, site, and procedures any time the responsibility for care of the patient is transferred to another member of the care team.       Prior to injection, verified patient identity using patient's name and date of  birth.  Due to injection administration, patient instructed to remain in clinic for 15 minutes  afterwards, and to report any adverse reaction to me immediately.    Joint injection was performed.      Drug Amount Wasted:  None.  Vial/Syringe: Single dose vial  Expiration Date:  11/1/25      Michael Olmstead, ATC  March 9, 2024

## 2024-03-12 ENCOUNTER — THERAPY VISIT (OUTPATIENT)
Dept: PHYSICAL THERAPY | Facility: CLINIC | Age: 79
End: 2024-03-12
Attending: STUDENT IN AN ORGANIZED HEALTH CARE EDUCATION/TRAINING PROGRAM
Payer: COMMERCIAL

## 2024-03-12 DIAGNOSIS — M16.11 PRIMARY OSTEOARTHRITIS OF RIGHT HIP: ICD-10-CM

## 2024-03-12 DIAGNOSIS — M19.012 LOCALIZED OSTEOARTHRITIS OF LEFT SHOULDER: ICD-10-CM

## 2024-03-12 PROCEDURE — 97161 PT EVAL LOW COMPLEX 20 MIN: CPT | Mod: GP

## 2024-03-12 PROCEDURE — 97110 THERAPEUTIC EXERCISES: CPT | Mod: GP

## 2024-03-12 NOTE — PROGRESS NOTES
PHYSICAL THERAPY EVALUATION  Type of Visit: Evaluation    See electronic medical record for Abuse and Falls Screening details.  KEY PT FINDINGS:  1) Unable to stand on single leg on RLE  2) Decreased strength bilateral (RLE>LLE)  3) Decreased LE flexibility bilateral    Therapist Assessment: Cyndie aMrin is a 78 year old female patient presenting to Physical Therapy with right hip and left shoulder pain. Patient demonstrates pain, weakness, instability, balance deficits, neural tension, LE flexibility impairments, and history of falls. Signs and symptoms are consistent with right hip and left shoulder osteoarthritis complicated by lumbar radiculopathy due to stenosis. These impairments limit their ability to perform ADLs independently without pain. Skilled PT services are necessary in order to reduce impairments and improve independent function.    Patient was referred by Geovanny Mcneal DO on 2/26/24.    Precautions/restrictions/MD instructions include: None    Subjective History    Patient is a 78 year old female presenting to outpatient physical therapy with zapien hip and left shoulder pain that started end of last year. She has a history of L ESTHELA a few years ago. She noticed that her right hip started to get progressively weaker since rehabbing her left hip and has progressively gotten more painful. She has had a few falls recently due to her right hip buckling. She had resting pain before her recent CSI on 3/9/24, but that has helped quite a bit with her resting pain. She has noticed significant weakness in both legs. Before the injection most of her pain was posterior, lateral, and anterior on her right hip. She has bilateral numbness and tingling in both feet and calves due to lumbar stenosis. She favors the left side now due to weakness in her right. Also notes that her left shoulder has been getting progressively worse over the years making transfers and lifting overhead difficult.    Pain: Pain Level  at Rest: 0/10  Pain Level with Use: 4/10  Pain Location: hip  Pain Quality: Sharp  Pain Frequency: intermittent  Pain is Worst: depending on weight-bearing activity  Pain is Exacerbated By: Standing, walking, and stairs  Pain is Relieved By: rest and biofreeze  Pain Progression: Improved    Red Flags review findings: denies    Prior Treatment Includes: CSI R hip 3/9/24, effect was significantly helpful. Previous GT injection, helped temporarily.    Medications: Tylenol PRN    Imaging: Hip X-Ray  24 Impression:  1. No acute osseous abnormality  2. Moderate osteoarthrosis of the right hip..  3. Postsurgical changes of left total hip arthroplasty without  evidence of hardware complication.     I have personally reviewed the examination and initial interpretation  and I agree with the findings.     JUTTA ELLERMANN, MD    X-Ray Lumbar Spine 24  Impression:  1. No acute osseous abnormality.  2. Multilevel degenerative changes most pronounced at L4-L5.     NICOLE GARCIA MD     Lifestyle & General Medical History:   - Occupation: Retired   - Experience with physical activity: None   - Notable medical history: Lumbar stenosis, L ESTHELA, colostomy, HTN, one kidney    Current Functional Status: Independent  Previous Functional Status:  fully functional prior to pain onset/injury.  Outcome measure: HOS and SPADI    General health as reported by patient: fair.    Additional considerations: Is concerned about her colostomy and cyst in her gut    Patient Goals for Physical Therapy: To be able to walk comfortably.      Subjective       Presenting condition or subjective complaint:  Shot in right hip, problem walking, (had L ESTHELA), left shoulder pain  Date of onset: 23 R Hip, 2023 L Shoulder  Relevant medical history:   Arthritis, colostomy, high blood pressure, L ESTHELA, incontinence, pain at night or rest, stenosis  Dates & types of surgery:   ,  ,  kidney removed,   colostomy    Prior diagnostic imaging/testing results:       Prior therapy history for the same diagnosis, illness or injury:           Objective   HIP EVALUATION  INTEGUMENTARY (edema, incisions): WNL  POSTURE: Standing Posture: Rounded shoulders, Forward head, Lordosis decreased, Thoracic kyphosis increased  Sitting Posture: Rounded shoulders, Forward head, Lordosis decreased, Thoracic kyphosis increased  GAIT:   Weightbearing Status: WBAT  Assistive Device(s): Cane (single end)  Gait Deviations: Antalgic  Base of support increased  Hip hiking L, Trendelenberg R  Trunk flexion  BALANCE/PROPRIOCEPTION: Single Leg Stance Eyes Open (seconds): 3 seconds on L - mild LOB, unable to stand on RLE unsupported  ROM:   (Degrees) Left AROM Left PROM  Right AROM Right PROM   Hip Flexion 120 120 90 Limited +pain   Hip Extension       Hip Abduction       Hip Adduction       Hip Internal Rotation 35 Limited 20 Limited   Hip External Rotation 17 Limited 25 Limited   Knee Flexion       Knee Extension       Lumbar Side glide     Lumbar Flexion    Lumbar Extension      PELVIC/SI SCREEN: WNL  STRENGTH:   Pain: - none + mild ++ moderate +++ severe  Strength Scale: 0-5/5 Left Right   Hip Flexion 4 4-   Hip Extension     Hip Abduction     Hip Adduction     Hip Internal Rotation 4 4-   Hip External Rotation 4 4-   Knee Flexion 4+ 4   Knee Extension 4+ 4   Lumbar Dermatomes/Myotomes/Reflexes: Decreased sensation in L5 on R, diminished patellar and achilles reflexes bilateral  LE FLEXIBILITY: Decreased hip IR L, Decreased hip ER L, Decreased hip flexors L, Decreased hamstrings L, Decreased hip IR R, Decreased hip ER R, Decreased hip flexors R, Decreased hamstrings R  SPECIAL TESTS:    Left Right   BEATRIS  Positive   FADIR/Labrum/RUDDY  Positive   Femoral Nerve     Remi's     Piriformis     Quadrant Testing  Positive   SLR     Slump Negative  Positive   Stork with Extension     Leonard            FUNCTIONAL TESTS: Double Leg Squat: Anterior knee  translation, Knee valgus, Hip internal rotation, and Improper use of glutes/hips    Tu.86 seconds with SPC  PALPATION: WNL  JOINT MOBILITY: Hypomobility noted in R hip joint    Assessment & Plan   CLINICAL IMPRESSIONS  Medical Diagnosis: R Hip Osteoarthritis and L Shoulder Osteoarthritis    Treatment Diagnosis: R hip pain and L shoulder pain   Impression/Assessment: Patient is a 78 year old female with right hip and left shoulder pain complaints.  The following significant findings have been identified: Pain, Decreased ROM/flexibility, Decreased joint mobility, Decreased strength, Impaired balance, Decreased proprioception, Impaired sensation, Impaired gait, Impaired muscle performance, Decreased activity tolerance, Impaired posture, and Instability. These impairments interfere with their ability to perform self care tasks, work tasks, recreational activities, household chores, driving , household mobility, and community mobility as compared to previous level of function.     Clinical Decision Making (Complexity):  Clinical Presentation: Stable/Uncomplicated  Clinical Presentation Rationale: based on medical and personal factors listed in PT evaluation  Clinical Decision Making (Complexity): Low complexity    PLAN OF CARE  Treatment Interventions:  Interventions: Gait Training, Manual Therapy, Neuromuscular Re-education, Therapeutic Activity, Therapeutic Exercise, Self-Care/Home Management, Aquatic Therapy    Long Term Goals     PT Goal 1  Goal Identifier: LTG1  Goal Description: Patient will be able to walk with a normal gait pattern with no pain using the least restrictive device  Rationale: to maximize safety and independence within the home;to maximize safety and independence with performance of ADLs and functional tasks;to maximize safety and independence within the community;to maximize safety and independence with transportation;to maximize safety and independence with self cares  Target Date:  05/07/24  PT Goal 2  Goal Identifier: LTG2  Goal Description: Patient will report an improvement of at least 10 on the HOS to demonstrate MCID in 8 weeks  Rationale: to maximize safety and independence with performance of ADLs and functional tasks;to maximize safety and independence within the home;to maximize safety and independence within the community;to maximize safety and independence with transportation;to maximize safety and independence with self cares  Target Date: 05/07/24      Frequency of Treatment: 1x/wk  Duration of Treatment: 8 weeks    Recommended Referrals to Other Professionals:  None indicated  Education Assessment:   Learner/Method: Patient    Risks and benefits of evaluation/treatment have been explained.   Patient/Family/caregiver agrees with Plan of Care.     Evaluation Time:     PT Eval, Low Complexity Minutes (67571): 20    Signing Clinician: Manuel Yoder PT      [facility]                                                                                   OUTPATIENT PHYSICAL THERAPY      PLAN OF TREATMENT FOR OUTPATIENT REHABILITATION   Patient's Last Name, First Name, Cyndie Stock YOB: 1945   Provider's Name   [facility]   Medical Record No.  [id number]     Onset Date: 12/01/23  Start of Care Date: 03/12/24     Medical Diagnosis:  R Hip Osteoarthritis and L Shoulder Osteoarthritis      PT Treatment Diagnosis:  R hip pain and L shoulder pain Plan of Treatment  Frequency/Duration: 1x/wk/ 8 weeks    Certification date from 03/12/24 to 05/07/24         See note for plan of treatment details and functional goals     Manuel Yoder, PT                         I CERTIFY THE NEED FOR THESE SERVICES FURNISHED UNDER        THIS PLAN OF TREATMENT AND WHILE UNDER MY CARE     (Physician attestation of this document indicates review and certification of the therapy plan).              Referring  Provider:  Geovanny Mcneal    Initial Assessment  See Epic Evaluation- Start of Care Date: 03/12/24

## 2024-03-22 NOTE — PROGRESS NOTES
PROCEDURE ENCOUNTER    [facility]  Geovanny Mcneal DO  2024     Name: Cyndie Marin  MRN: [id number]  Age: 78 year old  : 1945    Referring provider:   Diagnosis: L Shoulder OA    Glenohumeral Injection - Ultrasound Guided  The patient was informed of the risks and the benefits of the procedure and a written consent was signed.  The patient s left shoulder was prepped with chlorhexidine in sterile fashion.   40 mg of triamcinolone suspension was drawn up into a 10 mL syringe with 4 mL of 1% lidocaine w/o Epi and 5 ml of sterile saline.  Injection was performed using sterile technique.  Under ultrasound guidance a 3.5-inch 22-gauge needle was used to enter the left glenohumeral joint.  Posterior approach was used with the patient in lateral recumbent position, arm in neutral position at the side.  Needle placement was visualized and documented with ultrasound.  Ultrasound visualization was necessary due to the small joint space entered.  Injection performed long axis to the probe.  Injection solution visualized within the joint space.  Images were permanently stored for the patient's record.  There were no complications. The patient tolerated the procedure well. There was negligible bleeding.   Therapy scheduled to follow for mobilization.  The patient was instructed to call or go to the emergency room with any unusual pain, swelling, redness, or if otherwise concerned.      Geovanny Mcneal D.O., [facility]  , Sports Medicine  Department of Family [facility] and [facility]    Large Joint Injection/Arthocentesis: L glenohumeral joint    Date/Time: 3/25/2024 9:32 AM    Performed by: Geovanny Mcneal DO  Authorized by: Geovanny Mcneal DO    Indications:  Pain and osteoarthritis  Needle Size:  22 G  Guidance: ultrasound    Approach:  Posterior  Location:  Shoulder      Site:  L glenohumeral joint  Medications:  40 mg triamcinolone 40 MG/ML; 4 mL  lidocaine (PF) 1 %  Medications comment:  5 mL of sterile saline   Outcome:  Tolerated well, no immediate complications  Procedure discussed: discussed risks, benefits, and alternatives    Consent Given by:  Patient  Timeout: timeout called immediately prior to procedure    Prep: patient was prepped and draped in usual sterile fashion

## 2024-03-25 ENCOUNTER — OFFICE VISIT (OUTPATIENT)
Dept: ORTHOPEDICS | Facility: CLINIC | Age: 79
End: 2024-03-25
Payer: COMMERCIAL

## 2024-03-25 DIAGNOSIS — M19.012 LOCALIZED OSTEOARTHRITIS OF LEFT SHOULDER: Primary | ICD-10-CM

## 2024-03-25 PROCEDURE — 20611 DRAIN/INJ JOINT/BURSA W/US: CPT | Mod: LT | Performed by: STUDENT IN AN ORGANIZED HEALTH CARE EDUCATION/TRAINING PROGRAM

## 2024-03-25 RX ORDER — TRIAMCINOLONE ACETONIDE 40 MG/ML
40 INJECTION, SUSPENSION INTRA-ARTICULAR; INTRAMUSCULAR
Status: DISCONTINUED | OUTPATIENT
Start: 2024-03-25 | End: 2024-06-25

## 2024-03-25 RX ORDER — LIDOCAINE HYDROCHLORIDE 10 MG/ML
4 INJECTION, SOLUTION EPIDURAL; INFILTRATION; INTRACAUDAL; PERINEURAL
Status: DISCONTINUED | OUTPATIENT
Start: 2024-03-25 | End: 2024-06-25

## 2024-03-25 RX ADMIN — TRIAMCINOLONE ACETONIDE 40 MG: 40 INJECTION, SUSPENSION INTRA-ARTICULAR; INTRAMUSCULAR at 09:32

## 2024-03-25 RX ADMIN — LIDOCAINE HYDROCHLORIDE 4 ML: 10 INJECTION, SOLUTION EPIDURAL; INFILTRATION; INTRACAUDAL; PERINEURAL at 09:32

## 2024-03-25 NOTE — LETTER
3/25/2024      RE: Cyndie Marin  [address] [place] [address]     Dear Colleague,    Thank you for referring your patient, Cyndie Marin, to the [facility]IC[facility] [place]. Please see a copy of my visit note below.    PROCEDURE ENCOUNTER    [facility]  Geovanny LOFTONZaheer Fredis, DO  2024     Name: Cyndie Marin  MRN: [id number]  Age: 78 year old  : 1945    Referring provider:   Diagnosis: L Shoulder OA    Glenohumeral Injection - Ultrasound Guided  The patient was informed of the risks and the benefits of the procedure and a written consent was signed.  The patient s left shoulder was prepped with chlorhexidine in sterile fashion.   40 mg of triamcinolone suspension was drawn up into a 10 mL syringe with 4 mL of 1% lidocaine w/o Epi and 5 ml of sterile saline.  Injection was performed using sterile technique.  Under ultrasound guidance a 3.5-inch 22-gauge needle was used to enter the left glenohumeral joint.  Posterior approach was used with the patient in lateral recumbent position, arm in neutral position at the side.  Needle placement was visualized and documented with ultrasound.  Ultrasound visualization was necessary due to the small joint space entered.  Injection performed long axis to the probe.  Injection solution visualized within the joint space.  Images were permanently stored for the patient's record.  There were no complications. The patient tolerated the procedure well. There was negligible bleeding.   Therapy scheduled to follow for mobilization.  The patient was instructed to call or go to the emergency room with any unusual pain, swelling, redness, or if otherwise concerned.      Geovanny Mcneal D.O., [facility]  , Sports Medicine  Department of Family Medicine and [facility]    Large Joint Injection/Arthocentesis: L glenohumeral joint    Date/Time: 3/25/2024 9:32 AM    Performed by: Fredis  Geovanny LOFTON DO  Authorized by: Geovanny Mcneal DO    Indications:  Pain and osteoarthritis  Needle Size:  22 G  Guidance: ultrasound    Approach:  Posterior  Location:  Shoulder      Site:  L glenohumeral joint  Medications:  40 mg triamcinolone 40 MG/ML; 4 mL lidocaine (PF) 1 %  Medications comment:  5 mL of sterile saline   Outcome:  Tolerated well, no immediate complications  Procedure discussed: discussed risks, benefits, and alternatives    Consent Given by:  Patient  Timeout: timeout called immediately prior to procedure    Prep: patient was prepped and draped in usual sterile fashion            Again, thank you for allowing me to participate in the care of your patient.      Sincerely,    Geovanny Mcneal DO

## 2024-04-01 ENCOUNTER — THERAPY VISIT (OUTPATIENT)
Dept: PHYSICAL THERAPY | Facility: CLINIC | Age: 79
End: 2024-04-01
Payer: COMMERCIAL

## 2024-04-01 DIAGNOSIS — M19.012 LOCALIZED OSTEOARTHRITIS OF LEFT SHOULDER: ICD-10-CM

## 2024-04-01 DIAGNOSIS — M16.11 PRIMARY OSTEOARTHRITIS OF RIGHT HIP: Primary | ICD-10-CM

## 2024-04-01 PROCEDURE — 97110 THERAPEUTIC EXERCISES: CPT | Mod: GP

## 2024-04-08 ENCOUNTER — THERAPY VISIT (OUTPATIENT)
Dept: PHYSICAL THERAPY | Facility: CLINIC | Age: 79
End: 2024-04-08
Payer: COMMERCIAL

## 2024-04-08 DIAGNOSIS — M16.11 PRIMARY OSTEOARTHRITIS OF RIGHT HIP: Primary | ICD-10-CM

## 2024-04-08 DIAGNOSIS — M19.012 LOCALIZED OSTEOARTHRITIS OF LEFT SHOULDER: ICD-10-CM

## 2024-04-08 PROCEDURE — 97116 GAIT TRAINING THERAPY: CPT | Mod: GP

## 2024-04-08 PROCEDURE — 97110 THERAPEUTIC EXERCISES: CPT | Mod: GP

## 2024-04-15 ENCOUNTER — THERAPY VISIT (OUTPATIENT)
Dept: PHYSICAL THERAPY | Facility: CLINIC | Age: 79
End: 2024-04-15
Payer: COMMERCIAL

## 2024-04-15 DIAGNOSIS — M16.11 PRIMARY OSTEOARTHRITIS OF RIGHT HIP: Primary | ICD-10-CM

## 2024-04-15 DIAGNOSIS — M19.012 LOCALIZED OSTEOARTHRITIS OF LEFT SHOULDER: ICD-10-CM

## 2024-04-15 PROCEDURE — 97110 THERAPEUTIC EXERCISES: CPT | Mod: GP

## 2024-04-22 ENCOUNTER — THERAPY VISIT (OUTPATIENT)
Dept: PHYSICAL THERAPY | Facility: CLINIC | Age: 79
End: 2024-04-22
Payer: COMMERCIAL

## 2024-04-22 DIAGNOSIS — M16.11 PRIMARY OSTEOARTHRITIS OF RIGHT HIP: Primary | ICD-10-CM

## 2024-04-22 PROCEDURE — 97530 THERAPEUTIC ACTIVITIES: CPT | Mod: GP

## 2024-04-22 PROCEDURE — 97110 THERAPEUTIC EXERCISES: CPT | Mod: 59

## 2024-04-25 ENCOUNTER — OFFICE VISIT (OUTPATIENT)
Dept: INTERNAL MEDICINE | Facility: CLINIC | Age: 79
End: 2024-04-25
Payer: COMMERCIAL

## 2024-04-25 VITALS
BODY MASS INDEX: 32.94 KG/M2 | RESPIRATION RATE: 19 BRPM | OXYGEN SATURATION: 98 % | WEIGHT: 179 LBS | TEMPERATURE: 98.3 F | DIASTOLIC BLOOD PRESSURE: 62 MMHG | SYSTOLIC BLOOD PRESSURE: 118 MMHG | HEIGHT: 62 IN | HEART RATE: 85 BPM

## 2024-04-25 DIAGNOSIS — K63.89 COLONIC MASS: ICD-10-CM

## 2024-04-25 DIAGNOSIS — Z93.3 COLOSTOMY IN PLACE (H): Primary | ICD-10-CM

## 2024-04-25 DIAGNOSIS — M51.369 DDD (DEGENERATIVE DISC DISEASE), LUMBAR: ICD-10-CM

## 2024-04-25 DIAGNOSIS — I10 ESSENTIAL HYPERTENSION: ICD-10-CM

## 2024-04-25 DIAGNOSIS — G47.33 OSA (OBSTRUCTIVE SLEEP APNEA): ICD-10-CM

## 2024-04-25 DIAGNOSIS — M16.11 PRIMARY OSTEOARTHRITIS OF RIGHT HIP: ICD-10-CM

## 2024-04-25 DIAGNOSIS — E78.5 HYPERLIPIDEMIA LDL GOAL <100: ICD-10-CM

## 2024-04-25 DIAGNOSIS — Z96.642 S/P TOTAL LEFT HIP ARTHROPLASTY: ICD-10-CM

## 2024-04-25 PROBLEM — M77.8 LEFT SHOULDER TENDONITIS: Status: RESOLVED | Noted: 2023-09-13 | Resolved: 2024-04-25

## 2024-04-25 PROBLEM — M48.061 LUMBAR STENOSIS: Status: RESOLVED | Noted: 2018-11-26 | Resolved: 2024-04-25

## 2024-04-25 PROCEDURE — 99214 OFFICE O/P EST MOD 30 MIN: CPT | Performed by: INTERNAL MEDICINE

## 2024-04-25 RX ORDER — RESPIRATORY SYNCYTIAL VIRUS VACCINE 120MCG/0.5
0.5 KIT INTRAMUSCULAR ONCE
Qty: 1 EACH | Refills: 0 | Status: CANCELLED | OUTPATIENT
Start: 2024-04-25 | End: 2024-04-25

## 2024-04-25 NOTE — PROGRESS NOTES
ASSESSMENT AND PLAN:    1. Colostomy in place   Has a parastomal hernia, functioning well.     2. S/P total left hip arthroplasty    3. Primary osteoarthritis of right hip  I urge to consider R ESTHELA.  She is having weight bearing pain and it limits her activity.  Did well with L ESTHELA    4. Essential hypertension  Satisfactory.     5. Hyperlipidemia LDL goal <100  Ongoing statin therapy    6. Colonic mass  This was found to be a cystic fluid collection and is being followed.     7. Lumbar degenerative disc disease  Nearly all L4-5.  Does not have symptoms of radiculopathy.  Doesn't seem to need epidural injection at this time.     8. Left shoulder rotator cuff symptoms  This is chronic.  I recommended that she have R ESTHELA before considering left shoulder arthroplasty. She has significant glenohumeral osteoarthritis.     Follow up in 6 months and prn.     CHIEF COMPLAINT:  Hip, back and shoulder pain    HISTORY OF PRESENT ILLNESS:  Cyndie Marin is a 79 year old female doing pretty well.  Has had injections in left shoulder and in right hip.  Some benefit.  Known chronic shoulder pain and osteoarthritis.  The left hip ESTHELA has gone well.  Weight bearing exacerbates the right hip now.  Some back pain but no radicular pain or any foot weakness.  Her parastomal hernia is stable.  Her coloscopy works well.  Some rectal discharge. The colonic mass has been found to be a cyst and is being watched.     REVIEW OF SYSTEMS:   See HPI, all other systems on review are negative.    Past Medical History:   Diagnosis Date    Calculus of gallbladder without cholecystitis without obstruction 08/08/2022    Claustrophobia     Colonic mass 08/08/2022    Essential hypertension     GERD (gastroesophageal reflux disease)     History of blood clots     History of urinary tract obstruction 08/08/2022    Hyperlipidemia LDL goal <100 08/08/2022    Parastomal hernia 09/13/2023    Perirectal abscess 10/29/2019    Primary osteoarthritis of both  hips     S/P total left hip arthroplasty     Seasonal allergies     Sleep apnea     uses CPAP    RAJ (stress urinary incontinence, female) 2022     History   Smoking Status    Never   Smokeless Tobacco    Never     Family History   Problem Relation Age of Onset    Breast Cancer Paternal Grandmother     Prostate Cancer Father     Heart Disease Other         many on fathers side    Ovarian Cancer Mother     Heart Disease Maternal Grandfather     Brain Cancer Cousin 47.00     Past Surgical History:   Procedure Laterality Date    COLOSTOMY N/A 10/28/2019    Procedure: CREATION, COLOSTOMY;  Surgeon: Mylene Sears MD;  [place] [facility]  Service: General    MD EXCIS BARTHOLIN GLAND/CYST      Description: Excision Of Bartholin's Gland Or Cyst;  Recorded: 2013;    PROCTOSCOPY N/A 10/28/2019    Procedure: RIGID PROCTOSCOPY;  Surgeon: Mylene Sears MD;  [place] [facility]  Service: General    SIGMOIDOSCOPY FLEXIBLE N/A 10/27/2019    Procedure: SIGMOIDOSCOPY with biopsy;  Surgeon: Tj Gavin MD;  Location: [facility] GI;  Service: Gastroenterology    TOOTH EXTRACTION      [facility]  DELIVERY ONLY      Description:  Section;  Recorded: 2008;  Comments: x2    [facility] PART REMOVAL COLON W ANASTOMOSIS N/A 10/28/2019    Procedure: OPEN EXPLORATORY LAPAROTOMY, SEGMENTAL COLECTOMY,  WASHOUT OF PELVIC ABSCESS. RECTAL EXAM UNDER ANESTHESIA. DEBRIDEMENT OD ABSCESS CAVITY;  Surgeon: Mylene Sears MD;  Location: [facility] OR;  Service: General    [facility] REMV KIDNEY,W/RIB RESECTION      Description: Nephrectomy;  Recorded: 2008;    [facility] TOTAL HIP ARTHROPLASTY Left 2021    Procedure: LEFT DIRECT ANTERIOR TOTAL HIP ARTHROPLASTY;  Surgeon: Alexander Yancey MD;  Location: [facility];  Service: Orthopedics     Allergies   Allergen Reactions    Cats Claw (Uncaria [Uncaria Tomentosa (Cats Claw)] Unknown    Dye [External Allergen Needs Reconciliation - See Comment] Hives  "and Swelling     Cat scan dye    Other Drug Allergy (See Comments) Hives and Swelling     Cat scan dye [iodine-based contrast?]    Other Environmental Allergy Unknown     DUST    Ragweed Pollen [Ragweeds] Unknown     VITALS:  Vitals:    04/25/24 1347   BP: 118/62   BP Location: Left arm   Patient Position: Sitting   Cuff Size: Adult Small   Pulse: 85   Resp: 19   Temp: 98.3  F (36.8  C)   TempSrc: Tympanic   SpO2: 98%   Weight: 81.2 kg (179 lb)   Height: 1.575 m (5' 2\")     Estimated body mass index is 32.74 kg/m  as calculated from the following:    Height as of this encounter: 1.575 m (5' 2\").    Weight as of this encounter: 81.2 kg (179 lb).  Wt Readings from Last 3 Encounters:   04/25/24 81.2 kg (179 lb)   03/01/24 82.6 kg (182 lb)   09/13/23 82.7 kg (182 lb 6.4 oz)     PHYSICAL EXAM:  Constitutional:  In NAD, alert and oriented  Cardiac:  S1 S2   Lungs: Clear of  Abdomen:  coloscopy and parastomal hernia     DECISION TO OBTAIN OLD RECORDS AND/OR OBTAIN HISTORY FROM SOMEONE OTHER THAN PATIENT, AND/OR ACCESSING CARE EVERYWHERE):  1  0     REVIEW AND SUMMARIZATION OF OLD RECORDS, AND/OR OBTAINING HISTORY FROM SOMEONE OTHER THAN PATIENT, AND/OR DISCUSSION OF CASE WITH ANOTHER HEALTH CARE PROVIDER:  2 reviewed past health notes    REVIEW AND/OR ORDER OF OF CLINICAL LAB TESTS: 1  0.    REVIEW AND/OR ORDER OF RADIOLOGY TESTS: 1 reviewed shoulder xray.    REVIEW AND/OR ORDER OF MEDICAL TESTS (EKG/ECHO/COLONOSCOPY/EGD): 1  0    INDEPENDENT  VISUALIZATION OF IMAGE, TRACING, OR SPECIMEN ITSELF (2 EACH):  0     TOTAL: 3    Current Outpatient Medications   Medication Sig Dispense Refill    acetaminophen (TYLENOL) 325 MG tablet [ACETAMINOPHEN (TYLENOL) 325 MG TABLET] Take 2 tablets (650 mg total) by mouth every 4 (four) hours as needed for pain (mild pain). 100 tablet 0    cholecalciferol, vitamin D3, 5,000 unit Tab Take 1,000 Units by mouth daily      lisinopril-hydrochlorothiazide (ZESTORETIC) 10-12.5 MG tablet TAKE 1 " TABLET DAILY 90 tablet 3    loratadine (CLARITIN) 10 mg tablet [LORATADINE (CLARITIN) 10 MG TABLET] Take 10 mg by mouth daily as needed.       menthol (BIOFREEZE, MENTHOL,) 4 % Gel [MENTHOL (BIOFREEZE, MENTHOL,) 4 % GEL] Apply topically at bedtime as needed.       Naproxen Sodium (ALEVE PO) Take 220 mg by mouth 2 times daily as needed for moderate pain      rosuvastatin (CRESTOR) 10 MG tablet TAKE 1 TABLET AT BEDTIME 90 tablet 3    methylPREDNISolone (MEDROL DOSEPAK) 4 MG tablet therapy pack Follow Package Directions (Patient not taking: Reported on 4/25/2024) 21 tablet 0     Roni Horowitz MD  Internal Medicine  [facility]

## 2024-04-29 ENCOUNTER — THERAPY VISIT (OUTPATIENT)
Dept: PHYSICAL THERAPY | Facility: CLINIC | Age: 79
End: 2024-04-29
Payer: COMMERCIAL

## 2024-04-29 DIAGNOSIS — M16.11 PRIMARY OSTEOARTHRITIS OF RIGHT HIP: Primary | ICD-10-CM

## 2024-04-29 PROCEDURE — 97530 THERAPEUTIC ACTIVITIES: CPT | Mod: GP

## 2024-04-29 PROCEDURE — 97110 THERAPEUTIC EXERCISES: CPT | Mod: GP

## 2024-05-03 ENCOUNTER — TRANSFERRED RECORDS (OUTPATIENT)
Dept: HEALTH INFORMATION MANAGEMENT | Facility: CLINIC | Age: 79
End: 2024-05-03
Payer: COMMERCIAL

## 2024-06-04 ENCOUNTER — OFFICE VISIT (OUTPATIENT)
Dept: INTERNAL MEDICINE | Facility: CLINIC | Age: 79
End: 2024-06-04
Payer: COMMERCIAL

## 2024-06-04 VITALS
HEIGHT: 65 IN | DIASTOLIC BLOOD PRESSURE: 72 MMHG | RESPIRATION RATE: 16 BRPM | HEART RATE: 74 BPM | OXYGEN SATURATION: 96 % | BODY MASS INDEX: 29.24 KG/M2 | WEIGHT: 175.5 LBS | SYSTOLIC BLOOD PRESSURE: 126 MMHG | TEMPERATURE: 97.1 F

## 2024-06-04 DIAGNOSIS — Z01.818 PRE-OP EVALUATION: Primary | ICD-10-CM

## 2024-06-04 DIAGNOSIS — G47.33 OSA (OBSTRUCTIVE SLEEP APNEA): ICD-10-CM

## 2024-06-04 DIAGNOSIS — Z86.718 HISTORY OF DEEP VENOUS THROMBOSIS: ICD-10-CM

## 2024-06-04 LAB
ERYTHROCYTE [DISTWIDTH] IN BLOOD BY AUTOMATED COUNT: 12.3 % (ref 10–15)
HCT VFR BLD AUTO: 41.4 % (ref 35–47)
HGB BLD-MCNC: 13.2 G/DL (ref 11.7–15.7)
MCH RBC QN AUTO: 28.1 PG (ref 26.5–33)
MCHC RBC AUTO-ENTMCNC: 31.9 G/DL (ref 31.5–36.5)
MCV RBC AUTO: 88 FL (ref 78–100)
PLATELET # BLD AUTO: 256 10E3/UL (ref 150–450)
RBC # BLD AUTO: 4.7 10E6/UL (ref 3.8–5.2)
WBC # BLD AUTO: 6 10E3/UL (ref 4–11)

## 2024-06-04 PROCEDURE — 99214 OFFICE O/P EST MOD 30 MIN: CPT | Performed by: INTERNAL MEDICINE

## 2024-06-04 PROCEDURE — 85027 COMPLETE CBC AUTOMATED: CPT | Performed by: INTERNAL MEDICINE

## 2024-06-04 PROCEDURE — 36415 COLL VENOUS BLD VENIPUNCTURE: CPT | Performed by: INTERNAL MEDICINE

## 2024-06-04 PROCEDURE — 80048 BASIC METABOLIC PNL TOTAL CA: CPT | Performed by: INTERNAL MEDICINE

## 2024-06-04 RX ORDER — RESPIRATORY SYNCYTIAL VIRUS VACCINE 120MCG/0.5
0.5 KIT INTRAMUSCULAR ONCE
Qty: 1 EACH | Refills: 0 | Status: CANCELLED | OUTPATIENT
Start: 2024-06-04 | End: 2024-06-04

## 2024-06-04 NOTE — PROGRESS NOTES
Preoperative Evaluation  [facility]  [address] [place] [address]  Phone: [phone]  Fax: [phone]  Primary Provider: Physician No Ref-Primary  Pre-op Performing Provider: Roni Horowitz MD  Jun 4, 2024   {      6/4/2024   Surgical Information   What procedure is being done? Right hip replacement   Facility or Hospital where procedure/surgery will be performed: [facility]   Who is doing the procedure / surgery? Dr. Owens   Date of surgery / procedure: 6/25/24   Time of surgery / procedure: TBD   Where do you plan to recover after surgery? at home with family     Fax number for surgical facility: Note does not need to be faxed, will be available electronically in Epic.    Assessment & Plan     The proposed surgical procedure is considered LOW risk.    Pre-op evaluation  She is medically stable.  She has tolerated surgery, well, in the past, and has no history of heart or lung disease. She uses CPAP. I find no contraindication to her having right total hip arthroplasty and I recommend proceeding as planned. I would recommend rivaroxiban DVT prophylaxis following surgery, given her remote history of provoked DVT in the past.     SHAUNA (obstructive sleep apnea)  Ongoing use of CPAP     - No identified additional risk factors other than previously addressed    Recommendation  Approval given to proceed with proposed procedure, without further diagnostic evaluation.    Lynne Agee is a 79 year old, presenting for the following:  Pre-Op Exam (Right hip replacement on 06/25/24 at [facility] with Dr. Owens)        6/4/2024    10:18 AM   Additional Questions   Roomed by Raymundo ALLEN RN     HPI:related to upcoming procedure: she has consented to having right hip replacement surgery. She has had left hip replacement in the past and that went well.  She has not recently been ill.  She has colostomy in place and has elected to  keep it for now.   She had DVT following a fall and LE injury, more than 10 years ago. She also had PE at that time. She was treated with anticoagulation for one year period.  She has not had a recurrence. Following her left total hip arthroplasty she took 'expensive' anticoagulation rather than just aspirin for DVT prophlaxis, given the remote history of DVT.         6/4/2024   Pre-Op Questionnaire   Have you ever had a heart attack or stroke? No   Have you ever had surgery on your heart or blood vessels, such as a stent placement, a coronary artery bypass, or surgery on an artery in your head, neck, heart, or legs? No   Do you have chest pain with activity? No   Do you have a history of heart failure? No   Do you currently have a cold, bronchitis or symptoms of other infection? No   Do you have a cough, shortness of breath, or wheezing? No   Do you or anyone in your family have previous history of blood clots? (!) YES    Do you or does anyone in your family have a serious bleeding problem such as prolonged bleeding following surgeries or cuts? No   Have you ever had problems with anemia or been told to take iron pills? No   Have you had any abnormal blood loss such as black, tarry or bloody stools, or abnormal vaginal bleeding? No   Have you ever had a blood transfusion? No   Are you willing to have a blood transfusion if it is medically needed before, during, or after your surgery? Yes   Have you or any of your relatives ever had problems with anesthesia? No   Do you have sleep apnea, excessive snoring or daytime drowsiness? (!) YES   Do you have a CPAP machine? Yes   Do you have any artifical heart valves or other implanted medical devices like a pacemaker, defibrillator, or continuous glucose monitor? No   Do you have artificial joints? (!) YES   Are you allergic to latex? No     Patient Active Problem List    Diagnosis Date Noted    DDD (degenerative disc disease), lumbar 04/25/2024     Priority: Medium     Localized osteoarthritis of left shoulder 04/08/2024     Priority: Medium    Primary osteoarthritis of right hip 04/08/2024     Priority: Medium    Parastomal hernia 09/13/2023     Priority: Medium    Impacted cerumen of right ear 09/13/2023     Priority: Medium    Colonic mass 08/08/2022     Priority: Medium    Hyperlipidemia LDL goal <100 08/08/2022     Priority: Medium    Shoulder pain, right 08/08/2022     Priority: Medium    Calculus of gallbladder without cholecystitis without obstruction 08/08/2022     Priority: Medium    History of urinary tract obstruction 08/08/2022     Priority: Medium    RAJ (stress urinary incontinence, female) 08/08/2022     Priority: Medium    S/P total left hip arthroplasty 08/05/2021     Priority: Medium    SHAUNA (obstructive sleep apnea)      Priority: Medium     Created by Conversion  Replacement Utility updated for latest IMO load        Overactive bladder 02/12/2020     Priority: Medium    Gastroesophageal reflux disease without esophagitis      Priority: Medium    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     Priority: Medium    Essential hypertension 12/10/2018     Priority: Medium    Allergic rhinitis due to pollen 11/26/2018     Priority: Medium      Past Medical History:   Diagnosis Date    Calculus of gallbladder without cholecystitis without obstruction 08/08/2022    Claustrophobia     Colonic mass 08/08/2022    DDD (degenerative disc disease), lumbar 04/25/2024    Essential hypertension     GERD (gastroesophageal reflux disease)     History of blood clots     History of urinary tract obstruction 08/08/2022    Hyperlipidemia LDL goal <100 08/08/2022    Parastomal hernia 09/13/2023    Perirectal abscess 10/29/2019    Primary osteoarthritis of both hips     S/P total left hip arthroplasty     Seasonal allergies     Sleep apnea     uses CPAP    RAJ (stress urinary incontinence, female) 08/08/2022    Thrombosis      Past Surgical History:   Procedure Laterality Date    COLOSTOMY N/A  10/28/2019    Procedure: CREATION, COLOSTOMY;  Surgeon: Mylene Sears MD;  Location: [facility] OR;  Service: General    [place] EXCIS BARTHOLIN GLAND/CYST      Description: Excision Of Bartholin's Gland Or Cyst;  Recorded: 2013;    PROCTOSCOPY N/A 10/28/2019    Procedure: RIGID PROCTOSCOPY;  Surgeon: Mylene Sears MD;  Location: [facility] OR;  Service: General    SIGMOIDOSCOPY FLEXIBLE N/A 10/27/2019    Procedure: SIGMOIDOSCOPY with biopsy;  Surgeon: Tj Gavin MD;  Location: [facility] GI;  Service: Gastroenterology    TOOTH EXTRACTION      [facility]  DELIVERY ONLY      Description:  Section;  Recorded: 2008;  Comments: x2    [facility] PART REMOVAL COLON W ANASTOMOSIS N/A 10/28/2019    Procedure: OPEN EXPLORATORY LAPAROTOMY, SEGMENTAL COLECTOMY,  WASHOUT OF PELVIC ABSCESS. RECTAL EXAM UNDER ANESTHESIA. DEBRIDEMENT OD ABSCESS CAVITY;  Surgeon: Mylene Sears MD;  Location: [facility] OR;  Service: General    [facility] REMV KIDNEY,W/RIB RESECTION      Description: Nephrectomy;  Recorded: 2008;    [facility] TOTAL HIP ARTHROPLASTY Left 2021    Procedure: LEFT DIRECT ANTERIOR TOTAL HIP ARTHROPLASTY;  Surgeon: Alexander Yancey MD;  Location: [facility];  Service: Orthopedics     Current Outpatient Medications   Medication Sig Dispense Refill    acetaminophen (TYLENOL) 325 MG tablet [ACETAMINOPHEN (TYLENOL) 325 MG TABLET] Take 2 tablets (650 mg total) by mouth every 4 (four) hours as needed for pain (mild pain). 100 tablet 0    cholecalciferol, vitamin D3, 5,000 unit Tab Take 1,000 Units by mouth daily      lisinopril-hydrochlorothiazide (ZESTORETIC) 10-12.5 MG tablet TAKE 1 TABLET DAILY 90 tablet 3    loratadine (CLARITIN) 10 mg tablet [LORATADINE (CLARITIN) 10 MG TABLET] Take 10 mg by mouth daily as needed.       menthol (BIOFREEZE, MENTHOL,) 4 % Gel [MENTHOL (BIOFREEZE, MENTHOL,) 4 % GEL] Apply topically at bedtime as needed.       Naproxen Sodium (ALEVE PO) Take 220 mg by  "mouth 2 times daily as needed for moderate pain      rosuvastatin (CRESTOR) 10 MG tablet TAKE 1 TABLET AT BEDTIME 90 tablet 3    methylPREDNISolone (MEDROL DOSEPAK) 4 MG tablet therapy pack Follow Package Directions (Patient not taking: Reported on 4/25/2024) 21 tablet 0     Allergies   Allergen Reactions    Cats Claw (Uncaria [Uncaria Tomentosa (Cats Claw)] Unknown    Dye [External Allergen Needs Reconciliation - See Comment] Hives and Swelling     Cat scan dye    Other Drug Allergy (See Comments) Hives and Swelling     Cat scan dye [iodine-based contrast?]    Other Environmental Allergy Unknown     DUST    Ragweed Pollen [Ragweeds] Unknown      Social History     Tobacco Use    Smoking status: Never    Smokeless tobacco: Never   Substance Use Topics    Alcohol use: Yes     Comment: rarely     Family History   Problem Relation Age of Onset    Breast Cancer Paternal Grandmother     Prostate Cancer Father     Heart Disease Other         many on fathers side    Ovarian Cancer Mother     Heart Disease Maternal Grandfather     Brain Cancer Cousin 47.00     History   Drug Use No     Review of Systems    Objective      PHYSICAL EXAM:  General Appearance: In no acute distress  Vitals:    06/04/24 1029   BP: 126/72   BP Location: Left arm   Patient Position: Sitting   Cuff Size: Adult Regular   Pulse: 74   Resp: 16   Temp: 97.1  F (36.2  C)   TempSrc: Tympanic   SpO2: 96%   Weight: 79.6 kg (175 lb 8 oz)   Height: 1.651 m (5' 5\")     Estimated body mass index is 29.2 kg/m  as calculated from the following:    Height as of this encounter: 1.651 m (5' 5\").    Weight as of this encounter: 79.6 kg (175 lb 8 oz).  EYES: Clear   HEENT: nose and throat clear, ears normal   NECK:  without cervical or axillary adenopathy  RESPIRATORY: Clear   CARDIOVASCULAR: S1, S2  ABDOMEN: soft, flat, and non-tender  EXTREMITIES:  no significant inflammation or edema  NEUROLOGIC: Speech is clear,  gait is normal  PSYCHIATRIC: Oriented X 3, thinking " is clear     Diagnostics  Recent Results (from the past 48 hour(s))   CBC with platelets    Collection Time: 06/04/24 10:55 AM   Result Value Ref Range    WBC Count 6.0 4.0 - 11.0 10e3/uL    RBC Count 4.70 3.80 - 5.20 10e6/uL    Hemoglobin 13.2 11.7 - 15.7 g/dL    Hematocrit 41.4 35.0 - 47.0 %    MCV 88 78 - 100 fL    MCH 28.1 26.5 - 33.0 pg    MCHC 31.9 31.5 - 36.5 g/dL    RDW 12.3 10.0 - 15.0 %    Platelet Count 256 150 - 450 10e3/uL   Basic metabolic panel  (Ca, Cl, CO2, Creat, Gluc, K, Na, BUN)    Collection Time: 06/04/24 10:55 AM   Result Value Ref Range    Sodium 141 135 - 145 mmol/L    Potassium 4.0 3.4 - 5.3 mmol/L    Chloride 107 98 - 107 mmol/L    Carbon Dioxide (CO2) 25 22 - 29 mmol/L    Anion Gap 9 7 - 15 mmol/L    Urea Nitrogen 25.2 (H) 8.0 - 23.0 mg/dL    Creatinine 0.51 0.51 - 0.95 mg/dL    GFR Estimate >90 >60 mL/min/1.73m2    Calcium 10.7 (H) 8.8 - 10.2 mg/dL    Glucose 93 70 - 99 mg/dL      Revised Cardiac Risk Index (RCRI)  The patient has the following serious cardiovascular risks for perioperative complications:   - No serious cardiac risks = 0 points     RCRI Interpretation: 0 points: Class I (very low risk - 0.4% complication rate)    Signed Electronically by: Roni Horowitz MD  Copy of this evaluation report is provided to requesting physician.

## 2024-06-04 NOTE — H&P (VIEW-ONLY)
Preoperative Evaluation  [facility]  [address] [place] [address]  Phone: [phone]  Fax: [phone]  Primary Provider: Physician No Ref-Primary  Pre-op Performing Provider: Roni Horowitz MD  Jun 4, 2024   {      6/4/2024   Surgical Information   What procedure is being done? Right hip replacement   Facility or Hospital where procedure/surgery will be performed: [facility]   Who is doing the procedure / surgery? Dr. Owens   Date of surgery / procedure: 6/25/24   Time of surgery / procedure: TBD   Where do you plan to recover after surgery? at home with family     Fax number for surgical facility: Note does not need to be faxed, will be available electronically in Epic.    Assessment & Plan     The proposed surgical procedure is considered LOW risk.    Pre-op evaluation  She is medically stable.  She has tolerated surgery, well, in the past, and has no history of heart or lung disease. She uses CPAP. I find no contraindication to her having right total hip arthroplasty and I recommend proceeding as planned. I would recommend rivaroxiban DVT prophylaxis following surgery, given her remote history of provoked DVT in the past.     SHAUNA (obstructive sleep apnea)  Ongoing use of CPAP     - No identified additional risk factors other than previously addressed    Recommendation  Approval given to proceed with proposed procedure, without further diagnostic evaluation.    Lynne Agee is a 79 year old, presenting for the following:  Pre-Op Exam (Right hip replacement on 06/25/24 at [facility] with Dr. Owens)        6/4/2024    10:18 AM   Additional Questions   Roomed by Raymundo ALLEN RN     HPI:related to upcoming procedure: she has consented to having right hip replacement surgery. She has had left hip replacement in the past and that went well.  She has not recently been ill.  She has colostomy in place and has elected to  keep it for now.   She had DVT following a fall and LE injury, more than 10 years ago. She also had PE at that time. She was treated with anticoagulation for one year period.  She has not had a recurrence. Following her left total hip arthroplasty she took 'expensive' anticoagulation rather than just aspirin for DVT prophlaxis, given the remote history of DVT.         6/4/2024   Pre-Op Questionnaire   Have you ever had a heart attack or stroke? No   Have you ever had surgery on your heart or blood vessels, such as a stent placement, a coronary artery bypass, or surgery on an artery in your head, neck, heart, or legs? No   Do you have chest pain with activity? No   Do you have a history of heart failure? No   Do you currently have a cold, bronchitis or symptoms of other infection? No   Do you have a cough, shortness of breath, or wheezing? No   Do you or anyone in your family have previous history of blood clots? (!) YES    Do you or does anyone in your family have a serious bleeding problem such as prolonged bleeding following surgeries or cuts? No   Have you ever had problems with anemia or been told to take iron pills? No   Have you had any abnormal blood loss such as black, tarry or bloody stools, or abnormal vaginal bleeding? No   Have you ever had a blood transfusion? No   Are you willing to have a blood transfusion if it is medically needed before, during, or after your surgery? Yes   Have you or any of your relatives ever had problems with anesthesia? No   Do you have sleep apnea, excessive snoring or daytime drowsiness? (!) YES   Do you have a CPAP machine? Yes   Do you have any artifical heart valves or other implanted medical devices like a pacemaker, defibrillator, or continuous glucose monitor? No   Do you have artificial joints? (!) YES   Are you allergic to latex? No     Patient Active Problem List    Diagnosis Date Noted    DDD (degenerative disc disease), lumbar 04/25/2024     Priority: Medium     Localized osteoarthritis of left shoulder 04/08/2024     Priority: Medium    Primary osteoarthritis of right hip 04/08/2024     Priority: Medium    Parastomal hernia 09/13/2023     Priority: Medium    Impacted cerumen of right ear 09/13/2023     Priority: Medium    Colonic mass 08/08/2022     Priority: Medium    Hyperlipidemia LDL goal <100 08/08/2022     Priority: Medium    Shoulder pain, right 08/08/2022     Priority: Medium    Calculus of gallbladder without cholecystitis without obstruction 08/08/2022     Priority: Medium    History of urinary tract obstruction 08/08/2022     Priority: Medium    RAJ (stress urinary incontinence, female) 08/08/2022     Priority: Medium    S/P total left hip arthroplasty 08/05/2021     Priority: Medium    SHAUNA (obstructive sleep apnea)      Priority: Medium     Created by Conversion  Replacement Utility updated for latest IMO load        Overactive bladder 02/12/2020     Priority: Medium    Gastroesophageal reflux disease without esophagitis      Priority: Medium    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     Priority: Medium    Essential hypertension 12/10/2018     Priority: Medium    Allergic rhinitis due to pollen 11/26/2018     Priority: Medium      Past Medical History:   Diagnosis Date    Calculus of gallbladder without cholecystitis without obstruction 08/08/2022    Claustrophobia     Colonic mass 08/08/2022    DDD (degenerative disc disease), lumbar 04/25/2024    Essential hypertension     GERD (gastroesophageal reflux disease)     History of blood clots     History of urinary tract obstruction 08/08/2022    Hyperlipidemia LDL goal <100 08/08/2022    Parastomal hernia 09/13/2023    Perirectal abscess 10/29/2019    Primary osteoarthritis of both hips     S/P total left hip arthroplasty     Seasonal allergies     Sleep apnea     uses CPAP    RAJ (stress urinary incontinence, female) 08/08/2022    Thrombosis      Past Surgical History:   Procedure Laterality Date    COLOSTOMY N/A  10/28/2019    Procedure: CREATION, COLOSTOMY;  Surgeon: Mylene Sears MD;  Location: [facility] Main OR;  Service: General    MS EXCIS BARTHOLIN GLAND/CYST      Description: Excision Of Bartholin's Gland Or Cyst;  Recorded: 2013;    PROCTOSCOPY N/A 10/28/2019    Procedure: RIGID PROCTOSCOPY;  Surgeon: Mylene Sears MD;  Location: [facility] Main OR;  Service: General    SIGMOIDOSCOPY FLEXIBLE N/A 10/27/2019    Procedure: SIGMOIDOSCOPY with biopsy;  Surgeon: Tj Gavin MD;  Location: [facility] GI;  Service: Gastroenterology    TOOTH EXTRACTION      [facility]  DELIVERY ONLY      Description:  Section;  Recorded: 2008;  Comments: x2    [facility] PART REMOVAL COLON W ANASTOMOSIS N/A 10/28/2019    Procedure: OPEN EXPLORATORY LAPAROTOMY, SEGMENTAL COLECTOMY,  WASHOUT OF PELVIC ABSCESS. RECTAL EXAM UNDER ANESTHESIA. DEBRIDEMENT OD ABSCESS CAVITY;  Surgeon: Mylene Sears MD;  Location: [facility] OR;  Service: General    [facility] REMV KIDNEY,W/RIB RESECTION      Description: Nephrectomy;  Recorded: 2008;    [facility] TOTAL HIP ARTHROPLASTY Left 2021    Procedure: LEFT DIRECT ANTERIOR TOTAL HIP ARTHROPLASTY;  Surgeon: Alexander Yancey MD;  Location: [facility];  Service: Orthopedics     Current Outpatient Medications   Medication Sig Dispense Refill    acetaminophen (TYLENOL) 325 MG tablet [ACETAMINOPHEN (TYLENOL) 325 MG TABLET] Take 2 tablets (650 mg total) by mouth every 4 (four) hours as needed for pain (mild pain). 100 tablet 0    cholecalciferol, vitamin D3, 5,000 unit Tab Take 1,000 Units by mouth daily      lisinopril-hydrochlorothiazide (ZESTORETIC) 10-12.5 MG tablet TAKE 1 TABLET DAILY 90 tablet 3    loratadine (CLARITIN) 10 mg tablet [LORATADINE (CLARITIN) 10 MG TABLET] Take 10 mg by mouth daily as needed.       menthol (BIOFREEZE, MENTHOL,) 4 % Gel [MENTHOL (BIOFREEZE, MENTHOL,) 4 % GEL] Apply topically at bedtime as needed.       Naproxen Sodium (ALEVE PO) Take 220 mg by  "mouth 2 times daily as needed for moderate pain      rosuvastatin (CRESTOR) 10 MG tablet TAKE 1 TABLET AT BEDTIME 90 tablet 3    methylPREDNISolone (MEDROL DOSEPAK) 4 MG tablet therapy pack Follow Package Directions (Patient not taking: Reported on 4/25/2024) 21 tablet 0     Allergies   Allergen Reactions    Cats Claw (Uncaria [Uncaria Tomentosa (Cats Claw)] Unknown    Dye [External Allergen Needs Reconciliation - See Comment] Hives and Swelling     Cat scan dye    Other Drug Allergy (See Comments) Hives and Swelling     Cat scan dye [iodine-based contrast?]    Other Environmental Allergy Unknown     DUST    Ragweed Pollen [Ragweeds] Unknown      Social History     Tobacco Use    Smoking status: Never    Smokeless tobacco: Never   Substance Use Topics    Alcohol use: Yes     Comment: rarely     Family History   Problem Relation Age of Onset    Breast Cancer Paternal Grandmother     Prostate Cancer Father     Heart Disease Other         many on fathers side    Ovarian Cancer Mother     Heart Disease Maternal Grandfather     Brain Cancer Cousin 47.00     History   Drug Use No     Review of Systems    Objective      PHYSICAL EXAM:  General Appearance: In no acute distress  Vitals:    06/04/24 1029   BP: 126/72   BP Location: Left arm   Patient Position: Sitting   Cuff Size: Adult Regular   Pulse: 74   Resp: 16   Temp: 97.1  F (36.2  C)   TempSrc: Tympanic   SpO2: 96%   Weight: 79.6 kg (175 lb 8 oz)   Height: 1.651 m (5' 5\")     Estimated body mass index is 29.2 kg/m  as calculated from the following:    Height as of this encounter: 1.651 m (5' 5\").    Weight as of this encounter: 79.6 kg (175 lb 8 oz).  EYES: Clear   HEENT: nose and throat clear, ears normal   NECK:  without cervical or axillary adenopathy  RESPIRATORY: Clear   CARDIOVASCULAR: S1, S2  ABDOMEN: soft, flat, and non-tender  EXTREMITIES:  no significant inflammation or edema  NEUROLOGIC: Speech is clear,  gait is normal  PSYCHIATRIC: Oriented X 3, thinking " is clear     Diagnostics  Recent Results (from the past 48 hour(s))   CBC with platelets    Collection Time: 06/04/24 10:55 AM   Result Value Ref Range    WBC Count 6.0 4.0 - 11.0 10e3/uL    RBC Count 4.70 3.80 - 5.20 10e6/uL    Hemoglobin 13.2 11.7 - 15.7 g/dL    Hematocrit 41.4 35.0 - 47.0 %    MCV 88 78 - 100 fL    MCH 28.1 26.5 - 33.0 pg    MCHC 31.9 31.5 - 36.5 g/dL    RDW 12.3 10.0 - 15.0 %    Platelet Count 256 150 - 450 10e3/uL   Basic metabolic panel  (Ca, Cl, CO2, Creat, Gluc, K, Na, BUN)    Collection Time: 06/04/24 10:55 AM   Result Value Ref Range    Sodium 141 135 - 145 mmol/L    Potassium 4.0 3.4 - 5.3 mmol/L    Chloride 107 98 - 107 mmol/L    Carbon Dioxide (CO2) 25 22 - 29 mmol/L    Anion Gap 9 7 - 15 mmol/L    Urea Nitrogen 25.2 (H) 8.0 - 23.0 mg/dL    Creatinine 0.51 0.51 - 0.95 mg/dL    GFR Estimate >90 >60 mL/min/1.73m2    Calcium 10.7 (H) 8.8 - 10.2 mg/dL    Glucose 93 70 - 99 mg/dL      Revised Cardiac Risk Index (RCRI)  The patient has the following serious cardiovascular risks for perioperative complications:   - No serious cardiac risks = 0 points     RCRI Interpretation: 0 points: Class I (very low risk - 0.4% complication rate)    Signed Electronically by: Roni Horowitz MD  Copy of this evaluation report is provided to requesting physician.

## 2024-06-05 ENCOUNTER — TELEPHONE (OUTPATIENT)
Dept: INTERNAL MEDICINE | Facility: CLINIC | Age: 79
End: 2024-06-05
Payer: COMMERCIAL

## 2024-06-05 PROBLEM — K63.89 COLONIC MASS: Status: RESOLVED | Noted: 2022-08-08 | Resolved: 2024-06-05

## 2024-06-05 PROBLEM — M25.511 SHOULDER PAIN, RIGHT: Status: RESOLVED | Noted: 2022-08-08 | Resolved: 2024-06-05

## 2024-06-05 PROBLEM — H61.21 IMPACTED CERUMEN OF RIGHT EAR: Status: RESOLVED | Noted: 2023-09-13 | Resolved: 2024-06-05

## 2024-06-05 PROBLEM — Z86.718 HISTORY OF DEEP VENOUS THROMBOSIS: Status: ACTIVE | Noted: 2024-06-05

## 2024-06-05 LAB
ANION GAP SERPL CALCULATED.3IONS-SCNC: 9 MMOL/L (ref 7–15)
BUN SERPL-MCNC: 25.2 MG/DL (ref 8–23)
CALCIUM SERPL-MCNC: 10.7 MG/DL (ref 8.8–10.2)
CHLORIDE SERPL-SCNC: 107 MMOL/L (ref 98–107)
CREAT SERPL-MCNC: 0.51 MG/DL (ref 0.51–0.95)
DEPRECATED HCO3 PLAS-SCNC: 25 MMOL/L (ref 22–29)
EGFRCR SERPLBLD CKD-EPI 2021: >90 ML/MIN/1.73M2
GLUCOSE SERPL-MCNC: 93 MG/DL (ref 70–99)
POTASSIUM SERPL-SCNC: 4 MMOL/L (ref 3.4–5.3)
SODIUM SERPL-SCNC: 141 MMOL/L (ref 135–145)

## 2024-06-05 NOTE — TELEPHONE ENCOUNTER
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

## 2024-06-06 NOTE — TELEPHONE ENCOUNTER
Spoke with patient regarding request.       Patient reports the following: Dr. Horowitz and her never discussed that she had a left Kidney removal in 1996    Would like to know if there is any contraindication for procedure knowing this and if this can be added to pre-op paperwork.       Reports no complications with kidneys since left kidney removal procedure.       Advised message would be sent to Dr. Horowitz to review.

## 2024-06-07 NOTE — TELEPHONE ENCOUNTER
[facility] for patient to return call or check [facility] to receive the following message from Dr. Horowitz:     The preop notes her history of kidney removal. Her renal function is normal. There is no problem with having the surgery. Dr. Carlos Manuel MD

## 2024-06-10 NOTE — PROGRESS NOTES
Discharge plan according to [facility]:       05/08/24 7495   Discharge Planning   Patient/Family Anticipates Transition to home   Living Arrangements   People in Home child(alexis), adult   Type of Residence Private Residence   Is your private residence a single family home or apartment? Single family home   Number of Stairs, Within Home, Primary ten   Stair Railings, Within Home, Primary railings safe and in good condition   Bathroom Shower/Tub Tub/Shower unit   Equipment Currently Used at Home raised toilet seat;grab bar, tub/shower   Support System   Support Systems Children   Do you have someone available to stay with you one or two nights once you are home? Yes  (son- Benny)

## 2024-06-24 ENCOUNTER — ANESTHESIA EVENT (OUTPATIENT)
Dept: SURGERY | Facility: CLINIC | Age: 79
End: 2024-06-24
Payer: COMMERCIAL

## 2024-06-24 NOTE — TREATMENT PLAN
Orthopedic Surgery Pre-Op Plan: Cyndie Marin  pre-op review. This is NOT an H&P   Surgeon: Dr. Yancey   [facility]: [facility]  Name of Surgery: Right Direct Anterior Total Hip Arthroplasty   Date of Surgery: 6/25/24  H&P: Completed on 6/4/24 by Dr. Roni Horowitz at [facility].   History of ASA, NSAIDS, vitamin and/or herbal supplements, GLP-1 Agonist or SGLT Inhibitor medication taken within 10 days?: Yes: Naproxen (Aleve): Patient instructed to hold this for 7 days before surgery.  History of blood thinners?: Yes: History of DVT and PE following lower extremity injury and toe fracture surgery in 2000. Treated with warfarin for 1 year following this. No longer on anticoagulation.     Plan:   1) Discharge Plan: Home morning of POD 1 with assist of Adult Children (son, Benny).   Please see Discharge Planning section near bottom of this note for further details.     2) History of Blood Clots: provoked DVT and PE following lower extremity injury and toe fracture in 2000:  On warfarin for 1 year following DVT and PE. No longer on anticoagulation. No known coagulation disorder but is at increased risk for VTE given this history. PCP recommends patient is put on post-op DVT prophylaxis with rivaroxaban (Xarelto). Patient was on Xarelto for post-op VTE prophylaxis after left total hip arthroplasty surgery here 1/2021 and tolerated it well. I will notify Dr. Yancey of this history and recommendation.     3) Hyperlipidemia: on rosuvastatin.     4) Hypertension: appears well-controlled on lisinopril-hydrochlorothiazide. Instructed to hold this medication on the morning of surgery.     5) Obstructive Sleep Apnea: on CPAP.  Reminded patient to bring CPAP machine to the hospital and to use it whenever sleeping or napping.    6) Colostomy in Place: Oct - Nov 2019: found to have pelvic abscess with rectal fistula requiring laparotomy, partial colectomy and colostomy placement. Colostomy still in  placed. Follows with GI.     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 Renal function stable.  Creatinine 0.51, GFR > 90 at preop exam on 6/4/2024.      Patient appears medically optimized for upcoming surgery. I would recommend Hospitalist Consult to assist with medical management. Please call me below with any questions on this patient.       Review of Systems Notable for: History of blood clots-provoked DVT and PE following lower extremity injury and toe fracture in 2000-no longer on anticoagulation, hyperlipidemia, hypertension, obstructive sleep apnea-on CPAP, colostomy in place, s/p left nephrectomy in 1996.    Past Medical History:   Past Medical History:   Diagnosis Date    Calculus of gallbladder without cholecystitis without obstruction 08/08/2022    Claustrophobia     Colonic mass 08/08/2022    Colostomy in place (H)     DDD (degenerative disc disease), lumbar 04/25/2024    Essential hypertension     GERD (gastroesophageal reflux disease)     History of blood clots     History of urinary tract obstruction 08/08/2022    Hyperlipidemia LDL goal <100 08/08/2022    Parastomal hernia 09/13/2023    Perirectal abscess 10/29/2019    Primary osteoarthritis of both hips     Renal disease     S/P total left hip arthroplasty     Seasonal allergies     Sleep apnea     uses CPAP    RAJ (stress urinary incontinence, female) 08/08/2022    Thrombosis      Past Surgical History:   Procedure Laterality Date    COLOSTOMY N/A 10/28/2019    Procedure: CREATION, COLOSTOMY;  Surgeon: Mylene Sears MD;  Location: [facility];  Service: General    NEPHRECTOMY Left 1996    with rib resection    SD EXCIS BARTHOLIN GLAND/CYST      Description: Excision Of Bartholin's Gland Or Cyst;  Recorded: 03/31/2013;    PROCTOSCOPY N/A 10/28/2019    Procedure: RIGID PROCTOSCOPY;  Surgeon: Mylene Sears MD;  Location: [facility];  Service: General    SIGMOIDOSCOPY FLEXIBLE N/A 10/27/2019    Procedure:  SIGMOIDOSCOPY with biopsy;  Surgeon: Tj Gavin MD;  Location: [facility] GI;  Service: Gastroenterology    TOOTH EXTRACTION      [facility]  DELIVERY ONLY      Description:  Section;  Recorded: 2008;  Comments: x2    [facility] PART REMOVAL COLON W ANASTOMOSIS N/A 10/28/2019    Procedure: OPEN EXPLORATORY LAPAROTOMY, SEGMENTAL COLECTOMY,  WASHOUT OF PELVIC ABSCESS. RECTAL EXAM UNDER ANESTHESIA. DEBRIDEMENT OD ABSCESS CAVITY;  Surgeon: Mylene Sears MD;  Location: [facility] OR;  Service: General    [facility] TOTAL HIP ARTHROPLASTY Left 2021    Procedure: LEFT DIRECT ANTERIOR TOTAL HIP ARTHROPLASTY;  Surgeon: Alexander Yancey MD;  Location: [facility] OR;  Service: Orthopedics       Current Medications:  Patient's Medications   New Prescriptions    No medications on file   Previous Medications    ACETAMINOPHEN (TYLENOL) 325 MG TABLET    [ACETAMINOPHEN (TYLENOL) 325 MG TABLET] Take 2 tablets (650 mg total) by mouth every 4 (four) hours as needed for pain (mild pain).    CHOLECALCIFEROL, VITAMIN D3, 5,000 UNIT TAB    Take 1,000 Units by mouth daily    LISINOPRIL-HYDROCHLOROTHIAZIDE (ZESTORETIC) 10-12.5 MG TABLET    TAKE 1 TABLET DAILY    LORATADINE (CLARITIN) 10 MG TABLET    [LORATADINE (CLARITIN) 10 MG TABLET] Take 10 mg by mouth daily as needed.     MENTHOL (BIOFREEZE, MENTHOL,) 4 % GEL    [MENTHOL (BIOFREEZE, MENTHOL,) 4 % GEL] Apply topically at bedtime as needed.     NAPROXEN SODIUM (ALEVE PO)    Take 220 mg by mouth 2 times daily as needed for moderate pain    ROSUVASTATIN (CRESTOR) 10 MG TABLET    TAKE 1 TABLET AT BEDTIME   Modified Medications    No medications on file   Discontinued Medications    No medications on file       ALLERGIES:  Allergies   Allergen Reactions    Cats Claw (Uncaria [Uncaria Tomentosa (Cats Claw)] Unknown    Dye [External Allergen Needs Reconciliation - See Comment] Hives and Swelling     Cat scan dye    Other Drug Allergy (See Comments) Hives and Swelling      Cat scan dye [iodine-based contrast?]    Other Environmental Allergy Unknown     DUST    Ragweed Pollen [Ragweeds] Unknown       Social History  Social History     Tobacco Use    Smoking status: Never    Smokeless tobacco: Never   Vaping Use    Vaping status: Never Used   Substance Use Topics    Alcohol use: Yes     Comment: rarely    Drug use: No       Any Abnormal Recent Diagnostics? No    Discharge Planning:   Discharge plan according to [facility]:    Home morning of POD 1 with assist of Adult Children (sonBenny).      05/08/24 1227   Discharge Planning   Patient/Family Anticipates Transition to home   Living Arrangements   People in Home child(alexis), adult   Type of Residence Private Residence   Is your private residence a single family home or apartment? Single family home   Number of Stairs, Within Home, Primary ten   Stair Railings, Within Home, Primary railings safe and in good condition   Bathroom Shower/Tub Tub/Shower unit   Equipment Currently Used at Home raised toilet seat;grab bar, tub/shower   Support System   Support Systems Children   Do you have someone available to stay with you one or two nights once you are home? Yes  (son- Benny)       CORRINE Ayers, CNP   Advanced Practice Nurse Navigator- Orthopedics  [facility]   Phone: [phone]

## 2024-06-25 ENCOUNTER — APPOINTMENT (OUTPATIENT)
Dept: RADIOLOGY | Facility: CLINIC | Age: 79
End: 2024-06-25
Attending: ORTHOPAEDIC SURGERY
Payer: COMMERCIAL

## 2024-06-25 ENCOUNTER — APPOINTMENT (OUTPATIENT)
Dept: RADIOLOGY | Facility: CLINIC | Age: 79
End: 2024-06-25
Attending: PHYSICIAN ASSISTANT
Payer: COMMERCIAL

## 2024-06-25 ENCOUNTER — APPOINTMENT (OUTPATIENT)
Dept: PHYSICAL THERAPY | Facility: CLINIC | Age: 79
End: 2024-06-25
Attending: ORTHOPAEDIC SURGERY
Payer: COMMERCIAL

## 2024-06-25 ENCOUNTER — HOSPITAL ENCOUNTER (OUTPATIENT)
Facility: CLINIC | Age: 79
Discharge: HOME | End: 2024-06-28
Attending: ORTHOPAEDIC SURGERY | Admitting: ORTHOPAEDIC SURGERY
Payer: COMMERCIAL

## 2024-06-25 ENCOUNTER — ANESTHESIA (OUTPATIENT)
Dept: SURGERY | Facility: CLINIC | Age: 79
End: 2024-06-25
Payer: COMMERCIAL

## 2024-06-25 DIAGNOSIS — D62 ANEMIA DUE TO BLOOD LOSS, ACUTE: ICD-10-CM

## 2024-06-25 DIAGNOSIS — I10 ESSENTIAL HYPERTENSION, BENIGN: ICD-10-CM

## 2024-06-25 DIAGNOSIS — Z96.641 STATUS POST TOTAL REPLACEMENT OF RIGHT HIP: Primary | ICD-10-CM

## 2024-06-25 PROBLEM — Z96.649 S/P TOTAL HIP ARTHROPLASTY: Status: ACTIVE | Noted: 2024-06-25

## 2024-06-25 LAB
ABO/RH(D): NORMAL
ANTIBODY SCREEN: NEGATIVE
CREAT SERPL-MCNC: 0.48 MG/DL (ref 0.51–0.95)
EGFRCR SERPLBLD CKD-EPI 2021: >90 ML/MIN/1.73M2
SPECIMEN EXPIRATION DATE: NORMAL

## 2024-06-25 PROCEDURE — 250N000011 HC RX IP 250 OP 636: Mod: JZ | Performed by: ANESTHESIOLOGY

## 2024-06-25 PROCEDURE — 97530 THERAPEUTIC ACTIVITIES: CPT | Mod: GP

## 2024-06-25 PROCEDURE — 99214 OFFICE O/P EST MOD 30 MIN: CPT | Performed by: INTERNAL MEDICINE

## 2024-06-25 PROCEDURE — 258N000003 HC RX IP 258 OP 636: Mod: JZ

## 2024-06-25 PROCEDURE — 36415 COLL VENOUS BLD VENIPUNCTURE: CPT | Performed by: ORTHOPAEDIC SURGERY

## 2024-06-25 PROCEDURE — 710N000010 HC RECOVERY PHASE 1, LEVEL 2, PER MIN: Performed by: ORTHOPAEDIC SURGERY

## 2024-06-25 PROCEDURE — 97161 PT EVAL LOW COMPLEX 20 MIN: CPT | Mod: GP

## 2024-06-25 PROCEDURE — 36415 COLL VENOUS BLD VENIPUNCTURE: CPT | Performed by: PHYSICIAN ASSISTANT

## 2024-06-25 PROCEDURE — 250N000011 HC RX IP 250 OP 636: Performed by: PHYSICIAN ASSISTANT

## 2024-06-25 PROCEDURE — 258N000003 HC RX IP 258 OP 636: Mod: JZ | Performed by: ANESTHESIOLOGY

## 2024-06-25 PROCEDURE — 250N000009 HC RX 250: Performed by: ORTHOPAEDIC SURGERY

## 2024-06-25 PROCEDURE — 258N000003 HC RX IP 258 OP 636: Mod: JZ | Performed by: PHYSICIAN ASSISTANT

## 2024-06-25 PROCEDURE — 250N000011 HC RX IP 250 OP 636: Mod: JZ

## 2024-06-25 PROCEDURE — 250N000013 HC RX MED GY IP 250 OP 250 PS 637: Performed by: INTERNAL MEDICINE

## 2024-06-25 PROCEDURE — 86900 BLOOD TYPING SEROLOGIC ABO: CPT | Performed by: PHYSICIAN ASSISTANT

## 2024-06-25 PROCEDURE — 999N000141 HC STATISTIC PRE-PROCEDURE NURSING ASSESSMENT: Performed by: ORTHOPAEDIC SURGERY

## 2024-06-25 PROCEDURE — 360N000084 HC SURGERY LEVEL 4 W/ FLUORO, PER MIN: Performed by: ORTHOPAEDIC SURGERY

## 2024-06-25 PROCEDURE — 258N000001 HC RX 258: Performed by: ORTHOPAEDIC SURGERY

## 2024-06-25 PROCEDURE — 82565 ASSAY OF CREATININE: CPT | Performed by: ORTHOPAEDIC SURGERY

## 2024-06-25 PROCEDURE — 250N000013 HC RX MED GY IP 250 OP 250 PS 637: Performed by: PHYSICIAN ASSISTANT

## 2024-06-25 PROCEDURE — 370N000017 HC ANESTHESIA TECHNICAL FEE, PER MIN: Performed by: ORTHOPAEDIC SURGERY

## 2024-06-25 PROCEDURE — C1776 JOINT DEVICE (IMPLANTABLE): HCPCS | Performed by: ORTHOPAEDIC SURGERY

## 2024-06-25 PROCEDURE — 999N000157 HC STATISTIC RCP TIME EA 10 MIN

## 2024-06-25 PROCEDURE — 250N000009 HC RX 250

## 2024-06-25 PROCEDURE — 999N000065 XR PELVIS AND HIP PORTABLE RIGHT 1 VIEW

## 2024-06-25 PROCEDURE — 272N000001 HC OR GENERAL SUPPLY STERILE: Performed by: ORTHOPAEDIC SURGERY

## 2024-06-25 PROCEDURE — 999N000181 XR SURGERY CARM FLUORO GREATER THAN 5 MIN W STILLS: Mod: TC

## 2024-06-25 PROCEDURE — 94660 CPAP INITIATION&MGMT: CPT

## 2024-06-25 DEVICE — BIOLOX DELTA CERAMIC FEMORAL HEAD +8.5 36MM DIA 12/14 TAPER
Type: IMPLANTABLE DEVICE | Site: HIP | Status: FUNCTIONAL
Brand: BIOLOX DELTA

## 2024-06-25 DEVICE — PINNACLE HIP SOLUTIONS ALTRX POLYETHYLENE ACETABULAR LINER NEUTRAL 36MM ID 52MM OD
Type: IMPLANTABLE DEVICE | Site: HIP | Status: FUNCTIONAL
Brand: PINNACLE ALTRX

## 2024-06-25 DEVICE — PINNACLE GRIPTION ACETABULAR SHELL SECTOR 52MM OD
Type: IMPLANTABLE DEVICE | Site: HIP | Status: FUNCTIONAL
Brand: PINNACLE GRIPTION

## 2024-06-25 DEVICE — ACTIS DUOFIX HIP PROSTHESIS (FEMORAL STEM 12/14 TAPER CEMENTLESS SIZE 6 HIGH COLLAR)  CE
Type: IMPLANTABLE DEVICE | Site: HIP | Status: FUNCTIONAL
Brand: ACTIS

## 2024-06-25 RX ORDER — LIDOCAINE 40 MG/G
CREAM TOPICAL
Status: DISCONTINUED | OUTPATIENT
Start: 2024-06-25 | End: 2024-06-25 | Stop reason: HOSPADM

## 2024-06-25 RX ORDER — MAGNESIUM HYDROXIDE 1200 MG/15ML
LIQUID ORAL PRN
Status: DISCONTINUED | OUTPATIENT
Start: 2024-06-25 | End: 2024-06-25 | Stop reason: HOSPADM

## 2024-06-25 RX ORDER — KETAMINE HYDROCHLORIDE 10 MG/ML
INJECTION INTRAMUSCULAR; INTRAVENOUS PRN
Status: DISCONTINUED | OUTPATIENT
Start: 2024-06-25 | End: 2024-06-25

## 2024-06-25 RX ORDER — ONDANSETRON 4 MG/1
4 TABLET, ORALLY DISINTEGRATING ORAL EVERY 30 MIN PRN
Status: DISCONTINUED | OUTPATIENT
Start: 2024-06-25 | End: 2024-06-25

## 2024-06-25 RX ORDER — PROPOFOL 10 MG/ML
INJECTION, EMULSION INTRAVENOUS CONTINUOUS PRN
Status: DISCONTINUED | OUTPATIENT
Start: 2024-06-25 | End: 2024-06-25

## 2024-06-25 RX ORDER — HYDROMORPHONE HCL IN WATER/PF 6 MG/30 ML
0.2 PATIENT CONTROLLED ANALGESIA SYRINGE INTRAVENOUS EVERY 5 MIN PRN
Status: DISCONTINUED | OUTPATIENT
Start: 2024-06-25 | End: 2024-06-25

## 2024-06-25 RX ORDER — FENTANYL CITRATE 50 UG/ML
50 INJECTION, SOLUTION INTRAMUSCULAR; INTRAVENOUS EVERY 5 MIN PRN
Status: DISCONTINUED | OUTPATIENT
Start: 2024-06-25 | End: 2024-06-25

## 2024-06-25 RX ORDER — LIDOCAINE 40 MG/G
CREAM TOPICAL
Status: DISCONTINUED | OUTPATIENT
Start: 2024-06-25 | End: 2024-06-28 | Stop reason: HOSPADM

## 2024-06-25 RX ORDER — HYDROMORPHONE HCL IN WATER/PF 6 MG/30 ML
0.4 PATIENT CONTROLLED ANALGESIA SYRINGE INTRAVENOUS EVERY 5 MIN PRN
Status: DISCONTINUED | OUTPATIENT
Start: 2024-06-25 | End: 2024-06-25

## 2024-06-25 RX ORDER — POLYETHYLENE GLYCOL 3350 17 G/17G
17 POWDER, FOR SOLUTION ORAL DAILY
Status: DISCONTINUED | OUTPATIENT
Start: 2024-06-26 | End: 2024-06-27

## 2024-06-25 RX ORDER — PROCHLORPERAZINE MALEATE 5 MG
5 TABLET ORAL EVERY 6 HOURS PRN
Status: DISCONTINUED | OUTPATIENT
Start: 2024-06-25 | End: 2024-06-28 | Stop reason: HOSPADM

## 2024-06-25 RX ORDER — ACETAMINOPHEN 325 MG/1
975 TABLET ORAL ONCE
Status: COMPLETED | OUTPATIENT
Start: 2024-06-25 | End: 2024-06-25

## 2024-06-25 RX ORDER — ONDANSETRON 2 MG/ML
4 INJECTION INTRAMUSCULAR; INTRAVENOUS EVERY 30 MIN PRN
Status: DISCONTINUED | OUTPATIENT
Start: 2024-06-25 | End: 2024-06-25

## 2024-06-25 RX ORDER — CEFAZOLIN SODIUM 1 G/3ML
1 INJECTION, POWDER, FOR SOLUTION INTRAMUSCULAR; INTRAVENOUS EVERY 8 HOURS
Status: COMPLETED | OUTPATIENT
Start: 2024-06-25 | End: 2024-06-26

## 2024-06-25 RX ORDER — AMOXICILLIN 250 MG
1 CAPSULE ORAL 2 TIMES DAILY
Status: DISCONTINUED | OUTPATIENT
Start: 2024-06-25 | End: 2024-06-27

## 2024-06-25 RX ORDER — GLYCOPYRROLATE 0.2 MG/ML
0.4 INJECTION, SOLUTION INTRAMUSCULAR; INTRAVENOUS ONCE
Status: COMPLETED | OUTPATIENT
Start: 2024-06-25 | End: 2024-06-25

## 2024-06-25 RX ORDER — HYDROXYZINE HYDROCHLORIDE 10 MG/1
10 TABLET, FILM COATED ORAL EVERY 6 HOURS PRN
Qty: 30 TABLET | Refills: 0 | Status: ON HOLD | OUTPATIENT
Start: 2024-06-25 | End: 2024-08-09

## 2024-06-25 RX ORDER — ONDANSETRON 2 MG/ML
4 INJECTION INTRAMUSCULAR; INTRAVENOUS EVERY 6 HOURS PRN
Status: DISCONTINUED | OUTPATIENT
Start: 2024-06-25 | End: 2024-06-28 | Stop reason: HOSPADM

## 2024-06-25 RX ORDER — LISINOPRIL/HYDROCHLOROTHIAZIDE 10-12.5 MG
1 TABLET ORAL DAILY
Status: DISCONTINUED | OUTPATIENT
Start: 2024-06-26 | End: 2024-06-28 | Stop reason: HOSPADM

## 2024-06-25 RX ORDER — FENTANYL CITRATE 50 UG/ML
100 INJECTION, SOLUTION INTRAMUSCULAR; INTRAVENOUS ONCE
Status: DISCONTINUED | OUTPATIENT
Start: 2024-06-25 | End: 2024-06-25 | Stop reason: HOSPADM

## 2024-06-25 RX ORDER — HYDROMORPHONE HCL IN WATER/PF 6 MG/30 ML
0.4 PATIENT CONTROLLED ANALGESIA SYRINGE INTRAVENOUS
Status: DISCONTINUED | OUTPATIENT
Start: 2024-06-25 | End: 2024-06-28 | Stop reason: HOSPADM

## 2024-06-25 RX ORDER — AMOXICILLIN 250 MG
1-2 CAPSULE ORAL 2 TIMES DAILY PRN
Qty: 30 TABLET | Refills: 0 | Status: SHIPPED | OUTPATIENT
Start: 2024-06-25 | End: 2024-06-27

## 2024-06-25 RX ORDER — CEFAZOLIN SODIUM 1 G/3ML
INJECTION, POWDER, FOR SOLUTION INTRAMUSCULAR; INTRAVENOUS
Status: DISCONTINUED
Start: 2024-06-25 | End: 2024-06-25 | Stop reason: HOSPADM

## 2024-06-25 RX ORDER — ACETAMINOPHEN 325 MG/1
650 TABLET ORAL EVERY 4 HOURS PRN
Status: DISCONTINUED | OUTPATIENT
Start: 2024-06-28 | End: 2024-06-28 | Stop reason: HOSPADM

## 2024-06-25 RX ORDER — DEXAMETHASONE SODIUM PHOSPHATE 4 MG/ML
4 INJECTION, SOLUTION INTRA-ARTICULAR; INTRALESIONAL; INTRAMUSCULAR; INTRAVENOUS; SOFT TISSUE
Status: DISCONTINUED | OUTPATIENT
Start: 2024-06-25 | End: 2024-06-25

## 2024-06-25 RX ORDER — CALCIUM CARBONATE 500 MG/1
500 TABLET, CHEWABLE ORAL 4 TIMES DAILY PRN
Status: DISCONTINUED | OUTPATIENT
Start: 2024-06-25 | End: 2024-06-28 | Stop reason: HOSPADM

## 2024-06-25 RX ORDER — FENTANYL CITRATE 50 UG/ML
25 INJECTION, SOLUTION INTRAMUSCULAR; INTRAVENOUS EVERY 5 MIN PRN
Status: DISCONTINUED | OUTPATIENT
Start: 2024-06-25 | End: 2024-06-25

## 2024-06-25 RX ORDER — SODIUM CHLORIDE, SODIUM LACTATE, POTASSIUM CHLORIDE, CALCIUM CHLORIDE 600; 310; 30; 20 MG/100ML; MG/100ML; MG/100ML; MG/100ML
INJECTION, SOLUTION INTRAVENOUS CONTINUOUS
Status: DISCONTINUED | OUTPATIENT
Start: 2024-06-25 | End: 2024-06-25

## 2024-06-25 RX ORDER — HYDROXYZINE HYDROCHLORIDE 10 MG/1
10 TABLET, FILM COATED ORAL EVERY 6 HOURS PRN
Status: DISCONTINUED | OUTPATIENT
Start: 2024-06-25 | End: 2024-06-28 | Stop reason: HOSPADM

## 2024-06-25 RX ORDER — ACETAMINOPHEN 325 MG/1
650 TABLET ORAL EVERY 4 HOURS PRN
Qty: 100 TABLET | Refills: 0 | Status: ON HOLD | OUTPATIENT
Start: 2024-06-25 | End: 2024-08-10

## 2024-06-25 RX ORDER — TRANEXAMIC ACID 650 MG/1
1950 TABLET ORAL ONCE
Status: COMPLETED | OUTPATIENT
Start: 2024-06-25 | End: 2024-06-25

## 2024-06-25 RX ORDER — CEFAZOLIN SODIUM/WATER 2 G/20 ML
2 SYRINGE (ML) INTRAVENOUS SEE ADMIN INSTRUCTIONS
Status: DISCONTINUED | OUTPATIENT
Start: 2024-06-25 | End: 2024-06-25 | Stop reason: HOSPADM

## 2024-06-25 RX ORDER — ONDANSETRON 2 MG/ML
INJECTION INTRAMUSCULAR; INTRAVENOUS PRN
Status: DISCONTINUED | OUTPATIENT
Start: 2024-06-25 | End: 2024-06-25

## 2024-06-25 RX ORDER — OXYCODONE HYDROCHLORIDE 5 MG/1
5 TABLET ORAL EVERY 4 HOURS PRN
Status: DISCONTINUED | OUTPATIENT
Start: 2024-06-25 | End: 2024-06-28 | Stop reason: HOSPADM

## 2024-06-25 RX ORDER — NALOXONE HYDROCHLORIDE 0.4 MG/ML
0.1 INJECTION, SOLUTION INTRAMUSCULAR; INTRAVENOUS; SUBCUTANEOUS
Status: DISCONTINUED | OUTPATIENT
Start: 2024-06-25 | End: 2024-06-25

## 2024-06-25 RX ORDER — CELECOXIB 200 MG/1
200 CAPSULE ORAL ONCE
Status: COMPLETED | OUTPATIENT
Start: 2024-06-25 | End: 2024-06-25

## 2024-06-25 RX ORDER — BISACODYL 10 MG
10 SUPPOSITORY, RECTAL RECTAL DAILY PRN
Status: DISCONTINUED | OUTPATIENT
Start: 2024-06-25 | End: 2024-06-28 | Stop reason: HOSPADM

## 2024-06-25 RX ORDER — SODIUM CHLORIDE, SODIUM LACTATE, POTASSIUM CHLORIDE, CALCIUM CHLORIDE 600; 310; 30; 20 MG/100ML; MG/100ML; MG/100ML; MG/100ML
INJECTION, SOLUTION INTRAVENOUS CONTINUOUS
Status: DISCONTINUED | OUTPATIENT
Start: 2024-06-25 | End: 2024-06-28 | Stop reason: HOSPADM

## 2024-06-25 RX ORDER — OXYCODONE HYDROCHLORIDE 5 MG/1
10 TABLET ORAL EVERY 4 HOURS PRN
Status: DISCONTINUED | OUTPATIENT
Start: 2024-06-25 | End: 2024-06-28 | Stop reason: HOSPADM

## 2024-06-25 RX ORDER — ROSUVASTATIN CALCIUM 10 MG/1
10 TABLET, COATED ORAL AT BEDTIME
Status: DISCONTINUED | OUTPATIENT
Start: 2024-06-25 | End: 2024-06-28 | Stop reason: HOSPADM

## 2024-06-25 RX ORDER — SODIUM CHLORIDE, SODIUM LACTATE, POTASSIUM CHLORIDE, CALCIUM CHLORIDE 600; 310; 30; 20 MG/100ML; MG/100ML; MG/100ML; MG/100ML
INJECTION, SOLUTION INTRAVENOUS CONTINUOUS
Status: DISCONTINUED | OUTPATIENT
Start: 2024-06-25 | End: 2024-06-25 | Stop reason: HOSPADM

## 2024-06-25 RX ORDER — OXYCODONE HYDROCHLORIDE 5 MG/1
5 TABLET ORAL EVERY 4 HOURS PRN
Qty: 26 TABLET | Refills: 0 | Status: ON HOLD | OUTPATIENT
Start: 2024-06-25 | End: 2024-08-09

## 2024-06-25 RX ORDER — FENTANYL CITRATE 50 UG/ML
100 INJECTION, SOLUTION INTRAMUSCULAR; INTRAVENOUS
Status: DISCONTINUED | OUTPATIENT
Start: 2024-06-25 | End: 2024-06-25 | Stop reason: HOSPADM

## 2024-06-25 RX ORDER — ACETAMINOPHEN 325 MG/1
975 TABLET ORAL EVERY 8 HOURS
Status: COMPLETED | OUTPATIENT
Start: 2024-06-25 | End: 2024-06-28

## 2024-06-25 RX ORDER — VITAMIN B COMPLEX
1 TABLET ORAL DAILY
COMMUNITY

## 2024-06-25 RX ORDER — CEFAZOLIN SODIUM/WATER 2 G/20 ML
2 SYRINGE (ML) INTRAVENOUS
Status: COMPLETED | OUTPATIENT
Start: 2024-06-25 | End: 2024-06-25

## 2024-06-25 RX ORDER — CEFADROXIL 500 MG/1
500 CAPSULE ORAL 2 TIMES DAILY
Qty: 14 CAPSULE | Refills: 0 | Status: SHIPPED | OUTPATIENT
Start: 2024-06-25 | End: 2024-07-02

## 2024-06-25 RX ORDER — HYDROMORPHONE HCL IN WATER/PF 6 MG/30 ML
0.2 PATIENT CONTROLLED ANALGESIA SYRINGE INTRAVENOUS
Status: DISCONTINUED | OUTPATIENT
Start: 2024-06-25 | End: 2024-06-28 | Stop reason: HOSPADM

## 2024-06-25 RX ORDER — ONDANSETRON 4 MG/1
4 TABLET, ORALLY DISINTEGRATING ORAL EVERY 6 HOURS PRN
Status: DISCONTINUED | OUTPATIENT
Start: 2024-06-25 | End: 2024-06-28 | Stop reason: HOSPADM

## 2024-06-25 RX ADMIN — SODIUM CHLORIDE, POTASSIUM CHLORIDE, SODIUM LACTATE AND CALCIUM CHLORIDE: 600; 310; 30; 20 INJECTION, SOLUTION INTRAVENOUS at 18:49

## 2024-06-25 RX ADMIN — OXYCODONE HYDROCHLORIDE 5 MG: 5 TABLET ORAL at 13:20

## 2024-06-25 RX ADMIN — SODIUM CHLORIDE, POTASSIUM CHLORIDE, SODIUM LACTATE AND CALCIUM CHLORIDE: 600; 310; 30; 20 INJECTION, SOLUTION INTRAVENOUS at 06:35

## 2024-06-25 RX ADMIN — ONDANSETRON 4 MG: 2 INJECTION INTRAMUSCULAR; INTRAVENOUS at 09:43

## 2024-06-25 RX ADMIN — Medication 2 G: at 07:48

## 2024-06-25 RX ADMIN — PHENYLEPHRINE HYDROCHLORIDE 0.3 MCG/KG/MIN: 10 INJECTION INTRAVENOUS at 07:56

## 2024-06-25 RX ADMIN — FENTANYL CITRATE 50 MCG: 50 INJECTION, SOLUTION INTRAMUSCULAR; INTRAVENOUS at 11:10

## 2024-06-25 RX ADMIN — CELECOXIB 200 MG: 200 CAPSULE ORAL at 06:00

## 2024-06-25 RX ADMIN — KETAMINE HYDROCHLORIDE 10 MG: 10 INJECTION INTRAMUSCULAR; INTRAVENOUS at 07:48

## 2024-06-25 RX ADMIN — ROSUVASTATIN CALCIUM 10 MG: 10 TABLET, FILM COATED ORAL at 21:11

## 2024-06-25 RX ADMIN — GLYCOPYRROLATE 0.4 MG: 0.2 INJECTION, SOLUTION INTRAMUSCULAR; INTRAVENOUS at 09:52

## 2024-06-25 RX ADMIN — ACETAMINOPHEN 975 MG: 325 TABLET ORAL at 14:37

## 2024-06-25 RX ADMIN — ONDANSETRON 4 MG: 2 INJECTION INTRAMUSCULAR; INTRAVENOUS at 07:51

## 2024-06-25 RX ADMIN — RIVAROXABAN 10 MG: 10 TABLET, FILM COATED ORAL at 21:11

## 2024-06-25 RX ADMIN — KETAMINE HYDROCHLORIDE 10 MG: 10 INJECTION INTRAMUSCULAR; INTRAVENOUS at 07:54

## 2024-06-25 RX ADMIN — PROCHLORPERAZINE EDISYLATE 5 MG: 5 INJECTION INTRAMUSCULAR; INTRAVENOUS at 09:58

## 2024-06-25 RX ADMIN — MEPIVACAINE HYDROCHLORIDE 3 ML: 15 INJECTION, SOLUTION EPIDURAL; INFILTRATION at 07:55

## 2024-06-25 RX ADMIN — PROPOFOL 100 MCG/KG/MIN: 10 INJECTION, EMULSION INTRAVENOUS at 07:56

## 2024-06-25 RX ADMIN — TRANEXAMIC ACID 1950 MG: 650 TABLET ORAL at 06:00

## 2024-06-25 RX ADMIN — HYDROMORPHONE HYDROCHLORIDE 0.2 MG: 0.2 INJECTION, SOLUTION INTRAMUSCULAR; INTRAVENOUS; SUBCUTANEOUS at 11:53

## 2024-06-25 RX ADMIN — FENTANYL CITRATE 50 MCG: 50 INJECTION, SOLUTION INTRAMUSCULAR; INTRAVENOUS at 11:00

## 2024-06-25 RX ADMIN — MIDAZOLAM HYDROCHLORIDE 1 MG: 1 INJECTION, SOLUTION INTRAMUSCULAR; INTRAVENOUS at 10:39

## 2024-06-25 RX ADMIN — OXYCODONE HYDROCHLORIDE 10 MG: 5 TABLET ORAL at 21:12

## 2024-06-25 RX ADMIN — SODIUM CHLORIDE, POTASSIUM CHLORIDE, SODIUM LACTATE AND CALCIUM CHLORIDE: 600; 310; 30; 20 INJECTION, SOLUTION INTRAVENOUS at 09:21

## 2024-06-25 RX ADMIN — ACETAMINOPHEN 975 MG: 325 TABLET ORAL at 21:12

## 2024-06-25 RX ADMIN — HYDROMORPHONE HYDROCHLORIDE 0.2 MG: 0.2 INJECTION, SOLUTION INTRAMUSCULAR; INTRAVENOUS; SUBCUTANEOUS at 11:34

## 2024-06-25 RX ADMIN — FENTANYL CITRATE 25 MCG: 50 INJECTION, SOLUTION INTRAMUSCULAR; INTRAVENOUS at 10:18

## 2024-06-25 RX ADMIN — CEFAZOLIN 1 G: 1 INJECTION, POWDER, FOR SOLUTION INTRAMUSCULAR; INTRAVENOUS at 15:56

## 2024-06-25 RX ADMIN — HYDROMORPHONE HYDROCHLORIDE 0.2 MG: 0.2 INJECTION, SOLUTION INTRAMUSCULAR; INTRAVENOUS; SUBCUTANEOUS at 12:03

## 2024-06-25 RX ADMIN — ACETAMINOPHEN 975 MG: 325 TABLET ORAL at 06:00

## 2024-06-25 RX ADMIN — BENZOCAINE AND MENTHOL 1 LOZENGE: 15; 3.6 LOZENGE ORAL at 19:06

## 2024-06-25 ASSESSMENT — ACTIVITIES OF DAILY LIVING (ADL)
ADLS_ACUITY_SCORE: 22
ADLS_ACUITY_SCORE: 31
ADLS_ACUITY_SCORE: 24
ADLS_ACUITY_SCORE: 31
ADLS_ACUITY_SCORE: 22
ADLS_ACUITY_SCORE: 25
ADLS_ACUITY_SCORE: 31
ADLS_ACUITY_SCORE: 22
ADLS_ACUITY_SCORE: 22
ADLS_ACUITY_SCORE: 31
ADLS_ACUITY_SCORE: 22
ADLS_ACUITY_SCORE: 24
ADLS_ACUITY_SCORE: 22

## 2024-06-25 NOTE — ANESTHESIA PREPROCEDURE EVALUATION
Anesthesia Pre-Procedure Evaluation    Patient: Cyndie Marin   MRN: [id number] : 1945        Procedure : Procedure(s):  RIGHT DIRECT ANTERIOR TOTAL HIP ARTHROPLASTY          Past Medical History:   Diagnosis Date    Calculus of gallbladder without cholecystitis without obstruction 2022    Claustrophobia     Colonic mass 2022    Colostomy in place (H)     DDD (degenerative disc disease), lumbar 2024    Essential hypertension     GERD (gastroesophageal reflux disease)     History of blood clots     History of urinary tract obstruction 2022    Hyperlipidemia LDL goal <100 2022    Parastomal hernia 2023    Perirectal abscess 10/29/2019    Primary osteoarthritis of both hips     Renal disease     S/P total left hip arthroplasty     Seasonal allergies     Sleep apnea     uses CPAP    RAJ (stress urinary incontinence, female) 2022    Thrombosis       Past Surgical History:   Procedure Laterality Date    COLOSTOMY N/A 10/28/2019    Procedure: CREATION, COLOSTOMY;  Surgeon: Mylene Sears MD;  Location: [facility];  Service: General    NEPHRECTOMY Left 1996    with rib resection    LA EXCIS BARTHOLIN GLAND/CYST      Description: Excision Of Bartholin's Gland Or Cyst;  Recorded: 2013;    PROCTOSCOPY N/A 10/28/2019    Procedure: RIGID PROCTOSCOPY;  Surgeon: Mylene Sears MD;  Location: [facility] OR;  Service: General    SIGMOIDOSCOPY FLEXIBLE N/A 10/27/2019    Procedure: SIGMOIDOSCOPY with biopsy;  Surgeon: Tj Gavin MD;  Location: [facility];  Service: Gastroenterology    TOOTH EXTRACTION      ZZC  DELIVERY ONLY      Description:  Section;  Recorded: 2008;  Comments: x2    ZZC PART REMOVAL COLON W ANASTOMOSIS N/A 10/28/2019    Procedure: OPEN EXPLORATORY LAPAROTOMY, SEGMENTAL COLECTOMY,  WASHOUT OF PELVIC ABSCESS. RECTAL EXAM UNDER ANESTHESIA. DEBRIDEMENT OD ABSCESS CAVITY;  Surgeon: Mylene Sears MD;  Location: [facility];  Service: General    [facility] TOTAL HIP ARTHROPLASTY Left 01/26/2021    Procedure: LEFT DIRECT ANTERIOR TOTAL HIP ARTHROPLASTY;  Surgeon: Alexander Yancey MD;  Location: [facility] OR;  Service: Orthopedics      Allergies   Allergen Reactions    Cats Claw (Uncaria [Uncaria Tomentosa (Cats Claw)] Unknown    Dye [External Allergen Needs Reconciliation - See Comment] Hives and Swelling     Cat scan dye    Other Drug Allergy (See Comments) Hives and Swelling     Cat scan dye [iodine-based contrast?]    Other Environmental Allergy Unknown     DUST    Ragweed Pollen [Ragweeds] Unknown      Social History     Tobacco Use    Smoking status: Never    Smokeless tobacco: Never   Substance Use Topics    Alcohol use: Yes     Comment: rarely      Wt Readings from Last 1 Encounters:   06/25/24 78.9 kg (174 lb)        Anesthesia Evaluation   Pt has had prior anesthetic.     No history of anesthetic complications       ROS/MED HX  ENT/Pulmonary:     (+) sleep apnea,                                       Neurologic: Comment: Lumbar DDD, pt reports some numbness in feet  Had spinal with previous ESTHELA, no complications      Cardiovascular:     (+)  hypertension- -   -  - -                                      METS/Exercise Tolerance:     Hematologic:     (+) History of blood clots,    pt is not anticoagulated,           Musculoskeletal:       GI/Hepatic:     (+) GERD,                   Renal/Genitourinary:     (+) renal disease,             Endo:  - neg endo ROS     Psychiatric/Substance Use:       Infectious Disease:       Malignancy:       Other:            Physical Exam    Airway        Mallampati: II   TM distance: > 3 FB   Neck ROM: full   Mouth opening: > 3 cm    Respiratory Devices and Support         Dental       (+) Minor Abnormalities - some fillings, tiny chips      Cardiovascular          Rhythm and rate: regular and normal     Pulmonary           breath sounds clear to auscultation           OUTSIDE  "LABS:  CBC:   Lab Results   Component Value Date    WBC 6.0 06/04/2024    WBC 7.1 09/13/2023    HGB 13.2 06/04/2024    HGB 14.4 09/13/2023    HCT 41.4 06/04/2024    HCT 44.1 09/13/2023     06/04/2024     09/13/2023     BMP:   Lab Results   Component Value Date     06/04/2024     09/13/2023    POTASSIUM 4.0 06/04/2024    POTASSIUM 3.7 09/13/2023    CHLORIDE 107 06/04/2024    CHLORIDE 105 09/13/2023    CO2 25 06/04/2024    CO2 25 09/13/2023    BUN 25.2 (H) 06/04/2024    BUN 21.5 09/13/2023    CR 0.51 06/04/2024    CR 0.62 09/13/2023    GLC 93 06/04/2024    GLC 86 09/13/2023     COAGS:   Lab Results   Component Value Date    INR 1.02 01/26/2021     POC: No results found for: \"BGM\", \"HCG\", \"HCGS\"  HEPATIC:   Lab Results   Component Value Date    ALBUMIN 3.9 09/13/2023    PROTTOTAL 6.4 09/13/2023    ALT 18 09/13/2023    AST 19 09/13/2023    ALKPHOS 113 (H) 09/13/2023    BILITOTAL 0.5 09/13/2023     OTHER:   Lab Results   Component Value Date    HALLIE 10.7 (H) 06/04/2024    MAG 2.1 08/05/2021    LIPASE 28 12/19/2019       Anesthesia Plan    ASA Status:  3       Anesthesia Type: Spinal.              Consents    Anesthesia Plan(s) and associated risks, benefits, and realistic alternatives discussed. Questions answered and patient/representative(s) expressed understanding.     - Discussed: Risks, Benefits and Alternatives for the PROCEDURE were discussed     - Discussed with:  Patient            Postoperative Care    Pain management: IV analgesics, Oral pain medications.   PONV prophylaxis: Ondansetron (or other 5HT-3), Dexamethasone or Solumedrol     Comments:    Other Comments: Discussed risk of worsening any pre-existing lower extremity neurologic symptoms post-spinal -- pt expressed understanding, agrees to proceed with spinal.             Cullen Lowry MD    I have reviewed the pertinent notes and labs in the chart from the past 30 days and (re)examined the patient.  Any updates or " "changes from those notes are reflected in this note.      # Hypercalcemia: Highest Ca = 10.7 mg/dL in last 30 days, will monitor as appropriate         # Overweight: Estimated body mass index is 28.96 kg/m  as calculated from the following:    Height as of this encounter: 1.651 m (5' 5\").    Weight as of this encounter: 78.9 kg (174 lb).      "

## 2024-06-25 NOTE — PLAN OF CARE

## 2024-06-25 NOTE — PROVIDER NOTIFICATION
Patient came out in pain in R shoulder. HR dropped to high 30's and SBP in high 80s and large emesis sitting upright, no signs of aspiration.  Dr. Johnson called and intervention was ordered.

## 2024-06-25 NOTE — INTERVAL H&P NOTE
"I have reviewed the surgical (or preoperative) H&P that is linked to this encounter, and examined the patient. There are no significant changes    Clinical Conditions Present on Arrival:  Clinically Significant Risk Factors Present on Admission          # Hypercalcemia: Highest Ca = 10.7 mg/dL in last 30 days, will monitor as appropriate             # Overweight: Estimated body mass index is 28.96 kg/m  as calculated from the following:    Height as of this encounter: 1.651 m (5' 5\").    Weight as of this encounter: 78.9 kg (174 lb).       "

## 2024-06-25 NOTE — ANESTHESIA CARE TRANSFER NOTE
Patient: Cyndie Marin    Procedure: Procedure(s):  RIGHT DIRECT ANTERIOR TOTAL HIP ARTHROPLASTY       Diagnosis: Hip pain [M25.559]  Osteoarthritis of right hip [M16.11]  Diagnosis Additional Information: No value filed.    Anesthesia Type:   Spinal     Note:    Oropharynx: oropharynx clear of all foreign objects  Level of Consciousness: awake  Oxygen Supplementation: nasal cannula  Level of Supplemental Oxygen (L/min / FiO2): 2  Independent Airway: airway patency satisfactory and stable  Dentition: dentition unchanged  Vital Signs Stable: post-procedure vital signs reviewed and stable  Report to RN Given: handoff report given  Patient transferred to: PACU    Handoff Report: Identifed the Patient, Identified the Reponsible Provider, Reviewed the pertinent medical history, Discussed the surgical course, Reviewed Intra-OP anesthesia mangement and issues during anesthesia, Set expectations for post-procedure period and Allowed opportunity for questions and acknowledgement of understanding      Vitals:  Vitals Value Taken Time   BP 94/51 06/25/24 0940   Temp 997.7    Pulse 66 06/25/24 0943   Resp 36 06/25/24 0943   SpO2 99 % 06/25/24 0943   Vitals shown include unfiled device data.    Electronically Signed By: CORRINE Wright CRNA  June 25, 2024  9:44 AM

## 2024-06-25 NOTE — ANESTHESIA PROCEDURE NOTES
"Intrathecal Procedure Note    Pre-Procedure   Staff -        Anesthesiologist:  Cullen Lowry MD       Performed By: anesthesiologist       Location: OR       Procedure Start/Stop Times: 6/25/2024 7:53 AM and 6/25/2024 7:55 AM       Pre-Anesthestic Checklist: patient identified, IV checked, risks and benefits discussed, informed consent, monitors and equipment checked, pre-op evaluation and at physician/surgeon's request  Timeout:       Correct Patient: Yes        Correct Procedure: Yes        Correct Site: Yes        Correct Position: Yes   Procedure Documentation  Procedure: intrathecal       Patient Position: sitting       Patient Prep/Sterile Barriers: sterile gloves, mask, patient draped       Skin prep: Chloraprep       Insertion Site: L3-4. (midline approach).       Needle Gauge: 22.        Needle Length (Inches): 3.5        Spinal Needle Type: Simone tip       Introducer used       Introducer: 20 G       # of attempts: 1 and  # of redirects:     Assessment/Narrative         Paresthesias: No.       CSF fluid: clear.    Medication(s) Administered   1.5% Mepivacaine PF (Intrathecal) - Intrathecal   3 mL - 6/25/2024 7:55:00 AM  Medication Administration Time: 6/25/2024 7:53 AM      FOR [facility] ([facility] [place], [place]) ONLY:   Pain Team Contact information: please page the Pain Team Via [employer]. Search \"Pain\". During daytime hours, please page the attending first. At night please page the resident first.      "

## 2024-06-25 NOTE — PROVIDER NOTIFICATION
Patient not tolerating laying flat for bladder scan and unable to scan. Patient getting anxious, restless, gets worked up and nauseous.  In need of laying flat for xray.  Dr. Johnson called for medication for anxiety.

## 2024-06-25 NOTE — ANESTHESIA POSTPROCEDURE EVALUATION
Patient: Cyndie Marin    Procedure: Procedure(s):  RIGHT DIRECT ANTERIOR TOTAL HIP ARTHROPLASTY       Anesthesia Type:  Spinal    Note:     Postop Pain Control: Uneventful            Sign Out: Well controlled pain   PONV: No   Neuro/Psych: Uneventful            Sign Out: Acceptable/Baseline neuro status   Airway/Respiratory: Uneventful            Sign Out: Acceptable/Baseline resp. status   CV/Hemodynamics: Uneventful            Sign Out: Acceptable CV status; No obvious hypovolemia; No obvious fluid overload   Other NRE: NONE   DID A NON-ROUTINE EVENT OCCUR? No           Last vitals:  Vitals Value Taken Time   /57 06/25/24 1100   Temp 36.7  C (98  F) 06/25/24 0935   Pulse 80 06/25/24 1108   Resp 17 06/25/24 1108   SpO2 100 % 06/25/24 1108   Vitals shown include unfiled device data.    Electronically Signed By: Cullen Lowry MD  June 25, 2024  11:09 AM

## 2024-06-25 NOTE — PHARMACY-ADMISSION MEDICATION HISTORY
Pharmacist Admission Medication History    Admission medication history is complete. The information provided in this note is only as accurate as the sources available at the time of the update.    Information Source(s): Patient and CareEverywhere/SureScripts via in-person    Pertinent Information:      Changes made to PTA medication list:  Added: None  Deleted: None  Changed: None    Allergies reviewed with patient and updates made in EHR: yes    Medication History Completed By: Tawanda Beavers [facility] 6/25/2024 6:54 AM    PTA Med List   Medication Sig Last Dose    acetaminophen (TYLENOL) 325 MG tablet [ACETAMINOPHEN (TYLENOL) 325 MG TABLET] Take 2 tablets (650 mg total) by mouth every 4 (four) hours as needed for pain (mild pain). prn at prn    lisinopril-hydrochlorothiazide (ZESTORETIC) 10-12.5 MG tablet TAKE 1 TABLET DAILY 6/24/2024 at am    loratadine (CLARITIN) 10 mg tablet [LORATADINE (CLARITIN) 10 MG TABLET] Take 10 mg by mouth daily as needed.  prn at prn    menthol (BIOFREEZE, MENTHOL,) 4 % Gel [MENTHOL (BIOFREEZE, MENTHOL,) 4 % GEL] Apply topically at bedtime as needed.  prn at prn    Naproxen Sodium (ALEVE PO) Take 220 mg by mouth 2 times daily as needed for moderate pain 6/18/2024 at prn    rosuvastatin (CRESTOR) 10 MG tablet TAKE 1 TABLET AT BEDTIME 6/24/2024 at pm    Vitamin D3 (CHOLECALCIFEROL) 25 mcg (1000 units) tablet Take 1 tablet by mouth daily 6/24/2024 at am

## 2024-06-25 NOTE — OP NOTE
PATIENT:  Cyndie Marin    DATE OF SURGERY:  6/25/2024     PREOPERATIVE DIAGNOSIS: Right hip osteoarthritis.     POSTOPERATIVE DIAGNOSIS: Right hip osteoarthritis.     PROCEDURE: Right total hip arthroplasty.     SURGEON: JACQUELINE GONZALEZ MD.     ASSISTANT: Kelly Schmitt PA-C; BETO assist was essential and required for all portions of the case, including patient positioning, soft tissue retraction, patient safety, use of complex orthopedic instrumentation, assistance with closure of the wound, and postoperative care    ANESTHESIA: Spinal    EBL: 600 mL    COMPLICATIONS: None    IMPLANTS:   1. DePuy Actis femoral stem, size 6, High offset, 12/14 trunnion.   2. DePuy Gription acetabular shell, 52 mm outer diameter.   3. DePuy AltrX polyethylene liner, +0 mm offset.   4. DePuy Biolox ceramic femoral head, 36 mm outer diameter, +8.5 mm neck length.     INDICATION FOR PROCEDURE: Cyndie Marin is a pleasant 79 year old female with long standing Hip degenerative joint disease.  It failed to respond to conservative management.  The above procedure was recommended.  For full details please see my clinic notes.  The risks including, but not limited to, bleeding, infection, nerve injury, dvt, implant failure, implant wear, fracture, limb length inequality, anesthetic complications, heart attack, stroke, and even death were discussed.  Pt verbalized understanding.  Informed consent was obtained      DESCRIPTION OF PROCEDURE: The patient was seen and evaluated in the preop area. Discussion of the surgery and any further questions were answered with the patient and family using easily understandable non-medical terms. The correct site was identified with the patient, and I placed my initials at the surgical site. Pre-procedure verification was completed. Relevant information / documentation available, they were reviewed and properly matched to the patient.  I verified the consent was accurate and complete.  I verified that  the proper surgical equipment and supplies were available. The patient was taken to the operating room and placed in position according to the procedure in consideration with all extremities well padded.  The patient received preoperative prophylactic antibiotics based on the patient s procedure, BMI, and allergy profile.  A Time Out was conducted just prior to starting procedure to verify the eight required elements: 1-patient identity, 2-consent accurate and complete, 3-position, 4-correct side/site marked (if applicable), 5-procedure, 6-relevant images / results properly labeled and displayed (if applicable), 7-antibiotics / irrigation fluids (if applicable), 8-safety precautions.    The patient was  given successful spinal anesthesia. The patient was then placed in the supine position on the [place] table with all extremities well padded.  The operative  Hip was then prepped and draped in sterile fashion.  The leg was covered with a [place] type drape.     I made a longitudinal incision over the tensor fascia muscle.  It was located approx 1 cm distal and 3 cm lateral to the anterior superior iliac spine.  It was angled slightly posterior  And lateral towards the lateral femoral condyle.  The incision was approx 8cm long and parallels the fibers of the tensor fascia benson muscle.  The subcutanuos tissues were incised with a scalpel.  Once at the tensor fascia it was split longitudinally.  An Fely type clamp was placed on the anterior aspect. The vessel that perforates the fascia was coagulated with electrocautery.  Blunt finger dissection was performed anteriorly and medially to the tensor fascia muscle.  A Estiven retractor was placed anteriorly and blunt Cobra retractors were on the superior lateral and inferior medial femoral neck.  A Carter elevator was then used to elevate the ilipsoas muscle and rectus femorus muscles off the anterior hip capsule.  I then identified the lateral femoral circumflex vessels which were  "then tied off and/or coagulated.  I then performed a \"T\" capsulotomy of the hip capsule, which extended down to the intertrochanteric line.  Each leaf was tagged for later repair.  I then placed the Cobra retractors inside of the hip capsule and a right angle retractor was carefully placed anteriorly to the hip joint, just under the rectus femorus muscle.      We then obtained intraoperative xrays of the pelvis and hips.  The hip was placed under gentle traction and externally rotated 20 degrees. Blunt retractors were placed around the femoral neck to protect the surrounding structures. I then made the femoral neck cut with an oscillating saw as per my preoperative templating and using C-arm verification.  The femoral head was then removed without complication. Traction was then released. Retractors were then placed around the acetabulum.  The labrum was sharply excised.  The capsule was released off the medial and posterior medial neck.      We then began our reaming of the acetabulum by aiming the reamer anterior to posterior and proximal.  We medialized under C-arm visualization.  We sequentially reamed in 1-2mm increments.  Progress was checked by visualizing the acetabulum, direct palpation of the acetabulum and C-arm fluoroscopy.  We reamed until there was good circumferential fit on C-arm pictures and our approximate templated size.  Careful attentian was paid to make sure we had a true AP pelvis xray.  After we had reamed to the appropriate size I inserted the acetabular component.  It was impacted into position under C-arm fluoroscopic guidance.  It was placed at 45 degrees of inclination and 20 degrees of anteversion.  It had excellent fit and stability.  No additional screw fixation was felt to be needed.      We then turned our attention to femoral preparation.  The bone hook was placed around the posterior femur just distal to the vastus tubercle.  The femur was then gently externally rotated and " releases were performed.  The capsule about the greater trochanter was excised to aid in delivering the femur.  The leg was then progressively externally rotated to approximately 120 degrees.   The hip was then adducted and extended using the [place] table.  Once good visualization of the femoral neck was achieved, blunt retractors were placed around the femoral neck.  A rongeur was used to remove excess bone from the lateral neck remnant.  I then began femoral broaching.  I started with the smallest size.  The broach was oriented such that it paralleled the posterior cortex of the femoral neck.  C-arm pictures were taken periodically to make sure the broaches were going down the femur appropriately and not in varus.  Broaching continued until complete stability was achieved throughout the proximal femur.  The appropriate trial neck and head were placed on the broach.  The leg ws brought back up to neutral position, then utilizing the [place] table, the hip reduced.      The leg length, offset, component size and position was then checked with the C-arm xray.  The pictures were also printed off to compare pre and post position and compare to non-operative side, using an overlay technique.  Once the appropriate neck length and offset were achieved, the hip was then dislocated.   The leg was externally rotated, adducted and extended.  The femoral trials were removed.  The acetabular liner trial was removed.  The appproriate highly cross linked polyethylene liner was then impacted appropriately into the acetabular component.  The appropriate sized femoral component was then impacted into the proximal femur without complication.  The appropriate final head was then impacted onto the stem.  The hip was then reduced utilizing the [place] table.  C-arm pictures were taken again to confirm appropriate leg length, offset, component size and postition.  I externally the hip to 90 degrees and internally rotated to 90 degrees without  any instability.  A shuck test was performed without any instability.  The soft tissue tension appeared appropriate.      The hip was then irrigated with antibiotic saline.  The hip capsule was closed with #1 fiberwire sutures. The fascia was closed with #1 vicryl suture.  Subcutaneous layer with 2-O vicryl suture.  Skin with 3-O monocryl suture and Dermabond.  A sterile dressing was placed on the surgical hip.  The sponge and needle counts were correct at the end of the case.  The patient left the operating room in stable condition.      POST OP PLAN:   Pain Control:  IV narcotics, transition to oral narcotics as bowel function returns, Toradol, Decadron, Gabapentin, Ice.  DVT Prophylaxis: Xarelto 10 mg PO Qday x 1 month, mechanical devices, early ambulation  Infection Prophylaxis: IV Abx as per pt's allergy profile and BMI x 24 hrs, then discharge on Duricef 500mg PO BID x 7 days  PT/OT: Eval and Treat as per ANTERIOR APPROACH protocol  Incision/Dressing Care: Keep on, clean. Reniforce prn.  OK to shower.   Medical Cares: Primary medical consult  Disposition:  consult as needed for disposition    Implant Name Type Inv. Item Serial No.  Lot No. LRB No. Used Action   IMP HEAD FEMORAL DEPUY CERAMIC 36MM +[id number] - [id number] Total Joint Component/Insert IMP HEAD FEMORAL DEPUY CERAMIC 36MM +8MM 1365-  &[facility]-  Right 1 Implanted   IMP STEM FEM DEPUY ACTIS COLLAR HI-OFFSET SZ 6MM 1010- - [id number] Total Joint Component/Insert IMP STEM FEM DEPUY ACTIS COLLAR HI-OFFSET SZ 6MM 1010-  &[facility]-  Right 1 Implanted   IMP INSERT HIP DEPUY PINNACLE ALTRX [id number] 1221- - [id number] Total Joint Component/Insert IMP INSERT HIP DEPUY PINNACLE ALTRX [id number] 1221-  [facility]-  Right 1 Implanted   IMP SHELL ACET DEPUY PINNACLE GRIPTION 52MM [id number] - [id number] Total Joint Component/Insert IMP SHELL ACET DEPUY PINNACLE GRIPTION 52MM  [id number]  [employer] [facility]-  Right 1 Implanted         Alexander Yancey MD  6/25/2024  9:13 AM      @C(1)@  Alexander Yancey MD    @C(2)@  No Ref-Primary, Physician

## 2024-06-25 NOTE — PROGRESS NOTES
06/25/24 1400   Appointment Info   Signing Clinician's Name / Credentials (PT) Gordo Hodgson, PT, DPT   Quick Adds   Quick Adds Certification   Living Environment   People in Home child(alexis), adult   Current Living Arrangements house   Home Accessibility stairs to enter home;stairs within home   Number of Stairs, Main Entrance 6   Number of Stairs, Within Home, Primary greater than 10 stairs   Self-Care   Usual Activity Tolerance good   Current Activity Tolerance moderate   Equipment Currently Used at Home cane, straight;walker, rolling  (4WW)   Fall history within last six months yes   Number of times patient has fallen within last six months 1   General Information   Onset of Illness/Injury or Date of Surgery 06/25/24   Referring Physician Dr. Yancey   Pertinent History of Current Problem (include personal factors and/or comorbidities that impact the POC) s/p R ESTHELA   Existing Precautions/Restrictions weight bearing   Weight-Bearing Status - LLE full weight-bearing   Weight-Bearing Status - RLE weight-bearing as tolerated   Range of Motion (ROM)   ROM Comment WFL, decreased RLE s/p ESTHELA   Strength (Manual Muscle Testing)   Strength Comments WFL   Bed Mobility   Bed Mobility supine-sit;sit-supine   Supine-Sit [place] (Bed Mobility) modified independence;verbal cues   Sit-Supine [place] (Bed Mobility) modified independence;verbal cues   Impairments Contributing to Impaired Bed Mobility pain;decreased strength   Assistive Device (Bed Mobility) bed rails   Transfers   Transfers sit-stand transfer   Sit-Stand Transfer   Sit-Stand [place] (Transfers) contact guard;verbal cues   Assistive Device (Sit-Stand Transfers) walker, 4-wheeled   Clinical Impression   Criteria for Skilled Therapeutic Intervention Yes, treatment indicated   PT Diagnosis (PT) impaired functional mobility   Influenced by the following impairments pain, decreased ROM, decreased strength   Functional limitations due to impairments  gait, stairs, transfers   Clinical Presentation (PT Evaluation Complexity) stable   Clinical Presentation Rationale pt presents as medically diagnosed   Clinical Decision Making (Complexity) low complexity   Planned Therapy Interventions (PT) gait training;home exercise program;patient/family education;ROM (range of motion);stair training;strengthening;transfer training   Risk & Benefits of therapy have been explained care plan/treatment goals reviewed;patient   PT Total Evaluation Time   PT Eval, Low Complexity Minutes (98647) 10   Therapy Certification   Start of care date 06/25/24   Certification date from 06/25/24   Certification date to 07/25/24   Medical Diagnosis s/p ESTHELA   Physical Therapy Goals   PT Frequency Daily   PT Predicted Duration/Target Date for Goal Attainment 06/27/24   PT Goals PT Goal 1;Gait;Transfers;Stairs   PT: Transfers Modified independent;Sit to/from stand;Assistive device   PT: Gait Modified independent;Rolling walker;100 feet   PT: Stairs Supervision/stand-by assist;4 stairs;Rail on both sides   PT: Goal 1 Independent with ESTHELA HEP for LE strengthening and ROM   Interventions   Interventions Quick Adds Therapeutic Activity;Gait Training   Therapeutic Activity   Therapeutic Activities: dynamic activities to improve functional performance Minutes (73126) 15   Symptoms Noted During/After Treatment Fatigue;Increased pain;Dizziness  (Nausea)   Treatment Detail/Skilled Intervention Supine<>sit with HOB elevated with use of bed rail, SBA at EOB. Pt reports mild nausea and dizziness, this improved with time. Sit to/from stand x 2 from elevated bed and commode, cueing for safe hand placement and LE positioning, independent with toileting. PT student managing IV. Increased time for set up and due to first time pt getting up. Educated on pain control, icing, role of therapies, and POC. Elected to limit amb and exs due to pain/nausea. Encouraged pt to amb with nursing later tonight.   PT Discharge  Planning   PT Plan Increase amb, stairs trial, ESTHELA HEP   PT Discharge Recommendation (DC Rec) other (see comments)  (Defer to ortho)   PT Rationale for DC Rec Son/DIL at home to assist as needed, mobility limited but expect progression and no concerns with d/c tomorrow if pain managed.   PT Brief overview of current status SBA transfers, amb to and from commode with 4WW   [facility]  OUTPATIENT PHYSICAL THERAPY EVALUATION  PLAN OF TREATMENT FOR OUTPATIENT REHABILITATION  (COMPLETE FOR INITIAL CLAIMS ONLY)  Patient's Last Name, First Name, M.I.  YOB: 1945  [place]  [place]                        Provider's Name  [facility] Medical Record No.  [id number]                             Onset Date:  06/25/24   Start of Care Date:  (P) 06/25/24   Type:     _X_PT   ___OT   ___SLP Medical Diagnosis:  (P) s/p ESTHELA              PT Diagnosis:  impaired functional mobility Visits from SOC:  1     See note for plan of treatment, functional goals and certification details    I CERTIFY THE NEED FOR THESE SERVICES FURNISHED UNDER        THIS PLAN OF TREATMENT AND WHILE UNDER MY CARE     (Physician co-signature of this document indicates review and certification of the therapy plan).

## 2024-06-25 NOTE — CONSULTS
INTERNAL MEDICINE CONSULT    Physician requesting consult: Dr. Yancey   Reason for consult: hypertension, h/o DVT, PE, sleep apnea     Assessment and Plan:  Hypertension  Blood pressure stable  Resume lisinopril, hydrochlorothiazide starting tomorrow    Dyslipidemia  Continue home Crestor    Obstructive sleep apnea  On CPAP    History of provoked DVT, PE in 2000 following foot surgery  DVT prophylaxis per orthopedics, plan for Xarelto    Right hip osteoarthritis s/p total hip arthroplasty: Postop day #0.  Continue PT OT, pain control, DVT prophylaxis per orthopedic surgery.  Encourage incentive spirometry, monitor hemoglobin  Estimated blood loss 600 mL noted  Preop hemoglobin was 13.    HPI:     Cyndie Marin is a 79 year old female with past history significant for provoked DVT, PE in 2000, hypertension, dyslipidemia, sleep apnea on CPAP, colostomy for pelvic abscess in 2019, left nephrectomy for benign cyst, admitted postoperatively after she underwent a total hip arthroplasty.  Tolerated procedure well.  Estimated blood loss 600 mL. Currently denies any complaints such as nausea vomiting shortness of breath chest pain abdominal pain. Denies any previous history of blood clots, heart issues.  Preop history and physical reviewed.     Medical History    Past Medical History:   Diagnosis Date    Calculus of gallbladder without cholecystitis without obstruction 08/08/2022    Claustrophobia     Colonic mass 08/08/2022    Colostomy in place (H)     DDD (degenerative disc disease), lumbar 04/25/2024    Essential hypertension     GERD (gastroesophageal reflux disease)     History of blood clots     History of urinary tract obstruction 08/08/2022    Hyperlipidemia LDL goal <100 08/08/2022    Parastomal hernia 09/13/2023    Perirectal abscess 10/29/2019    Primary osteoarthritis of both hips     Renal disease     S/P total left hip arthroplasty     Seasonal allergies     Sleep apnea     uses CPAP    RAJ (stress  urinary incontinence, female) 2022    Thrombosis       Patient Active Problem List    Diagnosis Date Noted    S/P total hip arthroplasty 2024     Priority: Medium    History of deep venous thrombosis 2024     Priority: Medium     Before , thought to be provoked by LE contusion from fall.       DDD (degenerative disc disease), lumbar 2024     Priority: Medium    Localized osteoarthritis of left shoulder 2024     Priority: Medium    Primary osteoarthritis of right hip 2024     Priority: Medium    Parastomal hernia 2023     Priority: Medium    Hyperlipidemia LDL goal <100 2022     Priority: Medium    Calculus of gallbladder without cholecystitis without obstruction 2022     Priority: Medium    History of urinary tract obstruction 2022     Priority: Medium    RAJ (stress urinary incontinence, female) 2022     Priority: Medium    S/P total left hip arthroplasty 2021     Priority: Medium    SHAUNA (obstructive sleep apnea)      Priority: Medium     Created by Conversion  Replacement Utility updated for latest IMO load        Overactive bladder 2020     Priority: Medium    Gastroesophageal reflux disease without esophagitis      Priority: Medium    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     Priority: Medium    Essential hypertension 12/10/2018     Priority: Medium    Allergic rhinitis due to pollen 2018     Priority: Medium        Surgical History  She  has a past surgical history that includes  DELIVERY ONLY; Pr Excis Bartholin Gland/Cyst; Tooth Extraction; Sigmoidoscopy flexible (N/A, 10/27/2019); PART REMOVAL COLON W ANASTOMOSIS (N/A, 10/28/2019); Proctoscopy (N/A, 10/28/2019); Colostomy (N/A, 10/28/2019); TOTAL HIP ARTHROPLASTY (Left, 2021); and Nephrectomy (Left, ).     Past Surgical History:   Procedure Laterality Date    COLOSTOMY N/A 10/28/2019    Procedure: CREATION, COLOSTOMY;  Surgeon: Mylene Sears MD;  Location:  [facility] Main OR;  Service: General    NEPHRECTOMY Left     with rib resection    ND EXCIS BARTHOLIN GLAND/CYST      Description: Excision Of Bartholin's Gland Or Cyst;  Recorded: 2013;    PROCTOSCOPY N/A 10/28/2019    Procedure: RIGID PROCTOSCOPY;  Surgeon: Mylene Sears MD;  Location: [facility] OR;  Service: General    SIGMOIDOSCOPY FLEXIBLE N/A 10/27/2019    Procedure: SIGMOIDOSCOPY with biopsy;  Surgeon: Tj Gavin MD;  Location: [facility] GI;  Service: Gastroenterology    TOOTH EXTRACTION      [facility]  DELIVERY ONLY      Description:  Section;  Recorded: 2008;  Comments: x2    ZZC PART REMOVAL COLON W ANASTOMOSIS N/A 10/28/2019    Procedure: OPEN EXPLORATORY LAPAROTOMY, SEGMENTAL COLECTOMY,  WASHOUT OF PELVIC ABSCESS. RECTAL EXAM UNDER ANESTHESIA. DEBRIDEMENT OD ABSCESS CAVITY;  Surgeon: Mylene Sears MD;  Location: [facility] OR;  Service: General    [facility] TOTAL HIP ARTHROPLASTY Left 2021    Procedure: LEFT DIRECT ANTERIOR TOTAL HIP ARTHROPLASTY;  Surgeon: Alexander Yancey MD;  Location: [facility] OR;  Service: Orthopedics       Allergies  Allergies   Allergen Reactions    Cats Claw (Uncaria [Uncaria Tomentosa (Cats Claw)] Unknown    Dye [External Allergen Needs Reconciliation - See Comment] Hives and Swelling     Cat scan dye    Other Drug Allergy (See Comments) Hives and Swelling     Cat scan dye [iodine-based contrast?]    Other Environmental Allergy Unknown     DUST    Ragweed Pollen [Ragweeds] Unknown       Prior to Admission Medications   Facility-Administered Medications Prior to Admission   Medication Dose Route Frequency Provider Last Rate Last Admin    [DISCONTINUED] lidocaine (PF) (XYLOCAINE) 1 % injection 0.5 mL  0.5 mL   Geovanny Mcneal, DO   0.5 mL at 23 0850    [DISCONTINUED] lidocaine (PF) (XYLOCAINE) 1 % injection 4 mL  4 mL   Geovanny Mcneal, DO   4 mL at 24 0932    [DISCONTINUED] lidocaine (PF) (XYLOCAINE) 1 %  injection 4 mL  4 mL   Geovanny Mcneal, DO   4 mL at 01/29/24 0926    [DISCONTINUED] lidocaine (PF) (XYLOCAINE) 1 % injection 4 mL  4 mL   Geovanny Mcneal, DO   4 mL at 03/06/23 1401    [DISCONTINUED] lidocaine (PF) (XYLOCAINE) 1 % injection 4 mL  4 mL   Geovanny Mcneal, DO   4 mL at 09/12/22 1634    [DISCONTINUED] lidocaine (PF) (XYLOCAINE) 1 % injection 7 mL  7 mL   Geovanny Mcneal, DO   7 mL at 03/09/24 1050    [DISCONTINUED] triamcinolone (KENALOG-40) injection 40 mg  40 mg   Geovanny Mcneal, DO   40 mg at 03/25/24 0932    [DISCONTINUED] triamcinolone (KENALOG-40) injection 40 mg  40 mg   Geovanny Mcneal, DO   40 mg at 03/09/24 1050    [DISCONTINUED] triamcinolone (KENALOG-40) injection 40 mg  40 mg   Geovanny Mcneal, DO   40 mg at 01/29/24 0926    [DISCONTINUED] triamcinolone (KENALOG-40) injection 40 mg  40 mg   Geovanny cMneal, DO   40 mg at 03/06/23 1401    [DISCONTINUED] triamcinolone (KENALOG-40) injection 40 mg  40 mg   Geovanny Mcneal, DO   40 mg at 09/12/22 1634    [DISCONTINUED] triamcinolone acetonide (KENALOG-10) injection 10 mg  10 mg   Geovanny Mcneal, DO   10 mg at 08/17/23 0850     Medications Prior to Admission   Medication Sig Dispense Refill Last Dose    acetaminophen (TYLENOL) 325 MG tablet [ACETAMINOPHEN (TYLENOL) 325 MG TABLET] Take 2 tablets (650 mg total) by mouth every 4 (four) hours as needed for pain (mild pain). 100 tablet 0 prn at prn    lisinopril-hydrochlorothiazide (ZESTORETIC) 10-12.5 MG tablet TAKE 1 TABLET DAILY 90 tablet 3 6/24/2024 at am    loratadine (CLARITIN) 10 mg tablet [LORATADINE (CLARITIN) 10 MG TABLET] Take 10 mg by mouth daily as needed.    prn at prn    menthol (BIOFREEZE, MENTHOL,) 4 % Gel [MENTHOL (BIOFREEZE, MENTHOL,) 4 % GEL] Apply topically at bedtime as needed.    prn at prn    Naproxen Sodium (ALEVE PO) Take 220 mg by mouth 2 times daily as needed for moderate pain   6/18/2024 at prn    rosuvastatin (CRESTOR) 10 MG tablet TAKE 1 TABLET AT BEDTIME 90 tablet 3  "6/24/2024 at pm    Vitamin D3 (CHOLECALCIFEROL) 25 mcg (1000 units) tablet Take 1 tablet by mouth daily   6/24/2024 at am       Social History  Reviewed, and she  reports that she has never smoked. She has never used smokeless tobacco. She reports current alcohol use. She reports that she does not use drugs.  Social History     Tobacco Use    Smoking status: Never    Smokeless tobacco: Never   Substance Use Topics    Alcohol use: Yes     Comment: rarely       Family History  Reviewed, and noncontributory    Review Of Systems  As per admission HPI, all others reviewed and negative.     Physical Exam:  /57   Pulse 76   Temp 98  F (36.7  C) (Temporal)   Resp 18   Ht 1.651 m (5' 5\")   Wt 78.9 kg (174 lb)   LMP  (LMP Unknown)   SpO2 100%   BMI 28.96 kg/m    General Appearance:  Awake Alert, orientedx3, not in any apparent distress   Head:  Normocephalic, without obvious abnormality   Eyes:  PERRL, conjunctiva/corneas clear   Throat: Oral mucosa moist   Neck: Supple,  no JVD   Lungs:   Clear to auscultation bilaterally, respirations unlabored   Chest Wall:  No tenderness   Heart:  Regular rate and rhythm, S1, S2 normal,no murmur   Abdomen:   Soft, non-tender, bowel sounds present,  no masses, no organomegaly   Extremities: S/p right ESTHELA    Skin: Skin color, texture, turgor normal, no rashes or lesions   Neurologic: Alert and oriented X 3, exam on operated extremity defer to surgery     Results:  Results for orders placed or performed during the hospital encounter of 06/25/24   POC US Guidance Needle Placement     Status: None    Narrative    Ultrasound was performed as guidance to an anesthesia procedure.  Click   \"PACS images\" hyperlink below to view any stored images.  For specific   procedure details, view procedure note authored by anesthesia.   XR Surgery JACKSON Fluoro G/T 5 Min w Stills     Status: None    Narrative    This exam was marked as non-reportable because it will not be read by a   radiologist " "or a [place] non-radiologist provider.         XR Pelvis w Hip Port Right 1 View     Status: None    Narrative    EXAM: XR PELVIS AND HIP PORTABLE RIGHT 1 VIEW  LOCATION: [facility]  DATE: 6/25/2024    INDICATION: Status post Hip surgery  COMPARISON: None.      Impression    IMPRESSION: Postoperative changes bilateral total hip arthroplasty. Components appear well seated. Post procedural air surrounding the right hip joint. Pelvis negative for fracture.   Adult Type and Screen     Status: None   Result Value Ref Range    ABO/RH(D) A POS     Antibody Screen Negative Negative    SPECIMEN EXPIRATION DATE [id number]    ABO/Rh type and screen     Status: None    Narrative    The following orders were created for panel order ABO/Rh type and screen.  Procedure                               Abnormality         Status                     ---------                               -----------         ------                     Adult Type and Screen[[id number]]                            Final result                 Please view results for these tests on the individual orders.       Imaging:   XR Pelvis w Hip Port Right 1 View    Result Date: 6/25/2024  EXAM: XR PELVIS AND HIP PORTABLE RIGHT 1 VIEW LOCATION: [facility] DATE: 6/25/2024 INDICATION: Status post Hip surgery COMPARISON: None.     IMPRESSION: Postoperative changes bilateral total hip arthroplasty. Components appear well seated. Post procedural air surrounding the right hip joint. Pelvis negative for fracture.    XR Surgery JACKSON Fluoro G/T 5 Min w Stills    Result Date: 6/25/2024  This exam was marked as non-reportable because it will not be read by a radiologist or a [place] non-radiologist provider.     POC US Guidance Needle Placement    Result Date: 6/25/2024  Ultrasound was performed as guidance to an anesthesia procedure.  Click \"PACS images\" hyperlink below to view any stored images.  For specific procedure " details, view procedure note authored by anesthesia.        Klarissa Rogers M.D  [facility] Service  Internal Medicine    6/25/2024  12:31 PM

## 2024-06-26 ENCOUNTER — APPOINTMENT (OUTPATIENT)
Dept: OCCUPATIONAL THERAPY | Facility: CLINIC | Age: 79
End: 2024-06-26
Attending: ORTHOPAEDIC SURGERY
Payer: COMMERCIAL

## 2024-06-26 ENCOUNTER — APPOINTMENT (OUTPATIENT)
Dept: PHYSICAL THERAPY | Facility: CLINIC | Age: 79
End: 2024-06-26
Attending: PHYSICIAN ASSISTANT
Payer: COMMERCIAL

## 2024-06-26 LAB
FASTING STATUS PATIENT QL REPORTED: ABNORMAL
GLUCOSE SERPL-MCNC: 159 MG/DL (ref 70–99)
HGB BLD-MCNC: 9.9 G/DL (ref 11.7–15.7)

## 2024-06-26 PROCEDURE — 97110 THERAPEUTIC EXERCISES: CPT | Mod: GP

## 2024-06-26 PROCEDURE — 97116 GAIT TRAINING THERAPY: CPT | Mod: GP

## 2024-06-26 PROCEDURE — 36415 COLL VENOUS BLD VENIPUNCTURE: CPT | Performed by: ORTHOPAEDIC SURGERY

## 2024-06-26 PROCEDURE — 99214 OFFICE O/P EST MOD 30 MIN: CPT | Performed by: INTERNAL MEDICINE

## 2024-06-26 PROCEDURE — 250N000013 HC RX MED GY IP 250 OP 250 PS 637: Performed by: INTERNAL MEDICINE

## 2024-06-26 PROCEDURE — 97535 SELF CARE MNGMENT TRAINING: CPT | Mod: GO

## 2024-06-26 PROCEDURE — 82947 ASSAY GLUCOSE BLOOD QUANT: CPT | Performed by: ORTHOPAEDIC SURGERY

## 2024-06-26 PROCEDURE — 97166 OT EVAL MOD COMPLEX 45 MIN: CPT | Mod: GO

## 2024-06-26 PROCEDURE — 250N000013 HC RX MED GY IP 250 OP 250 PS 637: Performed by: PHYSICIAN ASSISTANT

## 2024-06-26 PROCEDURE — 250N000011 HC RX IP 250 OP 636: Performed by: PHYSICIAN ASSISTANT

## 2024-06-26 PROCEDURE — 85018 HEMOGLOBIN: CPT | Performed by: PHYSICIAN ASSISTANT

## 2024-06-26 RX ADMIN — OXYCODONE HYDROCHLORIDE 10 MG: 5 TABLET ORAL at 17:04

## 2024-06-26 RX ADMIN — ACETAMINOPHEN 975 MG: 325 TABLET ORAL at 21:09

## 2024-06-26 RX ADMIN — ROSUVASTATIN CALCIUM 10 MG: 10 TABLET, FILM COATED ORAL at 21:09

## 2024-06-26 RX ADMIN — CEFAZOLIN 1 G: 1 INJECTION, POWDER, FOR SOLUTION INTRAMUSCULAR; INTRAVENOUS at 00:27

## 2024-06-26 RX ADMIN — SENNOSIDES AND DOCUSATE SODIUM 1 TABLET: 50; 8.6 TABLET ORAL at 21:09

## 2024-06-26 RX ADMIN — ACETAMINOPHEN 975 MG: 325 TABLET ORAL at 13:33

## 2024-06-26 RX ADMIN — OXYCODONE HYDROCHLORIDE 10 MG: 5 TABLET ORAL at 11:54

## 2024-06-26 RX ADMIN — RIVAROXABAN 10 MG: 10 TABLET, FILM COATED ORAL at 16:29

## 2024-06-26 RX ADMIN — ACETAMINOPHEN 975 MG: 325 TABLET ORAL at 06:18

## 2024-06-26 RX ADMIN — LISINOPRIL AND HYDROCHLOROTHIAZIDE 1 TABLET: 12.5; 1 TABLET ORAL at 08:36

## 2024-06-26 RX ADMIN — SENNOSIDES AND DOCUSATE SODIUM 1 TABLET: 50; 8.6 TABLET ORAL at 08:36

## 2024-06-26 RX ADMIN — ONDANSETRON 4 MG: 4 TABLET, ORALLY DISINTEGRATING ORAL at 09:35

## 2024-06-26 RX ADMIN — OXYCODONE HYDROCHLORIDE 10 MG: 5 TABLET ORAL at 04:11

## 2024-06-26 ASSESSMENT — ACTIVITIES OF DAILY LIVING (ADL)
ADLS_ACUITY_SCORE: 27
ADLS_ACUITY_SCORE: 27
DEPENDENT_IADLS:: INDEPENDENT
ADLS_ACUITY_SCORE: 31
ADLS_ACUITY_SCORE: 27
ADLS_ACUITY_SCORE: 31
ADLS_ACUITY_SCORE: 27
ADLS_ACUITY_SCORE: 27
PREVIOUS_RESPONSIBILITIES: MEAL PREP;HOUSEKEEPING;LAUNDRY;SHOPPING;MEDICATION MANAGEMENT;FINANCES;DRIVING
ADLS_ACUITY_SCORE: 27
ADLS_ACUITY_SCORE: 27

## 2024-06-26 NOTE — DISCHARGE SUMMARY
"ORTHOPEDIC DISCHARGE SUMMARY       Cyndie Marin,  1945, MRN [id number]    Admission Date: 2024      Admission Diagnoses: Hip pain [M25.559]  Osteoarthritis of right hip [M16.11]     Discharge Date:  24     Post-operative Day:  1 Day Post-Op    Reason for Admission: The patient was admitted for the following: Procedure(s):  RIGHT DIRECT ANTERIOR TOTAL HIP ARTHROPLASTY    BRIEF HOSPITAL COURSE   Cyndie Marin is a pleasant 79 year old female who underwent the aforementioned procedure with Dr. Yancey on . There were no intraoperative complications and the patient was transferred to the recovery room and later the orthopedic unit in stable condition. Once the patient reached the orthopedic floor our orthopedic pain protocol was implemented along with the following:    Anticoagulation Medications: ASA  Therapy: PT and OT  Activity: WBAT  Bracing: None    Consultations during Admission: Hospitalist service for medical management     COMPLICATIONS/SIGNIFICANT FINDINGS    NONE    DISCHARGE INFORMATION   Condition at discharge: Good  Discharge destination: Home  Patient was seen by myself on the date of discharge.    FOLLOW UP CARE   Follow up with orthopedics in 2 weeks or sooner should the need arise. Ortho will continue to manage pain control, post op anticoagulation and incision care.     Follow up with your PCP for management of chronic medical problems and to evaluate for post op medical complications including constipation, nausea/vomiting, DVT/PE, anemia, changes in blood pressure, fevers/chills, urinary retention and atelectasis/pneumonia.     Subjective   Patient is doing well on POD #1. Pain is well controlled with oral medications. Ambulating. Tolerating oral intake.     Physical Exam   /63 (BP Location: Left arm)   Pulse 95   Temp 98.2  F (36.8  C) (Oral)   Resp 18   Ht 1.651 m (5' 5\")   Wt 78.9 kg (174 lb)   LMP  (LMP Unknown)   SpO2 95%   BMI 28.96 kg/m    The " patient is A&Ox3. Appears comfortable, sitting up at bedside.  Calves are soft and non-tender bilaterally  Brisk capillary refill in the toes.   Palpable right dorsalis pedis pulse. Foot warm & well-perfused.  Sensation is intact to light touch & equal bilaterally in the femoral, DP, SP & tibial nerve distributions.  ROM: Flexes at right hip. Appropriately flexes & extends all toes bilaterally.   Motor: +5/5 dorsiflexion, plantar flexion & EHL bilaterally. Fires quad.   Leg lengths equal.  right hip dressing C/D/I without strikethrough, no surrounding erythema.         5/5 TA/GSC/EHL.     Pertinent Results at Discharge     Hemoglobin   Date/Time Value Ref Range Status   06/26/2024 05:55 AM 9.9 (L) 11.7 - 15.7 g/dL Final   06/04/2024 10:55 AM 13.2 11.7 - 15.7 g/dL Final   09/13/2023 01:34 PM 14.4 11.7 - 15.7 g/dL Final     INR   Date/Time Value Ref Range Status   01/26/2021 08:07 AM 1.02 0.90 - 1.10 Final   10/27/2019 08:43 AM 1.05 0.90 - 1.10 Final     Platelet Count   Date/Time Value Ref Range Status   06/04/2024 10:55  150 - 450 10e3/uL Final   09/13/2023 01:34  150 - 450 10e3/uL Final   08/08/2022 03:36  150 - 450 10e3/uL Final       Problem List   Active Problems:    S/P total hip arthroplasty      Nicolasa Sánchez PA-C/Dr. Yancey  [facility]  [phone]  Date: 6/26/2024  Time: 8:49 AM

## 2024-06-26 NOTE — CONSULTS
Care Management Initial Consult    General Information  Assessment completed with: Patient,    Type of CM/SW Visit: Initial Assessment    Primary Care Provider verified and updated as needed: Yes   Readmission within the last 30 days: no previous admission in last 30 days      Reason for Consult: discharge planning  Advance Care Planning: Advance Care Planning Reviewed: no concerns identified          Communication Assessment  Patient's communication style: spoken language (English or Bilingual)    Hearing Difficulty or Deaf: no   Wear Glasses or Blind: yes    Cognitive  Cognitive/Neuro/Behavioral: WDL                      Living Environment:   People in home: child(alexis), adult  Adam  Current living Arrangements: house      Able to return to prior arrangements: yes       Family/Social Support:  Care provided by: self  Provides care for: no one  Marital Status:   Children          Description of Support System: Supportive, Involved         Current Resources:   Patient receiving home care services: No     Community Resources: None  Equipment currently used at home: cane, straight, grab bar, tub/shower, commode chair, shower chair, walker, rolling  Supplies currently used at home: Incontinence Supplies (Cpap, colostomy)    Employment/Financial:  Employment Status: retired        Financial Concerns:             Does the patient's insurance plan have a 3 day qualifying hospital stay waiver?  Yes     Which insurance plan 3 day waiver is available? Alternative insurance waiver    Will the waiver be used for post-acute placement? No    Lifestyle & Psychosocial Needs:  Social Determinants of Health     Food Insecurity: Not on file   Depression: Not at risk (1/29/2024)    PHQ-2     PHQ-2 Score: 0   Housing Stability: Not on file   Tobacco Use: Low Risk  (6/21/2024)    Patient History     Smoking Tobacco Use: Never     Smokeless Tobacco Use: Never     Passive Exposure: Not on file   Financial Resource Strain: Not on  file   Alcohol Use: Not on file   Transportation Needs: Not on file   Physical Activity: Not on file   Interpersonal Safety: Low Risk  (4/25/2024)    Interpersonal Safety     Do you feel physically and emotionally safe where you currently live?: Yes     Within the past 12 months, have you been hit, slapped, kicked or otherwise physically hurt by someone?: No     Within the past 12 months, have you been humiliated or emotionally abused in other ways by your partner or ex-partner?: No   Stress: Not on file   Social Connections: Not on file   Health Literacy: Not on file       Functional Status:  Prior to admission patient needed assistance:   Dependent ADLs:: Ambulation-walker, Independent  Dependent IADLs:: Independent       Mental Health Status:  Mental Health Status: No Current Concerns       Chemical Dependency Status:  Chemical Dependency Status: No Current Concerns             Values/Beliefs:  Spiritual, Cultural Beliefs, Confucianist Practices, Values that affect care: no               Additional Information:  Met with pt, introduced self and CM role. Pt lives in a house with her son and daughter-in-law.  Pt uses a walker to ambulate, independent with I/ADLs. Pt has a colostomy that she is able to manage independently.     Pt plans to return back to home with her son and daughter-in-law. Son to transport back to home.     No CM needs at this time. CM will sign off. Consult CM if there becomes a need.     Renzo Meek RN

## 2024-06-26 NOTE — PROGRESS NOTES
"Orthopedic Progress Note      Assessment: 1 Day Post-Op  S/P Procedure(s):  RIGHT DIRECT ANTERIOR TOTAL HIP ARTHROPLASTY @    Plan:   - Continue PT/OT.   - Weightbearing status: WBAT  - Anticoagulation: ASA in addition to SCDs, jose stockings and early ambulation.  - Discharge planning: Discharge to home today.     Subjective:  Pain: Well controlled on oral meds  Nausea, Vomiting:  No  Chest pain: No  Lightheadedness, Dizziness:  No  Neuro:  Patient denies new onset numbness or paresthesias     Patient doing well on POD #1. Ambulating, tolerating oral intake, voiding & pain is controlled with oral medications. Hgb 9.9 (13.2 pre-op).     Objective:  /63 (BP Location: Left arm)   Pulse 95   Temp 98.2  F (36.8  C) (Oral)   Resp 18   Ht 1.651 m (5' 5\")   Wt 78.9 kg (174 lb)   LMP  (LMP Unknown)   SpO2 95%   BMI 28.96 kg/m    The patient is A&Ox3. Appears comfortable, sitting up at bedside.  Calves are soft and non-tender bilaterally  Brisk capillary refill in the toes.   Palpable right dorsalis pedis pulse. Foot warm & well-perfused.  Sensation is intact to light touch & equal bilaterally in the femoral, DP, SP & tibial nerve distributions.  ROM: Flexes at right hip. Appropriately flexes & extends all toes bilaterally.   Motor: +5/5 dorsiflexion, plantar flexion & EHL bilaterally. Fires quad.   Leg lengths equal.  right hip dressing C/D/I without strikethrough, no surrounding erythema.       5/5 TA/GSC/EHL.          Pertinent Labs   Lab Results: personally reviewed.   Lab Results   Component Value Date    INR 1.02 01/26/2021    INR 1.05 10/27/2019     Lab Results   Component Value Date    WBC 6.0 06/04/2024    HGB 9.9 (L) 06/26/2024    HCT 41.4 06/04/2024    MCV 88 06/04/2024     06/04/2024     Lab Results   Component Value Date     06/04/2024    CO2 25 06/04/2024         Report completed by:  Nicolasa Sánchez PA-C/Dr. [facility]    Date: 6/26/2024  Time: 8:47 AM    "

## 2024-06-26 NOTE — PROGRESS NOTES
[facility] Daily Progress Note    Assessment/Plan:  Hypertension  Blood pressure stable  Resume lisinopril, hydrochlorothiazide with holding parameters     Dyslipidemia  Continue home Crestor     Obstructive sleep apnea  On CPAP     History of provoked DVT, PE in 2000 following foot surgery  DVT prophylaxis per orthopedics, plan for Xarelto     Right hip osteoarthritis s/p total hip arthroplasty: Postop day #1  Continue PT OT, pain control, DVT prophylaxis per orthopedic surgery.  Encourage incentive spirometry, monitor hemoglobin  Estimated blood loss 600 mL noted  Preop hemoglobin was 13.    Acute blood loss anemia  Monitor CBC  Transfuse if hemoglobin less than 7 or symptoms.    Active Problems:    S/P total hip arthroplasty     LOS: 0 days     Subjective:  Having intermittent nausea.  No other shortness of breath, chest pain, urinary or bowel complaints.   Current Facility-Administered Medications   Medication Dose Route Frequency Provider Last Rate Last Admin    acetaminophen (TYLENOL) tablet 975 mg  975 mg Oral Q8H Diana Schmitt PA-C   975 mg at 06/26/24 0618    lisinopril-hydrochlorothiazide (ZESTORETIC) 10-12.5 MG per tablet 1 tablet  1 tablet Oral Daily Klarissa Rogers MD   1 tablet at 06/26/24 0836    polyethylene glycol (MIRALAX) Packet 17 g  17 g Oral Daily Diana Schmitt PA-C        rivaroxaban ANTICOAGULANT (XARELTO) tablet 10 mg  10 mg Oral Daily with supper Diana Schmitt PA-C   10 mg at 06/25/24 2111    rosuvastatin (CRESTOR) tablet 10 mg  10 mg Oral At Bedtime Klarissa Rogers MD   10 mg at 06/25/24 2111    senna-docusate (SENOKOT-S/PERICOLACE) 8.6-50 MG per tablet 1 tablet  1 tablet Oral BID Diana Schmitt PA-C   1 tablet at 06/26/24 0836    sodium chloride (PF) 0.9% PF flush 3 mL  3 mL Intracatheter Q8H Diana Schmitt PA-C   3 mL at 06/26/24 0411       Objective:  Vital signs in last 24 hours:  Temp:  [97.1  F (36.2  C)-98.8  F (37.1  C)] 98.2  F (36.8  C)  Pulse:  [54-96] 95  Resp:  [14-35]  18  BP: ()/(44-65) 133/63  SpO2:  [90 %-100 %] 95 %  Weight:   [unfilled]    Intake/Output last 3 shifts:  I/O last 3 completed shifts:  In: 3996 [P.O.:960; I.V.:3036]  Out: 1100 [Urine:500; Blood:600]  Intake/Output this shift:  No intake/output data recorded.    Review of Systems:   As per subjective, all others negative.    Physical Exam:    General Appearance:  Alert, cooperative, no distress, appears stated age   Head:  Normocephalic, without obvious abnormality, atraumatic   Lungs:   Clear to auscultation bilaterally, respirations unlabored   Chest Wall:  No tenderness or deformity   Heart:  Regular rate and rhythm, S1, S2 normal,no murmur, rub or gallop   Abdomen:   Soft, non tender, non distended, bowel sounds present, no guarding or rigidity   Extremities: S/p right ESTHELA   Skin: Skin color, texture, turgor normal, no rashes or lesions   Neurologic: Alert and oriented X 3, Moves all 4 extremities       Lab Results:  Personally Reviewed.   Recent Results (from the past 24 hour(s))   Creatinine    Collection Time: 06/25/24  2:01 PM   Result Value Ref Range    Creatinine 0.48 (L) 0.51 - 0.95 mg/dL    GFR Estimate >90 >60 mL/min/1.73m2   Hemoglobin    Collection Time: 06/26/24  5:55 AM   Result Value Ref Range    Hemoglobin 9.9 (L) 11.7 - 15.7 g/dL   Glucose    Collection Time: 06/26/24  5:55 AM   Result Value Ref Range    Glucose 159 (H) 70 - 99 mg/dL    Patient Fasting > 8hrs? Unknown          Klarissa Rogers M.D  [facility] Service  Internal Medicine

## 2024-06-26 NOTE — PROGRESS NOTES
06/26/24 0730   Appointment Info   Signing Clinician's Name / Credentials (OT) Jorgito Frank OTR/L   Quick Adds   Quick Adds Certification   Living Environment   People in Home child(alexis), adult  (Son)   Current Living Arrangements house  (2 levels)   Living Environment Comments Tub/Shower with GB, Shower chair, RTS with GB   Self-Care   Usual Activity Tolerance good   Current Activity Tolerance moderate   Equipment Currently Used at Home cane, straight;walker, rolling  (4WW and FWW)   Instrumental Activities of Daily Living (IADL)   Previous Responsibilities meal prep;housekeeping;laundry;shopping;medication management;finances;driving   IADL Comments Son is able to assist   General Information   Onset of Illness/Injury or Date of Surgery 06/25/24   Referring Physician Dr. Alexander Yancey   Patient/Family Therapy Goal Statement (OT) To return home with family.   Additional Occupational Profile Info/Pertinent History of Current Problem Adm for R ESTHELA.  PMH:DVT, PE, Hyperlip, HTN, SHAUNA, Colostomy, L Nephrectomy, L ESTHELA - 3 yrs ago   Existing Precautions/Restrictions no known precautions/restrictions   Right Lower Extremity (Weight-bearing Status) weight-bearing as tolerated (WBAT)   Cognitive Status Examination   Follows Commands WNL   Visual Perception   Visual Impairment/Limitations corrective lenses full-time   Bed Mobility   Bed Mobility supine-sit   Supine-Sit [place] (Bed Mobility) minimum assist (75% patient effort)   Transfers   Transfers bed-chair transfer;sit-stand transfer;toilet transfer   Transfer Skill: Bed to Chair/Chair to Bed   Bed-Chair [place] (Transfers) supervision;verbal cues   Assistive Device (Bed-Chair Transfers) rolling walker   Sit-Stand Transfer   Sit-Stand [place] (Transfers) supervision;verbal cues   Assistive Device (Sit-Stand Transfers) walker, 4-wheeled   Toilet Transfer   Type (Toilet Transfer) sit-stand;stand-sit   [place] Level (Toilet Transfer)  supervision;verbal cues   Assistive Device (Toilet Transfer) walker, front-wheeled;commode chair   Balance   Balance Assessment standing dynamic balance   Standing Balance: Dynamic modified independence   Position/Device Used, Standing Balance walker, 4-wheeled   Activities of Daily Living   BADL Assessment/Intervention upper body dressing;lower body dressing   Upper Body Dressing Assessment/Training   Position (Upper Body Dressing) supported sitting   Mobile Level (Upper Body Dressing) upper body dressing skills;don;pull-over garment;independent   Lower Body Dressing Assessment/Training   Position (Lower Body Dressing) supported sitting;supported standing   Assistive Devices (Lower Body Dressing) reacher;long-handled shoe horn;sock-aid   Mobile Level (Lower Body Dressing) lower body dressing skills;don;pants/bottoms;shoes/slippers;socks   Clinical Impression   Criteria for Skilled Therapeutic Interventions Met (OT) Yes, treatment indicated   OT Diagnosis Decreased indep with ADLs and trsfs due to R ESTHELA   OT Problem List-Impairments impacting ADL mobility   Assessment of Occupational Performance 3-5 Performance Deficits   Identified Performance Deficits dressing, toileting, bahting, G/H and trsfs   Planned Therapy Interventions (OT) ADL retraining   Clinical Decision Making Complexity (OT) detailed assessment/moderate complexity   Risk & Benefits of therapy have been explained evaluation/treatment results reviewed;participants included;patient   OT Total Evaluation Time   OT Eval, Moderate Complexity Minutes (79753) 15   Therapy Certification   Medical Diagnosis ESTHELA   Start of Care Date 06/26/24   Certification date from 06/26/24   Certification date to 07/26/24   OT Goals   Therapy Frequency (OT) One time eval and treatment   OT Goals Lower Body Dressing;Transfers   OT: Lower Body Dressing Modified independent;using adaptive equipment;within precautions;Completed   OT: Transfer Modified  independent;within precautions;Completed  (toilet)   Interventions   Interventions Quick Adds Self-Care/Home Management   Self-Care/Home Management   Self-Care/Home Mgmt/ADL, Compensatory, Meal Prep Minutes (57902) 30   Symptoms Noted During/After Treatment (Meal Preparation/Planning Training) none   Treatment Detail/Skilled Intervention Pt sitting in chair when therapist arrived.  Reviewed with Pt the ESTHELA precautions - NO PREC.  Pt  verbalized understanding.  Worked on FB dressing.  Pt was indep with U/B dressing while sitting.  Worked on L/B dressing with SBA for VC for sequencing and use of AE.  By end of session, Pt was mod indep with L/B dressing.  Pt also took care of her colostomy bag and stoma care.  Due to the time needed for her Stoma care.  Therapist demo the car and kitchen trsfs and mobility.  Pt did practice getting into bed at end of session with min A of one to lift the R leg into the bed.  Pt does have a leg  at home and if familiar with using it for the car, bed and tub/shower trsfs.   Worked on room trsfs using the FWW to the bed, chair,  toilet,   Initially needing SBA for VC for correct hand placement.  By end of session, Pt was mod indep using FWW.  Pt was shown and practiced bed mobility using pillows between legs when resting on thier L side.   At end of session, Pt was resting in bed with alarm on and call light within reach.  RN aware.   OT Discharge Planning   OT Plan D/C OT   OT Discharge Recommendation (DC Rec) other (see comments)  (Defer to Ortho Team)   OT Rationale for DC Rec Pt will have assist from her Son at home.   OT Brief overview of current status Goals met   Total Session Time   Timed Code Treatment Minutes 30   Total Session Time (sum of timed and untimed services) 45    [facility]  OUTPATIENT OCCUPATIONAL THERAPY  EVALUATION  PLAN OF TREATMENT FOR OUTPATIENT REHABILITATION  (COMPLETE FOR INITIAL CLAIMS ONLY)  Patient's Last Name, First  Name, M.I.  YOB: 1945  TaniaCyndie munoz                          Provider's Name  [facility] Medical Record No.  [id number]                             Onset Date:  06/25/24   Start of Care Date:  06/26/24   Type:     ___PT   _X_OT   ___SLP Medical Diagnosis:  ESTHELA                    OT Diagnosis:  Decreased indep with ADLs and trsfs due to R ESTHELA Visits from SOC:  1     See note for plan of treatment, functional goals and certification details    I CERTIFY THE NEED FOR THESE SERVICES FURNISHED UNDER        THIS PLAN OF TREATMENT AND WHILE UNDER MY CARE     (Physician co-signature of this document indicates review and certification of the therapy plan).

## 2024-06-26 NOTE — PROGRESS NOTES
Patient vital signs are at baseline: Yes  Patient able to ambulate as they were prior to admission or with assist devices provided by therapies during their stay:  Yes  Patient MUST void prior to discharge:  Yes  Patient able to tolerate oral intake:  Yes, does not tolerate straws, will burp after drinking water, takes one pill at a time  Pain has adequate pain control using Oral analgesics:  Yes  Does patient have an identified :  Yes  Has goal D/C date and time been discussed with patient:  Yes    Alert and oriented x 4, able to make needs known, CMS intact, dsg CDI, colostomy in place, managed by patient, JAYASHREE MARQUEZ, plan is home after therapies

## 2024-06-26 NOTE — PLAN OF CARE
Problem: Hip Arthroplasty  Goal: Acceptable Pain Control  Outcome: Progressing  Intervention: Prevent or Manage Pain  Recent Flowsheet Documentation  Taken 6/25/2024 1550 by Diogenes Holland, RN  Pain Management Interventions:   cold applied   medication offered but refused     Problem: Hip Arthroplasty  Goal: Acceptable Pain Control  Intervention: Prevent or Manage Pain  Recent Flowsheet Documentation  Taken 6/25/2024 1550 by Diogenes Holland, RN  Pain Management Interventions:   cold applied   medication offered but refused   Goal Outcome Evaluation:       Patient alert and oriented. VSS. Has CPAP on at bedtime. LR infusing at 75ml/hr. Tolerating Regular diet.  Voiding spontaneously. PRN oxycodone given for pain, patient stated effective. Utilizing ice packs and repositioning. Call light within reach. Alarms on.

## 2024-06-26 NOTE — PLAN OF CARE
Patient vital signs are at baseline: Yes  Patient able to ambulate as they were prior to admission or with assist devices provided by therapies during their stay:  Yes  Patient MUST void prior to discharge:  Yes  Patient able to tolerate oral intake:  Yes  Pain has adequate pain control using Oral analgesics:  Yes  Does patient have an identified :  Yes  Has goal D/C date and time been discussed with patient:  Yes  Pt not discharging today more hopeful tomorrow 6/27. Having off and on nausea with therapy and when getting up. PRN Zofran given effective per pt. Pain controlled with PRN and scheduled meds see MAR. Dressing C/D/I. Manages own colostomy,wears home CPAP.   Sara Perea RN on 6/26/2024 at 1:51 PM

## 2024-06-27 ENCOUNTER — APPOINTMENT (OUTPATIENT)
Dept: PHYSICAL THERAPY | Facility: CLINIC | Age: 79
End: 2024-06-27
Attending: ORTHOPAEDIC SURGERY
Payer: COMMERCIAL

## 2024-06-27 LAB
FASTING STATUS PATIENT QL REPORTED: ABNORMAL
GLUCOSE SERPL-MCNC: 148 MG/DL (ref 70–99)
HGB BLD-MCNC: 8.8 G/DL (ref 11.7–15.7)

## 2024-06-27 PROCEDURE — 250N000013 HC RX MED GY IP 250 OP 250 PS 637: Performed by: INTERNAL MEDICINE

## 2024-06-27 PROCEDURE — 97110 THERAPEUTIC EXERCISES: CPT | Mod: GP

## 2024-06-27 PROCEDURE — 85018 HEMOGLOBIN: CPT | Performed by: PHYSICIAN ASSISTANT

## 2024-06-27 PROCEDURE — 99214 OFFICE O/P EST MOD 30 MIN: CPT | Performed by: INTERNAL MEDICINE

## 2024-06-27 PROCEDURE — 250N000013 HC RX MED GY IP 250 OP 250 PS 637: Performed by: PHYSICIAN ASSISTANT

## 2024-06-27 PROCEDURE — 36415 COLL VENOUS BLD VENIPUNCTURE: CPT | Performed by: ORTHOPAEDIC SURGERY

## 2024-06-27 PROCEDURE — 97116 GAIT TRAINING THERAPY: CPT | Mod: GP

## 2024-06-27 PROCEDURE — 82947 ASSAY GLUCOSE BLOOD QUANT: CPT | Performed by: ORTHOPAEDIC SURGERY

## 2024-06-27 PROCEDURE — 250N000011 HC RX IP 250 OP 636: Performed by: PHYSICIAN ASSISTANT

## 2024-06-27 RX ORDER — AMOXICILLIN 250 MG
2 CAPSULE ORAL 2 TIMES DAILY
Status: ON HOLD
Start: 2024-06-27 | End: 2024-08-09

## 2024-06-27 RX ORDER — AMOXICILLIN 250 MG
2 CAPSULE ORAL 2 TIMES DAILY
Status: DISCONTINUED | OUTPATIENT
Start: 2024-06-27 | End: 2024-06-28 | Stop reason: HOSPADM

## 2024-06-27 RX ORDER — POLYETHYLENE GLYCOL 3350 17 G/17G
17 POWDER, FOR SOLUTION ORAL 2 TIMES DAILY
Status: DISCONTINUED | OUTPATIENT
Start: 2024-06-27 | End: 2024-06-28 | Stop reason: HOSPADM

## 2024-06-27 RX ORDER — LISINOPRIL/HYDROCHLOROTHIAZIDE 10-12.5 MG
TABLET ORAL
Status: SHIPPED
Start: 2024-06-27 | End: 2024-09-17

## 2024-06-27 RX ORDER — FERROUS SULFATE 325(65) MG
325 TABLET ORAL EVERY OTHER DAY
Qty: 60 TABLET | Refills: 0 | Status: ON HOLD | OUTPATIENT
Start: 2024-06-27 | End: 2024-08-09

## 2024-06-27 RX ADMIN — OXYCODONE HYDROCHLORIDE 5 MG: 5 TABLET ORAL at 01:06

## 2024-06-27 RX ADMIN — ACETAMINOPHEN 975 MG: 325 TABLET ORAL at 05:11

## 2024-06-27 RX ADMIN — POLYETHYLENE GLYCOL 3350 17 G: 17 POWDER, FOR SOLUTION ORAL at 21:21

## 2024-06-27 RX ADMIN — SENNOSIDES AND DOCUSATE SODIUM 2 TABLET: 50; 8.6 TABLET ORAL at 21:17

## 2024-06-27 RX ADMIN — POLYETHYLENE GLYCOL 3350 17 G: 17 POWDER, FOR SOLUTION ORAL at 08:02

## 2024-06-27 RX ADMIN — ACETAMINOPHEN 975 MG: 325 TABLET ORAL at 21:17

## 2024-06-27 RX ADMIN — ONDANSETRON 4 MG: 4 TABLET, ORALLY DISINTEGRATING ORAL at 04:19

## 2024-06-27 RX ADMIN — SENNOSIDES AND DOCUSATE SODIUM 1 TABLET: 50; 8.6 TABLET ORAL at 08:02

## 2024-06-27 RX ADMIN — MAGNESIUM HYDROXIDE 30 ML: 400 SUSPENSION ORAL at 04:19

## 2024-06-27 RX ADMIN — RIVAROXABAN 10 MG: 10 TABLET, FILM COATED ORAL at 17:23

## 2024-06-27 RX ADMIN — ACETAMINOPHEN 975 MG: 325 TABLET ORAL at 13:15

## 2024-06-27 RX ADMIN — OXYCODONE HYDROCHLORIDE 5 MG: 5 TABLET ORAL at 22:36

## 2024-06-27 RX ADMIN — OXYCODONE HYDROCHLORIDE 5 MG: 5 TABLET ORAL at 17:23

## 2024-06-27 RX ADMIN — OXYCODONE HYDROCHLORIDE 5 MG: 5 TABLET ORAL at 13:16

## 2024-06-27 RX ADMIN — ROSUVASTATIN CALCIUM 10 MG: 10 TABLET, FILM COATED ORAL at 21:17

## 2024-06-27 ASSESSMENT — ACTIVITIES OF DAILY LIVING (ADL)
ADLS_ACUITY_SCORE: 27
ADLS_ACUITY_SCORE: 27
ADLS_ACUITY_SCORE: 29
ADLS_ACUITY_SCORE: 27
ADLS_ACUITY_SCORE: 29
ADLS_ACUITY_SCORE: 29
ADLS_ACUITY_SCORE: 27
ADLS_ACUITY_SCORE: 29

## 2024-06-27 NOTE — PROGRESS NOTES
Patient vital signs are at baseline: Yes  Patient able to ambulate as they were prior to admission or with assist devices provided by therapies during their stay:  Yes  Patient MUST void prior to discharge:  Yes  Patient able to tolerate oral intake:  Yes  Pain has adequate pain control using Oral analgesics:  Yes  Does patient have an identified :  Yes  Has goal D/C date and time been discussed with patient:  Yes    Alert and oriented x 4, able to make needs known, CMS intact per patient's baseline, dsg CDI, had an episode of nausea/ left lower abd pain and left lower back pain, colostomy in place, only had a small hard nugget, after giving PRN Zofran, MOM, ambulated in the hallway, repositioned, voided, and drinking fluid, patient reported effectiveness, PIV SL.

## 2024-06-27 NOTE — PROGRESS NOTES
[facility] Daily Progress Note    Assessment/Plan:  Hypertension  Blood pressure on the lower side  Resume lisinopril, hydrochlorothiazide with holding parameters     Dyslipidemia  Continue home Crestor     Obstructive sleep apnea  On CPAP     History of provoked DVT, PE in 2000 following foot surgery  DVT prophylaxis per orthopedics, plan for Xarelto     Right hip osteoarthritis s/p total hip arthroplasty: Postop day #2  Continue PT OT, pain control, DVT prophylaxis per orthopedic surgery.  Encourage incentive spirometry, monitor hemoglobin  Estimated blood loss 600 mL noted  Preop hemoglobin was 13.    Acute blood loss anemia  Monitor CBC  Transfuse if hemoglobin less than 7 or symptoms.  Asymptomatic.  Started on iron.    History of pelvic abscess s/p colostomy  She has a small hard stool in her bag  Continue bowel meds.    Active Problems:    S/P total hip arthroplasty     LOS: 0 days     Subjective:  Intermittent nausea improving.  No vomiting.  She has very small hard stool in her colostomy bag.  Denies any abdominal pain.  No other shortness of breath, chest pain.  Current Facility-Administered Medications   Medication Dose Route Frequency Provider Last Rate Last Admin    acetaminophen (TYLENOL) tablet 975 mg  975 mg Oral Q8H Diana Schmitt PA-C   975 mg at 06/27/24 0511    lisinopril-hydrochlorothiazide (ZESTORETIC) 10-12.5 MG per tablet 1 tablet  1 tablet Oral Daily Klarissa Rogers MD   1 tablet at 06/26/24 0836    polyethylene glycol (MIRALAX) Packet 17 g  17 g Oral Daily Diana Schmitt PA-C   17 g at 06/27/24 0802    rivaroxaban ANTICOAGULANT (XARELTO) tablet 10 mg  10 mg Oral Daily with supper Diana Schmitt PA-C   10 mg at 06/26/24 1629    rosuvastatin (CRESTOR) tablet 10 mg  10 mg Oral At Bedtime Klarissa Rogers MD   10 mg at 06/26/24 2109    senna-docusate (SENOKOT-S/PERICOLACE) 8.6-50 MG per tablet 1 tablet  1 tablet Oral BID Diana Schmitt PA-C   1 tablet at 06/27/24 0802    sodium chloride (PF) 0.9%  PF flush 3 mL  3 mL Intracatheter Q8H Diana Schmitt PA-C   3 mL at 06/27/24 0419       Objective:  Vital signs in last 24 hours:  Temp:  [98  F (36.7  C)-98.6  F (37  C)] 98  F (36.7  C)  Pulse:  [81-95] 95  Resp:  [18-20] 18  BP: (105-115)/(52-56) 114/56  SpO2:  [94 %-99 %] 94 %  Weight:   [unfilled]    Intake/Output last 3 shifts:  I/O last 3 completed shifts:  In: 629 [P.O.:626; I.V.:3]  Out: 425 [Urine:425]  Intake/Output this shift:  No intake/output data recorded.    Review of Systems:   As per subjective, all others negative.    Physical Exam:    General Appearance:  Alert, cooperative, no distress, appears stated age   Head:  Normocephalic, without obvious abnormality, atraumatic   Lungs:   Clear to auscultation bilaterally, respirations unlabored   Chest Wall:  No tenderness or deformity   Heart:  Regular rate and rhythm, S1, S2 normal,no murmur, rub or gallop   Abdomen:   Soft, non tender, non distended, bowel sounds present, no guarding or rigidity colostomy bag in place   Extremities: S/p right ESTHELA   Skin: Skin color, texture, turgor normal, no rashes or lesions   Neurologic: Alert and oriented X 3, Moves all 4 extremities       Lab Results:  Personally Reviewed.   Recent Results (from the past 24 hour(s))   Hemoglobin    Collection Time: 06/27/24  5:57 AM   Result Value Ref Range    Hemoglobin 8.8 (L) 11.7 - 15.7 g/dL   Glucose    Collection Time: 06/27/24  5:57 AM   Result Value Ref Range    Glucose 148 (H) 70 - 99 mg/dL    Patient Fasting > 8hrs? Unknown          Klarissa Rogers M.D  [facility] Service  Internal Medicine

## 2024-06-27 NOTE — PLAN OF CARE
Goal Outcome Evaluation:         Patient vital signs are at baseline: Yes  Patient able to ambulate as they were prior to admission or with assist devices provided by therapies during their stay:  Yes  Patient MUST void prior to discharge:  Yes  Patient able to tolerate oral intake:  Yes  Pain has adequate pain control using Oral analgesics:  Yes  Does patient have an identified :  Yes  Has goal D/C date and time been discussed with patient:  Yes, pt hopefully discharge  tomorrow. No nausea reported this evening. Pain controlled w/ PO oxycodone and scheduled meds. Dressing CDI. Managing colostomy on own. Wearing home CPAP at night. Calling appropriately. Bed alarms on.

## 2024-06-27 NOTE — PLAN OF CARE
Patient vital signs are at baseline: Yes  Patient able to ambulate as they were prior to admission or with assist devices provided by therapies during their stay:  Yes  Patient MUST void prior to discharge:  Yes  Patient able to tolerate oral intake:  Yes  Pain has adequate pain control using Oral analgesics:  Yes  Does patient have an identified :  Yes  Has goal D/C date and time been discussed with patient:  Yes    Pt denied nausea this shift. Bowl meds increased. Pt having some stool in colostomy however still very hard. Pt's Hg also still trending down now at 8.8. Dressing C/D/I. Discharge pending medical clearance.   Sara Perea RN on 6/27/2024 at 5:51 PM

## 2024-06-27 NOTE — UTILIZATION REVIEW
Admission Status; Secondary Review Determination   Under the authority of the Utilization Management Committee, the utilization review process indicated a secondary review on Cyndie Marin. The review outcome is based on review of the medical records, discussions with staff, and applying clinical experience noted on the date of the review.   (x) Outpatient Status with extended recovery is appropriate - This patient does not meet hospital inpatient criteria. If this patient's primary payer is Medicare and was admitted as an inpatient, Condition Code 44 should be used and patient status changed to outpatient recovery/Postprocedureal Care.    RATIONALE FOR DETERMINATION   79-year-old female 2 days status post right total hip arthroplasty for right hip osteoarthritis.  Had acute blood loss anemia with mild decrease in hemoglobin but asymptomatic.  Has colostomy secondary to history of pelvic abscess.  History of sleep apnea and high blood pressure.  Discharge yesterday was canceled secondary to nausea.  Nausea improved today and she is ambulating, tolerating oral diet, voiding and pain is controlled with oral medications.  Vitals are normal and no significant lab changes.  Patient was admitted to the hospital after the procedure. The procedure is not on the CMS inpatient list. No documented complications or unexpected recovery. Patient can be safely  monitored for bleeding and recover in outpatient/extended recovery setting.    The severity of illness, intensity of service provided, expected LOS and risk for adverse outcome doesn't meet inpatient hospital admission.   The information on this document is developed by the utilization review team in order for the business office to ensure compliance. This only denotes the appropriateness of proper admission status and does not reflect the quality of care rendered.   The definitions of Inpatient Status and Observation Status used in making the determination above are  those provided in the CMS Coverage Manual, Chapter 1 and Chapter 6, section 70.4.   Sincerely,   Rahul Hurtado MD  Utilization Review  Physician Advisor  [facility]

## 2024-06-27 NOTE — PROGRESS NOTES
"Orthopedic Progress Note      Assessment: 2 Day Post-Op  S/P Procedure(s):  RIGHT DIRECT ANTERIOR TOTAL HIP ARTHROPLASTY @    Plan:   - Continue PT/OT.   - Weightbearing status: WBAT  - Anticoagulation: ASA in addition to SCDs, jose stockings and early ambulation.  - Discharge planning: Discharge pending medical clearance  - stayed yesterday due to increasing uncontrolled nausea, this has improved today     Subjective:  Pain: Well controlled on oral meds  Nausea, Vomiting:  No  Chest pain: No  Lightheadedness, Dizziness:  No  Neuro:  Patient denies new onset numbness or paresthesias     Patient doing well on POD #2. Ambulating, tolerating oral intake, voiding & pain is controlled with oral medications. Hgb 8.8 (13.2 pre-op).     Objective:  /56 (BP Location: Left arm, Cuff Size: Adult Regular)   Pulse 95   Temp 98  F (36.7  C) (Oral)   Resp 18   Ht 1.651 m (5' 5\")   Wt 78.9 kg (174 lb)   LMP  (LMP Unknown)   SpO2 94%   BMI 28.96 kg/m    The patient is A&Ox3. Appears comfortable, sitting up at bedside.  Calves are soft and non-tender bilaterally  Brisk capillary refill in the toes.   Palpable right dorsalis pedis pulse. Foot warm & well-perfused.  Sensation is intact to light touch & equal bilaterally in the femoral, DP, SP & tibial nerve distributions.  ROM: Flexes at right hip. Appropriately flexes & extends all toes bilaterally.   Motor: +5/5 dorsiflexion, plantar flexion & EHL bilaterally. Fires quad.   Leg lengths equal.  right hip dressing C/D/I without strikethrough, no surrounding erythema.       5/5 TA/GSC/EHL.          Pertinent Labs   Lab Results: personally reviewed.   Lab Results   Component Value Date    INR 1.02 01/26/2021    INR 1.05 10/27/2019     Lab Results   Component Value Date    WBC 6.0 06/04/2024    HGB 8.8 (L) 06/27/2024    HCT 41.4 06/04/2024    MCV 88 06/04/2024     06/04/2024     Lab Results   Component Value Date     06/04/2024    CO2 25 06/04/2024         Report " completed by:  Nicolasa Sánchez PA-C/Dr. Yancey  [facility]  06/27/24

## 2024-06-28 VITALS
SYSTOLIC BLOOD PRESSURE: 114 MMHG | HEART RATE: 84 BPM | WEIGHT: 174 LBS | OXYGEN SATURATION: 92 % | TEMPERATURE: 98.7 F | HEIGHT: 65 IN | RESPIRATION RATE: 18 BRPM | DIASTOLIC BLOOD PRESSURE: 55 MMHG | BODY MASS INDEX: 28.99 KG/M2

## 2024-06-28 LAB
HGB BLD-MCNC: 8.5 G/DL (ref 11.7–15.7)
HOLD SPECIMEN: NORMAL

## 2024-06-28 PROCEDURE — 85018 HEMOGLOBIN: CPT

## 2024-06-28 PROCEDURE — 99214 OFFICE O/P EST MOD 30 MIN: CPT | Performed by: INTERNAL MEDICINE

## 2024-06-28 PROCEDURE — 250N000013 HC RX MED GY IP 250 OP 250 PS 637: Performed by: INTERNAL MEDICINE

## 2024-06-28 PROCEDURE — 250N000013 HC RX MED GY IP 250 OP 250 PS 637: Performed by: PHYSICIAN ASSISTANT

## 2024-06-28 PROCEDURE — 36415 COLL VENOUS BLD VENIPUNCTURE: CPT

## 2024-06-28 RX ADMIN — ACETAMINOPHEN 975 MG: 325 TABLET ORAL at 07:18

## 2024-06-28 RX ADMIN — POLYETHYLENE GLYCOL 3350 17 G: 17 POWDER, FOR SOLUTION ORAL at 09:44

## 2024-06-28 RX ADMIN — SENNOSIDES AND DOCUSATE SODIUM 2 TABLET: 50; 8.6 TABLET ORAL at 09:44

## 2024-06-28 RX ADMIN — OXYCODONE HYDROCHLORIDE 10 MG: 5 TABLET ORAL at 04:14

## 2024-06-28 ASSESSMENT — ACTIVITIES OF DAILY LIVING (ADL)
ADLS_ACUITY_SCORE: 27
ADLS_ACUITY_SCORE: 29
ADLS_ACUITY_SCORE: 27
ADLS_ACUITY_SCORE: 30
ADLS_ACUITY_SCORE: 27
ADLS_ACUITY_SCORE: 30
ADLS_ACUITY_SCORE: 27

## 2024-06-28 NOTE — PROGRESS NOTES
Physical Therapy Discharge Summary    Reason for therapy discharge:    All goals and outcomes met, no further needs identified.    Progress towards therapy goal(s). See goals on Care Plan in [facility] electronic health record for goal details.  Goals met 06/26 and 06/27.    Therapy recommendation(s):    Continue home exercise program.

## 2024-06-28 NOTE — PROGRESS NOTES
Patient vital signs are at baseline: Yes  Patient able to ambulate as they were prior to admission or with assist devices provided by therapies during their stay:  Yes  Patient MUST void prior to discharge:  Yes  Patient able to tolerate oral intake:  Yes  Pain has adequate pain control using Oral analgesics:  Yes  Does patient have an identified :  Yes  Has goal D/C date and time been discussed with patient:  Yes    Alert and oriented x 4, able to make needs known, CMS intact, dsg CDI, denied nausea or abd discomfort, colostomy in place, hard nuggets noted, PIV SL.

## 2024-06-28 NOTE — PLAN OF CARE
Patient vital signs are at baseline: Yes  Patient able to ambulate as they were prior to admission or with assist devices provided by therapies during their stay:  Yes  Patient MUST void prior to discharge:  Yes  Patient able to tolerate oral intake:  Yes  Pain has adequate pain control using Oral analgesics:  Yes  Does patient have an identified :  Yes  Has goal D/C date and time been discussed with patient:  Yes     Plan to discharge home with son to transport. RN notified discharge RN that pt would be a good candidate.       Problem: Adult Inpatient Plan of Care  Goal: Absence of Hospital-Acquired Illness or Injury  Intervention: Identify and Manage Fall Risk  Recent Flowsheet Documentation  Taken 6/28/2024 0935 by Nikolas Hemphill RN  Safety Promotion/Fall Prevention:   activity supervised   assistive device/personal items within reach   clutter free environment maintained   increased rounding and observation   increase visualization of patient   nonskid shoes/slippers when out of bed   mobility aid in reach   patient and family education   patient video monitoring   room door open   room near nurse's station   room organization consistent   safety round/check completed  Intervention: Prevent Skin Injury  Recent Flowsheet Documentation  Taken 6/28/2024 0935 by Nikolas Hemphill RN  Body Position:   position changed independently   weight shifting  Skin Protection: adhesive use limited  Device Skin Pressure Protection:   adhesive use limited   absorbent pad utilized/changed  Intervention: Prevent and Manage VTE (Venous Thromboembolism) Risk  Recent Flowsheet Documentation  Taken 6/28/2024 0935 by Nikolas Hemphill RN  VTE Prevention/Management: patient refused intervention  Intervention: Prevent Infection  Recent Flowsheet Documentation  Taken 6/28/2024 0935 by Nikolas Hemphill RN  Infection Prevention:   single patient room provided   personal protective equipment utilized   rest/sleep promoted   hand hygiene  promoted   cohorting utilized

## 2024-06-28 NOTE — PROGRESS NOTES
"Orthopedic Progress Note      Assessment: 3 Day Post-Op  S/P Procedure(s):  RIGHT DIRECT ANTERIOR TOTAL HIP ARTHROPLASTY @    Plan:   - Continue PT/OT.   - Weightbearing status: WBAT  - Anticoagulation: ASA in addition to SCDs, jose stockings and early ambulation.  - Discharge planning: Discharge today if hgb stable     Subjective:  Pain: Well controlled on oral meds  Nausea, Vomiting:  No  Chest pain: No  Lightheadedness, Dizziness:  No  Neuro:  Patient denies new onset numbness or paresthesias     Patient doing well on POD #3. Ambulating, tolerating oral intake, voiding & pain is controlled with oral medications. Hgb pending (13.2 pre-op).     Objective:  /55 (BP Location: Left arm)   Pulse 84   Temp 98.7  F (37.1  C) (Oral)   Resp 18   Ht 1.651 m (5' 5\")   Wt 78.9 kg (174 lb)   LMP  (LMP Unknown)   SpO2 92%   BMI 28.96 kg/m    The patient is A&Ox3. Appears comfortable, sitting up at bedside.  Calves are soft and non-tender bilaterally  Brisk capillary refill in the toes.   Palpable right dorsalis pedis pulse. Foot warm & well-perfused.  Sensation is intact to light touch & equal bilaterally in the femoral, DP, SP & tibial nerve distributions.  ROM: Flexes at right hip. Appropriately flexes & extends all toes bilaterally.   Motor: +5/5 dorsiflexion, plantar flexion & EHL bilaterally. Fires quad.   Leg lengths equal.  right hip dressing C/D/I without strikethrough, no surrounding erythema.       5/5 TA/GSC/EHL.          Pertinent Labs   Lab Results: personally reviewed.   Lab Results   Component Value Date    INR 1.02 01/26/2021    INR 1.05 10/27/2019     Lab Results   Component Value Date    WBC 6.0 06/04/2024    HGB 8.8 (L) 06/27/2024    HCT 41.4 06/04/2024    MCV 88 06/04/2024     06/04/2024     Lab Results   Component Value Date     06/04/2024    CO2 25 06/04/2024         Report completed by:  Nicolasa Sácnhez PA-C/Dr. Yancey  [facility]  06/28/24     "

## 2024-06-28 NOTE — PROGRESS NOTES
Patient vital signs are at baseline: Yes  Patient able to ambulate as they were prior to admission or with assist devices provided by therapies during their stay:  Yes  Patient MUST void prior to discharge:  Yes  Patient able to tolerate oral intake:  Yes  Pain has adequate pain control using Oral analgesics:  Yes  Does patient have an identified :  Yes  Has goal D/C date and time been discussed with patient:  Yes     Pt A&Ox4. VSS on RA. Had pt since half of the shift. Denies nausea this shift. C/o pain 5-6/10, oxycodone given, effective as per pt. Noted Bmx1 of hard stool, 7x small nuggets. Colostomy bag changed herself. CMS intact. Dressing c/d/I. Saline locked. Calls appropriately. Bed alarm on for pt safety. Discharge pending.

## 2024-06-28 NOTE — PROGRESS NOTES
[facility] Daily Progress Note    Assessment/Plan:  Hypertension  Blood pressure on the lower side  lisinopril, hydrochlorothiazide with holding parameters     Dyslipidemia  Continue home Crestor     Obstructive sleep apnea  On CPAP     History of provoked DVT, PE in 2000 following foot surgery  DVT prophylaxis per orthopedics, plan for Xarelto     Right hip osteoarthritis s/p total hip arthroplasty: Postop day #3  Continue PT OT, pain control, DVT prophylaxis per orthopedic surgery.  Encourage incentive spirometry, monitor hemoglobin  Estimated blood loss 600 mL noted  Preop hemoglobin was 13.    Acute blood loss anemia  Monitor CBC  Transfuse if hemoglobin less than 7 or symptoms.  Asymptomatic.  Started on iron.    History of pelvic abscess s/p colostomy  Having normal bowel movements   Continue bowel meds.    Active Problems:    S/P total hip arthroplasty     LOS: 0 days     Subjective:  No nausea  No vomiting. Had 3 normal stools.  Denies any abdominal pain.  No other shortness of breath, chest pain.  Current Facility-Administered Medications   Medication Dose Route Frequency Provider Last Rate Last Admin    lisinopril-hydrochlorothiazide (ZESTORETIC) 10-12.5 MG per tablet 1 tablet  1 tablet Oral Daily Klarissa Rogers MD   1 tablet at 06/26/24 0836    polyethylene glycol (MIRALAX) Packet 17 g  17 g Oral BID Klarissa Rogers MD   17 g at 06/28/24 0944    rivaroxaban ANTICOAGULANT (XARELTO) tablet 10 mg  10 mg Oral Daily with supper Diana Schmitt PA-C   10 mg at 06/27/24 1723    rosuvastatin (CRESTOR) tablet 10 mg  10 mg Oral At Bedtime Klarissa Rogers MD   10 mg at 06/27/24 2117    senna-docusate (SENOKOT-S/PERICOLACE) 8.6-50 MG per tablet 2 tablet  2 tablet Oral BID Klarissa Rogers MD   2 tablet at 06/28/24 0944    sodium chloride (PF) 0.9% PF flush 3 mL  3 mL Intracatheter Q8H Diana Schmitt PA-C   3 mL at 06/28/24 0943       Objective:  Vital signs in last 24 hours:  Temp:  [98.2  F (36.8  C)-98.7  F (37.1  C)] 98.7   F (37.1  C)  Pulse:  [82-90] 84  Resp:  [18-20] 18  BP: (114-134)/(55-62) 114/55  SpO2:  [92 %-95 %] 92 %  Weight:   [unfilled]    Intake/Output last 3 shifts:  I/O last 3 completed shifts:  In: 923 [P.O.:920; I.V.:3]  Out: -   Intake/Output this shift:  I/O this shift:  In: 360 [P.O.:360]  Out: -     Review of Systems:   As per subjective, all others negative.    Physical Exam:    General Appearance:  Alert, cooperative, no distress, appears stated age   Head:  Normocephalic, without obvious abnormality, atraumatic   Lungs:   Clear to auscultation bilaterally, respirations unlabored   Chest Wall:  No tenderness or deformity   Heart:  Regular rate and rhythm, S1, S2 normal,no murmur, rub or gallop   Abdomen:   Soft, non tender, non distended, bowel sounds present, no guarding or rigidity colostomy bag in place   Extremities: S/p right ESTHELA   Skin: Skin color, texture, turgor normal, no rashes or lesions   Neurologic: Alert and oriented X 3, Moves all 4 extremities       Lab Results:  Personally Reviewed.   Recent Results (from the past 24 hour(s))   Hemoglobin    Collection Time: 06/28/24  8:40 AM   Result Value Ref Range    Hemoglobin 8.5 (L) 11.7 - 15.7 g/dL         Klarissa Rogers M.D  [facility]  Internal Medicine

## 2024-07-08 ENCOUNTER — TRANSFERRED RECORDS (OUTPATIENT)
Dept: HEALTH INFORMATION MANAGEMENT | Facility: CLINIC | Age: 79
End: 2024-07-08
Payer: COMMERCIAL

## 2024-07-31 ENCOUNTER — OFFICE VISIT (OUTPATIENT)
Dept: URGENT CARE | Facility: URGENT CARE | Age: 79
End: 2024-07-31
Payer: COMMERCIAL

## 2024-07-31 ENCOUNTER — HOSPITAL ENCOUNTER (EMERGENCY)
Facility: CLINIC | Age: 79
Discharge: HOME | End: 2024-07-31
Attending: EMERGENCY MEDICINE | Admitting: EMERGENCY MEDICINE
Payer: COMMERCIAL

## 2024-07-31 VITALS
HEART RATE: 89 BPM | BODY MASS INDEX: 28.49 KG/M2 | HEIGHT: 65 IN | SYSTOLIC BLOOD PRESSURE: 140 MMHG | DIASTOLIC BLOOD PRESSURE: 88 MMHG | TEMPERATURE: 97.6 F | WEIGHT: 171 LBS | OXYGEN SATURATION: 99 % | RESPIRATION RATE: 20 BRPM

## 2024-07-31 VITALS
HEART RATE: 84 BPM | RESPIRATION RATE: 16 BRPM | DIASTOLIC BLOOD PRESSURE: 77 MMHG | OXYGEN SATURATION: 100 % | SYSTOLIC BLOOD PRESSURE: 127 MMHG | BODY MASS INDEX: 28.49 KG/M2 | WEIGHT: 171 LBS | TEMPERATURE: 98.2 F | HEIGHT: 65 IN

## 2024-07-31 DIAGNOSIS — T81.30XA WOUND DEHISCENCE: ICD-10-CM

## 2024-07-31 DIAGNOSIS — T81.49XA POSTOPERATIVE WOUND INFECTION OF RIGHT HIP: Primary | ICD-10-CM

## 2024-07-31 DIAGNOSIS — Z96.641 STATUS POST TOTAL REPLACEMENT OF RIGHT HIP: ICD-10-CM

## 2024-07-31 DIAGNOSIS — T81.31XA POSTOPERATIVE WOUND DEHISCENCE, INITIAL ENCOUNTER: ICD-10-CM

## 2024-07-31 LAB
ANION GAP SERPL CALCULATED.3IONS-SCNC: 11 MMOL/L (ref 7–15)
BASOPHILS # BLD AUTO: 0 10E3/UL (ref 0–0.2)
BASOPHILS NFR BLD AUTO: 0 %
BUN SERPL-MCNC: 16.7 MG/DL (ref 8–23)
CALCIUM SERPL-MCNC: 10.1 MG/DL (ref 8.8–10.4)
CHLORIDE SERPL-SCNC: 104 MMOL/L (ref 98–107)
CREAT SERPL-MCNC: 0.5 MG/DL (ref 0.51–0.95)
CRP SERPL-MCNC: 4.22 MG/L
EGFRCR SERPLBLD CKD-EPI 2021: >90 ML/MIN/1.73M2
EOSINOPHIL # BLD AUTO: 0.2 10E3/UL (ref 0–0.7)
EOSINOPHIL NFR BLD AUTO: 2 %
ERYTHROCYTE [DISTWIDTH] IN BLOOD BY AUTOMATED COUNT: 13.2 % (ref 10–15)
ERYTHROCYTE [SEDIMENTATION RATE] IN BLOOD BY WESTERGREN METHOD: 32 MM/HR (ref 0–30)
GLUCOSE SERPL-MCNC: 97 MG/DL (ref 70–99)
HCO3 SERPL-SCNC: 25 MMOL/L (ref 22–29)
HCT VFR BLD AUTO: 36.9 % (ref 35–47)
HGB BLD-MCNC: 11.7 G/DL (ref 11.7–15.7)
IMM GRANULOCYTES # BLD: 0 10E3/UL
IMM GRANULOCYTES NFR BLD: 0 %
LYMPHOCYTES # BLD AUTO: 2.1 10E3/UL (ref 0.8–5.3)
LYMPHOCYTES NFR BLD AUTO: 21 %
MCH RBC QN AUTO: 26.8 PG (ref 26.5–33)
MCHC RBC AUTO-ENTMCNC: 31.7 G/DL (ref 31.5–36.5)
MCV RBC AUTO: 84 FL (ref 78–100)
MONOCYTES # BLD AUTO: 1.1 10E3/UL (ref 0–1.3)
MONOCYTES NFR BLD AUTO: 11 %
NEUTROPHILS # BLD AUTO: 6.5 10E3/UL (ref 1.6–8.3)
NEUTROPHILS NFR BLD AUTO: 66 %
NRBC # BLD AUTO: 0 10E3/UL
NRBC BLD AUTO-RTO: 0 /100
PLATELET # BLD AUTO: 313 10E3/UL (ref 150–450)
POTASSIUM SERPL-SCNC: 4.8 MMOL/L (ref 3.4–5.3)
RBC # BLD AUTO: 4.37 10E6/UL (ref 3.8–5.2)
SODIUM SERPL-SCNC: 140 MMOL/L (ref 135–145)
WBC # BLD AUTO: 9.9 10E3/UL (ref 4–11)

## 2024-07-31 PROCEDURE — 86140 C-REACTIVE PROTEIN: CPT | Performed by: EMERGENCY MEDICINE

## 2024-07-31 PROCEDURE — 85041 AUTOMATED RBC COUNT: CPT | Performed by: EMERGENCY MEDICINE

## 2024-07-31 PROCEDURE — 99283 EMERGENCY DEPT VISIT LOW MDM: CPT

## 2024-07-31 PROCEDURE — 36415 COLL VENOUS BLD VENIPUNCTURE: CPT | Performed by: EMERGENCY MEDICINE

## 2024-07-31 PROCEDURE — 85652 RBC SED RATE AUTOMATED: CPT | Performed by: EMERGENCY MEDICINE

## 2024-07-31 PROCEDURE — 80048 BASIC METABOLIC PNL TOTAL CA: CPT | Performed by: EMERGENCY MEDICINE

## 2024-07-31 PROCEDURE — 99214 OFFICE O/P EST MOD 30 MIN: CPT | Performed by: INTERNAL MEDICINE

## 2024-07-31 RX ORDER — CEPHALEXIN 500 MG/1
500 CAPSULE ORAL 4 TIMES DAILY
Qty: 28 CAPSULE | Refills: 0 | Status: SHIPPED | OUTPATIENT
Start: 2024-07-31 | End: 2024-08-07

## 2024-07-31 ASSESSMENT — COLUMBIA-SUICIDE SEVERITY RATING SCALE - C-SSRS
2. HAVE YOU ACTUALLY HAD ANY THOUGHTS OF KILLING YOURSELF IN THE PAST MONTH?: NO
1. IN THE PAST MONTH, HAVE YOU WISHED YOU WERE DEAD OR WISHED YOU COULD GO TO SLEEP AND NOT WAKE UP?: NO
6. HAVE YOU EVER DONE ANYTHING, STARTED TO DO ANYTHING, OR PREPARED TO DO ANYTHING TO END YOUR LIFE?: NO

## 2024-07-31 ASSESSMENT — ACTIVITIES OF DAILY LIVING (ADL): ADLS_ACUITY_SCORE: 36

## 2024-07-31 NOTE — DISCHARGE INSTRUCTIONS
As we discussed, fortunately the blood work does not look like there is any systemic signs of infection, and we show Dr. Isaac the picture of your leg and discussed the case.  At this time he would like you to start the oral antibiotic and then see him in clinic on Friday as scheduled.  He does suspect that you will need to go back to the operating room for him to clean out this wound and resuture, and he will get this scheduled at the appointment on Friday.  If between now and then you start having a fever, severe pain where you cannot put weight on your leg, these would be reasons to come back to the ER sooner.

## 2024-07-31 NOTE — H&P (VIEW-ONLY)
"ASSESSMENT AND PLAN:      ICD-10-CM    1. Postoperative wound infection of right hip  T81.49XA       2. Postoperative wound dehiscence, initial encounter  T81.31XA       3. Status post total replacement of right hip  Z96.641         Postop wound infection with dehiscence of right total knee hip replacement.  Firmness underlying skin 60 cm surrounding wound.  Concern for wound infection potentially, could be infected joint    Discussed with patient most likely would need imaging and blood work.  May need to be hospitalized for IV antibiotics.    picture of wound included in physical exam below    Patient Instructions       Referral to ER for with concerns of hip replacement infection      Kavitha Jaramillo MD  [facility] URGENT CARE    Subjective     Cyndie Marin is a 79 year old who presents for Patient presents with:  Urgent Care  Wound Check: Had right side hip replacement 6/25 now wound has odor, still open and redness       an established patient of [facility].      Arthroplasty June 25, 2024    Location: Right hip  Context: Concern for surgical wound infection for 1 week.  Has appointment with ortho later this week.  Told tape from wound would fall off.  Some oozing,   Area split & now big gap  Odor.  Redness noted  Treatment measures tried include: normal showers      Review of Systems        Objective    /77   Pulse 84   Temp 98.2  F (36.8  C) (Oral)   Resp 16   Ht 1.651 m (5' 5\")   Wt 77.6 kg (171 lb)   LMP  (LMP Unknown)   SpO2 100%   BMI 28.46 kg/m    Physical Exam  Vitals reviewed.   Constitutional:       Appearance: Normal appearance.   Musculoskeletal:      Comments: right upper thigh    Dehisced purulent wound.  Strong odor.  Surrounding underlying firmness 6 to 7 cm beyond wound itself with slight pinkness of skin     Neurological:      Mental Status: She is alert.                    "

## 2024-07-31 NOTE — PROGRESS NOTES
"ASSESSMENT AND PLAN:      ICD-10-CM    1. Postoperative wound infection of right hip  T81.49XA       2. Postoperative wound dehiscence, initial encounter  T81.31XA       3. Status post total replacement of right hip  Z96.641         Postop wound infection with dehiscence of right total knee hip replacement.  Firmness underlying skin 60 cm surrounding wound.  Concern for wound infection potentially, could be infected joint    Discussed with patient most likely would need imaging and blood work.  May need to be hospitalized for IV antibiotics.    picture of wound included in physical exam below    Patient Instructions       Referral to ER for with concerns of hip replacement infection      Kavitha Jaramillo MD  [facility] URGENT CARE    Subjective     Cyndie Marin is a 79 year old who presents for Patient presents with:  Urgent Care  Wound Check: Had right side hip replacement 6/25 now wound has odor, still open and redness       an established patient of [facility].      Arthroplasty June 25, 2024    Location: Right hip  Context: Concern for surgical wound infection for 1 week.  Has appointment with ortho later this week.  Told tape from wound would fall off.  Some oozing,   Area split & now big gap  Odor.  Redness noted  Treatment measures tried include: normal showers      Review of Systems        Objective    /77   Pulse 84   Temp 98.2  F (36.8  C) (Oral)   Resp 16   Ht 1.651 m (5' 5\")   Wt 77.6 kg (171 lb)   LMP  (LMP Unknown)   SpO2 100%   BMI 28.46 kg/m    Physical Exam  Vitals reviewed.   Constitutional:       Appearance: Normal appearance.   Musculoskeletal:      Comments: right upper thigh    Dehisced purulent wound.  Strong odor.  Surrounding underlying firmness 6 to 7 cm beyond wound itself with slight pinkness of skin     Neurological:      Mental Status: She is alert.                    "

## 2024-07-31 NOTE — ED TRIAGE NOTES
"Arrives to ED from  with concern for infection to incision of R hip. Had RTHA on 6/25. Noted wound dehiscence and purulent drainage 1 week ago. Has follow up scheduled with surgeon on Friday. \"My son said I can't wait that long\". Afebrile.      Triage Assessment (Adult)       Row Name 07/31/24 [id number]          Triage Assessment    Airway WDL WDL        Respiratory WDL    Respiratory WDL WDL        Skin Circulation/Temperature WDL    Skin Circulation/Temperature WDL WDL        Cardiac WDL    Cardiac WDL WDL        Peripheral/Neurovascular WDL    Peripheral Neurovascular WDL WDL        Cognitive/Neuro/Behavioral WDL    Cognitive/Neuro/Behavioral WDL WDL                     " unable to verbalize/demonstrate/(0) unable to meet; needs instruction

## 2024-07-31 NOTE — ED PROVIDER NOTES
EMERGENCY DEPARTMENT ENCOUNTER     NAME: Cyndie Marin   AGE: 79 year old female   YOB: 1945   MRN: [id number]   EVALUATION DATE & TIME: No admission date for patient encounter.   PCP: Roni Horowitz     Chief Complaint   Patient presents with    Wound Check   :    FINAL IMPRESSION       1. Wound dehiscence           ED COURSE & MEDICAL DECISION MAKING      Pertinent Labs & Imaging studies reviewed. (See chart for details)   79 year old female  presents to the Emergency Department for evaluation of concern for a wound infection and dehiscence, sent from urgent care.  On chart review, patient did have surgery with Dr. Isaac and a total arthroplasty on June 25.. Initial Vitals Reviewed. Initial exam notable for well-appearing patient who is very nontoxic and afebrile, ambulatory with a cane with no pain with weightbearing.  I do not suspect a septic joint as she does not have a fever, pain with ambulation, but it does look like she has wound infection with dehiscence.  I did not take a new image as there is an image in the chart from today from the urgent care provider that saw her prior to being sent here.  I called and discussed the case with Dr. Isaac, her primary surgeon at [facility], and he reviewed the images well.  He does think she is going to need a wound cleanout and revision in the OR, but as long as she remains afebrile and labs are reassuring he thinks we can start some Keflex and then she has an appointment with him on Friday and about 36 hours and he will see her to get this scheduled.  Labs are very reassuring here, patient and her friend are very comfortable with this plan and so we discussed return precautions and I prescribed Keflex at time of discharge.           At the conclusion of the encounter I discussed the results of all of the tests and the disposition. The questions were answered. The patient or family acknowledged understanding and was agreeable  with the care plan.      minutes critical care time, see procedure note below for details if relevant    Medical Decision Making    History:  Supplemental history from: Friend  External Record(s) reviewed: Outpatient Record: Urgent care visit 7/31/2024    Work Up:  Chart documentation includes differential considered and any EKGs or imaging independently interpreted by provider, where specified.  In additional to work up documented, I considered the following work up: Documented in chart, if applicable.    External consultation:  Discussion of management with another provider: Orthopedics    Complicating factors:  Care impacted by chronic illness: Anticoagulated State  Care affected by social determinants of health: Access to Medical Care    Disposition considerations: Discharge. I prescribed additional prescription strength medication(s) as charted. I considered admission, but ultimately discharged patient with reassuring labs and consultation for a plan with orthopedic surgery.        MEDICATIONS GIVEN IN THE EMERGENCY:   Medications - No data to display   NEW PRESCRIPTIONS STARTED AT TODAY'S ER VISIT   New Prescriptions    CEPHALEXIN (KEFLEX) 500 MG CAPSULE    Take 1 capsule (500 mg) by mouth 4 times daily for 7 days     ================================================================   HISTORY OF PRESENT ILLNESS       Patient information was obtained from: Patient  Use of Intrepreter: N/A    Cyndie Marin is a 79 year old female with history of total hip arthroplasty with Dr. Isaac of [facility] on June 25 who presents for evaluation of wound concern.  She states that about 1 week ago she noticed that the wound incision opened up, and has had some drainage, feels firm and a little bit red and warm.  No fever.  No increased pain and she has been able to walk with a cane.  She went to urgent care prior to arrival here and was sent to the ED with concerns for wound  infection.    ================================================================        PAST HISTORY     PAST MEDICAL HISTORY:   Past Medical History:   Diagnosis Date    Calculus of gallbladder without cholecystitis without obstruction 2022    Claustrophobia     Colonic mass 2022    Colostomy in place (H)     DDD (degenerative disc disease), lumbar 2024    Essential hypertension     GERD (gastroesophageal reflux disease)     History of blood clots     History of urinary tract obstruction 2022    Hyperlipidemia LDL goal <100 2022    Parastomal hernia 2023    Perirectal abscess 10/29/2019    Primary osteoarthritis of both hips     Renal disease     S/P total left hip arthroplasty     Seasonal allergies     Sleep apnea     uses CPAP    RAJ (stress urinary incontinence, female) 2022    Thrombosis       PAST SURGICAL HISTORY:   Past Surgical History:   Procedure Laterality Date    ARTHROPLASTY HIP ANTERIOR Right 2024    Procedure: RIGHT DIRECT ANTERIOR TOTAL HIP ARTHROPLASTY;  Surgeon: Alexander Yancey MD;  Location: [facility] OR    COLOSTOMY N/A 10/28/2019    Procedure: CREATION, COLOSTOMY;  Surgeon: Mylene Sears MD;  Location: [facility] OR;  Service: General    NEPHRECTOMY Left 1996    with rib resection    MI EXCIS BARTHOLIN GLAND/CYST      Description: Excision Of Bartholin's Gland Or Cyst;  Recorded: 2013;    PROCTOSCOPY N/A 10/28/2019    Procedure: RIGID PROCTOSCOPY;  Surgeon: Mylene Sears MD;  Location: [facility] OR;  Service: General    SIGMOIDOSCOPY FLEXIBLE N/A 10/27/2019    Procedure: SIGMOIDOSCOPY with biopsy;  Surgeon: Tj Gavin MD;  Location: [facility] GI;  Service: Gastroenterology    TOOTH EXTRACTION      ZZC  DELIVERY ONLY      Description:  Section;  Recorded: 2008;  Comments: x2    ZZC PART REMOVAL COLON W ANASTOMOSIS N/A 10/28/2019    Procedure: OPEN EXPLORATORY LAPAROTOMY, SEGMENTAL COLECTOMY,   WASHOUT OF PELVIC ABSCESS. RECTAL EXAM UNDER ANESTHESIA. DEBRIDEMENT OD ABSCESS CAVITY;  Surgeon: Mylene Sears MD;  Location: [facility] Main OR;  Service: General    [facility] TOTAL HIP ARTHROPLASTY Left 01/26/2021    Procedure: LEFT DIRECT ANTERIOR TOTAL HIP ARTHROPLASTY;  Surgeon: Alexander Yancey MD;  Location: [facility] Main OR;  Service: Orthopedics      CURRENT MEDICATIONS:   acetaminophen (TYLENOL) 325 MG tablet  ferrous sulfate (FEROSUL) 325 (65 Fe) MG tablet  hydrOXYzine HCl (ATARAX) 10 MG tablet  lisinopril-hydrochlorothiazide (ZESTORETIC) 10-12.5 MG tablet  loratadine (CLARITIN) 10 mg tablet  menthol (BIOFREEZE, MENTHOL,) 4 % Gel  oxyCODONE (ROXICODONE) 5 MG tablet  rivaroxaban ANTICOAGULANT (XARELTO) 10 MG TABS tablet  rosuvastatin (CRESTOR) 10 MG tablet  senna-docusate (SENOKOT-S/PERICOLACE) 8.6-50 MG tablet  Vitamin D3 (CHOLECALCIFEROL) 25 mcg (1000 units) tablet      ALLERGIES:   Allergies   Allergen Reactions    Cats Claw (Uncaria [Uncaria Tomentosa (Cats Claw)] Unknown    Dye [External Allergen Needs Reconciliation - See Comment] Hives and Swelling     Cat scan dye    Other Drug Allergy (See Comments) Hives and Swelling     Cat scan dye [iodine-based contrast?]    Other Environmental Allergy Unknown     DUST    Ragweed Pollen [Ragweeds] Unknown      FAMILY HISTORY:   Family History   Problem Relation Age of Onset    Breast Cancer Paternal Grandmother     Prostate Cancer Father     Heart Disease Other         many on fathers side    Ovarian Cancer Mother     Heart Disease Maternal Grandfather     Brain Cancer Cousin 47.00      SOCIAL HISTORY:   Social History     Socioeconomic History    Marital status:    Tobacco Use    Smoking status: Never    Smokeless tobacco: Never   Vaping Use    Vaping status: Never Used   Substance and Sexual Activity    Alcohol use: Yes     Comment: rarely    Drug use: No    Sexual activity: Yes     Partners: Male   Social History Narrative     gravdia 3 para 2 is  "a retired .  Lives in her own home with son and daughter in law, does some gardening.      Social Determinants of Health     Interpersonal Safety: Low Risk  (4/25/2024)    Interpersonal Safety     Do you feel physically and emotionally safe where you currently live?: Yes     Within the past 12 months, have you been hit, slapped, kicked or otherwise physically hurt by someone?: No     Within the past 12 months, have you been humiliated or emotionally abused in other ways by your partner or ex-partner?: No        VITALS  Patient Vitals for the past 24 hrs:   BP Temp Temp src Pulse Resp SpO2 Height Weight   07/31/24 1536 (!) 140/88 97.6  F (36.4  C) Oral 89 20 99 % 1.651 m (5' 5\") 77.6 kg (171 lb)        ================================================================    PHYSICAL EXAM     VITAL SIGNS: BP (!) 140/88   Pulse 89   Temp 97.6  F (36.4  C) (Oral)   Resp 20   Ht 1.651 m (5' 5\")   Wt 77.6 kg (171 lb)   LMP  (LMP Unknown)   SpO2 99%   BMI 28.46 kg/m     Constitutional:  Awake, no acute distress   HENT:  Atraumatic, oropharynx without exudate or erythema, membranes moist  Lymph:  No adenopathy  Eyes: EOM intact, PERRL, no injection  Neck: Supple  Respiratory:  Clear to auscultation bilaterally, no wheezes or crackles   Cardiovascular:  Regular rate and rhythm, single S1 and S2   GI:  Soft, nontender, nondistended, no rebound or guarding   Musculoskeletal:  Moves all extremities, no lower extremity edema, no deformities    Skin:  Warm, dry, incision on right anterior hip with dehiscence present, some granulation tissue but also some green appearing stringy material.  Slight erythema around the wound edges with some induration but no obvious fluctuance.  Mild warmth.  Patient is ambulatory with a cane  Neurologic:  Alert and oriented x3, no focal deficits noted       ================================================================  LAB       All pertinent labs reviewed and interpreted.   Labs " Ordered and Resulted from Time of ED Arrival to Time of ED Departure   BASIC METABOLIC PANEL - Abnormal       Result Value    Sodium 140      Potassium 4.8      Chloride 104      Carbon Dioxide (CO2) 25      Anion Gap 11      Urea Nitrogen 16.7      Creatinine 0.50 (*)     GFR Estimate >90      Calcium 10.1      Glucose 97     CRP INFLAMMATION - Normal    CRP Inflammation 4.22     CBC WITH PLATELETS AND DIFFERENTIAL    WBC Count 9.9      RBC Count 4.37      Hemoglobin 11.7      Hematocrit 36.9      MCV 84      MCH 26.8      MCHC 31.7      RDW 13.2      Platelet Count 313      % Neutrophils 66      % Lymphocytes 21      % Monocytes 11      % Eosinophils 2      % Basophils 0      % Immature Granulocytes 0      NRBCs per 100 WBC 0      Absolute Neutrophils 6.5      Absolute Lymphocytes 2.1      Absolute Monocytes 1.1      Absolute Eosinophils 0.2      Absolute Basophils 0.0      Absolute Immature Granulocytes 0.0      Absolute NRBCs 0.0     ERYTHROCYTE SEDIMENTATION RATE AUTO        ===============================================================  RADIOLOGY       Reviewed all pertinent imaging. Please see official radiology report.   No orders to display         ================================================================  EKG         I have independently reviewed and interpreted the EKG(s) documented above.     ================================================================  PROCEDURES            Tonia Alfonso M.D.   Emergency Medicine   [facility] EMERGENCY ROOM  [address] [id number] [phone]  Dept: [phone]        Tonia Alfonso MD  07/31/24 [id number]

## 2024-08-01 ENCOUNTER — TELEPHONE (OUTPATIENT)
Dept: INTERNAL MEDICINE | Facility: CLINIC | Age: 79
End: 2024-08-01
Payer: COMMERCIAL

## 2024-08-01 NOTE — TELEPHONE ENCOUNTER
Transitions of Care Outreach  No chief complaint on file.      Most Recent Admission Date: 7/31/2024   Most Recent Admission Diagnosis:      Most Recent Discharge Date: 7/31/2024   Most Recent Discharge Diagnosis: Wound dehiscence - T81.30XA     Transitions of Care Assessment    Discharge Assessment  How are you doing now that you are home?: as good as expected  How are your symptoms? (Red Flag symptoms escalate to triage hotline per guidelines): Unchanged  Do you know how to contact your clinic care team if you have future questions or changes to your health status? : Yes  Does the patient have their discharge instructions? : Yes  Does the patient have questions regarding their discharge instructions? : No  Were you started on any new medications or were there changes to any of your previous medications? : Yes  Does the patient have all of their medications?: Yes  Do you have questions regarding any of your medications? : No  Do you have all of your needed medical supplies or equipment (DME)?  (i.e. oxygen tank, CPAP, cane, etc.): Yes    Follow up Plan     Discharge Follow-Up  Discharge follow up appointment scheduled in alignment with recommended follow up timeframe or Transitions of Risk Category? (Low = within 30 days; Moderate= within 14 days; High= within 7 days): Yes  Discharge Follow Up Appointment Date: 08/02/24  Discharge Follow Up Appointment Scheduled with?: Specialty Care Provider (Surgeon)    Future Appointments   Date Time Provider Department Center   9/17/2024  2:00 PM [facility]       Outpatient Plan as outlined on AVS reviewed with patient.    For any urgent concerns, please contact our 24 hour nurse triage line: [phone] ([phone])       Hipolito Kerns RN

## 2024-08-02 ENCOUNTER — TRANSFERRED RECORDS (OUTPATIENT)
Dept: HEALTH INFORMATION MANAGEMENT | Facility: CLINIC | Age: 79
End: 2024-08-02
Payer: COMMERCIAL

## 2024-08-06 NOTE — OR NURSING
Patient states she is unable to get in with primary for pre-op physical.  Left message with Dr. Yancey on how to proceed.   2:42pm received call back from Dr. Yancey's office,  Dr. Yancey will do pre-op H&P.

## 2024-08-08 ENCOUNTER — ANESTHESIA EVENT (OUTPATIENT)
Dept: SURGERY | Facility: CLINIC | Age: 79
DRG: 902 | End: 2024-08-08
Payer: COMMERCIAL

## 2024-08-09 ENCOUNTER — ANESTHESIA (OUTPATIENT)
Dept: SURGERY | Facility: CLINIC | Age: 79
DRG: 902 | End: 2024-08-09
Payer: COMMERCIAL

## 2024-08-09 ENCOUNTER — HOSPITAL ENCOUNTER (INPATIENT)
Facility: CLINIC | Age: 79
LOS: 3 days | Discharge: HOME | DRG: 902 | End: 2024-08-13
Attending: ORTHOPAEDIC SURGERY | Admitting: ORTHOPAEDIC SURGERY
Payer: COMMERCIAL

## 2024-08-09 DIAGNOSIS — L08.9 SOFT TISSUE INFECTION: ICD-10-CM

## 2024-08-09 DIAGNOSIS — Z47.89 SURGICAL AFTERCARE, MUSCULOSKELETAL SYSTEM: Primary | ICD-10-CM

## 2024-08-09 LAB
BACTERIA SPEC CULT: ABNORMAL
BACTERIA SPEC CULT: ABNORMAL
GRAM STAIN RESULT: ABNORMAL

## 2024-08-09 PROCEDURE — 250N000025 HC SEVOFLURANE, PER MIN: Performed by: ORTHOPAEDIC SURGERY

## 2024-08-09 PROCEDURE — 94660 CPAP INITIATION&MGMT: CPT

## 2024-08-09 PROCEDURE — 0JBL0ZZ EXCISION OF RIGHT UPPER LEG SUBCUTANEOUS TISSUE AND FASCIA, OPEN APPROACH: ICD-10-PCS | Performed by: ORTHOPAEDIC SURGERY

## 2024-08-09 PROCEDURE — 999N000157 HC STATISTIC RCP TIME EA 10 MIN

## 2024-08-09 PROCEDURE — 250N000009 HC RX 250: Performed by: NURSE ANESTHETIST, CERTIFIED REGISTERED

## 2024-08-09 PROCEDURE — 710N000010 HC RECOVERY PHASE 1, LEVEL 2, PER MIN: Performed by: ORTHOPAEDIC SURGERY

## 2024-08-09 PROCEDURE — 999N000141 HC STATISTIC PRE-PROCEDURE NURSING ASSESSMENT: Performed by: ORTHOPAEDIC SURGERY

## 2024-08-09 PROCEDURE — 250N000011 HC RX IP 250 OP 636: Performed by: NURSE ANESTHETIST, CERTIFIED REGISTERED

## 2024-08-09 PROCEDURE — 258N000003 HC RX IP 258 OP 636: Performed by: ANESTHESIOLOGY

## 2024-08-09 PROCEDURE — 370N000017 HC ANESTHESIA TECHNICAL FEE, PER MIN: Performed by: ORTHOPAEDIC SURGERY

## 2024-08-09 PROCEDURE — 87186 SC STD MICRODIL/AGAR DIL: CPT | Performed by: ORTHOPAEDIC SURGERY

## 2024-08-09 PROCEDURE — 250N000011 HC RX IP 250 OP 636

## 2024-08-09 PROCEDURE — 258N000003 HC RX IP 258 OP 636

## 2024-08-09 PROCEDURE — 258N000001 HC RX 258: Performed by: ORTHOPAEDIC SURGERY

## 2024-08-09 PROCEDURE — 99214 OFFICE O/P EST MOD 30 MIN: CPT | Performed by: HOSPITALIST

## 2024-08-09 PROCEDURE — 250N000009 HC RX 250: Performed by: ANESTHESIOLOGY

## 2024-08-09 PROCEDURE — 250N000011 HC RX IP 250 OP 636: Performed by: ANESTHESIOLOGY

## 2024-08-09 PROCEDURE — 250N000013 HC RX MED GY IP 250 OP 250 PS 637

## 2024-08-09 PROCEDURE — 272N000001 HC OR GENERAL SUPPLY STERILE: Performed by: ORTHOPAEDIC SURGERY

## 2024-08-09 PROCEDURE — 87205 SMEAR GRAM STAIN: CPT | Performed by: ORTHOPAEDIC SURGERY

## 2024-08-09 PROCEDURE — 250N000011 HC RX IP 250 OP 636: Performed by: ORTHOPAEDIC SURGERY

## 2024-08-09 PROCEDURE — 360N000075 HC SURGERY LEVEL 2, PER MIN: Performed by: ORTHOPAEDIC SURGERY

## 2024-08-09 PROCEDURE — 87075 CULTR BACTERIA EXCEPT BLOOD: CPT | Performed by: ORTHOPAEDIC SURGERY

## 2024-08-09 RX ORDER — PROCHLORPERAZINE MALEATE 5 MG
5 TABLET ORAL EVERY 6 HOURS PRN
Status: DISCONTINUED | OUTPATIENT
Start: 2024-08-09 | End: 2024-08-13 | Stop reason: HOSPADM

## 2024-08-09 RX ORDER — SODIUM CHLORIDE, SODIUM LACTATE, POTASSIUM CHLORIDE, CALCIUM CHLORIDE 600; 310; 30; 20 MG/100ML; MG/100ML; MG/100ML; MG/100ML
INJECTION, SOLUTION INTRAVENOUS CONTINUOUS
Status: DISCONTINUED | OUTPATIENT
Start: 2024-08-09 | End: 2024-08-13 | Stop reason: HOSPADM

## 2024-08-09 RX ORDER — LIDOCAINE 40 MG/G
CREAM TOPICAL
Status: DISCONTINUED | OUTPATIENT
Start: 2024-08-09 | End: 2024-08-13 | Stop reason: HOSPADM

## 2024-08-09 RX ORDER — AMOXICILLIN 250 MG
1 CAPSULE ORAL 2 TIMES DAILY
Status: DISCONTINUED | OUTPATIENT
Start: 2024-08-09 | End: 2024-08-13 | Stop reason: HOSPADM

## 2024-08-09 RX ORDER — HYDROMORPHONE HCL IN WATER/PF 6 MG/30 ML
0.4 PATIENT CONTROLLED ANALGESIA SYRINGE INTRAVENOUS
Status: DISCONTINUED | OUTPATIENT
Start: 2024-08-09 | End: 2024-08-13 | Stop reason: HOSPADM

## 2024-08-09 RX ORDER — CEFAZOLIN SODIUM/WATER 2 G/20 ML
SYRINGE (ML) INTRAVENOUS
Status: DISCONTINUED
Start: 2024-08-09 | End: 2024-08-09 | Stop reason: HOSPADM

## 2024-08-09 RX ORDER — SODIUM CHLORIDE, SODIUM LACTATE, POTASSIUM CHLORIDE, CALCIUM CHLORIDE 600; 310; 30; 20 MG/100ML; MG/100ML; MG/100ML; MG/100ML
INJECTION, SOLUTION INTRAVENOUS CONTINUOUS
Status: DISCONTINUED | OUTPATIENT
Start: 2024-08-09 | End: 2024-08-09 | Stop reason: HOSPADM

## 2024-08-09 RX ORDER — CEFAZOLIN SODIUM/WATER 2 G/20 ML
2 SYRINGE (ML) INTRAVENOUS
Status: COMPLETED | OUTPATIENT
Start: 2024-08-09 | End: 2024-08-09

## 2024-08-09 RX ORDER — FENTANYL CITRATE 50 UG/ML
100 INJECTION, SOLUTION INTRAMUSCULAR; INTRAVENOUS
Status: DISCONTINUED | OUTPATIENT
Start: 2024-08-09 | End: 2024-08-09 | Stop reason: HOSPADM

## 2024-08-09 RX ORDER — ONDANSETRON 4 MG/1
4 TABLET, ORALLY DISINTEGRATING ORAL EVERY 6 HOURS PRN
Status: DISCONTINUED | OUTPATIENT
Start: 2024-08-09 | End: 2024-08-13 | Stop reason: HOSPADM

## 2024-08-09 RX ORDER — DIPHENHYDRAMINE HCL 12.5 MG/5ML
12.5 SOLUTION ORAL EVERY 6 HOURS PRN
Status: DISCONTINUED | OUTPATIENT
Start: 2024-08-09 | End: 2024-08-09 | Stop reason: HOSPADM

## 2024-08-09 RX ORDER — FENTANYL CITRATE 50 UG/ML
100 INJECTION, SOLUTION INTRAMUSCULAR; INTRAVENOUS ONCE
Status: COMPLETED | OUTPATIENT
Start: 2024-08-09 | End: 2024-08-09

## 2024-08-09 RX ORDER — CEFAZOLIN SODIUM 2 G/100ML
2 INJECTION, SOLUTION INTRAVENOUS EVERY 8 HOURS
Status: COMPLETED | OUTPATIENT
Start: 2024-08-09 | End: 2024-08-10

## 2024-08-09 RX ORDER — ONDANSETRON 2 MG/ML
4 INJECTION INTRAMUSCULAR; INTRAVENOUS EVERY 30 MIN PRN
Status: DISCONTINUED | OUTPATIENT
Start: 2024-08-09 | End: 2024-08-09 | Stop reason: HOSPADM

## 2024-08-09 RX ORDER — FENTANYL CITRATE 50 UG/ML
25 INJECTION, SOLUTION INTRAMUSCULAR; INTRAVENOUS EVERY 5 MIN PRN
Status: DISCONTINUED | OUTPATIENT
Start: 2024-08-09 | End: 2024-08-09 | Stop reason: HOSPADM

## 2024-08-09 RX ORDER — CEFADROXIL 500 MG/1
500 CAPSULE ORAL 2 TIMES DAILY
Qty: 28 CAPSULE | Refills: 0 | Status: SHIPPED | OUTPATIENT
Start: 2024-08-09 | End: 2024-08-13

## 2024-08-09 RX ORDER — DEXAMETHASONE SODIUM PHOSPHATE 4 MG/ML
4 INJECTION, SOLUTION INTRA-ARTICULAR; INTRALESIONAL; INTRAMUSCULAR; INTRAVENOUS; SOFT TISSUE
Status: DISCONTINUED | OUTPATIENT
Start: 2024-08-09 | End: 2024-08-09 | Stop reason: HOSPADM

## 2024-08-09 RX ORDER — ACETAMINOPHEN 325 MG/1
650 TABLET ORAL EVERY 4 HOURS PRN
Status: DISCONTINUED | OUTPATIENT
Start: 2024-08-12 | End: 2024-08-13 | Stop reason: HOSPADM

## 2024-08-09 RX ORDER — ASPIRIN 81 MG/1
81 TABLET ORAL 2 TIMES DAILY
Qty: 60 TABLET | Refills: 0 | Status: SHIPPED | OUTPATIENT
Start: 2024-08-09 | End: 2024-09-17

## 2024-08-09 RX ORDER — NALOXONE HYDROCHLORIDE 0.4 MG/ML
0.2 INJECTION, SOLUTION INTRAMUSCULAR; INTRAVENOUS; SUBCUTANEOUS
Status: DISCONTINUED | OUTPATIENT
Start: 2024-08-09 | End: 2024-08-13 | Stop reason: HOSPADM

## 2024-08-09 RX ORDER — POLYETHYLENE GLYCOL 3350 17 G/17G
17 POWDER, FOR SOLUTION ORAL DAILY
Status: DISCONTINUED | OUTPATIENT
Start: 2024-08-10 | End: 2024-08-13 | Stop reason: HOSPADM

## 2024-08-09 RX ORDER — LIDOCAINE 40 MG/G
CREAM TOPICAL
Status: DISCONTINUED | OUTPATIENT
Start: 2024-08-09 | End: 2024-08-09 | Stop reason: HOSPADM

## 2024-08-09 RX ORDER — NALOXONE HYDROCHLORIDE 0.4 MG/ML
0.1 INJECTION, SOLUTION INTRAMUSCULAR; INTRAVENOUS; SUBCUTANEOUS
Status: DISCONTINUED | OUTPATIENT
Start: 2024-08-09 | End: 2024-08-09 | Stop reason: HOSPADM

## 2024-08-09 RX ORDER — ASPIRIN 81 MG/1
81 TABLET ORAL 2 TIMES DAILY
Status: DISCONTINUED | OUTPATIENT
Start: 2024-08-09 | End: 2024-08-13 | Stop reason: HOSPADM

## 2024-08-09 RX ORDER — DIPHENHYDRAMINE HYDROCHLORIDE 50 MG/ML
12.5 INJECTION INTRAMUSCULAR; INTRAVENOUS EVERY 6 HOURS PRN
Status: DISCONTINUED | OUTPATIENT
Start: 2024-08-09 | End: 2024-08-09 | Stop reason: HOSPADM

## 2024-08-09 RX ORDER — PROPOFOL 10 MG/ML
INJECTION, EMULSION INTRAVENOUS CONTINUOUS PRN
Status: DISCONTINUED | OUTPATIENT
Start: 2024-08-09 | End: 2024-08-09

## 2024-08-09 RX ORDER — METHOCARBAMOL 500 MG/1
500 TABLET, FILM COATED ORAL EVERY 6 HOURS PRN
Status: DISCONTINUED | OUTPATIENT
Start: 2024-08-09 | End: 2024-08-13 | Stop reason: HOSPADM

## 2024-08-09 RX ORDER — OXYCODONE HYDROCHLORIDE 5 MG/1
5 TABLET ORAL EVERY 6 HOURS PRN
Qty: 20 TABLET | Refills: 0 | Status: SHIPPED | OUTPATIENT
Start: 2024-08-09 | End: 2024-09-17

## 2024-08-09 RX ORDER — AMOXICILLIN 250 MG
1-2 CAPSULE ORAL 2 TIMES DAILY
Qty: 30 TABLET | Refills: 0 | Status: SHIPPED | OUTPATIENT
Start: 2024-08-09

## 2024-08-09 RX ORDER — ONDANSETRON 2 MG/ML
4 INJECTION INTRAMUSCULAR; INTRAVENOUS EVERY 6 HOURS PRN
Status: DISCONTINUED | OUTPATIENT
Start: 2024-08-09 | End: 2024-08-13 | Stop reason: HOSPADM

## 2024-08-09 RX ORDER — HYDROMORPHONE HCL IN WATER/PF 6 MG/30 ML
0.2 PATIENT CONTROLLED ANALGESIA SYRINGE INTRAVENOUS EVERY 5 MIN PRN
Status: DISCONTINUED | OUTPATIENT
Start: 2024-08-09 | End: 2024-08-09 | Stop reason: HOSPADM

## 2024-08-09 RX ORDER — CEFAZOLIN SODIUM/WATER 2 G/20 ML
2 SYRINGE (ML) INTRAVENOUS SEE ADMIN INSTRUCTIONS
Status: DISCONTINUED | OUTPATIENT
Start: 2024-08-09 | End: 2024-08-09 | Stop reason: HOSPADM

## 2024-08-09 RX ORDER — HYDROXYZINE HYDROCHLORIDE 10 MG/1
10 TABLET, FILM COATED ORAL EVERY 6 HOURS PRN
Status: DISCONTINUED | OUTPATIENT
Start: 2024-08-09 | End: 2024-08-13 | Stop reason: HOSPADM

## 2024-08-09 RX ORDER — HYDROXYZINE HYDROCHLORIDE 10 MG/1
10 TABLET, FILM COATED ORAL EVERY 6 HOURS PRN
Qty: 30 TABLET | Refills: 0 | Status: SHIPPED | OUTPATIENT
Start: 2024-08-09 | End: 2024-09-17

## 2024-08-09 RX ORDER — ACETAMINOPHEN 325 MG/1
975 TABLET ORAL EVERY 8 HOURS
Status: DISPENSED | OUTPATIENT
Start: 2024-08-09 | End: 2024-08-12

## 2024-08-09 RX ORDER — HYDROMORPHONE HCL IN WATER/PF 6 MG/30 ML
0.4 PATIENT CONTROLLED ANALGESIA SYRINGE INTRAVENOUS EVERY 5 MIN PRN
Status: DISCONTINUED | OUTPATIENT
Start: 2024-08-09 | End: 2024-08-09 | Stop reason: HOSPADM

## 2024-08-09 RX ORDER — OXYCODONE HYDROCHLORIDE 5 MG/1
10 TABLET ORAL EVERY 4 HOURS PRN
Status: DISCONTINUED | OUTPATIENT
Start: 2024-08-09 | End: 2024-08-13 | Stop reason: HOSPADM

## 2024-08-09 RX ORDER — NALOXONE HYDROCHLORIDE 0.4 MG/ML
0.4 INJECTION, SOLUTION INTRAMUSCULAR; INTRAVENOUS; SUBCUTANEOUS
Status: DISCONTINUED | OUTPATIENT
Start: 2024-08-09 | End: 2024-08-13 | Stop reason: HOSPADM

## 2024-08-09 RX ORDER — FENTANYL CITRATE 50 UG/ML
50 INJECTION, SOLUTION INTRAMUSCULAR; INTRAVENOUS EVERY 5 MIN PRN
Status: DISCONTINUED | OUTPATIENT
Start: 2024-08-09 | End: 2024-08-09 | Stop reason: HOSPADM

## 2024-08-09 RX ORDER — HYDROMORPHONE HCL IN WATER/PF 6 MG/30 ML
0.2 PATIENT CONTROLLED ANALGESIA SYRINGE INTRAVENOUS
Status: DISCONTINUED | OUTPATIENT
Start: 2024-08-09 | End: 2024-08-13 | Stop reason: HOSPADM

## 2024-08-09 RX ORDER — OXYCODONE HYDROCHLORIDE 5 MG/1
5 TABLET ORAL EVERY 4 HOURS PRN
Status: DISCONTINUED | OUTPATIENT
Start: 2024-08-09 | End: 2024-08-13 | Stop reason: HOSPADM

## 2024-08-09 RX ORDER — ONDANSETRON 2 MG/ML
INJECTION INTRAMUSCULAR; INTRAVENOUS PRN
Status: DISCONTINUED | OUTPATIENT
Start: 2024-08-09 | End: 2024-08-09

## 2024-08-09 RX ORDER — ONDANSETRON 4 MG/1
4 TABLET, ORALLY DISINTEGRATING ORAL EVERY 30 MIN PRN
Status: DISCONTINUED | OUTPATIENT
Start: 2024-08-09 | End: 2024-08-09 | Stop reason: HOSPADM

## 2024-08-09 RX ORDER — ACETAMINOPHEN 325 MG/1
650 TABLET ORAL EVERY 4 HOURS PRN
Qty: 100 TABLET | Refills: 0 | Status: SHIPPED | OUTPATIENT
Start: 2024-08-09

## 2024-08-09 RX ORDER — DEXAMETHASONE SODIUM PHOSPHATE 4 MG/ML
INJECTION, SOLUTION INTRA-ARTICULAR; INTRALESIONAL; INTRAMUSCULAR; INTRAVENOUS; SOFT TISSUE PRN
Status: DISCONTINUED | OUTPATIENT
Start: 2024-08-09 | End: 2024-08-09

## 2024-08-09 RX ADMIN — SODIUM CHLORIDE, POTASSIUM CHLORIDE, SODIUM LACTATE AND CALCIUM CHLORIDE: 600; 310; 30; 20 INJECTION, SOLUTION INTRAVENOUS at 12:11

## 2024-08-09 RX ADMIN — FENTANYL CITRATE 25 MCG: 50 INJECTION, SOLUTION INTRAMUSCULAR; INTRAVENOUS at 09:40

## 2024-08-09 RX ADMIN — ROCURONIUM BROMIDE 30 MG: 10 INJECTION, SOLUTION INTRAVENOUS at 08:04

## 2024-08-09 RX ADMIN — FENTANYL CITRATE 50 MCG: 50 INJECTION INTRAMUSCULAR; INTRAVENOUS at 08:04

## 2024-08-09 RX ADMIN — HYDROMORPHONE HYDROCHLORIDE 0.25 MG: 1 INJECTION, SOLUTION INTRAMUSCULAR; INTRAVENOUS; SUBCUTANEOUS at 09:13

## 2024-08-09 RX ADMIN — ASPIRIN 81 MG: 81 TABLET, COATED ORAL at 19:31

## 2024-08-09 RX ADMIN — ACETAMINOPHEN 975 MG: 325 TABLET ORAL at 13:28

## 2024-08-09 RX ADMIN — SODIUM CHLORIDE, POTASSIUM CHLORIDE, SODIUM LACTATE AND CALCIUM CHLORIDE: 600; 310; 30; 20 INJECTION, SOLUTION INTRAVENOUS at 06:37

## 2024-08-09 RX ADMIN — PROPOFOL 50 MCG/KG/MIN: 10 INJECTION, EMULSION INTRAVENOUS at 08:09

## 2024-08-09 RX ADMIN — DEXAMETHASONE SODIUM PHOSPHATE 4 MG: 4 INJECTION, SOLUTION INTRA-ARTICULAR; INTRALESIONAL; INTRAMUSCULAR; INTRAVENOUS; SOFT TISSUE at 08:04

## 2024-08-09 RX ADMIN — CEFAZOLIN SODIUM 2 G: 2 INJECTION, SOLUTION INTRAVENOUS at 16:17

## 2024-08-09 RX ADMIN — SODIUM CHLORIDE, POTASSIUM CHLORIDE, SODIUM LACTATE AND CALCIUM CHLORIDE: 600; 310; 30; 20 INJECTION, SOLUTION INTRAVENOUS at 08:53

## 2024-08-09 RX ADMIN — HYDROMORPHONE HYDROCHLORIDE 0.25 MG: 1 INJECTION, SOLUTION INTRAMUSCULAR; INTRAVENOUS; SUBCUTANEOUS at 09:17

## 2024-08-09 RX ADMIN — FENTANYL CITRATE 25 MCG: 50 INJECTION, SOLUTION INTRAMUSCULAR; INTRAVENOUS at 09:33

## 2024-08-09 RX ADMIN — SODIUM CHLORIDE, POTASSIUM CHLORIDE, SODIUM LACTATE AND CALCIUM CHLORIDE: 600; 310; 30; 20 INJECTION, SOLUTION INTRAVENOUS at 12:45

## 2024-08-09 RX ADMIN — FENTANYL CITRATE 50 MCG: 50 INJECTION INTRAMUSCULAR; INTRAVENOUS at 08:15

## 2024-08-09 RX ADMIN — HYDROMORPHONE HYDROCHLORIDE 0.5 MG: 1 INJECTION, SOLUTION INTRAMUSCULAR; INTRAVENOUS; SUBCUTANEOUS at 08:30

## 2024-08-09 RX ADMIN — ROCURONIUM BROMIDE 20 MG: 10 INJECTION, SOLUTION INTRAVENOUS at 08:23

## 2024-08-09 RX ADMIN — LIDOCAINE HYDROCHLORIDE 30 MG: 10 INJECTION, SOLUTION EPIDURAL; INFILTRATION; INTRACAUDAL; PERINEURAL at 08:04

## 2024-08-09 RX ADMIN — SUGAMMADEX 200 MG: 100 INJECTION, SOLUTION INTRAVENOUS at 09:02

## 2024-08-09 RX ADMIN — ONDANSETRON 4 MG: 2 INJECTION INTRAMUSCULAR; INTRAVENOUS at 08:04

## 2024-08-09 RX ADMIN — Medication 2 G: at 08:41

## 2024-08-09 RX ADMIN — ACETAMINOPHEN 975 MG: 325 TABLET ORAL at 19:32

## 2024-08-09 ASSESSMENT — ACTIVITIES OF DAILY LIVING (ADL)
ADLS_ACUITY_SCORE: 29
ADLS_ACUITY_SCORE: 33

## 2024-08-09 NOTE — H&P
Georgia is a 79 year old, presenting for the following:  Pre-Op Exam (Right hip wound I&D and possible headliner exchange at [facility] with Dr. Yancey)  She complains of some mild right hip pain and some slight right hip wound drainage.     Patient Active Problem List     Diagnosis Date Noted    DDD (degenerative disc disease), lumbar 04/25/2024       Priority: Medium    Localized osteoarthritis of left shoulder 04/08/2024       Priority: Medium    Primary osteoarthritis of right hip 04/08/2024       Priority: Medium    Parastomal hernia 09/13/2023       Priority: Medium    Impacted cerumen of right ear 09/13/2023       Priority: Medium    Colonic mass 08/08/2022       Priority: Medium    Hyperlipidemia LDL goal <100 08/08/2022       Priority: Medium    Shoulder pain, right 08/08/2022       Priority: Medium    Calculus of gallbladder without cholecystitis without obstruction 08/08/2022       Priority: Medium    History of urinary tract obstruction 08/08/2022       Priority: Medium    RAJ (stress urinary incontinence, female) 08/08/2022       Priority: Medium    S/P total left hip arthroplasty 08/05/2021       Priority: Medium    SHAUNA (obstructive sleep apnea)         Priority: Medium       Created by Conversion  Replacement Utility updated for latest IMO load          Overactive bladder 02/12/2020       Priority: Medium    Gastroesophageal reflux disease without esophagitis         Priority: Medium    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       Priority: Medium    Essential hypertension 12/10/2018       Priority: Medium    Allergic rhinitis due to pollen 11/26/2018       Priority: Medium      Past Medical History        Past Medical History:   Diagnosis Date    Calculus of gallbladder without cholecystitis without obstruction 08/08/2022    Claustrophobia      Colonic mass 08/08/2022    DDD (degenerative disc disease), lumbar 04/25/2024    Essential hypertension      GERD (gastroesophageal reflux disease)       History of blood clots      History of urinary tract obstruction 2022    Hyperlipidemia LDL goal <100 2022    Parastomal hernia 2023    Perirectal abscess 10/29/2019    Primary osteoarthritis of both hips      S/P total left hip arthroplasty      Seasonal allergies      Sleep apnea       uses CPAP    RAJ (stress urinary incontinence, female) 2022    Thrombosis           Past Surgical History         Past Surgical History:   Procedure Laterality Date    COLOSTOMY N/A 10/28/2019     Procedure: CREATION, COLOSTOMY;  Surgeon: Mylene Sears MD;  Location: [facility];  Service: General    FL EXCIS BARTHOLIN GLAND/CYST         Description: Excision Of Bartholin's Gland Or Cyst;  Recorded: 2013;    PROCTOSCOPY N/A 10/28/2019     Procedure: RIGID PROCTOSCOPY;  Surgeon: Mylene Sears MD;  Location: [facility];  Service: General    SIGMOIDOSCOPY FLEXIBLE N/A 10/27/2019     Procedure: SIGMOIDOSCOPY with biopsy;  Surgeon: Tj Gavin MD;  Location: [facility] GI;  Service: Gastroenterology    TOOTH EXTRACTION        [facility]  DELIVERY ONLY         Description:  Section;  Recorded: 2008;  Comments: x2    [facility] PART REMOVAL COLON W ANASTOMOSIS N/A 10/28/2019     Procedure: OPEN EXPLORATORY LAPAROTOMY, SEGMENTAL COLECTOMY,  WASHOUT OF PELVIC ABSCESS. RECTAL EXAM UNDER ANESTHESIA. DEBRIDEMENT OD ABSCESS CAVITY;  Surgeon: Mylene Sears MD;  Location: [facility];  Service: General    [facility] REMV KIDNEY,W/RIB RESECTION         Description: Nephrectomy;  Recorded: 2008;    [facility] TOTAL HIP ARTHROPLASTY Left 2021     Procedure: LEFT DIRECT ANTERIOR TOTAL HIP ARTHROPLASTY;  Surgeon: Alexander Yancey MD;  Location: [facility];  Service: Orthopedics         Current Outpatient Prescriptions          Current Outpatient Medications   Medication Sig Dispense Refill    acetaminophen (TYLENOL) 325 MG tablet [ACETAMINOPHEN (TYLENOL) 325 MG TABLET] Take 2  "tablets (650 mg total) by mouth every 4 (four) hours as needed for pain (mild pain). 100 tablet 0    cholecalciferol, vitamin D3, 5,000 unit Tab Take 1,000 Units by mouth daily        lisinopril-hydrochlorothiazide (ZESTORETIC) 10-12.5 MG tablet TAKE 1 TABLET DAILY 90 tablet 3    loratadine (CLARITIN) 10 mg tablet [LORATADINE (CLARITIN) 10 MG TABLET] Take 10 mg by mouth daily as needed.         menthol (BIOFREEZE, MENTHOL,) 4 % Gel [MENTHOL (BIOFREEZE, MENTHOL,) 4 % GEL] Apply topically at bedtime as needed.         Naproxen Sodium (ALEVE PO) Take 220 mg by mouth 2 times daily as needed for moderate pain        rosuvastatin (CRESTOR) 10 MG tablet TAKE 1 TABLET AT BEDTIME 90 tablet 3    methylPREDNISolone (MEDROL DOSEPAK) 4 MG tablet therapy pack Follow Package Directions (Patient not taking: Reported on 4/25/2024) 21 tablet 0         Allergies         Allergies   Allergen Reactions    Cats Claw (Uncaria [Uncaria Tomentosa (Cats Claw)] Unknown    Dye [External Allergen Needs Reconciliation - See Comment] Hives and Swelling       Cat scan dye    Other Drug Allergy (See Comments) Hives and Swelling       Cat scan dye [iodine-based contrast?]    Other Environmental Allergy Unknown       DUST    Ragweed Pollen [Ragweeds] Unknown         Social History            Tobacco Use    Smoking status: Never    Smokeless tobacco: Never   Substance Use Topics    Alcohol use: Yes       Comment: rarely     Estimated body mass index is 29.2 kg/m  as calculated from the following:    Height as of this encounter: 1.651 m (5' 5\").    Weight as of this encounter: 79.6 kg (175 lb 8 oz).  EYES: Clear, PERRLA  HEENT: nose and throat clear, ears normal   NECK:  without cervical or axillary adenopathy  RESPIRATORY: CTA Bilaterally  CARDIOVASCULAR: Regular S1, S2, No RMG  ABDOMEN: soft, flat, and non-tender  EXTREMITIES:  no significant inflammation or edema, Right hip wound open, slight drainage  NEUROLOGIC: Speech is clear,  gait is " normal  PSYCHIATRIC: Oriented X 3, thinking is clear     Collection Time: 06/04/24 10:55 AM    Result Value Ref Range     WBC Count 6.0 4.0 - 11.0 10e3/uL     RBC Count 4.70 3.80 - 5.20 10e6/uL     Hemoglobin 13.2 11.7 - 15.7 g/dL     Hematocrit 41.4 35.0 - 47.0 %     MCV 88 78 - 100 fL     MCH 28.1 26.5 - 33.0 pg     MCHC 31.9 31.5 - 36.5 g/dL     RDW 12.3 10.0 - 15.0 %     Platelet Count 256 150 - 450 10e3/uL   Basic metabolic panel  (Ca, Cl, CO2, Creat, Gluc, K, Na, BUN)     Collection Time: 06/04/24 10:55 AM   Result Value Ref Range     Sodium 141 135 - 145 mmol/L     Potassium 4.0 3.4 - 5.3 mmol/L     Chloride 107 98 - 107 mmol/L     Carbon Dioxide (CO2) 25 22 - 29 mmol/L     Anion Gap 9 7 - 15 mmol/L     Urea Nitrogen 25.2 (H) 8.0 - 23.0 mg/dL     Creatinine 0.51 0.51 - 0.95 mg/dL     GFR Estimate >90 >60 mL/min/1.73m2     Calcium 10.7 (H) 8.8 - 10.2 mg/dL     Glucose 93 70 - 99 mg/dL      Revised Cardiac Risk Index (RCRI)  The patient has the following serious cardiovascular risks for perioperative complications:   - No serious cardiac risks = 0 points      RCRI Interpretation: 0 points: Class I (very low risk - 0.4% complication rate)      Assessment: Right hip wound dehiscence    Plan: patient is low risk for procedure, Ok to proceed with above procedure

## 2024-08-09 NOTE — CONSULTS
"[facility]  Consult Note - Hospitalist Service  Date of Admission:  8/9/2024  Consult Requested by: orthopaedics  Reason for Consult: post operative medical management    Assessment & Plan   Cyndie Marin is a 79 year old female admitted s/p I&D right hip incision for wound dehiscence in the setting of recent hip arthroplasty.  She is clinically stable.  Recommendations as below.    # s/p I&D right hip incision  - per orthopaedics    # HTN  - hold hydrochlorothiazide/lisinopril for now    # HLD    # SHAUNA  - CPAP    # Hx pelvic abscess s/p partial colectomy and ostomy placement       Clinically Significant Risk Factors Present on Admission                  # Hypertension: Noted on problem list         # Overweight: Estimated body mass index is 28.29 kg/m  as calculated from the following:    Height as of this encounter: 1.651 m (5' 5\").    Weight as of this encounter: 77.1 kg (170 lb).                    Dagoberto Palacios MD  Hospitalist Service  Securely message with [employer] (more info)  Text page via [facility] [facility] Paging/Directory   ______________________________________________________________________    Chief Complaint   s/p I&D right hip incision    History is obtained from the patient    History of Present Illness   Cyndie Marin is a 79 year old female with history of HTN, HLD, hx provoked VTE, and hx pelvic abscess s/p partial colectomy and ostomy placement.  She currently reports her hip stings.  Denies dyspnea, chest pain, chest pressure, or nausea.  Denies numbness or tingling in the legs.      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)     DDD (degenerative disc disease), lumbar 04/25/2024    Essential hypertension     GERD (gastroesophageal reflux disease)     History of blood clots     History of urinary tract " obstruction 2022    Hyperlipidemia LDL goal <100 2022    Parastomal hernia 2023    Perirectal abscess 10/29/2019    PONV (postoperative nausea and vomiting)     Primary osteoarthritis of both hips     Renal disease     S/P total left hip arthroplasty     Seasonal allergies     Sleep apnea     uses CPAP    RAJ (stress urinary incontinence, female) 2022    Thrombosis        Past Surgical History   Past Surgical History:   Procedure Laterality Date    ARTHROPLASTY HIP ANTERIOR Right 2024    Procedure: RIGHT DIRECT ANTERIOR TOTAL HIP ARTHROPLASTY;  Surgeon: Alexander Yancey MD;  Location: [facility] OR    COLOSTOMY N/A 10/28/2019    Procedure: CREATION, COLOSTOMY;  Surgeon: Mylene Sears MD;  Location: [facility] OR;  Service: General    NEPHRECTOMY Left     with rib resection    NV EXCIS BARTHOLIN GLAND/CYST      Description: Excision Of Bartholin's Gland Or Cyst;  Recorded: 2013;    PROCTOSCOPY N/A 10/28/2019    Procedure: RIGID PROCTOSCOPY;  Surgeon: Mylene Sears MD;  Location: [facility] OR;  Service: General    SIGMOIDOSCOPY FLEXIBLE N/A 10/27/2019    Procedure: SIGMOIDOSCOPY with biopsy;  Surgeon: Tj Gavin MD;  Location: [facility] GI;  Service: Gastroenterology    TOOTH EXTRACTION      [facility]  DELIVERY ONLY      Description:  Section;  Recorded: 2008;  Comments: x2    [facility] PART REMOVAL COLON W ANASTOMOSIS N/A 10/28/2019    Procedure: OPEN EXPLORATORY LAPAROTOMY, SEGMENTAL COLECTOMY,  WASHOUT OF PELVIC ABSCESS. RECTAL EXAM UNDER ANESTHESIA. DEBRIDEMENT OD ABSCESS CAVITY;  Surgeon: Mylene Sears MD;  Location: [facility] OR;  Service: General    [facility] TOTAL HIP ARTHROPLASTY Left 2021    Procedure: LEFT DIRECT ANTERIOR TOTAL HIP ARTHROPLASTY;  Surgeon: Alexander Yacney MD;  Location: [facility] OR;  Service: Orthopedics       Medications   I have reviewed this patient's current medications          Physical Exam    Vital Signs: Temp: 98.2  F (36.8  C) Temp src: Temporal BP: 117/57 Pulse: 68   Resp: 16 SpO2: 100 % O2 Device: Nasal cannula Oxygen Delivery: 2 LPM  Weight: 170 lbs 0 oz    Gen: lying in bed in no extremis  Neuro: alert, conversant; moves toes and sensation grossly intact in the bilateral feet  CV:  nl rate, regular rhythm  Pulm:  no acute resp distress, ctab anteriorly  GI:  abdomen NTTP: LLQ ostomy    Medical Decision Making             Data

## 2024-08-09 NOTE — ANESTHESIA PROCEDURE NOTES
Airway       Patient location during procedure: OR       Procedure Start/Stop Times: 8/9/2024 8:06 AM  Staff -        CRNA: Logan Griffiths APRN CRNA       Performed By: CRNA  Consent for Airway        Urgency: elective  Indications and Patient Condition       Indications for airway management: philip-procedural       Induction type:intravenous       Mask difficulty assessment: 1 - vent by mask    Final Airway Details       Final airway type: endotracheal airway       Successful airway: ETT - single  Endotracheal Airway Details        ETT size (mm): 7.0       Cuffed: yes       Cuff volume (mL): 10       Successful intubation technique: video laryngoscopy       VL Blade Size: Glidescope 3       Grade View of Cords: 2       Adjucts: stylet       Position: Right       Measured from: lips       Secured at (cm): 21       Bite block used: None    Post intubation assessment        Placement verified by: capnometry, equal breath sounds and chest rise        Number of attempts at approach: 1       Number of other approaches attempted: 0       Secured with: tape       Ease of procedure: easy       Dentition: Unchanged    Medication(s) Administered   Medication Administration Time: 8/9/2024 8:06 AM

## 2024-08-09 NOTE — INTERVAL H&P NOTE
"I have reviewed the surgical (or preoperative) H&P that is linked to this encounter, and examined the patient. There are no significant changes    Clinical Conditions Present on Arrival:  Clinically Significant Risk Factors Present on Admission                      # Overweight: Estimated body mass index is 28.29 kg/m  as calculated from the following:    Height as of this encounter: 1.651 m (5' 5\").    Weight as of this encounter: 77.1 kg (170 lb).       "

## 2024-08-09 NOTE — ANESTHESIA PREPROCEDURE EVALUATION
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)      DDD (degenerative disc disease), lumbar 2024     Essential hypertension      GERD (gastroesophageal reflux disease)      History of blood clots      History of urinary tract obstruction 2022     Hyperlipidemia LDL goal <100 2022     Parastomal hernia 2023     Perirectal abscess 10/29/2019     PONV (postoperative nausea and vomiting)      Primary osteoarthritis of both hips      Renal disease      S/P total left hip arthroplasty      Seasonal allergies      Sleep apnea     uses CPAP     RAJ (stress urinary incontinence, female) 2022     Thrombosis       Past Surgical History:   Procedure Laterality Date     ARTHROPLASTY HIP ANTERIOR Right 2024    Procedure: RIGHT DIRECT ANTERIOR TOTAL HIP ARTHROPLASTY;  Surgeon: Alexander Yancey MD;  Location: [facility] OR     COLOSTOMY N/A 10/28/2019    Procedure: CREATION, COLOSTOMY;  Surgeon: Mylene Sears MD;  Location: [facility] OR;  Service: General     NEPHRECTOMY Left 1996    with rib resection     AR EXCIS BARTHOLIN GLAND/CYST      Description: Excision Of Bartholin's Gland Or Cyst;  Recorded: 2013;     PROCTOSCOPY N/A 10/28/2019    Procedure: RIGID PROCTOSCOPY;  Surgeon: Mylene Sears MD;  Location: [facility] OR;  Service: General     SIGMOIDOSCOPY FLEXIBLE N/A 10/27/2019    Procedure: SIGMOIDOSCOPY with biopsy;  Surgeon: Tj Gavin MD;  Location: [facility] GI;  Service: Gastroenterology     TOOTH EXTRACTION       [facility]  DELIVERY ONLY      Description:  Section;  Recorded:  06/26/2008;  Comments: x2     [facility] PART REMOVAL COLON W ANASTOMOSIS N/A 10/28/2019    Procedure: OPEN EXPLORATORY LAPAROTOMY, SEGMENTAL COLECTOMY,  WASHOUT OF PELVIC ABSCESS. RECTAL EXAM UNDER ANESTHESIA. DEBRIDEMENT OD ABSCESS CAVITY;  Surgeon: Mylene Sears MD;  Location: [facility] OR;  Service: General     [facility] TOTAL HIP ARTHROPLASTY Left 01/26/2021    Procedure: LEFT DIRECT ANTERIOR TOTAL HIP ARTHROPLASTY;  Surgeon: Alexander Yancey MD;  Location: [facility] OR;  Service: Orthopedics      Allergies   Allergen Reactions     Cats Claw (Uncaria [Uncaria Tomentosa (Cats Claw)] Unknown     Dye [External Allergen Needs Reconciliation - See Comment] Hives and Swelling     Cat scan dye     Other Drug Allergy (See Comments) Hives and Swelling     Cat scan dye [iodine-based contrast?]     Other Environmental Allergy Unknown     DUST     Ragweed Pollen [Ragweeds] Unknown      Social History     Tobacco Use     Smoking status: Never     Smokeless tobacco: Never   Substance Use Topics     Alcohol use: Yes     Comment: rarely      Wt Readings from Last 1 Encounters:   08/09/24 77.1 kg (170 lb)        Anesthesia Evaluation   Pt has had prior anesthetic.         ROS/MED HX  ENT/Pulmonary:  - neg pulmonary ROS   (+) sleep apnea,                                       Neurologic:  - neg neurologic ROS     Cardiovascular:  - neg cardiovascular ROS   (+)  hypertension- -   -  - -                                      METS/Exercise Tolerance:     Hematologic:     (+) History of blood clots,    pt is anticoagulated,           Musculoskeletal:  - neg musculoskeletal ROS     GI/Hepatic:     (+) GERD,                   Renal/Genitourinary:  - neg Renal ROS     Endo:  - neg endo ROS     Psychiatric/Substance Use:  - neg psychiatric ROS     Infectious Disease:  - neg infectious disease ROS     Malignancy:  - neg malignancy ROS     Other:  - neg other ROS        Physical Exam    Airway        Mallampati: II   TM distance: < 3 FB  "  Neck ROM: full   Mouth opening: > 3 cm    Respiratory Devices and Support         Dental  no notable dental history     (+) Modest Abnormalities - crowns, retainers, 1 or 2 missing teeth      Cardiovascular   cardiovascular exam normal       Rhythm and rate: regular and normal     Pulmonary   pulmonary exam normal        breath sounds clear to auscultation       OUTSIDE LABS:  CBC:   Lab Results   Component Value Date    WBC 9.9 07/31/2024    WBC 6.0 06/04/2024    HGB 11.7 07/31/2024    HGB 8.5 (L) 06/28/2024    HCT 36.9 07/31/2024    HCT 41.4 06/04/2024     07/31/2024     06/04/2024     BMP:   Lab Results   Component Value Date     07/31/2024     06/04/2024    POTASSIUM 4.8 07/31/2024    POTASSIUM 4.0 06/04/2024    CHLORIDE 104 07/31/2024    CHLORIDE 107 06/04/2024    CO2 25 07/31/2024    CO2 25 06/04/2024    BUN 16.7 07/31/2024    BUN 25.2 (H) 06/04/2024    CR 0.50 (L) 07/31/2024    CR 0.48 (L) 06/25/2024    GLC 97 07/31/2024     (H) 06/27/2024     COAGS:   Lab Results   Component Value Date    INR 1.02 01/26/2021     POC: No results found for: \"BGM\", \"HCG\", \"HCGS\"  HEPATIC:   Lab Results   Component Value Date    ALBUMIN 3.9 09/13/2023    PROTTOTAL 6.4 09/13/2023    ALT 18 09/13/2023    AST 19 09/13/2023    ALKPHOS 113 (H) 09/13/2023    BILITOTAL 0.5 09/13/2023     OTHER:   Lab Results   Component Value Date    HALLIE 10.1 07/31/2024    MAG 2.1 08/05/2021    LIPASE 28 12/19/2019    SED 32 (H) 07/31/2024       Anesthesia Plan    ASA Status:  3    NPO Status:  NPO Appropriate    Anesthesia Type: General.     - Airway: ETT   Induction: Intravenous, Propofol.   Maintenance: TIVA.        Consents    Anesthesia Plan(s) and associated risks, benefits, and realistic alternatives discussed. Questions answered and patient/representative(s) expressed understanding.     - Discussed:     - Discussed with:  Patient      - Extended Intubation/Ventilatory Support Discussed: Yes.      - Patient is " "DNR/DNI Status: No     Use of blood products discussed: Yes.     - Discussed with: Patient.     Postoperative Care    Pain management: Multi-modal analgesia.   PONV prophylaxis: Ondansetron (or other 5HT-3), Dexamethasone or Solumedrol     Comments:             Wan Clark MD    I have reviewed the pertinent notes and labs in the chart from the past 30 days and (re)examined the patient.  Any updates or changes from those notes are reflected in this note.            # Drug Induced Coagulation Defect: home medication list includes an anticoagulant medication   # Overweight: Estimated body mass index is 28.29 kg/m  as calculated from the following:    Height as of this encounter: 1.651 m (5' 5\").    Weight as of this encounter: 77.1 kg (170 lb).      "

## 2024-08-09 NOTE — ANESTHESIA POSTPROCEDURE EVALUATION
Patient: Cyndie Marin    Procedure: Procedure(s):  RIGHT HIP WOUND IRRIGATION AND DEBRIDEMENT, CLOSURE WITH  PREVENA DRESSING       Anesthesia Type:  General    Note:  Disposition: Inpatient   Postop Pain Control: Uneventful            Sign Out: Well controlled pain   PONV: No   Neuro/Psych: Uneventful            Sign Out: Acceptable/Baseline neuro status   Airway/Respiratory: Uneventful            Sign Out: Acceptable/Baseline resp. status   CV/Hemodynamics: Uneventful            Sign Out: Acceptable CV status; No obvious hypovolemia; No obvious fluid overload   Other NRE: NONE   DID A NON-ROUTINE EVENT OCCUR? No       Last vitals:  Vitals Value Taken Time   /55 08/09/24 1100   Temp 32.8  C (91.04  F) 08/09/24 0942   Pulse 70 08/09/24 1119   Resp 16 08/09/24 1030   SpO2 94 % 08/09/24 1119   Vitals shown include unfiled device data.    Electronically Signed By: Wan Clark MD  August 9, 2024  11:21 AM

## 2024-08-09 NOTE — OP NOTE
DATE OF SERVICE: 8/9/2024    PREOPERATIVE DIAGNOSIS: Right hip wound dehiscence    POSTOPERATIVE DIAGNOSIS: Right hip wound dehiscence    PROCEDURE:   Right hip incision, irrigation, excisional debridement down to fascia, complex wound closure 3 cm x 15 cm  2.  Application of vacuum assisted negative pressure wound dressing, 20 cm.     SURGEON: Alexander Yancey MD    ASSISTANT: Cecilia Garcia PA-C; BETO assist was essential and required for all portions of the case, including patient positioning, soft tissue retraction, patient safety, assistance with closure of the wound, and postoperative care    ANESTHESIA: General    EBL: 50 cc    COMPLICATIONS: None    SPECIMEN: Tissue/fluid culture x 2    INDICATION FOR PROCEDURE: Georgia is a pleasant 79-year-old female who developed a wound dehiscence within the last week.  She is approximately 6 weeks out from a right total hip arthroplasty.  The wound did not respond local dressing changes.  The above procedure was recommended.  For full details please see my clinic notes.  The risks including, but not limited to, bleeding, infection, nerve injury, dvt, implant failure, implant wear, fracture, limb length inequality, anesthetic complications, heart attack, stroke, and even death were discussed.  Pt verbalized understanding.  Informed consent was obtained    DESCRIPTION OF PROCEDURE: The patient was seen and evaluated in the preop area. Discussion of the surgery and any further questions were answered with the patient and family using easily understandable non-medical terms. The correct site was identified with the patient, and I placed my initials at the surgical site. Pre-procedure verification was completed. Relevant information / documentation available, they were reviewed and properly matched to the patient.  I verified the consent was accurate and complete.  I verified that the proper surgical equipment and supplies were available. The patient was taken to the operating  room and placed in position according to the procedure in consideration with all extremities well padded. The patient was given successful general anesthesia.  The patient was then placed on the [place] table with all extremities well-padded the operative  Hip was then prepped and draped in sterile fashion.  The leg was covered with an Ioban type drape. The patient received preoperative prophylactic antibiotics based on the patient s procedure, BMI, and allergy profile after we took wound cultures.  A Time Out was conducted just prior to starting procedure to verify the eight required elements: 1-patient identity, 2-consent accurate and complete, 3-position, 4-correct side/site marked (if applicable), 5-procedure, 6-relevant images / results properly labeled and displayed (if applicable), 7-antibiotics / irrigation fluids (if applicable), 8-safety precautions.     I then ellipsed out the dehisced wound.  This was approximately 3 cm x 15 cm.  Then using electrocautery as well as a scalpel any and all infected or inflamed tissue was excised.  The tissue that was excised was skiin and subcutaneous tissue.  No fascia or muscle was involved. 2 tissue/fluid cultures were obtained at this time.  A Carter elevator was used to scrape any of the soft tissues for any further unhealthy tissue.  There was no defects in the fascia.  I did not feel that this affected the fascia, muscular or the hip joint itself.  We then irrigated with 3 L of antibiotic saline.  Majority the tissue still looks fairly healthy without any erythema or warmth.  The complex wound closure attaching the subcutaneous layer to the underlying fascia was performed with #1 strata fix and 0 Vicryl suture.  Subcutaneous layer 2-0 Vicryl.  The skin was closed with skin staples.  A sterile dressing  Preveena dressing was placed on the operative hip.  The patient was brought to the post op recovery area in stable condition.  The sponge and needle counts were correct at  the end of the case.        POST OP PLAN:  Pain Control:  IV Narcotics, transition to oral narcotics as bowel function returns  Infection Prophylaxis:  IV Antibiotics as per patient's allergy profile and BMI x 24 hours then discharged on Duricef 500 mg p.o. twice daily x 2 weeks  DVT Prophylaxis: Enteric-coated aspirin 81 mg p.o. twice daily, mechanical devices  PT/OT: Eval and treat.  WBAT with Walker.  No range of motion restrictions.  Medical Management: As per primary medical service  Incision/Dressing Care: Keep on, clean and dry.  Follow Prevena dressing instructions  Disposition:  Consult if needed for disposition.  Follow-up with team Bc in 10-14 days    * No implants in log *

## 2024-08-09 NOTE — PROGRESS NOTES
Patient vital signs are at baseline: Yes  Patient able to ambulate as they were prior to admission or with assist devices provided by therapies during their stay:  No,  Reason:ambulated to bedside commode  Patient MUST void prior to discharge:  Yes  Patient able to tolerate oral intake:  Yes  Pain has adequate pain control using Oral analgesics:  Yes, pt currently denying pain  Has goal D/C date and time been discussed with patient:  No,  Reason: Therapy and pain management pending    Pt is AxO4 and VSS. Wound vac on continuous 125 mmHG pressure. No drainage noted at incision. CMS intact. CPAP and colostomy supplies in room.

## 2024-08-09 NOTE — PHARMACY-ADMISSION MEDICATION HISTORY
Pharmacist Admission Medication History    Admission medication history is complete. The information provided in this note is only as accurate as the sources available at the time of the update.    Information Source(s): Patient and CareEverywhere/SureScripts via in-person    Pertinent Information: n/a    Allergies reviewed with patient and updates made in EHR: yes    Medication History Completed By: Tatyana Bedoya PharmD 8/9/2024 6:58 AM    PTA Med List   Medication Sig Note Last Dose    acetaminophen (TYLENOL) 325 MG tablet Take 2 tablets (650 mg) by mouth every 4 hours as needed for other (mild pain)  Unknown at prn    lisinopril-hydrochlorothiazide (ZESTORETIC) 10-12.5 MG tablet Hold if blood pressure less than 120/80.    TAKE 1 TABLET DAILY  8/9/2024 at am    loratadine (CLARITIN) 10 mg tablet [LORATADINE (CLARITIN) 10 MG TABLET] Take 10 mg by mouth daily as needed.   Unknown at prn    menthol (BIOFREEZE, MENTHOL,) 4 % Gel [MENTHOL (BIOFREEZE, MENTHOL,) 4 % GEL] Apply topically at bedtime as needed.  8/6/2024: Applied to left arm 8/8/2024    rosuvastatin (CRESTOR) 10 MG tablet TAKE 1 TABLET AT BEDTIME  8/8/2024    Vitamin D3 (CHOLECALCIFEROL) 25 mcg (1000 units) tablet Take 1 tablet by mouth daily  8/8/2024 at pm

## 2024-08-09 NOTE — ANESTHESIA CARE TRANSFER NOTE
Patient: Cyndie Marin    Procedure: Procedure(s):  RIGHT HIP WOUND IRRIGATION AND DEBRIDEMENT, CLOSURE WITH  PREVENA DRESSING       Diagnosis: Right hip pain [M25.551]  Wound dehiscence [T81.30XA]  Diagnosis Additional Information: No value filed.    Anesthesia Type:   General     Note:    Oropharynx: oropharynx clear of all foreign objects and spontaneously breathing  Level of Consciousness: drowsy  Oxygen Supplementation: face mask  Level of Supplemental Oxygen (L/min / FiO2): 8  Independent Airway: airway patency satisfactory and stable  Dentition: dentition unchanged  Vital Signs Stable: post-procedure vital signs reviewed and stable  Report to RN Given: handoff report given  Patient transferred to: PACU    Handoff Report: Identifed the Patient, Identified the Reponsible Provider, Reviewed the pertinent medical history, Discussed the surgical course, Reviewed Intra-OP anesthesia mangement and issues during anesthesia, Set expectations for post-procedure period and Allowed opportunity for questions and acknowledgement of understanding      Vitals:  Vitals Value Taken Time   /59 08/09/24 0920   Temp 35.9  C (96.62  F) 08/09/24 0921   Pulse 76 08/09/24 0921   Resp 22 08/09/24 0921   SpO2 100 % 08/09/24 0921   Vitals shown include unfiled device data.    Electronically Signed By: CORRINE Beach CRNA  August 9, 2024  9:22 AM

## 2024-08-10 ENCOUNTER — APPOINTMENT (OUTPATIENT)
Dept: OCCUPATIONAL THERAPY | Facility: CLINIC | Age: 79
DRG: 902 | End: 2024-08-10
Payer: COMMERCIAL

## 2024-08-10 ENCOUNTER — APPOINTMENT (OUTPATIENT)
Dept: PHYSICAL THERAPY | Facility: CLINIC | Age: 79
DRG: 902 | End: 2024-08-10
Payer: COMMERCIAL

## 2024-08-10 LAB
CREAT SERPL-MCNC: 0.6 MG/DL (ref 0.51–0.95)
EGFRCR SERPLBLD CKD-EPI 2021: 90 ML/MIN/1.73M2
FASTING STATUS PATIENT QL REPORTED: ABNORMAL
GLUCOSE SERPL-MCNC: 148 MG/DL (ref 70–99)
HGB BLD-MCNC: 10.1 G/DL (ref 11.7–15.7)

## 2024-08-10 PROCEDURE — 97110 THERAPEUTIC EXERCISES: CPT | Mod: GP

## 2024-08-10 PROCEDURE — 36415 COLL VENOUS BLD VENIPUNCTURE: CPT

## 2024-08-10 PROCEDURE — 250N000011 HC RX IP 250 OP 636: Performed by: ORTHOPAEDIC SURGERY

## 2024-08-10 PROCEDURE — 82565 ASSAY OF CREATININE: CPT | Performed by: INTERNAL MEDICINE

## 2024-08-10 PROCEDURE — 250N000013 HC RX MED GY IP 250 OP 250 PS 637

## 2024-08-10 PROCEDURE — 82947 ASSAY GLUCOSE BLOOD QUANT: CPT | Performed by: ORTHOPAEDIC SURGERY

## 2024-08-10 PROCEDURE — 97161 PT EVAL LOW COMPLEX 20 MIN: CPT | Mod: GP

## 2024-08-10 PROCEDURE — 97535 SELF CARE MNGMENT TRAINING: CPT | Mod: GO

## 2024-08-10 PROCEDURE — 120N000001 HC R&B MED SURG/OB

## 2024-08-10 PROCEDURE — 99232 SBSQ HOSP IP/OBS MODERATE 35: CPT | Performed by: HOSPITALIST

## 2024-08-10 PROCEDURE — 97116 GAIT TRAINING THERAPY: CPT | Mod: GP

## 2024-08-10 PROCEDURE — 250N000011 HC RX IP 250 OP 636: Mod: JZ

## 2024-08-10 PROCEDURE — 94660 CPAP INITIATION&MGMT: CPT

## 2024-08-10 PROCEDURE — 258N000003 HC RX IP 258 OP 636: Performed by: ORTHOPAEDIC SURGERY

## 2024-08-10 PROCEDURE — 97166 OT EVAL MOD COMPLEX 45 MIN: CPT | Mod: GO

## 2024-08-10 PROCEDURE — 85018 HEMOGLOBIN: CPT

## 2024-08-10 PROCEDURE — 99204 OFFICE O/P NEW MOD 45 MIN: CPT | Performed by: INTERNAL MEDICINE

## 2024-08-10 PROCEDURE — 999N000157 HC STATISTIC RCP TIME EA 10 MIN

## 2024-08-10 PROCEDURE — 250N000011 HC RX IP 250 OP 636: Performed by: INTERNAL MEDICINE

## 2024-08-10 RX ORDER — CEPHALEXIN 500 MG/1
500 CAPSULE ORAL 4 TIMES DAILY
Status: DISCONTINUED | OUTPATIENT
Start: 2024-08-10 | End: 2024-08-10

## 2024-08-10 RX ORDER — VANCOMYCIN HYDROCHLORIDE
1250 EVERY 12 HOURS
Status: DISCONTINUED | OUTPATIENT
Start: 2024-08-10 | End: 2024-08-10

## 2024-08-10 RX ORDER — VANCOMYCIN HYDROCHLORIDE
1250 ONCE
Status: COMPLETED | OUTPATIENT
Start: 2024-08-10 | End: 2024-08-10

## 2024-08-10 RX ORDER — CEFEPIME HYDROCHLORIDE 1 G/1
1 INJECTION, POWDER, FOR SOLUTION INTRAMUSCULAR; INTRAVENOUS EVERY 8 HOURS
Status: DISCONTINUED | OUTPATIENT
Start: 2024-08-10 | End: 2024-08-12

## 2024-08-10 RX ORDER — VANCOMYCIN HYDROCHLORIDE 1 G/200ML
1000 INJECTION, SOLUTION INTRAVENOUS EVERY 12 HOURS
Status: DISCONTINUED | OUTPATIENT
Start: 2024-08-11 | End: 2024-08-12

## 2024-08-10 RX ADMIN — SENNOSIDES AND DOCUSATE SODIUM 1 TABLET: 50; 8.6 TABLET ORAL at 19:25

## 2024-08-10 RX ADMIN — CEFAZOLIN SODIUM 2 G: 2 INJECTION, SOLUTION INTRAVENOUS at 08:41

## 2024-08-10 RX ADMIN — ASPIRIN 81 MG: 81 TABLET, COATED ORAL at 08:41

## 2024-08-10 RX ADMIN — ASPIRIN 81 MG: 81 TABLET, COATED ORAL at 19:25

## 2024-08-10 RX ADMIN — CEFEPIME 1 G: 1 INJECTION, POWDER, FOR SOLUTION INTRAMUSCULAR; INTRAVENOUS at 11:23

## 2024-08-10 RX ADMIN — VANCOMYCIN HYDROCHLORIDE 1250 MG: 5 INJECTION, POWDER, LYOPHILIZED, FOR SOLUTION INTRAVENOUS at 12:19

## 2024-08-10 RX ADMIN — CEFAZOLIN SODIUM 2 G: 2 INJECTION, SOLUTION INTRAVENOUS at 00:08

## 2024-08-10 RX ADMIN — ACETAMINOPHEN 975 MG: 325 TABLET ORAL at 05:25

## 2024-08-10 RX ADMIN — ACETAMINOPHEN 975 MG: 325 TABLET ORAL at 12:19

## 2024-08-10 RX ADMIN — OXYCODONE HYDROCHLORIDE 5 MG: 5 TABLET ORAL at 02:15

## 2024-08-10 RX ADMIN — ACETAMINOPHEN 975 MG: 325 TABLET ORAL at 19:30

## 2024-08-10 RX ADMIN — CEFEPIME 1 G: 1 INJECTION, POWDER, FOR SOLUTION INTRAMUSCULAR; INTRAVENOUS at 18:15

## 2024-08-10 RX ADMIN — VANCOMYCIN HYDROCHLORIDE 1000 MG: 1 INJECTION, SOLUTION INTRAVENOUS at 23:32

## 2024-08-10 ASSESSMENT — ACTIVITIES OF DAILY LIVING (ADL)
ADLS_ACUITY_SCORE: 33
ADLS_ACUITY_SCORE: 35

## 2024-08-10 NOTE — PROGRESS NOTES
Brief staff note    Chart review only.  Vitals unremarkable.  No new imaging or labs.  Polymicrobial growth noted from intraoperative cultures.  Recommend consultation with Infectious Disease if there are questions related to antibiotic coverage.    See subsequent full progress note.

## 2024-08-10 NOTE — PROGRESS NOTES
Orthopedic Progress Note  Cyndie Marin  79 year old female  Subjective: POD #1 s/p superficial R hip I&D c vac dressing placement. Doing well. Hoping to discharge today. Worried about possible c dif with continued abx, also worried about abx resistance. Denies CP/SOB/N/V/calf pain.  Vitals:    08/09/24 2355 08/10/24 0011 08/10/24 0400 08/10/24 0534   BP:  106/51  120/60   BP Location:  Right arm     Patient Position:  Semi-Philippe's     Pulse:  66  65   Resp:  18  18   Temp:  97.9  F (36.6  C)     TempSrc:  Oral  Oral   SpO2: 97% 96% 97% 97%   Weight:       Height:         O: 79 year old, female in NAD, A&Ox3, resting comfortably on bed.  Resp: Breathing regular and non-labored.  Incision: Vac intact. No erythema, warmth or drainage.  Extremities: SILT DP/SP/plantar n dist. Motor intact EHL/FHL/TA/GSC. Calf soft and non-tender. Leg warm and well perfused  Hemoglobin   Date Value Ref Range Status   07/31/2024 11.7 11.7 - 15.7 g/dL Final   ]    A/P: 79 year old female POD#1 s/p R hip superficial I&D  -WBAT  -Pain management  -ASA /mechanical DVT prophylaxis  -No dressing chage - reinforce prn  -ID consulted for polymicrobial home abx recs  -Ice/elevate  -Cont PT/OT  -Medicine following - comgmt appreciated  -F/U with Dr. Yancey in 10-14 days  -discharge home today  Harvinder Pina PA-C  8/10/2024 7:51 AM   [facility]  [phone] office

## 2024-08-10 NOTE — PLAN OF CARE
Goal Outcome Evaluation:                    Problem: Adult Inpatient Plan of Care  Goal: Absence of Hospital-Acquired Illness or Injury  Intervention: Prevent Infection  Recent Flowsheet Documentation  Taken 8/10/2024 0900 by Sydnee Menendez RN  Infection Prevention:   hand hygiene promoted   rest/sleep promoted   single patient room provided     Problem: Hip Arthroplasty  Goal: Acceptable Pain Control  Outcome: Progressing     Pt A&O x4. Mild pain managed with scheduled tylenol and ice packs. IV SL between ABX. Colostomy in place. Wound vac CDI with no/minimal output. A1/SBA when ambulating. Plan for pt to stay a few more days to receive abx. Call light in reach. Bed alarm on. Pt able to make needs known.

## 2024-08-10 NOTE — CONSULTS
Consultation - Infectious Disease  [facility]  Cyndie Marin,  1945, MRN [id number]    Admitting Dx: Right hip pain [M25.551]  Wound dehiscence [T81.30XA]    PCP: Roni Horowitz, [phone]       ASSESSMENT   79-year-old woman with a history of hypertension and hyperlipidemia admitted with nonhealing surgical wound.    Status post right hip arthroplasty 2024.  Complicated by wound dehiscence  Superficial surgical site infection.  Dehiscence of wound.  Presented to urgent care about a week ago and placed on cephalexin x 7 days.  Admitted yesterday for wound debridement and wound VAC placement.  No signs of deep wound infection, no penetration to the joint capsule.  Gram-positive cocci seen on Gram stain.  Cultures growing E. coli, Morganella morganii, and Enterococcus faecalis.      Active Problems:    Surgical aftercare, musculoskeletal system       PLAN   -Start vancomycin and cefepime  -Follow-up on susceptibilities.  Needs to be on appropriate antibiotics at discharge.  Nonhealing wound is a risk factor for prosthetic joint infection  -Baseline labs with creatinine and CBC    Thank you for this consult. Will follow.    Adis Ornelas MD  [facility]  Direct messaging: [employer] Paging  On-Call ID provider: [phone], option: 9      ===========================================      Chief Complaint   <principal problem not specified>       HPI     We have been requested by Alexander Yancey MD to evaluate Cyndie Marin for the above.    History obtained by patient    Cyndie Marin is a 79 year old woman with a history of hypertension and hyperlipidemia who is admitted with nonhealing wound following right hip replacement.  Underwent right hip replacement on 2024.  Mechanically he is doing very well and ambulating without issues.  Unfortunately after her dressing came off the wound began opening up under the Steri-Strips.  This being came  progressively worse.  She was seen in urgent care about 10 days ago and started on cephalexin after being sent to the ER.  The ER communicated with the patient's orthopedic surgeon.  She had follow-up few days later with plans for surgery this week.  She was admitted yesterday for wound debridements.  There were no concerns for the wound tunneling to deep tissues.  Cultures were collected and this morning began growing gram-negative rods.          Review of Systems   Ten systems reviewed and negative except for what is noted in the HPI       Medical History  Past Medical History:   Diagnosis Date    Calculus of gallbladder without cholecystitis without obstruction 2022    Claustrophobia     Colonic mass 2022    Colostomy in place (H)     DDD (degenerative disc disease), lumbar 2024    Essential hypertension     GERD (gastroesophageal reflux disease)     History of blood clots     History of urinary tract obstruction 2022    Hyperlipidemia LDL goal <100 2022    Parastomal hernia 2023    Perirectal abscess 10/29/2019    PONV (postoperative nausea and vomiting)     Primary osteoarthritis of both hips     Renal disease     S/P total left hip arthroplasty     Seasonal allergies     Sleep apnea     uses CPAP    RAJ (stress urinary incontinence, female) 2022    Thrombosis     Surgical History  She  has a past surgical history that includes  DELIVERY ONLY; Pr Excis Bartholin Gland/Cyst; Tooth Extraction; Sigmoidoscopy flexible (N/A, 10/27/2019); PART REMOVAL COLON W ANASTOMOSIS (N/A, 10/28/2019); Proctoscopy (N/A, 10/28/2019); Colostomy (N/A, 10/28/2019); TOTAL HIP ARTHROPLASTY (Left, 2021); Nephrectomy (Left, ); and Arthroplasty Hip Anterior (Right, 2024).     Social History  Reviewed, and she  reports that she has never smoked. She has never used smokeless tobacco. She reports current alcohol use. She reports that she does not use drugs.  Social History  "    Social History Narrative     gravtanesha 3 para 2 is a retired .  Lives in her own home with son and daughter in law, does some gardening.      Family History  family history includes Brain Cancer (age of onset: 47.00) in her cousin; Breast Cancer in her paternal grandmother; Heart Disease in her maternal grandfather and another family member; Ovarian Cancer in her mother; Prostate Cancer in her father.  family history reviewed and is not pertinent to the presenting problem.            Allergies     Allergies   Allergen Reactions    Cats Claw (Uncaria [Uncaria Tomentosa (Cats Claw)] Unknown    Dye [External Allergen Needs Reconciliation - See Comment] Hives and Swelling     Cat scan dye    Other Drug Allergy (See Comments) Hives and Swelling     Cat scan dye [iodine-based contrast?]    Other Environmental Allergy Unknown     DUST    Ragweed Pollen [Ragweeds] Unknown         Antibiotics   Perioperative cefazolin    Previous:  None      Physical Exam     Temp:  [97.3  F (36.3  C)-98.8  F (37.1  C)] 97.6  F (36.4  C)  Pulse:  [65-91] 66  Resp:  [14-18] 16  BP: (104-124)/(51-60) 116/55  FiO2 (%):  [21 %] 21 %  SpO2:  [94 %-100 %] 96 %    /55 (BP Location: Right arm)   Pulse 66   Temp 97.6  F (36.4  C) (Oral)   Resp 16   Ht 1.651 m (5' 5\")   Wt 77.1 kg (170 lb)   LMP  (LMP Unknown)   SpO2 96%   BMI 28.29 kg/m      GENERAL:  well-developed, well-nourished, sitting in chair in no acute distress.   HENT:  Head is normocephalic, atraumatic. Oropharynx is moist without exudates or ulcers.  EYES:  Eyes have anicteric sclerae without conjunctival injection or stigmata of endocarditis.   NECK:  Supple.  LUNGS:  Clear to auscultation.  CARDIOVASCULAR:  Regular rate and rhythm with no murmurs, gallops or rubs.  ABDOMEN:  Normal bowel sounds, soft, nontender. Ostomy in place  EXT: left hip surgical dressing, wound vac in place  SKIN:  No acute rashes. No stigmata of endocarditis.  NEUROLOGIC:  " "Grossly nonfocal.      Cultures   8/9 right hip: Gram-positive cocci in Gram stain; culture with E. coli, Morganella morganii, Enterococcus faecalis  8/9 right hip #2: Gram-positive cocci in Gram stain; culture with E. coli, Morganella morganii, Enterococcus faecalis    Susceptibility data from last 90 days.  Collected Specimen Info Organism   08/09/24 Wound from Hip, Right Enterococcus faecalis     Escherichia coli     Morganella morganii   08/09/24 Wound from Hip, Right Enterococcus faecalis     Escherichia coli     Morganella morganii        Laboratory results   No results for input(s): \"WBC\", \"HGB\", \"PLT\" in the last 168 hours.    Invalid input(s): \"NEUTROPHILPCT\", \"MONOPCT\", \"EOSPCT\"    No results for input(s): \"NA\", \"CO2\", \"BUN\", \"CREATININE\", \"ALBUMIN\", \"BILITOT\", \"ALKPHOS\", \"ALT\", \"AST\", \"GLUCOSE\" in the last 168 hours.    Invalid input(s): \"K\", \"CL\", \"CALCIUM\", \"LABALBU\", \"PROT\"    No results for input(s): \"CRPI\", \"SED\" in the last 168 hours.        Imaging   Radiology results reviewed    POC US Guidance Needle Placement    Result Date: 8/9/2024  Ultrasound was performed as guidance to an anesthesia procedure.  Click \"PACS images\" hyperlink below to view any stored images.  For specific procedure details, view procedure note authored by anesthesia.      Data reviewed today: I reviewed all medications, new labs and imaging results over the last 24 hours. I personally reviewed no images or EKG's today.  The patient's care was discussed with the Patient.  "

## 2024-08-10 NOTE — PHARMACY-VANCOMYCIN DOSING SERVICE
Pharmacy Vancomycin Initial Note  Date of Service August 10, 2024  Patient's  1945  79 year old, female    Indication: Bone and Joint Infection    Current estimated CrCl = Estimated Creatinine Clearance: 75.5 mL/min (based on SCr of 0.6 mg/dL).    Creatinine for last 3 days  8/10/2024: 10:10 AM Creatinine 0.60 mg/dL    Recent Vancomycin Level(s) for last 3 days  No results found for requested labs within last 3 days.      Vancomycin IV Administrations (past 72 hours)        No vancomycin orders with administrations in past 72 hours.                    Nephrotoxins and other renal medications (From now, onward)      Start     Dose/Rate Route Frequency Ordered Stop    08/10/24 1100  vancomycin (VANCOCIN) 1,250 mg in 0.9% NaCl 250 mL intermittent infusion         1,250 mg  166.7 mL/hr over 90 Minutes Intravenous EVERY 12 HOURS 08/10/24 1032              Contrast Orders - past 72 hours (72h ago, onward)      None            [employer] Prediction of Planned Initial Vancomycin Regimen  Loading dose: 1250 mg at 12:00 08/10/2024.  Regimen: 1000 mg IV every 12 hours.  Start time: 00:00 on 2024  Exposure target: AUC24 (range)400-600 mg/L.hr   AUC24,ss: 550 mg/L.hr  Probability of AUC24 > 400: 81 %  Ctrough,ss: 17.6 mg/L  Probability of Ctrough,ss > 20: 39 %  Probability of nephrotoxicity (Lodise BOB ): 13 %        Plan:  Give Loading Dose of 1250mg Once then Start vancomycin  1000 mg IV q12h.   Vancomycin monitoring method: AUC  Vancomycin therapeutic monitoring goal: 400-600 mg*h/L  Pharmacy will check vancomycin levels as appropriate in 1-3 Days.    Serum creatinine levels will be ordered daily for the first week of therapy and at least twice weekly for subsequent weeks.      Jeffery Bonilla [facility]

## 2024-08-10 NOTE — CARE PLAN
Patient vital signs are at baseline: Yes  Patient able to ambulate as they were prior to admission or with assist devices provided by therapies during their stay:  Yes  Patient MUST void prior to discharge:  Yes  Patient able to tolerate oral intake:  Yes  Pain has adequate pain control using Oral analgesics:  Yes  Does patient have an identified :  Yes  Has goal D/C date and time been discussed with patient:  Yes    Patient alert and oriented. VSS. RA. LR infusing at 50ml/hr. Toleraitng Regular diet. A1 with walker and gait belt. Denied pain. Utilizing only scheduled Tylenol along with  ice packs and repositioning. Patient has CPAP on at bedtime. Has colostomy. Calls appropriately. Call light within reach.

## 2024-08-10 NOTE — PLAN OF CARE
Patient vital signs are at baseline: Yes  Patient able to ambulate as they were prior to admission or with assist devices provided by therapies during their stay:  Yes  Patient MUST void prior to discharge:  Yes  Patient able to tolerate oral intake:  Yes  Pain has adequate pain control using Oral analgesics:  Yes  Does patient have an identified :  Yes  Has goal D/C date and time been discussed with patient:  Yes    Alert and oriented x4. Some trouble with wound vac seal. Covered in 3 spots but machine still stated leak. Pt got up to bathroom, once back to bed, wound vac no longer had leak alarm. 5 mg oxycodone given for pain. VSS.     Problem: Hip Arthroplasty  Goal: Optimal Coping  Outcome: Progressing  Goal: Absence of Bleeding  Outcome: Progressing  Intervention: Monitor and Manage Bleeding  Recent Flowsheet Documentation  Taken 8/10/2024 0100 by Xavier Mcneal RN  Bleeding Management: dressing monitored  Goal: Effective Bowel Elimination  Outcome: Progressing  Goal: Optimal Functional Ability  Outcome: Progressing  Goal: Absence of Infection Signs and Symptoms  Outcome: Progressing  Goal: Intact Neurovascular Status  Outcome: Progressing  Goal: Anesthesia/Sedation Recovery  Outcome: Progressing  Goal: Acceptable Pain Control  Outcome: Progressing  Intervention: Prevent or Manage Pain  Recent Flowsheet Documentation  Taken 8/10/2024 0215 by Xavier Mcneal RN  Pain Management Interventions: cold applied  Goal: Nausea and Vomiting Relief  Outcome: Progressing  Goal: Effective Urinary Elimination  Outcome: Progressing  Goal: Effective Oxygenation and Ventilation  Outcome: Progressing

## 2024-08-10 NOTE — DISCHARGE SUMMARY
Orthopedic Discharge Summary   Patient: Cyndie Marin  Admission Date: 8/9/2024  Discharge Date: 8/10/2024   Date of Service: 8/10/2024  Attending Provider: Alexander Yancey MD  Admission Diagnosis: Right hip pain [M25.551], Wound dehiscence [T81.30XA], HTN, Hyperlipidemia, SHAUNA, h/o pelvic abscess/partial colectomy and ostomy  Discharge Diagnosis: same  Procedures Performed: R hip superficial I&D c application of vacuum assisted neg pressure wound dressing  Complications: None apparent   History of Present Illness: Georgia is a pleasant 79-year-old female who developed a wound dehiscence within the last week.  She is approximately 6 weeks out from a right total hip arthroplasty.  The wound did not respond local dressing changes.  The above procedure was recommended.  For full details please see Dr. Owens's clinic notes.  The risks including, but not limited to, bleeding, infection, nerve injury, dvt, implant failure, implant wear, fracture, limb length inequality, anesthetic complications, heart attack, stroke, and even death were discussed.  Pt verbalized understanding.  Informed consent was obtained   Past Medical History:   Past Medical History:   Diagnosis Date    Calculus of gallbladder without cholecystitis without obstruction 08/08/2022    Claustrophobia     Colonic mass 08/08/2022    Colostomy in place (H)     DDD (degenerative disc disease), lumbar 04/25/2024    Essential hypertension     GERD (gastroesophageal reflux disease)     History of blood clots     History of urinary tract obstruction 08/08/2022    Hyperlipidemia LDL goal <100 08/08/2022    Parastomal hernia 09/13/2023    Perirectal abscess 10/29/2019    PONV (postoperative nausea and vomiting)     Primary osteoarthritis of both hips     Renal disease     S/P total left hip arthroplasty     Seasonal allergies     Sleep apnea     uses CPAP    RAJ (stress urinary incontinence, female) 08/08/2022    Thrombosis      Patient Active Problem List    Diagnosis    SHAUNA (obstructive sleep apnea)    Allergic rhinitis due to pollen    Essential hypertension    Colostomy in place (H)    Gastroesophageal reflux disease without esophagitis    Overactive bladder    S/P total left hip arthroplasty    Hyperlipidemia LDL goal <100    Calculus of gallbladder without cholecystitis without obstruction    History of urinary tract obstruction    RAJ (stress urinary incontinence, female)    Parastomal hernia    Localized osteoarthritis of left shoulder    Primary osteoarthritis of right hip    DDD (degenerative disc disease), lumbar    History of deep venous thrombosis    S/P total hip arthroplasty    Surgical aftercare, musculoskeletal system     Past Surgical History:   Procedure Laterality Date    ARTHROPLASTY HIP ANTERIOR Right 2024    Procedure: RIGHT DIRECT ANTERIOR TOTAL HIP ARTHROPLASTY;  Surgeon: Alexander Yancey MD;  Location[facility]    COLOSTOMY N/A 10/28/2019    Procedure: CREATION, COLOSTOMY;  Surgeon: Mylene Sears MD;  Location: [facility] [place];  Service: General    NEPHRECTOMY Left 1996    with rib resection    CA EXCIS BARTHOLIN GLAND/CYST      Description: Excision Of Bartholin's Gland Or Cyst;  Recorded: 2013;    PROCTOSCOPY N/A 10/28/2019    Procedure: RIGID PROCTOSCOPY;  Surgeon: Mylene Sears MD;  Location: [facility] [place];  Service: General    SIGMOIDOSCOPY FLEXIBLE N/A 10/27/2019    Procedure: SIGMOIDOSCOPY with biopsy;  Surgeon: Tj Gavin MD;  Location: [facility];  Service: Gastroenterology    TOOTH EXTRACTION      [facility]  DELIVERY ONLY      Description:  Section;  Recorded: 2008;  Comments: x2    [facility] PART REMOVAL COLON W ANASTOMOSIS N/A 10/28/2019    Procedure: OPEN EXPLORATORY LAPAROTOMY, SEGMENTAL COLECTOMY,  WASHOUT OF PELVIC ABSCESS. RECTAL EXAM UNDER ANESTHESIA. DEBRIDEMENT OD ABSCESS CAVITY;  Surgeon: Mylene Sears MD;  Location: [facility] [place];  Service: General    [facility] TOTAL HIP  ARTHROPLASTY Left 01/26/2021    Procedure: LEFT DIRECT ANTERIOR TOTAL HIP ARTHROPLASTY;  Surgeon: Alexander Yancey MD;  Location: [facility] Main OR;  Service: Orthopedics     Social History     Socioeconomic History    Marital status:      Spouse name: Not on file    Number of children: Not on file    Years of education: Not on file    Highest education level: Not on file   Occupational History    Not on file   Tobacco Use    Smoking status: Never    Smokeless tobacco: Never   Vaping Use    Vaping status: Never Used   Substance and Sexual Activity    Alcohol use: Yes     Comment: rarely    Drug use: No    Sexual activity: Yes     Partners: Male   Other Topics Concern    Not on file   Social History Narrative     jayla 3 para 2 is a retired .  Lives in her own home with son and daughter in law, does some gardening.      Social Determinants of Health     Financial Resource Strain: Not on file   Food Insecurity: Not on file   Transportation Needs: Not on file   Physical Activity: Not on file   Stress: Not on file   Social Connections: Not on file   Interpersonal Safety: Low Risk  (4/25/2024)    Interpersonal Safety     Do you feel physically and emotionally safe where you currently live?: Yes     Within the past 12 months, have you been hit, slapped, kicked or otherwise physically hurt by someone?: No     Within the past 12 months, have you been humiliated or emotionally abused in other ways by your partner or ex-partner?: No   Housing Stability: Not on file     Medications on admission:   Medications Prior to Admission   Medication Sig Dispense Refill Last Dose    acetaminophen (TYLENOL) 325 MG tablet Take 2 tablets (650 mg) by mouth every 4 hours as needed for other (mild pain) 100 tablet 0 Unknown at prn    lisinopril-hydrochlorothiazide (ZESTORETIC) 10-12.5 MG tablet Hold if blood pressure less than 120/80.    TAKE 1 TABLET DAILY   8/9/2024 at am    loratadine (CLARITIN) 10 mg tablet  [LORATADINE (CLARITIN) 10 MG TABLET] Take 10 mg by mouth daily as needed.    Unknown at prn    menthol (BIOFREEZE, MENTHOL,) 4 % Gel [MENTHOL (BIOFREEZE, MENTHOL,) 4 % GEL] Apply topically at bedtime as needed.    8/8/2024    rosuvastatin (CRESTOR) 10 MG tablet TAKE 1 TABLET AT BEDTIME 90 tablet 3 8/8/2024    Vitamin D3 (CHOLECALCIFEROL) 25 mcg (1000 units) tablet Take 1 tablet by mouth daily   8/8/2024 at pm     Allergies:    Allergies   Allergen Reactions    Cats Claw (Uncaria [Uncaria Tomentosa (Cats Claw)] Unknown    Dye [External Allergen Needs Reconciliation - See Comment] Hives and Swelling     Cat scan dye    Other Drug Allergy (See Comments) Hives and Swelling     Cat scan dye [iodine-based contrast?]    Other Environmental Allergy Unknown     DUST    Ragweed Pollen [Ragweeds] Unknown       Hospital Course: Patient was brought to the OR on 8/9/24 where she underwent the above named procedure. Postoperatively she did very well with no apparent complications, and she had an uneventful hospital stay. Ancef was given for 24 hours after surgery. She was given Aspirin twice daily for DVT prophylaxis and her pain was well controlled with dilaudid, then transitioned to oral pain medications during her hospital stay. She progressed well with physical therapy and discharge to home was recommended. Her chronic medical problems were followed by the medicine team during her hospital stay and there were no significant changes to conditions or treatment plans. No new medical problems presented during her hospital stay. ID was consulted for home antibiotic recommendations.  Consultations Obtained During Hospitalization:  1. Internal medicine for management of comorbid conditions and home medication management.  2. Physical Therapy for gait training, mobilization, range of motion and strengthening exercises  3. Occupational Therapy for activities of daily living.  4. Social Work for disposition planning.  5. Infectious  disease for home antibiotic recommendations  Active Problems:    Surgical aftercare, musculoskeletal system    Discharge Disposition: home  Discharge Diet: regular  Discharge Medications:   Current Discharge Medication List        START taking these medications    Details   !! acetaminophen (TYLENOL) 325 MG tablet Take 2 tablets (650 mg) by mouth every 4 hours as needed for other (mild pain)  Qty: 100 tablet, Refills: 0    Associated Diagnoses: Surgical aftercare, musculoskeletal system      aspirin 81 MG EC tablet Take 1 tablet (81 mg) by mouth 2 times daily  Qty: 60 tablet, Refills: 0    Associated Diagnoses: Surgical aftercare, musculoskeletal system      cefadroxil (DURICEF) 500 MG capsule Take 1 capsule (500 mg) by mouth 2 times daily for 14 days  Qty: 28 capsule, Refills: 0    Associated Diagnoses: Surgical aftercare, musculoskeletal system      hydrOXYzine HCl (ATARAX) 10 MG tablet Take 1 tablet (10 mg) by mouth every 6 hours as needed for itching or anxiety (with pain, moderate pain)  Qty: 30 tablet, Refills: 0    Associated Diagnoses: Surgical aftercare, musculoskeletal system      oxyCODONE (ROXICODONE) 5 MG tablet Take 1 tablet (5 mg) by mouth every 6 hours as needed for moderate to severe pain  Qty: 20 tablet, Refills: 0    Associated Diagnoses: Surgical aftercare, musculoskeletal system      senna-docusate (SENOKOT-S/PERICOLACE) 8.6-50 MG tablet Take 1-2 tablets by mouth 2 times daily Take while on oral narcotics to prevent or treat constipation.  Qty: 30 tablet, Refills: 0    Comments: While taking narcotics  Associated Diagnoses: Surgical aftercare, musculoskeletal system       !! - Potential duplicate medications found. Please discuss with provider.        CONTINUE these medications which have NOT CHANGED    Details   !! acetaminophen (TYLENOL) 325 MG tablet Take 2 tablets (650 mg) by mouth every 4 hours as needed for other (mild pain)  Qty: 100 tablet, Refills: 0    Associated Diagnoses: Status post  total replacement of right hip      lisinopril-hydrochlorothiazide (ZESTORETIC) 10-12.5 MG tablet Hold if blood pressure less than 120/80.    TAKE 1 TABLET DAILY    Associated Diagnoses: Essential hypertension, benign      loratadine (CLARITIN) 10 mg tablet [LORATADINE (CLARITIN) 10 MG TABLET] Take 10 mg by mouth daily as needed.       menthol (BIOFREEZE, MENTHOL,) 4 % Gel [MENTHOL (BIOFREEZE, MENTHOL,) 4 % GEL] Apply topically at bedtime as needed.       rosuvastatin (CRESTOR) 10 MG tablet TAKE 1 TABLET AT BEDTIME  Qty: 90 tablet, Refills: 3    Associated Diagnoses: Pure hypercholesterolemia      Vitamin D3 (CHOLECALCIFEROL) 25 mcg (1000 units) tablet Take 1 tablet by mouth daily       !! - Potential duplicate medications found. Please discuss with provider.        Code Status: full    Harvinder Pina PA-C  8/10/2024 7:56 AM   [facility]  [phone] office

## 2024-08-10 NOTE — PROGRESS NOTES
"[facility]    Medicine Progress Note - Hospitalist Service    Date of Admission:  8/9/2024    Assessment & Plan   Cyndie Marin is a 79 year old female admitted s/p I&D right hip incision for wound dehiscence in the setting of recent hip arthroplasty.  She is clinically stable.  Recommendations as below.    # s/p I&D right hip incision with associated skin/soft tissue infection  - per orthopaedics and ID    # HTN  - hold hydrochlorothiazide/lisinopril for now    # HLD    # SHAUNA  - CPAP    # Hx pelvic abscess s/p partial colectomy and ostomy placement          Diet: Advance Diet as Tolerated: Regular Diet Adult  Discharge Instruction - Regular Diet Adult      Su Catheter: Not present  Lines: None     Cardiac Monitoring: ACTIVE order. Indication: Procedural area  Code Status: Full Code      Clinically Significant Risk Factors Present on Admission                  # Hypertension: Noted on problem list    # Anemia: based on hgb <11       # Overweight: Estimated body mass index is 28.29 kg/m  as calculated from the following:    Height as of this encounter: 1.651 m (5' 5\").    Weight as of this encounter: 77.1 kg (170 lb).                    Disposition Plan     Medically Ready for Discharge: Anticipated in 2-4 Days             Dagoberto Palacios MD  Hospitalist Service  [facility]  Securely message with [employer] (more info)  Text page via [employer] Paging/Directory   ______________________________________________________________________    Interval History   Plans to take bowel regimen tonight.  Reports pain with mobility.  Denies chest pain/pressure, dyspnea, or nausea.  Stool more firm from ostomy.    Physical Exam   Vital Signs: Temp: 97.6  F (36.4  C) Temp src: Oral BP: 116/55 Pulse: 66   Resp: 16 SpO2: 96 % O2 Device: None (Room air)    Weight: 170 lbs 0 oz    Gen:  lying in bed in no extremis  Neuro: alert, conversant  CV:  nl rate, regular rhythm  Pulm: no acute " resp distress, ctab anteriorly  GI:  abdomen NTTP    Medical Decision Making             Data

## 2024-08-10 NOTE — PROGRESS NOTES
08/10/24 0735   Appointment Info   Signing Clinician's Name / Credentials (OT) Kristen Salas, OTR/L   Rehab Comments (OT) OT Evaluation   Quick Adds   Quick Adds Certification   Living Environment   People in Home child(alexis), adult  (Out of town currently, however there is support to care for dogs and will provide assist if needed/errands)   Current Living Arrangements house   Living Environment Comments Pt lives in a 2 level home, primary bathroom/bedroom upstairs.   Self-Care   Usual Activity Tolerance good   Current Activity Tolerance moderate   Equipment Currently Used at Home cane, straight;grab bar, toilet;raised toilet seat;shower chair;walker, rolling;walker, standard  (reacher, adjustable bed)   Fall history within last six months no   Activity/Exercise/Self-Care Comment Pt IND w/ ADLs and IADLs at baseline. Tub/shower   General Information   Onset of Illness/Injury or Date of Surgery 08/09/24   Referring Physician Enoc Yancey MD   Patient/Family Therapy Goal Statement (OT) To go home   Additional Occupational Profile Info/Pertinent History of Current Problem R Hip I &D w/wound vac, s/p ESTHELA 6/25/2024, h/o colostomy   Existing Precautions/Restrictions weight bearing;no known precautions/restrictions   Right Lower Extremity (Weight-bearing Status) weight-bearing as tolerated (WBAT)   Cognitive Status Examination   Orientation Status orientation to person, place and time   Affect/Mental Status (Cognitive) WNL   Visual Perception   Visual Impairment/Limitations WFL   Sensory   Sensory Quick Adds sensation intact   Posture   Posture forward head position   Range of Motion Comprehensive   General Range of Motion no range of motion deficits identified   Strength Comprehensive (MMT)   General Manual Muscle Testing (MMT) Assessment no strength deficits identified   Bed Mobility   Bed Mobility supine-sit;sit-supine   Comment (Bed Mobility) SBA   Transfers   Transfers bed-chair transfer;sit-stand  transfer;toilet transfer;shower transfer   Transfer Comments SBA-CGA   Activities of Daily Living   BADL Assessment/Intervention lower body dressing;toileting;bathing   Bathing Assessment/Intervention   [place] Level (Bathing) lower body;minimum assist (75% patient effort)   Lower Body Dressing Assessment/Training   [place] Level (Lower Body Dressing) lower body dressing skills;minimum assist (75% patient effort)   Toileting   [place] Level (Toileting) adjust/manage clothing;supervision   Clinical Impression   Criteria for Skilled Therapeutic Interventions Met (OT) Yes, treatment indicated   OT Diagnosis decreased ADLs   Influenced by the following impairments ESTHELA   OT Problem List-Impairments impacting ADL activity tolerance impaired;mobility;pain;post-surgical precautions   Assessment of Occupational Performance 3-5 Performance Deficits   Identified Performance Deficits LE dressing/bathing, bed mobility, all transfers, toileting   Planned Therapy Interventions (OT) ADL retraining;bed mobility training;transfer training   Clinical Decision Making Complexity (OT) detailed assessment/moderate complexity   Risk & Benefits of therapy have been explained evaluation/treatment results reviewed;patient   OT Total Evaluation Time   OT Eval, Moderate Complexity Minutes (24844) 10   Therapy Certification   Medical Diagnosis I&D R Hip   Start of Care Date 08/10/24   Certification date from 08/10/24   Certification date to 08/17/24   OT Goals   Therapy Frequency (OT) One time eval and treatment   OT Predicted Duration/Target Date for Goal Attainment 08/10/24   OT Goals Lower Body Dressing;Toilet Transfer/Toileting   OT: Lower Body Dressing Modified independent;Goal Met   OT: Toilet Transfer/Toileting Modified independent;Goal Met   Interventions   Interventions Quick Adds Self-Care/Home Management   Self-Care/Home Management   Self-Care/Home Mgmt/ADL, Compensatory, Meal Prep Minutes (93390) 20   Symptoms Noted  During/After Treatment (Meal Preparation/Planning Training) none   Treatment Detail/Skilled Intervention Pt edu on hip px with management of wound vac during ADLs/fx transfers - demonstrated ability to complete ADLs w/in px. Pt edu on compensatory strategies for LE dressing threading wound vac through pant leg first- completed Mod I w/ AE. Pt reported no further concerns to practice socks/shoes as been able to complete post op R ESTHELA. STS w/ SBA and FWW. Pt amb. 15 ft to BR w/ FWW Mod I. SBA w/ toilet transfer w/ grab bars - completed each Mod I. Offered placing commode over toilet like home, pt declined reports she has been able to complete w/Mod I.Pt toileted and washed hands w/SBA given vc for wound vac management and walker safety cues.  Pt completed bed mobility techniques - completed Mod I. Pt verbalized tub transfer and car transfer with appropriate tech.   OT Discharge Planning   OT Plan DC OT   OT Discharge Recommendation (DC Rec) home with assist   OT Rationale for DC Rec Pt is familiar with ESTHELA tech for ADLs, is very motivated to be independent and is demonstrating with Mod I including mangement of wound vac. Has assist as needed to run errands.   OT Brief overview of current status Mod I w/toileting, fx mob, LE dressing whild managing wound vac   Total Session Time   Timed Code Treatment Minutes 20   Total Session Time (sum of timed and untimed services) 30    [facility]  OUTPATIENT OCCUPATIONAL THERAPY  EVALUATION  PLAN OF TREATMENT FOR OUTPATIENT REHABILITATION  (COMPLETE FOR INITIAL CLAIMS ONLY)  Patient's Last Name, First Name, M.I.  YOB: 1945  Cyndie Marin                          Provider's Name  [facility] Medical Record No.  [id number]                             Onset Date:  08/09/24   Start of Care Date:  08/10/24   Type:     ___PT   _X_OT   ___SLP Medical Diagnosis:  I&D R Hip                    OT Diagnosis:   decreased ADLs Visits from SOC:  1     See note for plan of treatment, functional goals and certification details    I CERTIFY THE NEED FOR THESE SERVICES FURNISHED UNDER        THIS PLAN OF TREATMENT AND WHILE UNDER MY CARE     (Physician co-signature of this document indicates review and certification of the therapy plan).                            Occupational Therapy Discharge Summary    Reason for therapy discharge:    All goals and outcomes met, no further needs identified.    Progress towards therapy goal(s). See goals on Care Plan in [facility] electronic health record for goal details.  Goals met    Therapy recommendation(s):    No further therapy is recommended.

## 2024-08-10 NOTE — UTILIZATION REVIEW
[facility] Utilization Review  Admission Status; Secondary Review Determination     Admission Date: 8/9/2024  5:51 AM      Under the authority of the Utilization Management Committee, the utilization review process indicated a secondary review on the above patient.  The review outcome is based on review of the medical records, discussions with staff, and applying clinical experience noted on the date of the review.        (X)      Inpatient Status Appropriate - This patient's medical care is consistent with medical management for inpatient care and reasonable inpatient medical practice.          RATIONALE FOR DETERMINATION   79-year-old female with history of hypertension, hyperlipidemia status post right hip arthroplasty in June, complicated by wound dehiscence, status post wound debridement and wound VAC placement, cultures growing E. coli, Morganella Jordy I, E faecalis, started on IV vancomycin and cefepime.  Patient needs plan for ongoing antibiotics, with anticipated length of stay more than 2 midnight, recommend change to inpatient status, communicated to Dr. Palacios, PAT Mckeon, Dr. Yancey      The severity of illness, intensity of service provided, expected LOS and risk for adverse outcome make the care complex, high risk and appropriate for hospital admission.The patient requires hospital based medical care which is anticipated to require a stay of 2 or more midnights; according to CMS guidelines the patient should be admitted as inpatient        The information on this document is developed by the utilization review team in order for the business office to ensure compliance.  This only denotes the appropriateness of proper admission status and does not reflect the quality of care rendered.         The definitions of Inpatient Status and Observation Status used in making the determination above are those provided in the CMS Coverage Manual, Chapter 1 and Chapter 6, section 70.4.       Sincerely,       Joana Tang MD  Physician Advisor  Utilization Review-[place]    Phone: [phone]

## 2024-08-10 NOTE — PROGRESS NOTES
08/10/24 0825   Appointment Info   Signing Clinician's Name / Credentials (PT) Marcelino Ghotra, VEL   Quick Adds   Quick Adds Certification   Living Environment   People in Home child(alexis), adult   Current Living Arrangements house   Home Accessibility stairs to enter home;stairs within home   Number of Stairs, Main Entrance 6   Stair Railings, Main Entrance railings safe and in good condition   Number of Stairs, Within Home, Primary greater than 10 stairs   Stair Railings, Within Home, Primary railings safe and in good condition   Transportation Anticipated family or friend will provide   Self-Care   Usual Activity Tolerance good   Current Activity Tolerance good   Equipment Currently Used at Home cane, straight;grab bar, toilet;raised toilet seat;shower chair;walker, rolling;walker, standard   Fall history within last six months no   Activity/Exercise/Self-Care Comment Indep prior for all I ADL's and ADL's   General Information   Onset of Illness/Injury or Date of Surgery 08/10/24   Pertinent History of Current Problem (include personal factors and/or comorbidities that impact the POC) s/p R ESTHELA   Existing Precautions/Restrictions no known precautions/restrictions   Weight-Bearing Status - RLE weight-bearing as tolerated   Cognition   Affect/Mental Status (Cognition) WFL   Range of Motion (ROM)   ROM Comment decreased ROM s/p R ESTHELA   Strength (Manual Muscle Testing)   Strength (Manual Muscle Testing) No deficits observed during functional mobility   Transfers   Transfers sit-stand transfer   Maintains Weight-bearing Status (Transfers) verbal cues to maintain   Sit-Stand Transfer   Sit-Stand [place] (Transfers) contact guard;verbal cues   Assistive Device (Sit-Stand Transfers) walker, front-wheeled   Gait/Stairs (Locomotion)   [place] Level (Gait) contact guard;verbal cues   Assistive Device (Gait) walker, front-wheeled   Distance in Feet (Gait) 20   Pattern (Gait) swing-through   Deviations/Abnormal  Patterns (Gait) gait speed decreased;dima decreased   Balance   Balance no deficits were identified   Coordination   Coordination no deficits were identified   Clinical Impression   Criteria for Skilled Therapeutic Intervention Yes, treatment indicated   PT Diagnosis (PT) impaired functional mobility   Influenced by the following impairments pain, decreased ROM, impaired balance, decreased strength   Functional limitations due to impairments gait, stairs, transfers   Clinical Presentation (PT Evaluation Complexity) stable   Clinical Presentation Rationale pt presents as medically diagnosed   Clinical Decision Making (Complexity) low complexity   Planned Therapy Interventions (PT) gait training;home exercise program;patient/family education;stair training;transfer training   Risk & Benefits of therapy have been explained care plan/treatment goals reviewed;patient   PT Total Evaluation Time   PT Eval, Low Complexity Minutes (09343) 10   Therapy Certification   Start of care date 08/10/24   Certification date from 08/10/24   Certification date to 09/09/24   Physical Therapy Goals   PT Frequency Daily   PT Predicted Duration/Target Date for Goal Attainment 08/12/24   PT Goals PT Goal 1;Gait;Transfers;Stairs   PT: Transfers Modified independent;Sit to/from stand;Assistive device;Within precautions;Goal Met   PT: Gait Modified independent;Rolling walker;150 feet;Within precautions;Goal Met   PT: Stairs Supervision/stand-by assist;Within precautions;8 stairs;Rail on right;Assistive device;Goal Met   PT: Goal 1 Independent with written HEP for LE strengthening and ROM   Interventions   Interventions Quick Adds Therapeutic Procedure;Therapeutic Activity;Gait Training   Therapeutic Procedure/Exercise   Ther. Procedure: strength, endurance, ROM, flexibillity Minutes (99478) 15   Symptoms Noted During/After Treatment increased pain   Treatment Detail/Skilled Intervention ESTHELA protocol therex x10 reps, cueing for technique,  encouraging patient to exercise at a level she can comfortably tolerate   Therapeutic Activity   Treatment Detail/Skilled Intervention sit to/from stand  independent   Gait Training   Gait Training Minutes (19896) 8   Symptoms Noted During/After Treatment (Gait Training) none   Treatment Detail/Skilled Intervention slow stable gait, vc for nonrecip steps and use of spc on up/down 8 steps   Distance in Feet 250   [place] Level (Gait Training) independent   Physical Assistance Level (Gait Training) verbal cues   Weight Bearing (Gait Training) weight-bearing as tolerated   Assistive Device (Gait Training) rolling walker   Pattern Analysis (Gait Training) swing-through gait   Gait Analysis Deviations decreased step length;decreased dima   Impairments (Gait Analysis/Training) pain;strength decreased   Stair Railings present on right side   Physical Assist/Nonphysical Assist (Stairs) supervision   Level of [place] (Stairs) stand-by assist   Assistive Device (Stairs) straight cane   PT Discharge Planning   PT Plan progress per ESTHELA protocol   PT Discharge Recommendation (DC Rec) home with assist   PT Rationale for DC Rec Mobilizing well has good home setup and support.   PT Brief overview of current status Indep with gait/transfers   PT Equipment Needed at Discharge cane, straight;walker, rolling   Total Session Time   Timed Code Treatment Minutes 23   Total Session Time (sum of timed and untimed services) 33   [facility]  OUTPATIENT PHYSICAL THERAPY EVALUATION  PLAN OF TREATMENT FOR OUTPATIENT REHABILITATION  (COMPLETE FOR INITIAL CLAIMS ONLY)  Patient's Last Name, First Name, M.I.  YOB: 1945  Cyndie Marin                        Provider's Name  [facility] Medical Record No.  [id number]                             Onset Date:  08/10/24   Start of Care Date:  08/10/24   Type:     _X_PT   ___OT   ___SLP Medical Diagnosis:                  PT Diagnosis:  impaired functional mobility Visits from SOC:  1     See note for plan of treatment, functional goals and certification details    I CERTIFY THE NEED FOR THESE SERVICES FURNISHED UNDER        THIS PLAN OF TREATMENT AND WHILE UNDER MY CARE     (Physician co-signature of this document indicates review and certification of the therapy plan).

## 2024-08-11 PROBLEM — L08.9 SOFT TISSUE INFECTION: Status: ACTIVE | Noted: 2024-08-11

## 2024-08-11 LAB
BACTERIA WND CULT: ABNORMAL
BASOPHILS # BLD AUTO: 0 10E3/UL (ref 0–0.2)
BASOPHILS NFR BLD AUTO: 1 %
EOSINOPHIL # BLD AUTO: 0.2 10E3/UL (ref 0–0.7)
EOSINOPHIL NFR BLD AUTO: 4 %
ERYTHROCYTE [DISTWIDTH] IN BLOOD BY AUTOMATED COUNT: 13.3 % (ref 10–15)
GLUCOSE SERPL-MCNC: 93 MG/DL (ref 70–99)
HCT VFR BLD AUTO: 30 % (ref 35–47)
HGB BLD-MCNC: 9.5 G/DL (ref 11.7–15.7)
IMM GRANULOCYTES # BLD: 0 10E3/UL
IMM GRANULOCYTES NFR BLD: 0 %
LYMPHOCYTES # BLD AUTO: 2 10E3/UL (ref 0.8–5.3)
LYMPHOCYTES NFR BLD AUTO: 35 %
MCH RBC QN AUTO: 26.7 PG (ref 26.5–33)
MCHC RBC AUTO-ENTMCNC: 31.7 G/DL (ref 31.5–36.5)
MCV RBC AUTO: 84 FL (ref 78–100)
MONOCYTES # BLD AUTO: 0.6 10E3/UL (ref 0–1.3)
MONOCYTES NFR BLD AUTO: 11 %
NEUTROPHILS # BLD AUTO: 2.8 10E3/UL (ref 1.6–8.3)
NEUTROPHILS NFR BLD AUTO: 50 %
NRBC # BLD AUTO: 0 10E3/UL
NRBC BLD AUTO-RTO: 0 /100
PLATELET # BLD AUTO: 207 10E3/UL (ref 150–450)
RBC # BLD AUTO: 3.56 10E6/UL (ref 3.8–5.2)
WBC # BLD AUTO: 5.8 10E3/UL (ref 4–11)

## 2024-08-11 PROCEDURE — 120N000001 HC R&B MED SURG/OB

## 2024-08-11 PROCEDURE — 99232 SBSQ HOSP IP/OBS MODERATE 35: CPT | Performed by: INTERNAL MEDICINE

## 2024-08-11 PROCEDURE — 85004 AUTOMATED DIFF WBC COUNT: CPT | Performed by: INTERNAL MEDICINE

## 2024-08-11 PROCEDURE — 99232 SBSQ HOSP IP/OBS MODERATE 35: CPT | Performed by: HOSPITALIST

## 2024-08-11 PROCEDURE — 999N000157 HC STATISTIC RCP TIME EA 10 MIN

## 2024-08-11 PROCEDURE — 36415 COLL VENOUS BLD VENIPUNCTURE: CPT | Performed by: INTERNAL MEDICINE

## 2024-08-11 PROCEDURE — 250N000011 HC RX IP 250 OP 636: Performed by: INTERNAL MEDICINE

## 2024-08-11 PROCEDURE — 82947 ASSAY GLUCOSE BLOOD QUANT: CPT | Performed by: ORTHOPAEDIC SURGERY

## 2024-08-11 PROCEDURE — 250N000013 HC RX MED GY IP 250 OP 250 PS 637

## 2024-08-11 PROCEDURE — 250N000011 HC RX IP 250 OP 636: Performed by: ORTHOPAEDIC SURGERY

## 2024-08-11 PROCEDURE — 94660 CPAP INITIATION&MGMT: CPT

## 2024-08-11 RX ADMIN — ASPIRIN 81 MG: 81 TABLET, COATED ORAL at 20:16

## 2024-08-11 RX ADMIN — VANCOMYCIN HYDROCHLORIDE 1000 MG: 1 INJECTION, SOLUTION INTRAVENOUS at 12:43

## 2024-08-11 RX ADMIN — ACETAMINOPHEN 975 MG: 325 TABLET ORAL at 20:16

## 2024-08-11 RX ADMIN — CEFEPIME 1 G: 1 INJECTION, POWDER, FOR SOLUTION INTRAMUSCULAR; INTRAVENOUS at 17:51

## 2024-08-11 RX ADMIN — CEFEPIME 1 G: 1 INJECTION, POWDER, FOR SOLUTION INTRAMUSCULAR; INTRAVENOUS at 11:55

## 2024-08-11 RX ADMIN — ACETAMINOPHEN 975 MG: 325 TABLET ORAL at 11:54

## 2024-08-11 RX ADMIN — ASPIRIN 81 MG: 81 TABLET, COATED ORAL at 09:16

## 2024-08-11 RX ADMIN — CEFEPIME 1 G: 1 INJECTION, POWDER, FOR SOLUTION INTRAMUSCULAR; INTRAVENOUS at 01:34

## 2024-08-11 RX ADMIN — ACETAMINOPHEN 975 MG: 325 TABLET ORAL at 05:39

## 2024-08-11 ASSESSMENT — ACTIVITIES OF DAILY LIVING (ADL)
ADLS_ACUITY_SCORE: 35
ADLS_ACUITY_SCORE: 35
ADLS_ACUITY_SCORE: 33
ADLS_ACUITY_SCORE: 33
ADLS_ACUITY_SCORE: 35
ADLS_ACUITY_SCORE: 33
ADLS_ACUITY_SCORE: 35
ADLS_ACUITY_SCORE: 35
ADLS_ACUITY_SCORE: 33
ADLS_ACUITY_SCORE: 35
ADLS_ACUITY_SCORE: 33
ADLS_ACUITY_SCORE: 35

## 2024-08-11 NOTE — PLAN OF CARE
Problem: Hip Arthroplasty  Goal: Acceptable Pain Control  Outcome: Progressing    Patient alert and oriented. VSS. RA. Saline locked in between antibiotics. Tolerating Regular diet. Denies pain, utilizing only scheduled Tylenol, along with ice packs and repositioning.  Voiding spontaneously. Has Colostomy. A1 with walker and gait belt. Call light within reach. Calls appropriately.

## 2024-08-11 NOTE — PLAN OF CARE
Patient vital signs are at baseline: Yes  Patient able to ambulate as they were prior to admission or with assist devices provided by therapies during their stay:  Yes  Patient MUST void prior to discharge:  Yes  Patient able to tolerate oral intake:  Yes  Pain has adequate pain control using Oral analgesics:  Yes  Does patient have an identified :  Yes  Has goal D/C date and time been discussed with patient:  Yes    Patient is A/Ox4, VSS on RA. SBA with walker and gait belt when ambulating. Voiding adequately. Colostomy bag in place. Dressing is CDI, full sensation per Pt. Wound vac on continuous suction, no output noted during this shift. IV leaking and burning so it was removed. New PIV placed in the RUE, patent. No new skin issues noted. Patient rating pain 4/10 during shift, cold therapy, pain medications, rest, and repositioning used to manage pain.

## 2024-08-11 NOTE — PROGRESS NOTES
"Infectious Diseases Progress Note  [facility]    Date of visit: 08/11/2024       ASSESSMENT   79-year-old woman with a history of hypertension and hyperlipidemia admitted with nonhealing surgical wound.    Status post right hip arthroplasty 6/25/2024.  Complicated by wound dehiscence  Superficial surgical site infection.  Dehiscence of wound.  Presented to urgent care about a week ago and placed on cephalexin x 7 days.  Admitted yesterday for wound debridement and wound VAC placement.  No signs of deep wound infection, no penetration to the joint capsule.  Gram-positive cocci seen on Gram stain.  Cultures growing E. coli, Morganella morganii, and Enterococcus faecalis.      Active Problems:    Surgical aftercare, musculoskeletal system       PLAN   -Start vancomycin and cefepime  -Follow-up on susceptibilities.  Needs to be on appropriate antibiotics at discharge.  Nonhealing wound is a risk factor for prosthetic joint infection      Adis Ornelas MD  [facility]  Direct messaging: [employer] Paging  On-Call ID provider: [phone], option: 9      ===========================================      SUBJECTIVE / INTERVAL HISTORY:     No events. Pain at left hand iv site, otherwise tolerating antibiotics.       Antibiotics   Vancomycin 8/10-  Cefepime 8/10-    Previous:  Perioperative cefazolin      Physical Exam     Temp:  [97.6  F (36.4  C)-97.8  F (36.6  C)] 97.8  F (36.6  C)  Pulse:  [56-65] 65  Resp:  [16] 16  BP: (111-115)/(53-56) 115/56  SpO2:  [95 %-97 %] 97 %    /56 (BP Location: Right arm)   Pulse 65   Temp 97.8  F (36.6  C) (Oral)   Resp 16   Ht 1.651 m (5' 5\")   Wt 77.1 kg (170 lb)   LMP  (LMP Unknown)   SpO2 97%   BMI 28.29 kg/m      GENERAL:  well-developed, well-nourished, sitting in chair in no acute distress.   HENT:  Head is normocephalic, atraumatic.   EYES:  Eyes have anicteric sclerae without conjunctival injection   LUNGS:  normal resp pattern  EXT: " "left hip surgical dressing, wound vac in place  SKIN:  No acute rashes.   NEUROLOGIC:  Grossly nonfocal.      Cultures   8/9 right hip: Gram-positive cocci in Gram stain; culture with E. coli, Morganella morganii, Enterococcus faecalis  8/9 right hip #2: Gram-positive cocci in Gram stain; culture with E. coli, Morganella morganii, Enterococcus faecalis    Susceptibility data from last 90 days.  Collected Specimen Info Organism   08/09/24 Wound from Hip, Right Enterococcus faecalis     Escherichia coli     Morganella morganii   08/09/24 Wound from Hip, Right Enterococcus faecalis     Escherichia coli     Morganella morganii          Pertinent Labs:     Recent Labs   Lab 08/11/24  0701 08/10/24  1010   WBC 5.8  --    HGB 9.5* 10.1*     --        No results for input(s): \"NA\", \"CO2\", \"BUN\", \"CREATININE\", \"ALBUMIN\", \"BILITOT\", \"ALKPHOS\", \"ALT\", \"AST\", \"GLUCOSE\" in the last 168 hours.    Invalid input(s): \"K\", \"CL\", \"CALCIUM\", \"LABALBU\", \"PROT\"    No results for input(s): \"CRPI\", \"SED\" in the last 168 hours.        Imaging:     POC US Guidance Needle Placement    Result Date: 8/9/2024  Ultrasound was performed as guidance to an anesthesia procedure.  Click \"PACS images\" hyperlink below to view any stored images.  For specific procedure details, view procedure note authored by anesthesia.         Data reviewed today: I reviewed all medications, new labs and imaging results over the last 24 hours. I personally reviewed no images or EKG's today.  The patient's care was discussed with the Patient.    "

## 2024-08-11 NOTE — PROGRESS NOTES
"Ortho Progress Note      Post-operative Day: 2 Days Post-Op  S/P Procedure(s):  RIGHT HIP WOUND IRRIGATION AND DEBRIDEMENT, CLOSURE WITH  PREVENA DRESSING      Subjective:  Pain: mild. Improved from yesterday  Chest pain, SOB:  no  Nausea, vomiting:  no  Lightheadedness, dizziness:  no  Neuro:  Patient denies new onset numbness or paresthesias    Reports overall she feels better than yesterday. Has not eaten yet today.     Objective:  /56 (BP Location: Right arm)   Pulse 65   Temp 97.8  F (36.6  C) (Oral)   Resp 16   Ht 1.651 m (5' 5\")   Wt 77.1 kg (170 lb)   LMP  (LMP Unknown)   SpO2 97%   BMI 28.29 kg/m    Gen: A&O x 3. NAD. Appears resting comfortably in chair.  Wound status: wound vac intact. No erythema/warmth.   Circulation, motion and sensation: Dorsiflexion/plantarflexion intact and equal bilaterally; distal lower extremity sensation is intact and equal bilaterally   Swelling: mild   Calf tenderness: calves are soft and non-tender bilaterally     Pertinent Labs   Lab Results: personally reviewed.   Lab Results   Component Value Date    INR 1.02 01/26/2021    INR 1.05 10/27/2019     Lab Results   Component Value Date    WBC 5.8 08/11/2024    HGB 9.5 (L) 08/11/2024    HCT 30.0 (L) 08/11/2024    MCV 84 08/11/2024     08/11/2024     Lab Results   Component Value Date     07/31/2024    CO2 25 07/31/2024       Assessment: Post-operative Day: 2 Days Post-Op  S/P Procedure(s):  RIGHT HIP WOUND IRRIGATION AND DEBRIDEMENT, CLOSURE WITH  PREVENA DRESSING    Plan:   -WBAT  -Pain management- currently minimal pain and treating primarily with tylenol/ice.  -ASA 81mg BID /mechanical DVT prophylaxis  -No dressing chage - reinforce prn  -ID following. Polymicrobial. Currently on vancomycin and cefepime  -Ice/elevate  -Cont PT/OT  -Medicine following - comgmt appreciated  -F/U with Dr. Yancey in 10-14 days    Report completed by:  Erik Little PA-C  Date: 8/11/2024  Time: 8:13 AM  "

## 2024-08-11 NOTE — PROGRESS NOTES
"[facility]    Medicine Progress Note - Hospitalist Service    Date of Admission:  8/9/2024    Assessment & Plan   Cyndie Marin is a 79 year old female admitted s/p I&D right hip incision for wound dehiscence in the setting of recent hip arthroplasty.  She is clinically stable.  Recommendations as below.    # s/p I&D right hip incision with associated skin/soft tissue infection  - per orthopaedics and ID    # HTN  - hold hydrochlorothiazide/lisinopril for now    # HLD    # SHAUNA  - CPAP    # Hx pelvic abscess s/p partial colectomy and ostomy placement          Diet: Advance Diet as Tolerated: Regular Diet Adult  Discharge Instruction - Regular Diet Adult      Su Catheter: Not present  Lines: None     Cardiac Monitoring: None  Code Status: Full Code      Clinically Significant Risk Factors Present on Admission                  # Hypertension: Noted on problem list    # Anemia: based on hgb <11       # Overweight: Estimated body mass index is 28.29 kg/m  as calculated from the following:    Height as of this encounter: 1.651 m (5' 5\").    Weight as of this encounter: 77.1 kg (170 lb).                    Disposition Plan     Medically Ready for Discharge: Anticipated in 2-4 Days             Dagoberto Palacios MD  Hospitalist Service  [facility]  Securely message with [employer] (more info)  Text page via [employer] Paging/Directory   ______________________________________________________________________    Interval History   Reports having significant pain overnight.  Pain is a 2-3 in severity this morning.  Denies chest pain, chest pressure, or dyspnea.  Ostomy output more soft.    Physical Exam   Vital Signs: Temp: 97.8  F (36.6  C) Temp src: Oral BP: 115/56 Pulse: 65   Resp: 16 SpO2: 97 % O2 Device: None (Room air)    Weight: 170 lbs 0 oz    Gen:  sitting in chair in no extremis  Neuro: alert, conversant  CV:  nl rate, regular rhythm; systolic murmur present  Pulm: no " acute resp distress, ctab anteriorly  GI:  abdomen NTTP    Medical Decision Making             Data   Reviewed:    WBC 5.8  Hgb 9.5  Plts 207

## 2024-08-11 NOTE — PLAN OF CARE
Patient vital signs are at baseline: Yes  Patient able to ambulate as they were prior to admission or with assist devices provided by therapies during their stay:  Yes  Patient MUST void prior to discharge:  Yes  Patient able to tolerate oral intake:  Yes  Pain has adequate pain control using Oral analgesics:  Yes  Does patient have an identified :  Yes  Has goal D/C date and time been discussed with patient:  Yes    Alert and oriented x4. No c/o pain. Able to make needs known via call light. VSS.     Problem: Adult Inpatient Plan of Care  Goal: Optimal Comfort and Wellbeing  Outcome: Progressing  Intervention: Monitor Pain and Promote Comfort  Recent Flowsheet Documentation  Taken 8/10/2024 2350 by Xavier Mcneal RN  Pain Management Interventions:   rest   repositioned  Goal: Readiness for Transition of Care  Outcome: Progressing     Problem: Hip Arthroplasty  Goal: Absence of Bleeding  Outcome: Progressing  Intervention: Monitor and Manage Bleeding  Recent Flowsheet Documentation  Taken 8/10/2024 2345 by Xavier Mcneal RN  Bleeding Management: dressing monitored  Goal: Optimal Functional Ability  Outcome: Progressing  Goal: Acceptable Pain Control  Outcome: Progressing  Intervention: Prevent or Manage Pain  Recent Flowsheet Documentation  Taken 8/10/2024 2350 by Xavier Mcneal RN  Pain Management Interventions:   rest   repositioned  Goal: Effective Urinary Elimination  Outcome: Progressing     Problem: Risk for Delirium  Goal: Improved Sleep  Outcome: Progressing

## 2024-08-12 LAB
BACTERIA WND CULT: NORMAL
BACTERIA WND CULT: NORMAL

## 2024-08-12 PROCEDURE — 250N000013 HC RX MED GY IP 250 OP 250 PS 637: Performed by: HOSPITALIST

## 2024-08-12 PROCEDURE — 99232 SBSQ HOSP IP/OBS MODERATE 35: CPT | Performed by: STUDENT IN AN ORGANIZED HEALTH CARE EDUCATION/TRAINING PROGRAM

## 2024-08-12 PROCEDURE — 250N000013 HC RX MED GY IP 250 OP 250 PS 637

## 2024-08-12 PROCEDURE — 999N000157 HC STATISTIC RCP TIME EA 10 MIN

## 2024-08-12 PROCEDURE — 250N000011 HC RX IP 250 OP 636: Performed by: STUDENT IN AN ORGANIZED HEALTH CARE EDUCATION/TRAINING PROGRAM

## 2024-08-12 PROCEDURE — 250N000011 HC RX IP 250 OP 636: Performed by: ORTHOPAEDIC SURGERY

## 2024-08-12 PROCEDURE — 250N000011 HC RX IP 250 OP 636: Performed by: INTERNAL MEDICINE

## 2024-08-12 PROCEDURE — 120N000001 HC R&B MED SURG/OB

## 2024-08-12 RX ORDER — PIPERACILLIN SODIUM, TAZOBACTAM SODIUM 3; .375 G/15ML; G/15ML
3.38 INJECTION, POWDER, LYOPHILIZED, FOR SOLUTION INTRAVENOUS EVERY 8 HOURS
Status: DISCONTINUED | OUTPATIENT
Start: 2024-08-12 | End: 2024-08-13 | Stop reason: HOSPADM

## 2024-08-12 RX ORDER — PIPERACILLIN SODIUM, TAZOBACTAM SODIUM 3; .375 G/15ML; G/15ML
3.38 INJECTION, POWDER, LYOPHILIZED, FOR SOLUTION INTRAVENOUS ONCE
Status: COMPLETED | OUTPATIENT
Start: 2024-08-12 | End: 2024-08-12

## 2024-08-12 RX ORDER — ROSUVASTATIN CALCIUM 10 MG/1
10 TABLET, COATED ORAL AT BEDTIME
Status: DISCONTINUED | OUTPATIENT
Start: 2024-08-12 | End: 2024-08-13 | Stop reason: HOSPADM

## 2024-08-12 RX ADMIN — CEFEPIME 1 G: 1 INJECTION, POWDER, FOR SOLUTION INTRAMUSCULAR; INTRAVENOUS at 10:30

## 2024-08-12 RX ADMIN — ROSUVASTATIN CALCIUM 10 MG: 10 TABLET, FILM COATED ORAL at 21:51

## 2024-08-12 RX ADMIN — ASPIRIN 81 MG: 81 TABLET, COATED ORAL at 20:33

## 2024-08-12 RX ADMIN — CEFEPIME 1 G: 1 INJECTION, POWDER, FOR SOLUTION INTRAMUSCULAR; INTRAVENOUS at 02:38

## 2024-08-12 RX ADMIN — PIPERACILLIN AND TAZOBACTAM 3.38 G: 3; .375 INJECTION, POWDER, FOR SOLUTION INTRAVENOUS at 18:41

## 2024-08-12 RX ADMIN — VANCOMYCIN HYDROCHLORIDE 1000 MG: 1 INJECTION, SOLUTION INTRAVENOUS at 01:02

## 2024-08-12 RX ADMIN — PIPERACILLIN AND TAZOBACTAM 3.38 G: 3; .375 INJECTION, POWDER, FOR SOLUTION INTRAVENOUS at 13:15

## 2024-08-12 RX ADMIN — ASPIRIN 81 MG: 81 TABLET, COATED ORAL at 08:14

## 2024-08-12 ASSESSMENT — ACTIVITIES OF DAILY LIVING (ADL)
ADLS_ACUITY_SCORE: 35
ADLS_ACUITY_SCORE: 33
ADLS_ACUITY_SCORE: 35
ADLS_ACUITY_SCORE: 33
ADLS_ACUITY_SCORE: 35
ADLS_ACUITY_SCORE: 33
ADLS_ACUITY_SCORE: 35

## 2024-08-12 NOTE — PROGRESS NOTES
Brief staff note    Chart review only.  Vitals unremarkable.  No new labs or imaging.  Wound cultures finalized, awaiting final abx recs from ID.  Resume statin.  Resume lisinopril/hydrochlorothiazide on discharge.

## 2024-08-12 NOTE — PLAN OF CARE
Problem: Adult Inpatient Plan of Care  Goal: Absence of Hospital-Acquired Illness or Injury  Intervention: Prevent Infection  Recent Flowsheet Documentation  Taken 8/12/2024 0100 by Yasmin Parker, RN  Infection Prevention: hand hygiene promoted  Taken 8/11/2024 2033 by Yasmin Parker, RN  Infection Prevention: hand hygiene promoted       Pt A&Ox4. VSS on CPAP at HS. Dressing c/d/i. Prevena wound vac in place, no output noted. Voiding adequately. Colostomy managed by patient. Up with sba with walker and gait belt, ambulated the hallway last evening. Pain managed with scheduled tylenol. Vancomycin and cefepime given, awaiting final plan for ID. Discharge pending antibiotic plan.

## 2024-08-12 NOTE — PROGRESS NOTES
"Orthopedic Progress Note      Assessment: 3 Days Post-Op  S/P Procedure(s):  RIGHT HIP WOUND IRRIGATION AND DEBRIDEMENT, CLOSURE WITH  PREVENA DRESSING     Plan:   -WBAT  -Pain management  -ASA 81mg BID /mechanical DVT prophylaxis  -No dressing chage - reinforce prn  -ID following. Polymicrobial. Currently on vancomycin and cefepime  -Ice/elevate  -Cont PT/OT  -Medicine following - comgmt appreciated  -F/U with Dr. Yancey in 10-14 days      Subjective:  Pain: Well controlled on Tylenol  Nausea, Vomiting:  No  Chest pain: No  Lightheadedness, Dizziness:  No  Neuro:  Patient denies new onset numbness or paresthesias     Patient doing well on POD #3. Working with PT ambulating, tolerating oral intake, voiding & pain is controlled with oral medications. Hgb 9.5 (11.7 pre-op).     Objective:  BP (!) 147/66 (BP Location: Right arm)   Pulse 71   Temp 97.7  F (36.5  C) (Oral)   Resp 18   Ht 1.651 m (5' 5\")   Wt 77.1 kg (170 lb)   LMP  (LMP Unknown)   SpO2 98%   BMI 28.29 kg/m    The patient is A&Ox3. Appears comfortable, sitting in chair.  Calves are soft and non-tender bilaterally  Brisk capillary refill in the toes.   Palpable bilateral dorsalis pedis pulse. Foot warm & well-perfused.  Sensation is intact to light touch & equal bilaterally in the femoral, DP, SP & tibial nerve distributions.  ROM:  Appropriately flexes & extends all toes bilaterally.   Motor: +5/5 dorsiflexion, plantar flexion & EHL bilaterally. Fires quad.   Leg lengths equal.  Prevena hip dressing intact. No surrounding erythema/ warmth      5/5 TA/GSC/EHL.          Pertinent Labs   Lab Results: personally reviewed.   Lab Results   Component Value Date    INR 1.02 01/26/2021    INR 1.05 10/27/2019     Lab Results   Component Value Date    WBC 5.8 08/11/2024    HGB 9.5 (L) 08/11/2024    HCT 30.0 (L) 08/11/2024    MCV 84 08/11/2024     08/11/2024     Lab Results   Component Value Date     07/31/2024    CO2 25 07/31/2024 "         Report completed by:  Tawnya Saldaña PA-C/Dr. Yancey  [facility]    Date: 8/12/2024  Time: 10:39 AM

## 2024-08-12 NOTE — PLAN OF CARE
Problem: Hip Arthroplasty  Goal: Absence of Infection Signs and Symptoms  Outcome: Progressing     Problem: Pain Acute  Goal: Optimal Pain Control and Function  Outcome: Progressing  Intervention: Prevent or Manage Pain  Recent Flowsheet Documentation  Taken 8/12/2024 0814 by Ann De RN  Sensory Stimulation Regulation:   care clustered   lighting decreased  Medication Review/Management: medications reviewed   Goal Outcome Evaluation:    Pt A&Ox4, denies having pain when asked. Up in chair. Transfers with SBA & walker. Tolerating diet. Continues on IV ABX. Awaiting sensitivity results. call light within reach, bed & chair alarm on.

## 2024-08-12 NOTE — PROGRESS NOTES
"Infectious Diseases Progress Note  [facility]    Date of visit: 08/12/2024       ASSESSMENT   79-year-old woman with a history of hypertension and hyperlipidemia admitted with nonhealing surgical wound.    Status post right hip arthroplasty 6/25/2024.  Complicated by wound dehiscence  Superficial surgical site infection.  Dehiscence of wound.  Presented to urgent care about a week ago and placed on cephalexin x 7 days.  Admitted yesterday for wound debridement and wound VAC placement.  No signs of deep wound infection, no penetration to the joint capsule.  Gram-positive cocci seen on Gram stain.  Cultures growing E. coli, Morganella morganii, and Enterococcus faecalis.      Active Problems:    Surgical aftercare, musculoskeletal system    Soft tissue infection       PLAN   -Discontinue vancomycin and cefepime  -Start IV Zosyn inpatient.  -When ready to discharge, will do oral Levaquin plus oral Augmentin for 14 days.  -Wound care.  -Okay to discharge from the ID standpoint.    Discussed with the patient, nursing staff, care management.    Albania Frazier MD  [facility]  Direct messaging: [employer] Paging  On-Call ID provider: [phone], option: 9      ===========================================      SUBJECTIVE / INTERVAL HISTORY:   New to me today.  Chart reviewed.  Cultures reviewed.  On IV vancomycin and cefepime.  No side effect.      Antibiotics   Vancomycin 8/10-12  Cefepime 8/10-12  Zosyn 8/12-    Previous:  Perioperative cefazolin      Physical Exam     Temp:  [97.6  F (36.4  C)-98  F (36.7  C)] 97.7  F (36.5  C)  Pulse:  [65-71] 71  Resp:  [18] 18  BP: (118-147)/(58-66) 147/66  FiO2 (%):  [21 %] 21 %  SpO2:  [96 %-98 %] 98 %    BP (!) 147/66 (BP Location: Right arm)   Pulse 71   Temp 97.7  F (36.5  C) (Oral)   Resp 18   Ht 1.651 m (5' 5\")   Wt 77.1 kg (170 lb)   LMP  (LMP Unknown)   SpO2 98%   BMI 28.29 kg/m      GENERAL:  well-developed, well-nourished, " sitting in chair in no acute distress.   HENT:  Head is normocephalic, atraumatic.   EYES:  Eyes have anicteric sclerae without conjunctival injection   LUNGS:  normal resp pattern  EXT: left hip surgical dressing, wound vac in place  SKIN:  No acute rashes.   NEUROLOGIC:  Grossly nonfocal.      Cultures   8/9 right hip: Gram-positive cocci in Gram stain; culture with E. coli, Morganella morganii, Enterococcus faecalis  8/9 right hip #2: Gram-positive cocci in Gram stain; culture with E. coli, Morganella morganii, Enterococcus faecalis    Susceptibility data from last 90 days.  Collected Specimen Info Organism Ampicillin Ampicillin/Sulbactam Cefepime Ceftazidime Ceftriaxone Ciprofloxacin Gentamicin Gentamicin Synergy Levofloxacin Meropenem Piperacillin/Tazobactam Tobramycin   08/09/24 Wound from Hip, Right Mixed Aerobic and Anaerobic burton               08/09/24 Wound from Hip, Right Enterococcus faecalis                 Escherichia coli                 Morganella morganii               08/09/24 Wound from Hip, Right Mixed Aerobic and Anaerobic burton               08/09/24 Wound from Hip, Right Escherichia coli  S  S  S  S  S  S  S   S  S  S  S     Morganella morganii R  R  S  S  S  S  S   S  S  S  S     Enterococcus faecalis  S        S         Collected Specimen Info Organism Trimethoprim/Sulfamethoxazole  Vancomycin   08/09/24 Wound from Hip, Right Mixed Aerobic and Anaerobic burton     08/09/24 Wound from Hip, Right Enterococcus faecalis       Escherichia coli       Morganella morganii     08/09/24 Wound from Hip, Right Mixed Aerobic and Anaerobic burton     08/09/24 Wound from Hip, Right Escherichia coli  S      Morganella morganii  S      Enterococcus faecalis   S         Contains abnormal data Wound Aerobic Bacterial Culture Routine  Order: [id number]  Collected 8/9/2024  8:33 AM       Status: Final result       Visible to patient: Yes (not seen)    Specimen Information: Hip, Right; Wound   0 Result  Notes  Culture 2+ Escherichia coli Abnormal       2+ Morganella morganii Abnormal       1+ Enterococcus faecalis Abnormal            Resulting Agency: [facility]     Susceptibility     Escherichia coli Morganella morganii Enterococcus faecalis     VIDHYA VIDHYA VIDHYA     Amikacin <=2 ug/mL Susceptible * <=2 ug/mL Susceptible *       Ampicillin 4 ug/mL Susceptible  Resistant 1 <=2 ug/mL Susceptible     Ampicillin/ Sulbactam <=2 ug/mL Susceptible >=32 ug/mL Resistant       Cefazolin    Resistant *       Cefepime <=1 ug/mL Susceptible <=1 ug/mL Susceptible       Cefoxitin <=4 ug/mL Susceptible * 8 ug/mL Susceptible *       Ceftazidime <=1 ug/mL Susceptible <=1 ug/mL Susceptible       Ceftriaxone <=1 ug/mL Susceptible <=1 ug/mL Susceptible       Ciprofloxacin <=0.25 ug/mL Susceptible <=0.25 ug/mL Susceptible <=0.5 ug/mL Susceptible *     Daptomycin     2 ug/mL Susceptible *     Doxycycline     <=0.5 ug/mL Susceptible *     Extended Spectrum Beta-Lactamase Negative ug/mL ESBL Negative *         Gentamicin <=1 ug/mL Susceptible <=1 ug/mL Susceptible       Gentamicin Synergy     Susceptible... Susceptible 2     Levofloxacin <=0.12 ug/mL Susceptible <=0.12 ug/mL Susceptible 1 ug/mL Susceptible *     Linezolid     2 ug/mL Susceptible *     Meropenem <=0.25 ug/mL Susceptible <=0.25 ug/mL Susceptible       Nitrofurantoin <=16 ug/mL Susceptible * 128 ug/mL Resistant * <=16 ug/mL Susceptible *     Piperacillin/Tazobactam <=4 ug/mL Susceptible <=4 ug/mL Susceptible       Streptomycin Synergy     Susceptible... Susceptible *     Tigecycline     <=0.12 ug/mL Susceptible *     Tobramycin <=1 ug/mL Susceptible <=1 ug/mL Susceptible       Trimethoprim/Sulfamethoxazole <=1/19 ug/mL Susceptible <=1/19 ug/mL Susceptible       Vancomycin     1 ug/mL Susceptible                  * Suppressed Antibiotic   1 Intrinsically Resistant   2 No high level gentamicin resistance found - therefore combination therapy with an aminoglycoside may be indicated for  "serious enterococcal infections such as bacteremia and endocarditis.             Specimen Collected: 08/09/24  8:33 AM Last Resulted: 08/11/24 10:40 PM            Pertinent Labs:     Recent Labs   Lab 08/11/24  0701 08/10/24  1010   WBC 5.8  --    HGB 9.5* 10.1*     --        No results for input(s): \"NA\", \"CO2\", \"BUN\", \"CREATININE\", \"ALBUMIN\", \"BILITOT\", \"ALKPHOS\", \"ALT\", \"AST\", \"GLUCOSE\" in the last 168 hours.    Invalid input(s): \"K\", \"CL\", \"CALCIUM\", \"LABALBU\", \"PROT\"    No results for input(s): \"CRPI\", \"SED\" in the last 168 hours.        Imaging:     POC US Guidance Needle Placement    Result Date: 8/9/2024  Ultrasound was performed as guidance to an anesthesia procedure.  Click \"PACS images\" hyperlink below to view any stored images.  For specific procedure details, view procedure note authored by anesthesia.         Data reviewed today: I reviewed all medications, new labs and imaging results over the last 24 hours. I personally reviewed no images or EKG's today.  The patient's care was discussed with the Patient.    "

## 2024-08-13 VITALS
HEIGHT: 65 IN | TEMPERATURE: 97.9 F | SYSTOLIC BLOOD PRESSURE: 122 MMHG | HEART RATE: 65 BPM | OXYGEN SATURATION: 98 % | WEIGHT: 170 LBS | BODY MASS INDEX: 28.32 KG/M2 | RESPIRATION RATE: 18 BRPM | DIASTOLIC BLOOD PRESSURE: 58 MMHG

## 2024-08-13 PROCEDURE — 250N000013 HC RX MED GY IP 250 OP 250 PS 637

## 2024-08-13 PROCEDURE — 250N000011 HC RX IP 250 OP 636: Performed by: STUDENT IN AN ORGANIZED HEALTH CARE EDUCATION/TRAINING PROGRAM

## 2024-08-13 PROCEDURE — 99232 SBSQ HOSP IP/OBS MODERATE 35: CPT | Performed by: STUDENT IN AN ORGANIZED HEALTH CARE EDUCATION/TRAINING PROGRAM

## 2024-08-13 RX ORDER — LEVOFLOXACIN 500 MG/1
500 TABLET, FILM COATED ORAL DAILY
Qty: 14 TABLET | Refills: 0 | Status: SHIPPED | OUTPATIENT
Start: 2024-08-13 | End: 2024-08-27

## 2024-08-13 RX ADMIN — PIPERACILLIN AND TAZOBACTAM 3.38 G: 3; .375 INJECTION, POWDER, FOR SOLUTION INTRAVENOUS at 02:32

## 2024-08-13 RX ADMIN — PIPERACILLIN AND TAZOBACTAM 3.38 G: 3; .375 INJECTION, POWDER, FOR SOLUTION INTRAVENOUS at 11:15

## 2024-08-13 RX ADMIN — ASPIRIN 81 MG: 81 TABLET, COATED ORAL at 08:39

## 2024-08-13 ASSESSMENT — ACTIVITIES OF DAILY LIVING (ADL)
ADLS_ACUITY_SCORE: 34
ADLS_ACUITY_SCORE: 33
ADLS_ACUITY_SCORE: 34
ADLS_ACUITY_SCORE: 33
ADLS_ACUITY_SCORE: 34
ADLS_ACUITY_SCORE: 33
ADLS_ACUITY_SCORE: 33
ADLS_ACUITY_SCORE: 34
ADLS_ACUITY_SCORE: 33
ADLS_ACUITY_SCORE: 34
ADLS_ACUITY_SCORE: 33

## 2024-08-13 NOTE — PLAN OF CARE
Goal Outcome Evaluation:      Plan of Care Reviewed With: patient    Overall Patient Progress: improvingOverall Patient Progress: improving    Alert and oriented x4, able to make needs known, voiding adequately, c/o left shoulder pain, but declined medications, applied ice,  colostomy was changed and pt self managed, CMS intact, Prevana in place, no output, PIV SL, plan is home.

## 2024-08-13 NOTE — PROGRESS NOTES
"Orthopedic Progress Note      Assessment: 4 Days Post-Op  S/P Procedure(s):  RIGHT HIP WOUND IRRIGATION AND DEBRIDEMENT,  CLOSURE WITH PREVENA DRESSING     Plan:   -WBAT  -Pain management  -ASA 81mg BID /mechanical DVT prophylaxis  -No dressing chage - reinforce prn  -ID following. Polymicrobial. Currently on IV Zosyn. Plan to discharge on Levaquin plus oral Augmentin for 14 days, per ID.   -Ice/elevate  -Cont PT/OT  -Medicine following - comgmt appreciated  -F/U with Dr. Yancey in 10-14 days  DISCHARGE: to home with family    Subjective:  Pain: mild  Chest pain, SOB: no  Nausea, Vomiting:  no  Lightheadedness, Dizziness:  no  Neuro:  Patient denies new onset numbness or paresthesias    Patient is doing well POD#4. Napping in chair, awoken by myself when entering room. Working with PT ambulating, tolerating oral intake, voiding & pain is controlled with oral medications. Hgb 9.5 (11.7 pre-op).     Objective:  /58 (BP Location: Left arm)   Pulse 65   Temp 97.9  F (36.6  C) (Oral)   Resp 18   Ht 1.651 m (5' 5\")   Wt 77.1 kg (170 lb)   LMP  (LMP Unknown)   SpO2 98%   BMI 28.29 kg/m    The patient is A&Ox3. Appears comfortable.   Sensation is intact.  Dorsiflexion and plantar flexion is intact.  Dorsalis pedis pulse intact.  Calves are soft and non-tender. Negative Ava's.  The incision is covered with Prevena WV. WV intact. No surrounding erythema.         Pertinent Labs   Lab Results: personally reviewed.   Lab Results   Component Value Date    INR 1.02 01/26/2021    INR 1.05 10/27/2019     Lab Results   Component Value Date    WBC 5.8 08/11/2024    HGB 9.5 (L) 08/11/2024    HCT 30.0 (L) 08/11/2024    MCV 84 08/11/2024     08/11/2024     Lab Results   Component Value Date     07/31/2024    CO2 25 07/31/2024         Report completed by:  Tawnya Saldaña PA-C/Dr. Yancey   [facility]    Date: 8/13/2024  Time: 9:53 AM   "

## 2024-08-13 NOTE — PROGRESS NOTES
AVS reviewed by this RN. All questions answered. Patient to discharge home with family once IV abx completed. Son to come pick patient up at 1630.

## 2024-08-13 NOTE — PROGRESS NOTES
Brief staff note    Discharge orders already in place per orthopaedics.  Patient not evaluated today.  Vitals unremarkable.  ID provided final abx recommendations.   0

## 2024-08-13 NOTE — PROGRESS NOTES
"Infectious Diseases Progress Note  [facility]    Date of visit: 08/13/2024       ASSESSMENT   79-year-old woman with a history of hypertension and hyperlipidemia admitted with nonhealing surgical wound.    Status post right hip arthroplasty 6/25/2024.  Complicated by wound dehiscence  Superficial surgical site infection.  Dehiscence of wound.  Presented to urgent care about a week ago and placed on cephalexin x 7 days.  Admitted yesterday for wound debridement and wound VAC placement.  No signs of deep wound infection, no penetration to the joint capsule.  Gram-positive cocci seen on Gram stain.  Cultures growing E. coli, Morganella morganii, and Enterococcus faecalis.      Active Problems:    Surgical aftercare, musculoskeletal system    Soft tissue infection       PLAN   -Continue IV Zosyn inpatient.  -Can discharge on Levaquin plus oral Augmentin for 14 days.  Orders placed.      Discussed with the patient, nursing staff, care management.    ID will sign off.    Albania Frazier MD  [facility]  Direct messaging: [employer] Paging  On-Call ID provider: [phone], option: 9      ===========================================      SUBJECTIVE / INTERVAL HISTORY:   Doing well today.  Discharging today.  No new complaint.    Antibiotics   Vancomycin 8/10-12  Cefepime 8/10-12  Zosyn 8/12-    Previous:  Perioperative cefazolin      Physical Exam     Temp:  [97.8  F (36.6  C)-97.9  F (36.6  C)] 97.9  F (36.6  C)  Pulse:  [65-76] 65  Resp:  [18-20] 18  BP: (117-134)/(58-66) 122/58  SpO2:  [97 %-98 %] 98 %    /58 (BP Location: Left arm)   Pulse 65   Temp 97.9  F (36.6  C) (Oral)   Resp 18   Ht 1.651 m (5' 5\")   Wt 77.1 kg (170 lb)   LMP  (LMP Unknown)   SpO2 98%   BMI 28.29 kg/m      GENERAL:  well-developed, well-nourished, sitting in chair in no acute distress.   HENT:  Head is normocephalic, atraumatic.   EYES:  Eyes have anicteric sclerae without conjunctival injection "   LUNGS:  normal resp pattern  EXT: left hip surgical dressing, wound vac in place  SKIN:  No acute rashes.   NEUROLOGIC:  Grossly nonfocal.      Cultures   8/9 right hip: Gram-positive cocci in Gram stain; culture with E. coli, Morganella morganii, Enterococcus faecalis  8/9 right hip #2: Gram-positive cocci in Gram stain; culture with E. coli, Morganella morganii, Enterococcus faecalis    Susceptibility data from last 90 days.  Collected Specimen Info Organism Ampicillin Ampicillin/Sulbactam Cefepime Ceftazidime Ceftriaxone Ciprofloxacin Gentamicin Gentamicin Synergy Levofloxacin Meropenem Piperacillin/Tazobactam Tobramycin   08/09/24 Wound from Hip, Right Mixed Aerobic and Anaerobic burton               08/09/24 Wound from Hip, Right Enterococcus faecalis                 Escherichia coli                 Morganella morganii               08/09/24 Wound from Hip, Right Mixed Aerobic and Anaerobic burton               08/09/24 Wound from Hip, Right Escherichia coli  S  S  S  S  S  S  S   S  S  S  S     Morganella morganii R  R  S  S  S  S  S   S  S  S  S     Enterococcus faecalis  S        S         Collected Specimen Info Organism Trimethoprim/Sulfamethoxazole  Vancomycin   08/09/24 Wound from Hip, Right Mixed Aerobic and Anaerobic burton     08/09/24 Wound from Hip, Right Enterococcus faecalis       Escherichia coli       Morganella morganii     08/09/24 Wound from Hip, Right Mixed Aerobic and Anaerobic burton     08/09/24 Wound from Hip, Right Escherichia coli  S      Morganella morganii  S      Enterococcus faecalis   S         Contains abnormal data Wound Aerobic Bacterial Culture Routine  Order: [id number]  Collected 8/9/2024  8:33 AM       Status: Final result       Visible to patient: Yes (not seen)    Specimen Information: Hip, Right; Wound   0 Result Notes  Culture 2+ Escherichia coli Abnormal       2+ Morganella morganii Abnormal       1+ Enterococcus faecalis Abnormal            Resulting Agency: [facility]      Susceptibility     Escherichia coli Morganella morganii Enterococcus faecalis     VIDHYA VIDHYA VIDHYA     Amikacin <=2 ug/mL Susceptible * <=2 ug/mL Susceptible *       Ampicillin 4 ug/mL Susceptible  Resistant 1 <=2 ug/mL Susceptible     Ampicillin/ Sulbactam <=2 ug/mL Susceptible >=32 ug/mL Resistant       Cefazolin    Resistant *       Cefepime <=1 ug/mL Susceptible <=1 ug/mL Susceptible       Cefoxitin <=4 ug/mL Susceptible * 8 ug/mL Susceptible *       Ceftazidime <=1 ug/mL Susceptible <=1 ug/mL Susceptible       Ceftriaxone <=1 ug/mL Susceptible <=1 ug/mL Susceptible       Ciprofloxacin <=0.25 ug/mL Susceptible <=0.25 ug/mL Susceptible <=0.5 ug/mL Susceptible *     Daptomycin     2 ug/mL Susceptible *     Doxycycline     <=0.5 ug/mL Susceptible *     Extended Spectrum Beta-Lactamase Negative ug/mL ESBL Negative *         Gentamicin <=1 ug/mL Susceptible <=1 ug/mL Susceptible       Gentamicin Synergy     Susceptible... Susceptible 2     Levofloxacin <=0.12 ug/mL Susceptible <=0.12 ug/mL Susceptible 1 ug/mL Susceptible *     Linezolid     2 ug/mL Susceptible *     Meropenem <=0.25 ug/mL Susceptible <=0.25 ug/mL Susceptible       Nitrofurantoin <=16 ug/mL Susceptible * 128 ug/mL Resistant * <=16 ug/mL Susceptible *     Piperacillin/Tazobactam <=4 ug/mL Susceptible <=4 ug/mL Susceptible       Streptomycin Synergy     Susceptible... Susceptible *     Tigecycline     <=0.12 ug/mL Susceptible *     Tobramycin <=1 ug/mL Susceptible <=1 ug/mL Susceptible       Trimethoprim/Sulfamethoxazole <=1/19 ug/mL Susceptible <=1/19 ug/mL Susceptible       Vancomycin     1 ug/mL Susceptible                  * Suppressed Antibiotic   1 Intrinsically Resistant   2 No high level gentamicin resistance found - therefore combination therapy with an aminoglycoside may be indicated for serious enterococcal infections such as bacteremia and endocarditis.             Specimen Collected: 08/09/24  8:33 AM Last Resulted: 08/11/24 10:40 PM         "    Pertinent Labs:     Recent Labs   Lab 08/11/24  0701 08/10/24  1010   WBC 5.8  --    HGB 9.5* 10.1*     --        No results for input(s): \"NA\", \"CO2\", \"BUN\", \"CREATININE\", \"ALBUMIN\", \"BILITOT\", \"ALKPHOS\", \"ALT\", \"AST\", \"GLUCOSE\" in the last 168 hours.    Invalid input(s): \"K\", \"CL\", \"CALCIUM\", \"LABALBU\", \"PROT\"    No results for input(s): \"CRPI\", \"SED\" in the last 168 hours.        Imaging:     POC US Guidance Needle Placement    Result Date: 8/9/2024  Ultrasound was performed as guidance to an anesthesia procedure.  Click \"PACS images\" hyperlink below to view any stored images.  For specific procedure details, view procedure note authored by anesthesia.         Data reviewed today: I reviewed all medications, new labs and imaging results over the last 24 hours. I personally reviewed no images or EKG's today.  The patient's care was discussed with the Patient.    "

## 2024-08-13 NOTE — DISCHARGE SUMMARY
"ORTHOPEDIC DISCHARGE SUMMARY       Cyndie Marin,  1945, MRN [id number]    Admission Date: 2024      Admission Diagnoses: Right hip pain [M25.551]  Wound dehiscence [T81.30XA]     Discharge Date:      Post-operative Day:  4 Days Post-Op    Reason for Admission: The patient was admitted for the following: Procedure(s):  RIGHT HIP WOUND IRRIGATION AND DEBRIDEMENT,  CLOSURE WITH PREVENA DRESSING    BRIEF HOSPITAL COURSE   Cyndie Marin is a pleasant 79 year old female who underwent the aforementioned procedure with Dr. Yancey on 24. There were no intraoperative complications and the patient was transferred to the recovery room and later the orthopedic unit in stable condition. Once the patient reached the orthopedic floor our orthopedic pain protocol was implemented along with the following:    Anticoagulation Medications: ASA  Therapy: PT and OT  Activity: WBAT  Bracing: None    Consultations during Admission: Hospitalist service for medical management     COMPLICATIONS/SIGNIFICANT FINDINGS    none    DISCHARGE INFORMATION   Condition at discharge: Good  Discharge destination: Home  Patient was seen by myself on the date of discharge.    FOLLOW UP CARE   Follow up with orthopedics in 2 weeks or sooner should the need arise. Ortho will continue to manage pain control, post op anticoagulation and incision care.     Follow up with your PCP for management of chronic medical problems and to evaluate for post op medical complications including constipation, nausea/vomiting, DVT/PE, anemia, changes in blood pressure, fevers/chills, urinary retention and atelectasis/pneumonia.     Subjective   Patient is doing well on POD #1. Pain is well controlled with oral medications. Ambulating. Tolerating oral intake.     Physical Exam   /58 (BP Location: Left arm)   Pulse 65   Temp 97.9  F (36.6  C) (Oral)   Resp 18   Ht 1.651 m (5' 5\")   Wt 77.1 kg (170 lb)   LMP  (LMP Unknown)   SpO2 98%   " BMI 28.29 kg/m    The patient is A&Ox3. Appears comfortable.   Sensation is intact.  Dorsiflexion and plantar flexion is intact.  Dorsalis pedis pulse intact.  Calves are soft and non-tender. Negative Ava's.  The incision is covered with Prevena WV. WV intact. No surrounding erythema.     Pertinent Results at Discharge     Hemoglobin   Date/Time Value Ref Range Status   08/11/2024 07:01 AM 9.5 (L) 11.7 - 15.7 g/dL Final   08/10/2024 10:10 AM 10.1 (L) 11.7 - 15.7 g/dL Final   07/31/2024 04:04 PM 11.7 11.7 - 15.7 g/dL Final     INR   Date/Time Value Ref Range Status   01/26/2021 08:07 AM 1.02 0.90 - 1.10 Final   10/27/2019 08:43 AM 1.05 0.90 - 1.10 Final     Platelet Count   Date/Time Value Ref Range Status   08/11/2024 07:01  150 - 450 10e3/uL Final   07/31/2024 04:04  150 - 450 10e3/uL Final   06/04/2024 10:55  150 - 450 10e3/uL Final       Problem List   Active Problems:    Surgical aftercare, musculoskeletal system    Soft tissue infection      Tawnya Saldaña PA-C/Dr. Yancey  [facility]  [phone]  Date: 8/13/2024  Time: 1:35 PM

## 2024-08-13 NOTE — PLAN OF CARE
Patient vital signs are at baseline: Yes  Patient able to ambulate as they were prior to admission or with assist devices provided by therapies during their stay:  Yes  Patient MUST void prior to discharge:  Yes  Patient able to tolerate oral intake:  Yes  Pain has adequate pain control using Oral analgesics:  Yes  Does patient have an identified :  Yes  Has goal D/C date and time been discussed with patient:  Yes. Patient to discharge after last dose of IV Zosyn is administered.

## 2024-08-14 ENCOUNTER — PATIENT OUTREACH (OUTPATIENT)
Dept: CARE COORDINATION | Facility: CLINIC | Age: 79
End: 2024-08-14
Payer: COMMERCIAL

## 2024-08-15 ENCOUNTER — PATIENT OUTREACH (OUTPATIENT)
Dept: CARE COORDINATION | Facility: CLINIC | Age: 79
End: 2024-08-15
Payer: COMMERCIAL

## 2024-08-15 NOTE — PROGRESS NOTES
"Clinic Care Coordination Contact  Transitions of Care Outreach  Chief Complaint   Patient presents with    Clinic Care Coordination - Post Hospital       Most Recent Admission Date: 8/9/2024   Most Recent Admission Diagnosis: Right hip pain - M25.551  Wound dehiscence - T81.30XA     Most Recent Discharge Date: 8/13/2024   Most Recent Discharge Diagnosis: Surgical aftercare, musculoskeletal system - Z47.89  Soft tissue infection - L08.9     Transitions of Care Assessment    Discharge Assessment  How are you doing now that you are home?: \"Pretty good. The vacc is starting to click again. The ortho had a note to send home some film with me that covers it.\"  How are your symptoms? (Red Flag symptoms escalate to triage hotline per guidelines): Improved  Do you know how to contact your clinic care team if you have future questions or changes to your health status? : Yes  Does the patient have their discharge instructions? : Yes  Does the patient have questions regarding their discharge instructions? : No  Were you started on any new medications or were there changes to any of your previous medications? : Yes  Does the patient have all of their medications?: Yes  Do you have questions regarding any of your medications? : No  Do you have all of your needed medical supplies or equipment (DME)?  (i.e. oxygen tank, CPAP, cane, etc.): No - What equipment or supplies are needed? (Pt needs film for wound care. Pt plans to contact her surgeon directly.)    Post-op ([facility] CTA Only)  If the patient had a surgery or procedure, do they have any questions for a nurse?: No         [facility] Explained and offered Care Coordination support to eligible patients: Yes    Patient accepted? No    Follow up Plan     Discharge Follow-Up  Discharge follow up appointment scheduled in alignment with recommended follow up timeframe or Transitions of Risk Category? (Low = within 30 days; Moderate= within 14 days; High= within 7 days): Yes  Discharge Follow " Up Appointment Date: 08/23/24  Discharge Follow Up Appointment Scheduled with?: Specialty Care Provider ([place] Team/Wound Check)    Future Appointments   Date Time Provider Department Center   9/17/2024  2:00 PM [facility]       Outpatient Plan as outlined on AVS reviewed with patient.    For any urgent concerns, please contact our 24 hour nurse triage line: [phone] ([phone])       DEIRDRE Starr  [phone]  Connected Care Resource [facility]

## 2024-08-21 ENCOUNTER — DOCUMENTATION ONLY (OUTPATIENT)
Dept: OTHER | Facility: CLINIC | Age: 79
End: 2024-08-21
Payer: COMMERCIAL

## 2024-08-23 ENCOUNTER — TRANSFERRED RECORDS (OUTPATIENT)
Dept: HEALTH INFORMATION MANAGEMENT | Facility: CLINIC | Age: 79
End: 2024-08-23
Payer: COMMERCIAL

## 2024-08-28 ENCOUNTER — PATIENT OUTREACH (OUTPATIENT)
Dept: CARE COORDINATION | Facility: CLINIC | Age: 79
End: 2024-08-28
Payer: COMMERCIAL

## 2024-09-06 ENCOUNTER — TRANSFERRED RECORDS (OUTPATIENT)
Dept: HEALTH INFORMATION MANAGEMENT | Facility: CLINIC | Age: 79
End: 2024-09-06
Payer: COMMERCIAL

## 2024-09-17 ENCOUNTER — OFFICE VISIT (OUTPATIENT)
Dept: INTERNAL MEDICINE | Facility: CLINIC | Age: 79
End: 2024-09-17
Payer: COMMERCIAL

## 2024-09-17 VITALS
SYSTOLIC BLOOD PRESSURE: 118 MMHG | OXYGEN SATURATION: 98 % | WEIGHT: 163.6 LBS | RESPIRATION RATE: 14 BRPM | BODY MASS INDEX: 27.26 KG/M2 | HEIGHT: 65 IN | HEART RATE: 75 BPM | TEMPERATURE: 97.7 F | DIASTOLIC BLOOD PRESSURE: 72 MMHG

## 2024-09-17 DIAGNOSIS — K21.9 GASTROESOPHAGEAL REFLUX DISEASE WITHOUT ESOPHAGITIS: ICD-10-CM

## 2024-09-17 DIAGNOSIS — E78.5 HYPERLIPIDEMIA LDL GOAL <100: Primary | ICD-10-CM

## 2024-09-17 DIAGNOSIS — I10 ESSENTIAL HYPERTENSION, BENIGN: ICD-10-CM

## 2024-09-17 DIAGNOSIS — Z93.3 COLOSTOMY IN PLACE (H): ICD-10-CM

## 2024-09-17 DIAGNOSIS — I10 ESSENTIAL HYPERTENSION: ICD-10-CM

## 2024-09-17 DIAGNOSIS — Z96.641 STATUS POST TOTAL REPLACEMENT OF RIGHT HIP: ICD-10-CM

## 2024-09-17 DIAGNOSIS — G47.33 OSA (OBSTRUCTIVE SLEEP APNEA): ICD-10-CM

## 2024-09-17 DIAGNOSIS — M19.012 LOCALIZED OSTEOARTHRITIS OF LEFT SHOULDER: ICD-10-CM

## 2024-09-17 PROBLEM — K80.20 CALCULUS OF GALLBLADDER WITHOUT CHOLECYSTITIS WITHOUT OBSTRUCTION: Status: RESOLVED | Noted: 2022-08-08 | Resolved: 2024-09-17

## 2024-09-17 LAB
ERYTHROCYTE [DISTWIDTH] IN BLOOD BY AUTOMATED COUNT: 14.9 % (ref 10–15)
HCT VFR BLD AUTO: 43.5 % (ref 35–47)
HGB BLD-MCNC: 13.5 G/DL (ref 11.7–15.7)
MCH RBC QN AUTO: 25.6 PG (ref 26.5–33)
MCHC RBC AUTO-ENTMCNC: 31 G/DL (ref 31.5–36.5)
MCV RBC AUTO: 83 FL (ref 78–100)
PLATELET # BLD AUTO: 259 10E3/UL (ref 150–450)
RBC # BLD AUTO: 5.27 10E6/UL (ref 3.8–5.2)
WBC # BLD AUTO: 11.3 10E3/UL (ref 4–11)

## 2024-09-17 PROCEDURE — G0008 ADMIN INFLUENZA VIRUS VAC: HCPCS | Performed by: INTERNAL MEDICINE

## 2024-09-17 PROCEDURE — 90662 IIV NO PRSV INCREASED AG IM: CPT | Performed by: INTERNAL MEDICINE

## 2024-09-17 PROCEDURE — 80053 COMPREHEN METABOLIC PANEL: CPT | Performed by: INTERNAL MEDICINE

## 2024-09-17 PROCEDURE — 36415 COLL VENOUS BLD VENIPUNCTURE: CPT | Performed by: INTERNAL MEDICINE

## 2024-09-17 PROCEDURE — 80061 LIPID PANEL: CPT | Performed by: INTERNAL MEDICINE

## 2024-09-17 PROCEDURE — G0439 PPPS, SUBSEQ VISIT: HCPCS | Performed by: INTERNAL MEDICINE

## 2024-09-17 PROCEDURE — 85027 COMPLETE CBC AUTOMATED: CPT | Performed by: INTERNAL MEDICINE

## 2024-09-17 PROCEDURE — 99214 OFFICE O/P EST MOD 30 MIN: CPT | Mod: 25 | Performed by: INTERNAL MEDICINE

## 2024-09-17 RX ORDER — ROSUVASTATIN CALCIUM 10 MG/1
10 TABLET, COATED ORAL AT BEDTIME
Qty: 90 TABLET | Refills: 3 | Status: SHIPPED | OUTPATIENT
Start: 2024-09-17

## 2024-09-17 RX ORDER — LISINOPRIL/HYDROCHLOROTHIAZIDE 10-12.5 MG
1 TABLET ORAL DAILY
Qty: 90 TABLET | Refills: 3 | Status: SHIPPED | OUTPATIENT
Start: 2024-09-17

## 2024-09-17 ASSESSMENT — PAIN SCALES - GENERAL: PAINLEVEL: MILD PAIN (3)

## 2024-09-17 NOTE — LETTER
September 18, 2024      Cyndie Marin  [address] [place] [address]        Dear MsZaheerTania,    We are writing to inform you of your test results.    Your tests are good. The cholesterol tests are good.  The blood chemistries are good, and the blood counts are good. The mildly elevated white blood cell count (the WBC) is not significant.     Resulted Orders   Lipid panel reflex to direct LDL Non-fasting   Result Value Ref Range    Cholesterol 138 <200 mg/dL    Triglycerides 59 <150 mg/dL    Direct Measure HDL 62 >=50 mg/dL    LDL Cholesterol Calculated 64 <100 mg/dL    Non HDL Cholesterol 76 <130 mg/dL    Patient Fasting > 8hrs? Yes     Narrative    Cholesterol  Desirable: < 200 mg/dL  Borderline High: 200 - 239 mg/dL  High: >= 240 mg/dL    Triglycerides  Normal: < 150 mg/dL  Borderline High: 150 - 199 mg/dL  High: 200-499 mg/dL  Very High: >= 500 mg/dL    Direct Measure HDL  Female: >= 50 mg/dL   Male: >= 40 mg/dL    LDL Cholesterol  Desirable: < 100 mg/dL  Above Desirable: 100 - 129 mg/dL   Borderline High: 130 - 159 mg/dL   High:  160 - 189 mg/dL   Very High: >= 190 mg/dL    Non HDL Cholesterol  Desirable: < 130 mg/dL  Above Desirable: 130 - 159 mg/dL  Borderline High: 160 - 189 mg/dL  High: 190 - 219 mg/dL  Very High: >= 220 mg/dL   CBC with platelets   Result Value Ref Range    WBC Count 11.3 (H) 4.0 - 11.0 10e3/uL    RBC Count 5.27 (H) 3.80 - 5.20 10e6/uL    Hemoglobin 13.5 11.7 - 15.7 g/dL    Hematocrit 43.5 35.0 - 47.0 %    MCV 83 78 - 100 fL    MCH 25.6 (L) 26.5 - 33.0 pg    MCHC 31.0 (L) 31.5 - 36.5 g/dL    RDW 14.9 10.0 - 15.0 %    Platelet Count 259 150 - 450 10e3/uL   Comprehensive metabolic panel   Result Value Ref Range    Sodium 139 135 - 145 mmol/L    Potassium 4.3 3.4 - 5.3 mmol/L    Carbon Dioxide (CO2) 25 22 - 29 mmol/L    Anion Gap 9 7 - 15 mmol/L    Urea Nitrogen 18.9 8.0 - 23.0 mg/dL    Creatinine 0.52 0.51 - 0.95 mg/dL    GFR Estimate >90 >60 mL/min/1.73m2      Comment:      eGFR  calculated using 2021 CKD-EPI equation.    Calcium 10.1 8.8 - 10.4 mg/dL      Comment:      Reference intervals for this test were updated on 7/16/2024 to reflect our healthy population more accurately. There may be differences in the flagging of prior results with similar values performed with this method. Those prior results can be interpreted in the context of the updated reference intervals.    Chloride 105 98 - 107 mmol/L    Glucose 81 70 - 99 mg/dL    Alkaline Phosphatase 130 40 - 150 U/L    AST 18 0 - 45 U/L    ALT 17 0 - 50 U/L    Protein Total 6.6 6.4 - 8.3 g/dL    Albumin 3.8 3.5 - 5.2 g/dL    Bilirubin Total 0.4 <=1.2 mg/dL    Patient Fasting > 8hrs? Yes        If you have any questions or concerns, please call the clinic at the number listed above.       Sincerely,      Roni Horowitz MD

## 2024-09-17 NOTE — PROGRESS NOTES
"Preventive Care Visit  [facility]  Roni Horowitz MD, Internal Medicine  Sep 17, 2024    Assessment & Plan     Essential hypertension  Satisfactory.     Hyperlipidemia LDL goal <100  Ongoing statin therapy.     SHAUNA (obstructive sleep apnea)  Ongoing CPAP    Gastroesophageal reflux disease without esophagitis  Minimal symptoms.      Localized osteoarthritis of left shoulder  Plans to get this repaired after upcoming trip to [place].     Status post total replacement of right hip  His is going well.  Dehiscence has fully healed.     Colostomy in place    Immunization  Influenza today    BMI  Estimated body mass index is 27.22 kg/m  as calculated from the following:    Height as of this encounter: 1.651 m (5' 5\").    Weight as of this encounter: 74.2 kg (163 lb 9.6 oz).     Counseling  Appropriate preventive services were addressed with this patient via screening,  reviewing preventive services available has been given to the patient.    Follow up yearly and as needed.     Lynne Agee is a 79 year old, presenting for the following:  Physical (AWV /DEXA results)        9/17/2024     1:43 PM   Additional Questions   Roomed by sebastian abad     Health Care Directive  Patient has a Health Care Directive on file  Advance care planning document is on file and is current.    HPI  She feels well overall.  Plans to get her left rotator cuff repaired later this year.  Her hip replacement wound has now fully healed. She feels well.  Using CPAP, and taking her medications.  No unusual dyspnea or cough or bowel or bladder issues.         9/17/2024   General Health   How would you rate your overall physical health? (!) FAIR   Feel stress (tense, anxious, or unable to sleep) To some extent      (!) STRESS CONCERN      9/17/2024   Nutrition   Diet: Regular (no restrictions)          9/17/2024   Exercise   Days per week of moderate/strenous exercise 3 days   Average minutes spent exercising at this level 30 min "          9/17/2024   Social Factors   Frequency of gathering with friends or relatives Twice a week   Worry food won't last until get money to buy more No   Food not last or not have enough money for food? No   Do you have housing? (Housing is defined as stable permanent housing and does not include staying ouside in a car, in a tent, in an abandoned building, in an overnight shelter, or couch-surfing.) Yes   Are you worried about losing your housing? No   Lack of transportation? No   Unable to get utilities (heat,electricity)? No         9/17/2024   Fall Risk   Fallen 2 or more times in the past year? No   Trouble with walking or balance? No             9/17/2024   Activities of Daily Living- Home Safety   Needs help with the following daily activites None of the above   Safety concerns in the home None of the above         9/17/2024   Dental   Dentist two times every year? (!) NO         9/17/2024   Hearing Screening   Hearing concerns? None of the above          9/17/2024   Driving Risk Screening   Patient/family members have concerns about driving No         9/17/2024   General Alertness/Fatigue Screening   Have you been more tired than usual lately? No         9/17/2024   Urinary Incontinence Screening   Bothered by leaking urine in past 6 months No          9/17/2024   TB Screening   Were you born outside of the [place]? No      Today's PHQ-2 Score:       9/17/2024     1:54 PM   PHQ-2 ( 1999 Pf[employer])   PHQ-2 Score Incomplete         9/17/2024   Substance Use   Alcohol more than 3/day or more than 7/wk No   Do you have a current opioid prescription? No   How severe/bad is pain from 1 to 10? 3/10   Do you use any other substances recreationally? No      Social History     Tobacco Use    Smoking status: Never    Smokeless tobacco: Never   Vaping Use    Vaping status: Never Used   Substance Use Topics    Alcohol use: Yes     Comment: rarely    Drug use: No     Reviewed and updated as needed this visit by Provider      Past Medical History:   Diagnosis Date    Calculus of gallbladder without cholecystitis without obstruction 2022    Claustrophobia     Colostomy in place (H)     DDD (degenerative disc disease), lumbar 2024    Essential hypertension     GERD (gastroesophageal reflux disease)     History of blood clots     History of urinary tract obstruction 2022    Hyperlipidemia LDL goal <100 2022    Parastomal hernia 2023    Perirectal abscess 10/29/2019    PONV (postoperative nausea and vomiting)     Primary osteoarthritis of both hips     S/P total left hip arthroplasty     Seasonal allergies     Sleep apnea     uses CPAP    RAJ (stress urinary incontinence, female) 2022    Thrombosis      Past Surgical History:   Procedure Laterality Date    APPLY WOUND VAC Right 2024    Procedure: CLOSURE WITH PREVENA DRESSING;  Surgeon: Alexander Yancey MD;  Location: [facility] OR    ARTHROPLASTY HIP ANTERIOR Right 2024    Procedure: RIGHT DIRECT ANTERIOR TOTAL HIP ARTHROPLASTY;  Surgeon: Alexander Yancey MD;  Location: [facility] OR     SECTION      COLECTOMY PARTIAL      COLOSTOMY N/A 10/28/2019    Procedure: CREATION, COLOSTOMY;  Surgeon: Mylene Sears MD;  Location: [facility] OR;  Service: General    IRRIGATION AND DEBRIDEMENT HIP, COMBINED Right 2024    Procedure: RIGHT HIP WOUND IRRIGATION AND DEBRIDEMENT,;  Surgeon: Alexander Yancey MD;  Location: [facility] OR    NEPHRECTOMY Left     with rib resection    TN EXCIS BARTHOLIN GLAND/CYST      Description: Excision Of Bartholin's Gland Or Cyst;  Recorded: 2013;    PROCTOSCOPY N/A 10/28/2019    Procedure: RIGID PROCTOSCOPY;  Surgeon: Mylene Sears MD;  Location: [facility] OR;  Service: General    SIGMOIDOSCOPY FLEXIBLE N/A 10/27/2019    Procedure: SIGMOIDOSCOPY with biopsy;  Surgeon: Tj Gavin MD;  Location: [facility] GI;  Service: Gastroenterology    TOOTH EXTRACTION       "TOTAL HIP ARTHROPLASTY       Current providers sharing in care for this patient include:  Patient Care Team:  Roni Horowitz MD as PCP - General (Internal Medicine)  Geovanny Mcneal DO as Assigned Musculoskeletal Provider    The following health maintenance items are reviewed in Epic and correct as of today:  Health Maintenance   Topic Date Due    ZOSTER IMMUNIZATION (1 of 2) 01/30/2008    RSV VACCINE (1 - 1-dose 75+ series) Never done    ANNUAL REVIEW OF HM ORDERS  08/05/2022    INFLUENZA VACCINE (1) 09/01/2024    COVID-19 Vaccine (7 - 2024-25 season) 09/01/2024    LIPID  09/13/2024    MEDICARE ANNUAL WELLNESS VISIT  09/13/2024    COLORECTAL CANCER SCREENING  03/06/2025    FALL RISK ASSESSMENT  09/17/2025    DEXA  09/21/2026    DTAP/TDAP/TD IMMUNIZATION (4 - Td or Tdap) 03/20/2027    ADVANCE CARE PLANNING  08/21/2029    PHQ-2 (once per calendar year)  Completed    Pneumococcal Vaccine: 65+ Years  Completed    HPV IMMUNIZATION  Aged Out    MENINGITIS IMMUNIZATION  Aged Out    RSV MONOCLONAL ANTIBODY  Aged Out     Review of Systems  See HPI    Objective      PHYSICAL EXAM:  General Appearance: In no acute distress  Vitals:    09/17/24 1403   BP: 118/72   BP Location: Left arm   Patient Position: Sitting   Cuff Size: Adult Large   Pulse: 75   Resp: 14   Temp: 97.7  F (36.5  C)   SpO2: 98%   Weight: 74.2 kg (163 lb 9.6 oz)   Height: 1.651 m (5' 5\")     Estimated body mass index is 27.22 kg/m  as calculated from the following:    Height as of this encounter: 1.651 m (5' 5\").    Weight as of this encounter: 74.2 kg (163 lb 9.6 oz).  EYES: Clear   HEENT: nose and throat clear, ears normal   NECK:  without cervical or axillary adenopathy  RESPIRATORY: Clear   CARDIOVASCULAR: S1, S2  ABDOMEN: soft, flat, and non-tender, ostomy is intact  NEUROLOGIC: Speech is clear, gait is normal  PSYCHIATRIC: Oriented X 3, thinking is clear       9/17/2024   Mini Cog   Clock Draw Score 2 Normal   3 Item Recall 3 objects recalled   Mini " Cog Total Score 5        Cognitive function is intact today.     Signed Electronically by: Roni Horowitz MD

## 2024-09-18 LAB
ALBUMIN SERPL BCG-MCNC: 3.8 G/DL (ref 3.5–5.2)
ALP SERPL-CCNC: 130 U/L (ref 40–150)
ALT SERPL W P-5'-P-CCNC: 17 U/L (ref 0–50)
ANION GAP SERPL CALCULATED.3IONS-SCNC: 9 MMOL/L (ref 7–15)
AST SERPL W P-5'-P-CCNC: 18 U/L (ref 0–45)
BILIRUB SERPL-MCNC: 0.4 MG/DL
BUN SERPL-MCNC: 18.9 MG/DL (ref 8–23)
CALCIUM SERPL-MCNC: 10.1 MG/DL (ref 8.8–10.4)
CHLORIDE SERPL-SCNC: 105 MMOL/L (ref 98–107)
CHOLEST SERPL-MCNC: 138 MG/DL
CREAT SERPL-MCNC: 0.52 MG/DL (ref 0.51–0.95)
EGFRCR SERPLBLD CKD-EPI 2021: >90 ML/MIN/1.73M2
FASTING STATUS PATIENT QL REPORTED: YES
FASTING STATUS PATIENT QL REPORTED: YES
GLUCOSE SERPL-MCNC: 81 MG/DL (ref 70–99)
HCO3 SERPL-SCNC: 25 MMOL/L (ref 22–29)
HDLC SERPL-MCNC: 62 MG/DL
LDLC SERPL CALC-MCNC: 64 MG/DL
NONHDLC SERPL-MCNC: 76 MG/DL
POTASSIUM SERPL-SCNC: 4.3 MMOL/L (ref 3.4–5.3)
PROT SERPL-MCNC: 6.6 G/DL (ref 6.4–8.3)
SODIUM SERPL-SCNC: 139 MMOL/L (ref 135–145)
TRIGL SERPL-MCNC: 59 MG/DL

## 2024-11-27 NOTE — LETTER
10/28/2022      RE: Cyndie Marin  [address] [place] [address]     Dear Colleague,    Thank you for referring your patient, Cyndie Marin, to the [facility] [place]. Please see a copy of my visit note below.    [facility]  Sports Medicine Clinic  Clinics and Surgery Center           SUBJECTIVE       Cyndie Marin is a 77 year old female presenting to clinic today for a f/u of the left shoulder    8/25/22:    77-year-old female presenting to clinic today with concerns for chronic left shoulder pain.  The patient has a history of a left hip replacement as well as a kidney removal in the past, which has limited her use of NSAIDs.  X-ray was obtained in August which did show severe degenerative changes of the ac and glenohumeral joints.  The patient and her presentation are consistent with the x-ray on my physical exam.  Given the fact the patient has not tried any conservative measures to assist in this pain, I do think an ultrasound-guided glenohumeral injection would be ideal.  The patient is interested.  We have assisted her in scheduling this.  Secondly, the patient may need an AC joint injection, but I do think addressing the more problematic area first would be more beneficial for her.  We also placed the patient in physical therapy.    9/12/22: L Shoulder GH CSI Inj.       Interval hx:  Patient returns to clinic status post left shoulder glenohumeral ultrasound-guided injection.  She is overall doing extremely well.  Her range of motion is improving her pain is low.  She has completed physical therapy back in August, and has not been since..    PMH, Medications and Allergies were reviewed and updated as needed.    ROS:  As noted above otherwise negative.    Patient Active Problem List   Diagnosis     SHAUNA (obstructive sleep apnea)     Allergic rhinitis due to pollen     Lumbar stenosis     Essential hypertension, benign     Colostomy in  Wt Readings from Last 3 Encounters:   11/27/24 76.2 kg (167 lb 15.9 oz)   09/20/24 75.3 kg (166 lb 0.1 oz)   09/07/24 77.8 kg (171 lb 8.3 oz)     Appears close to euvolemic on exam  On Torsemide at home - hold with DINA present  Monitor I&O  Daily Weights  Last echo 9/11 EF 65%         "place (H)     Gastroesophageal reflux disease without esophagitis     Overactive bladder     S/P hip replacement, left     Colonic mass     Hyperlipidemia LDL goal <100     Shoulder pain, right     Calculus of gallbladder without cholecystitis without obstruction     History of urinary tract obstruction     RAJ (stress urinary incontinence, female)       Current Outpatient Medications   Medication Sig Dispense Refill     acetaminophen (TYLENOL) 325 MG tablet [ACETAMINOPHEN (TYLENOL) 325 MG TABLET] Take 2 tablets (650 mg total) by mouth every 4 (four) hours as needed for pain (mild pain). 100 tablet 0     cholecalciferol, vitamin D3, 5,000 unit Tab Take 1,000 Units by mouth daily       lisinopril-hydrochlorothiazide (ZESTORETIC) 10-12.5 MG tablet TAKE 1 TABLET DAILY 90 tablet 3     loratadine (CLARITIN) 10 mg tablet [LORATADINE (CLARITIN) 10 MG TABLET] Take 10 mg by mouth daily as needed.        menthol (BIOFREEZE, MENTHOL,) 4 % Gel [MENTHOL (BIOFREEZE, MENTHOL,) 4 % GEL] Apply topically at bedtime as needed.        Naproxen Sodium (ALEVE PO)        polyethylene glycol (MIRALAX) 17 gram packet [POLYETHYLENE GLYCOL (MIRALAX) 17 GRAM PACKET] Take 1 packet (17 g total) by mouth daily. 7 packet 0     rosuvastatin (CRESTOR) 10 MG tablet Take 1 tablet (10 mg) by mouth At Bedtime 90 tablet 3            OBJECTIVE:       Vitals:   Vitals:    10/28/22 1449   Weight: 92.5 kg (204 lb)   Height: 1.651 m (5' 5\")     BMI: Body mass index is 33.95 kg/m .    Gen:  Well nourished and in no acute distress  HEENT: Extraocular movement intact  Neck: Supple  Pulm:  Breathing Comfortably. No increased respiratory effort.  Psych: Euthymic. Appropriately answers questions    MSK: Pain-free range of motion of the bilateral shoulders, improved on the left and flexion.  Abduction, external rotation, internal rotation roughly the same as before.  The patient is without pain, guarding, or sensations of instability.           ASSESSMENT and PLAN: "     [place] was seen today for recheck.    Diagnoses and all orders for this visit:    Localized osteoarthritis of left shoulder    Arthrosis of left acromioclavicular joint      77-year-old female returning to clinic today for follow-up of the localized arthritis of the left shoulder as well as arthrosis of the acromioclavicular joint on the left.  We did an injection for the glenohumeral joint on the left and the patient has successfully reduced the amount of pain she is having.  I do think her to continue with physical therapy at this point to ensure prolonged benefit from the injection would be ideal.  The patient currently has her exercises and would like to do this on her own.  At this point.  The patient can follow-up with us on an as-needed basis.  Did discuss that injections are typically done every 3 months as needed.  She will contact the clinic if she is having any regression of her currently very good progress.     Options for treatment and/or follow-up care were reviewed with the patient was actively involved in the decision making process. Patient verbalized understanding and was in agreement with the plan.    Geovanny Mcneal DO  , Sports Medicine  Department of Family Medicine and [facility] hospital

## 2025-01-15 ENCOUNTER — TELEPHONE (OUTPATIENT)
Dept: INTERNAL MEDICINE | Facility: CLINIC | Age: 80
End: 2025-01-15
Payer: COMMERCIAL

## 2025-01-15 DIAGNOSIS — G47.33 OBSTRUCTIVE SLEEP APNEA: Primary | ICD-10-CM

## 2025-01-15 NOTE — TELEPHONE ENCOUNTER
Order/Referral Request    Who is requesting: pt    Orders being requested: new CPAP machine    Reason service is needed/diagnosis: current one broke    When are orders needed by: asap    Has this been discussed with Provider: No    Does patient have a preference on a Group/Provider/Facility? [facility]: phone: [phone] in [place]    Does patient have an appointment scheduled?: No    Where to send orders: Fax    Could we send this information to you in [facility] or would you prefer to receive a phone call?:   Patient would prefer a phone call   Okay to leave a detailed message?: Yes at Home number on file [phone] (home)

## 2025-01-16 NOTE — TELEPHONE ENCOUNTER
Spoke with patient who reports the following:    [facility] 11 years ago.  Last seen sleep provider 2018.  CPAP 7  Uses nasal pillows, does not use full face mask due to claustrophobia.  CPAP min- unknown  CPAP max- unknown      Contacted [facility] with patient permission to verify settings:    Spoke with Ortega who states their orders state CPAP 7. Unable to verify CPAP min and max.         Routing for provider review.      Please send new order to: [facility] otherwise prefers [facility] ([place])

## 2025-01-17 ENCOUNTER — TRANSFERRED RECORDS (OUTPATIENT)
Dept: HEALTH INFORMATION MANAGEMENT | Facility: CLINIC | Age: 80
End: 2025-01-17
Payer: COMMERCIAL

## 2025-02-05 ENCOUNTER — OFFICE VISIT (OUTPATIENT)
Dept: INTERNAL MEDICINE | Facility: CLINIC | Age: 80
End: 2025-02-05
Payer: COMMERCIAL

## 2025-02-05 VITALS
WEIGHT: 179 LBS | SYSTOLIC BLOOD PRESSURE: 116 MMHG | HEART RATE: 81 BPM | BODY MASS INDEX: 30.56 KG/M2 | RESPIRATION RATE: 16 BRPM | HEIGHT: 64 IN | TEMPERATURE: 97.8 F | DIASTOLIC BLOOD PRESSURE: 78 MMHG | OXYGEN SATURATION: 95 %

## 2025-02-05 DIAGNOSIS — M19.012 PRIMARY OSTEOARTHRITIS OF LEFT SHOULDER: ICD-10-CM

## 2025-02-05 DIAGNOSIS — Z01.818 PREOPERATIVE EXAMINATION: Primary | ICD-10-CM

## 2025-02-05 DIAGNOSIS — G47.33 OBSTRUCTIVE SLEEP APNEA SYNDROME: ICD-10-CM

## 2025-02-05 LAB
ALBUMIN SERPL BCG-MCNC: 4.3 G/DL (ref 3.5–5.2)
ALP SERPL-CCNC: 155 U/L (ref 40–150)
ALT SERPL W P-5'-P-CCNC: 18 U/L (ref 0–50)
ANION GAP SERPL CALCULATED.3IONS-SCNC: 11 MMOL/L (ref 7–15)
AST SERPL W P-5'-P-CCNC: 23 U/L (ref 0–45)
ATRIAL RATE - MUSE: 72 BPM
BILIRUB SERPL-MCNC: 0.5 MG/DL
BUN SERPL-MCNC: 26.8 MG/DL (ref 8–23)
CALCIUM SERPL-MCNC: 10.3 MG/DL (ref 8.8–10.4)
CHLORIDE SERPL-SCNC: 103 MMOL/L (ref 98–107)
CREAT SERPL-MCNC: 0.74 MG/DL (ref 0.51–0.95)
DIASTOLIC BLOOD PRESSURE - MUSE: NORMAL MMHG
EGFRCR SERPLBLD CKD-EPI 2021: 81 ML/MIN/1.73M2
ERYTHROCYTE [DISTWIDTH] IN BLOOD BY AUTOMATED COUNT: 13.8 % (ref 10–15)
GLUCOSE SERPL-MCNC: 91 MG/DL (ref 70–99)
HCO3 SERPL-SCNC: 25 MMOL/L (ref 22–29)
HCT VFR BLD AUTO: 50.8 % (ref 35–47)
HGB BLD-MCNC: 16.7 G/DL (ref 11.7–15.7)
INTERPRETATION ECG - MUSE: NORMAL
MCH RBC QN AUTO: 28.5 PG (ref 26.5–33)
MCHC RBC AUTO-ENTMCNC: 32.9 G/DL (ref 31.5–36.5)
MCV RBC AUTO: 87 FL (ref 78–100)
P AXIS - MUSE: 24 DEGREES
PLATELET # BLD AUTO: 259 10E3/UL (ref 150–450)
POTASSIUM SERPL-SCNC: 4.2 MMOL/L (ref 3.4–5.3)
PR INTERVAL - MUSE: 214 MS
PROT SERPL-MCNC: 7.1 G/DL (ref 6.4–8.3)
QRS DURATION - MUSE: 78 MS
QT - MUSE: 406 MS
QTC - MUSE: 444 MS
R AXIS - MUSE: -37 DEGREES
RBC # BLD AUTO: 5.86 10E6/UL (ref 3.8–5.2)
SODIUM SERPL-SCNC: 139 MMOL/L (ref 135–145)
SYSTOLIC BLOOD PRESSURE - MUSE: NORMAL MMHG
T AXIS - MUSE: 8 DEGREES
VENTRICULAR RATE- MUSE: 72 BPM
WBC # BLD AUTO: 7.5 10E3/UL (ref 4–11)

## 2025-02-05 PROCEDURE — 80053 COMPREHEN METABOLIC PANEL: CPT | Performed by: INTERNAL MEDICINE

## 2025-02-05 PROCEDURE — 93005 ELECTROCARDIOGRAM TRACING: CPT | Performed by: INTERNAL MEDICINE

## 2025-02-05 PROCEDURE — G2211 COMPLEX E/M VISIT ADD ON: HCPCS | Performed by: INTERNAL MEDICINE

## 2025-02-05 PROCEDURE — 93010 ELECTROCARDIOGRAM REPORT: CPT | Performed by: INTERNAL MEDICINE

## 2025-02-05 PROCEDURE — 99214 OFFICE O/P EST MOD 30 MIN: CPT | Performed by: INTERNAL MEDICINE

## 2025-02-05 PROCEDURE — 36415 COLL VENOUS BLD VENIPUNCTURE: CPT | Performed by: INTERNAL MEDICINE

## 2025-02-05 PROCEDURE — 85027 COMPLETE CBC AUTOMATED: CPT | Performed by: INTERNAL MEDICINE

## 2025-02-05 ASSESSMENT — PAIN SCALES - GENERAL: PAINLEVEL_OUTOF10: MODERATE PAIN (4)

## 2025-02-05 NOTE — PROGRESS NOTES
Preoperative Evaluation  [facility]  [address] [address]  Phone: [phone]  Fax: [phone]  Primary Provider: Roni Horowitz MD  Pre-op Performing Provider: Roni Horowitz MD  Feb 5, 2025 2/5/2025   Surgical Information   What procedure is being done? Left reverse total shoulder arthroplasty    Facility or Hospital where procedure/surgery will be performed: [facility]    Who is doing the procedure / surgery? Dr. Yancey    Date of surgery / procedure: 2/25/25    Time of surgery / procedure: 7 am (unknown exact time, early morning    Where do you plan to recover after surgery? at home with family       Assessment & Plan     The proposed surgical procedure is considered LOW risk.    Preoperative examination  She is medically stable.  She will be getting replacement CPAP replacement equipment for her SHAUNA.  She has no history of heart or lung disease and her lab testing, and EKG do not reveal significant abnormalities. She has tolerated surgery, well, in the past, with bilateral total hip arthroplasties.  I find no contraindication to her having the left total shoulder arthroplasty and I recommend proceeding as planned.     Primary osteoarthritis of left shoulder    Obstructive sleep apnea     - No identified additional risk factors other than previously addressed     Recommendation  Approval given to proceed with proposed procedure, without further diagnostic evaluation.    The longitudinal plan of care for the diagnosis(es)/condition(s) as documented were addressed during this visit. Due to the added complexity in care, I will continue to support Georgia in the subsequent management and with ongoing continuity of care.    Subjective   Georgia is a 79 year old, presenting for the following:  Pre-Op Exam (2/25/25 With Dr. Yancey Left reverse total shoulder arthroplasty)        2/5/2025     3:07 PM   Additional Questions    Roomed by Kate FRANCO RN     HPI related to upcoming procedure: she has consented to having left total shoulder arthroplasty.  She has been well. Her equipment for CPAP for her SHAUNA has stopped working and she is in the process of getting replacement.  She has not have unusual dypsnea or cough or bowel or bladder issues.  She has a functioning colostomy in place.  She has not recently been ill, and has tolerated surgery well in the plast.         2/5/2025   Pre-Op Questionnaire   Have you ever had a heart attack or stroke? No    Have you ever had surgery on your heart or blood vessels, such as a stent placement, a coronary artery bypass, or surgery on an artery in your head, neck, heart, or legs? No    Do you have chest pain with activity? No    Do you have a history of heart failure? No    Do you currently have a cold, bronchitis or symptoms of other infection? No    Do you have a cough, shortness of breath, or wheezing? No    Do you or anyone in your family have previous history of blood clots? (!) YES    Do you or does anyone in your family have a serious bleeding problem such as prolonged bleeding following surgeries or cuts? No    Have you ever had problems with anemia or been told to take iron pills? (!) YES    Have you had any abnormal blood loss such as black, tarry or bloody stools, or abnormal vaginal bleeding? No    Have you ever had a blood transfusion? No    Are you willing to have a blood transfusion if it is medically needed before, during, or after your surgery? Yes    Have you or any of your relatives ever had problems with anesthesia? No    Do you have sleep apnea, excessive snoring or daytime drowsiness? (!) YES    Do you have a CPAP machine? Yes    Do you have any artifical heart valves or other implanted medical devices like a pacemaker, defibrillator, or continuous glucose monitor? No    Do you have artificial joints? (!) YES    Are you allergic to latex? No      Patient Active Problem List     Diagnosis Date Noted    Soft tissue infection 08/11/2024     Priority: Medium    Surgical aftercare, musculoskeletal system 08/09/2024     Priority: Medium    S/P total hip arthroplasty 06/25/2024     Priority: Medium    History of deep venous thrombosis 06/05/2024     Priority: Medium     Before 2008, thought to be provoked by LE contusion from fall.       DDD (degenerative disc disease), lumbar 04/25/2024     Priority: Medium    Localized osteoarthritis of left shoulder 04/08/2024     Priority: Medium    Primary osteoarthritis of right hip 04/08/2024     Priority: Medium    Parastomal hernia 09/13/2023     Priority: Medium    Hyperlipidemia LDL goal <100 08/08/2022     Priority: Medium    History of urinary tract obstruction 08/08/2022     Priority: Medium    RAJ (stress urinary incontinence, female) 08/08/2022     Priority: Medium    S/P total left hip arthroplasty 08/05/2021     Priority: Medium    SHAUNA (obstructive sleep apnea)      Priority: Medium     Created by Conversion  Replacement Utility updated for latest IMO load        Overactive bladder 02/12/2020     Priority: Medium    Gastroesophageal reflux disease without esophagitis      Priority: Medium    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     Priority: Medium    Essential hypertension 12/10/2018     Priority: Medium    Allergic rhinitis due to pollen 11/26/2018     Priority: Medium      Past Medical History:   Diagnosis Date    Calculus of gallbladder without cholecystitis without obstruction 08/08/2022    Claustrophobia     Colostomy in place (H)     DDD (degenerative disc disease), lumbar 04/25/2024    Essential hypertension     GERD (gastroesophageal reflux disease)     History of blood clots     History of urinary tract obstruction 08/08/2022    Hyperlipidemia LDL goal <100 08/08/2022    Parastomal hernia 09/13/2023    Perirectal abscess 10/29/2019    PONV (postoperative nausea and vomiting)     Primary osteoarthritis of both hips     S/P total left hip  arthroplasty     Seasonal allergies     Sleep apnea     uses CPAP    RAJ (stress urinary incontinence, female) 2022    Thrombosis      Past Surgical History:   Procedure Laterality Date    APPLY WOUND VAC Right 2024    Procedure: CLOSURE WITH PREVENA DRESSING;  Surgeon: Alexander Yancey MD;  Location: [facility] Main OR    ARTHROPLASTY HIP ANTERIOR Right 2024    Procedure: RIGHT DIRECT ANTERIOR TOTAL HIP ARTHROPLASTY;  Surgeon: Alexander Yancey MD;  Location: [facility] Main OR     SECTION      COLECTOMY PARTIAL      COLOSTOMY N/A 10/28/2019    Procedure: CREATION, COLOSTOMY;  Surgeon: Mylene Sears MD;  Location: [facility] OR;  Service: General    IRRIGATION AND DEBRIDEMENT HIP, COMBINED Right 2024    Procedure: RIGHT HIP WOUND IRRIGATION AND DEBRIDEMENT,;  Surgeon: Alexander Yancey MD;  Location: [facility] OR    NEPHRECTOMY Left     with rib resection    NE EXCIS BARTHOLIN GLAND/CYST      Description: Excision Of Bartholin's Gland Or Cyst;  Recorded: 2013;    PROCTOSCOPY N/A 10/28/2019    Procedure: RIGID PROCTOSCOPY;  Surgeon: Mylene Sears MD;  Location: [facility] OR;  Service: General    SIGMOIDOSCOPY FLEXIBLE N/A 10/27/2019    Procedure: SIGMOIDOSCOPY with biopsy;  Surgeon: Tj Gavin MD;  Location: [facility] GI;  Service: Gastroenterology    TOOTH EXTRACTION      TOTAL HIP ARTHROPLASTY       Current Outpatient Medications   Medication Sig Dispense Refill    acetaminophen (TYLENOL) 325 MG tablet Take 2 tablets (650 mg) by mouth every 4 hours as needed for other (mild pain) 100 tablet 0    lisinopril-hydrochlorothiazide (ZESTORETIC) 10-12.5 MG tablet Take 1 tablet by mouth daily. 90 tablet 3    menthol (BIOFREEZE, MENTHOL,) 4 % Gel [MENTHOL (BIOFREEZE, MENTHOL,) 4 % GEL] Apply topically at bedtime as needed.       rosuvastatin (CRESTOR) 10 MG tablet Take 1 tablet (10 mg) by mouth at bedtime. 90 tablet 3    Vitamin D3 (CHOLECALCIFEROL) 25 mcg  "(1000 units) tablet Take 1 tablet by mouth daily      senna-docusate (SENOKOT-S/PERICOLACE) 8.6-50 MG tablet Take 1-2 tablets by mouth 2 times daily Take while on oral narcotics to prevent or treat constipation. (Patient not taking: Reported on 2/5/2025) 30 tablet 0     Allergies   Allergen Reactions    Contrast Dye Hives and Swelling     CT contrast dye    Cats Claw (Uncaria [Uncaria Tomentosa (Cats Claw)] Unknown    Dust Mites Unknown    Ragweed Pollen [Ragweeds] Unknown      Social History     Tobacco Use    Smoking status: Never    Smokeless tobacco: Never   Substance Use Topics    Alcohol use: Yes     Comment: rarely     Family History   Problem Relation Age of Onset    Breast Cancer Paternal Grandmother     Prostate Cancer Father     Heart Disease Other         many on fathers side    Ovarian Cancer Mother     Heart Disease Maternal Grandfather     Brain Cancer Cousin 47.00     History   Drug Use No     Review of Systems  See HPI    Objective      PHYSICAL EXAM:  General Appearance: In no acute distress  Vitals:    02/05/25 1508   BP: 116/78   BP Location: Left arm   Patient Position: Sitting   Cuff Size: Adult Regular   Pulse: 81   Resp: 16   Temp: 97.8  F (36.6  C)   TempSrc: Temporal   SpO2: 95%   Weight: 81.2 kg (179 lb)   Height: 1.626 m (5' 4\")     Estimated body mass index is 30.73 kg/m  as calculated from the following:    Height as of this encounter: 1.626 m (5' 4\").    Weight as of this encounter: 81.2 kg (179 lb).  EYES: Clear, fundi are unremarkable   HEENT: nose and throat clear, ears normal   NECK:  without cervical or axillary adenopathy  RESPIRATORY: Clear   CARDIOVASCULAR: S1, S2  ABDOMEN: soft, flat, and non-tender, there is soft peristomal hernia functioning colostomy.  EXTREMITIES:  no significant inflammation or edema  NEUROLOGIC: Speech is clear,  gait is normal  PSYCHIATRIC: Oriented X 3, thinking is clear     Diagnostics  Recent Results (from the past 48 hours)   CBC with platelets    " Collection Time: 02/05/25  4:06 PM   Result Value Ref Range    WBC Count 7.5 4.0 - 11.0 10e3/uL    RBC Count 5.86 (H) 3.80 - 5.20 10e6/uL    Hemoglobin 16.7 (H) 11.7 - 15.7 g/dL    Hematocrit 50.8 (H) 35.0 - 47.0 %    MCV 87 78 - 100 fL    MCH 28.5 26.5 - 33.0 pg    MCHC 32.9 31.5 - 36.5 g/dL    RDW 13.8 10.0 - 15.0 %    Platelet Count 259 150 - 450 10e3/uL   Comprehensive metabolic panel    Collection Time: 02/05/25  4:06 PM   Result Value Ref Range    Sodium 139 135 - 145 mmol/L    Potassium 4.2 3.4 - 5.3 mmol/L    Carbon Dioxide (CO2) 25 22 - 29 mmol/L    Anion Gap 11 7 - 15 mmol/L    Urea Nitrogen 26.8 (H) 8.0 - 23.0 mg/dL    Creatinine 0.74 0.51 - 0.95 mg/dL    GFR Estimate 81 >60 mL/min/1.73m2    Calcium 10.3 8.8 - 10.4 mg/dL    Chloride 103 98 - 107 mmol/L    Glucose 91 70 - 99 mg/dL    Alkaline Phosphatase 155 (H) 40 - 150 U/L    AST 23 0 - 45 U/L    ALT 18 0 - 50 U/L    Protein Total 7.1 6.4 - 8.3 g/dL    Albumin 4.3 3.5 - 5.2 g/dL    Bilirubin Total 0.5 <=1.2 mg/dL   EKG 12-lead, tracing only    Collection Time: 02/05/25  5:44 PM   Result Value Ref Range    Systolic Blood Pressure  mmHg    Diastolic Blood Pressure  mmHg    Ventricular Rate 72 BPM    Atrial Rate 72 BPM    DE Interval 214 ms    QRS Duration 78 ms     ms    QTc 444 ms    P Axis 24 degrees    R AXIS -37 degrees    T Axis 8 degrees    Interpretation ECG       Sinus rhythm with 1st degree A-V block  Left axis deviation  Inferior infarct (cited on or before 19-Feb-2007)  Anteroseptal infarct (cited on or before 19-Feb-2007)  Abnormal ECG  When compared with ECG of 14-Jan-2021 10:49,  Questionable change in initial forces of Anterior leads  T wave inversion no longer evident in Anterior leads  Confirmed by DIANA MORENO, LORETTA LOC:SELAM ([id number]) on 2/5/2025 5:17:36 PM        Revised Cardiac Risk Index (RCRI)  The patient has the following serious cardiovascular risks for perioperative complications:   - No serious cardiac risks = 0 points     RCRI  Interpretation: 0 points: Class I (very low risk - 0.4% complication rate)    Signed Electronically by: Roni Horowitz MD  A copy of this evaluation report is provided to the requesting physician.

## 2025-02-05 NOTE — H&P (VIEW-ONLY)
Preoperative Evaluation  [facility]  [address] [place] [address]  Phone: [phone]  Fax: [phone]  Primary Provider: Roni Horowitz MD  Pre-op Performing Provider: Roni Horowitz MD  Feb 5, 2025 2/5/2025   Surgical Information   What procedure is being done? Left reverse total shoulder arthroplasty    Facility or Hospital where procedure/surgery will be performed: [facility]    Who is doing the procedure / surgery? Dr. Yancey    Date of surgery / procedure: 2/25/25    Time of surgery / procedure: 7 am (unknown exact time, early morning    Where do you plan to recover after surgery? at home with family       Assessment & Plan     The proposed surgical procedure is considered LOW risk.    Preoperative examination  She is medically stable.  She will be getting replacement CPAP replacement equipment for her SHAUNA.  She has no history of heart or lung disease and her lab testing, and EKG do not reveal significant abnormalities. She has tolerated surgery, well, in the past, with bilateral total hip arthroplasties.  I find no contraindication to her having the left total shoulder arthroplasty and I recommend proceeding as planned.     Primary osteoarthritis of left shoulder    Obstructive sleep apnea     - No identified additional risk factors other than previously addressed     Recommendation  Approval given to proceed with proposed procedure, without further diagnostic evaluation.    The longitudinal plan of care for the diagnosis(es)/condition(s) as documented were addressed during this visit. Due to the added complexity in care, I will continue to support Georgia in the subsequent management and with ongoing continuity of care.    Subjective   Georgia is a 79 year old, presenting for the following:  Pre-Op Exam (2/25/25 With Dr. Yancey Left reverse total shoulder arthroplasty)        2/5/2025     3:07 PM   Additional Questions    Roomed by Kate FRANCO RN     HPI related to upcoming procedure: she has consented to having left total shoulder arthroplasty.  She has been well. Her equipment for CPAP for her SHAUNA has stopped working and she is in the process of getting replacement.  She has not have unusual dypsnea or cough or bowel or bladder issues.  She has a functioning colostomy in place.  She has not recently been ill, and has tolerated surgery well in the plast.         2/5/2025   Pre-Op Questionnaire   Have you ever had a heart attack or stroke? No    Have you ever had surgery on your heart or blood vessels, such as a stent placement, a coronary artery bypass, or surgery on an artery in your head, neck, heart, or legs? No    Do you have chest pain with activity? No    Do you have a history of heart failure? No    Do you currently have a cold, bronchitis or symptoms of other infection? No    Do you have a cough, shortness of breath, or wheezing? No    Do you or anyone in your family have previous history of blood clots? (!) YES    Do you or does anyone in your family have a serious bleeding problem such as prolonged bleeding following surgeries or cuts? No    Have you ever had problems with anemia or been told to take iron pills? (!) YES    Have you had any abnormal blood loss such as black, tarry or bloody stools, or abnormal vaginal bleeding? No    Have you ever had a blood transfusion? No    Are you willing to have a blood transfusion if it is medically needed before, during, or after your surgery? Yes    Have you or any of your relatives ever had problems with anesthesia? No    Do you have sleep apnea, excessive snoring or daytime drowsiness? (!) YES    Do you have a CPAP machine? Yes    Do you have any artifical heart valves or other implanted medical devices like a pacemaker, defibrillator, or continuous glucose monitor? No    Do you have artificial joints? (!) YES    Are you allergic to latex? No      Patient Active Problem List     Diagnosis Date Noted    Soft tissue infection 08/11/2024     Priority: Medium    Surgical aftercare, musculoskeletal system 08/09/2024     Priority: Medium    S/P total hip arthroplasty 06/25/2024     Priority: Medium    History of deep venous thrombosis 06/05/2024     Priority: Medium     Before 2008, thought to be provoked by LE contusion from fall.       DDD (degenerative disc disease), lumbar 04/25/2024     Priority: Medium    Localized osteoarthritis of left shoulder 04/08/2024     Priority: Medium    Primary osteoarthritis of right hip 04/08/2024     Priority: Medium    Parastomal hernia 09/13/2023     Priority: Medium    Hyperlipidemia LDL goal <100 08/08/2022     Priority: Medium    History of urinary tract obstruction 08/08/2022     Priority: Medium    RAJ (stress urinary incontinence, female) 08/08/2022     Priority: Medium    S/P total left hip arthroplasty 08/05/2021     Priority: Medium    SHAUNA (obstructive sleep apnea)      Priority: Medium     Created by Conversion  Replacement Utility updated for latest IMO load        Overactive bladder 02/12/2020     Priority: Medium    Gastroesophageal reflux disease without esophagitis      Priority: Medium    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     Priority: Medium    Essential hypertension 12/10/2018     Priority: Medium    Allergic rhinitis due to pollen 11/26/2018     Priority: Medium      Past Medical History:   Diagnosis Date    Calculus of gallbladder without cholecystitis without obstruction 08/08/2022    Claustrophobia     Colostomy in place (H)     DDD (degenerative disc disease), lumbar 04/25/2024    Essential hypertension     GERD (gastroesophageal reflux disease)     History of blood clots     History of urinary tract obstruction 08/08/2022    Hyperlipidemia LDL goal <100 08/08/2022    Parastomal hernia 09/13/2023    Perirectal abscess 10/29/2019    PONV (postoperative nausea and vomiting)     Primary osteoarthritis of both hips     S/P total left hip  arthroplasty     Seasonal allergies     Sleep apnea     uses CPAP    RAJ (stress urinary incontinence, female) 2022    Thrombosis      Past Surgical History:   Procedure Laterality Date    APPLY WOUND VAC Right 2024    Procedure: CLOSURE WITH PREVENA DRESSING;  Surgeon: Alexander Yancey MD;  Location: [facility] Main OR    ARTHROPLASTY HIP ANTERIOR Right 2024    Procedure: RIGHT DIRECT ANTERIOR TOTAL HIP ARTHROPLASTY;  Surgeon: Alexander Yancey MD;  Location: [facility] Main OR     SECTION      COLECTOMY PARTIAL      COLOSTOMY N/A 10/28/2019    Procedure: CREATION, COLOSTOMY;  Surgeon: Mylene Sears MD;  Location: [facility] OR;  Service: General    IRRIGATION AND DEBRIDEMENT HIP, COMBINED Right 2024    Procedure: RIGHT HIP WOUND IRRIGATION AND DEBRIDEMENT,;  Surgeon: Alexander Yancey MD;  Location: [facility] OR    NEPHRECTOMY Left     with rib resection    WA EXCIS BARTHOLIN GLAND/CYST      Description: Excision Of Bartholin's Gland Or Cyst;  Recorded: 2013;    PROCTOSCOPY N/A 10/28/2019    Procedure: RIGID PROCTOSCOPY;  Surgeon: Mylene Sears MD;  Location: [facility] OR;  Service: General    SIGMOIDOSCOPY FLEXIBLE N/A 10/27/2019    Procedure: SIGMOIDOSCOPY with biopsy;  Surgeon: Tj Gavin MD;  Location: [facility] GI;  Service: Gastroenterology    TOOTH EXTRACTION      TOTAL HIP ARTHROPLASTY       Current Outpatient Medications   Medication Sig Dispense Refill    acetaminophen (TYLENOL) 325 MG tablet Take 2 tablets (650 mg) by mouth every 4 hours as needed for other (mild pain) 100 tablet 0    lisinopril-hydrochlorothiazide (ZESTORETIC) 10-12.5 MG tablet Take 1 tablet by mouth daily. 90 tablet 3    menthol (BIOFREEZE, MENTHOL,) 4 % Gel [MENTHOL (BIOFREEZE, MENTHOL,) 4 % GEL] Apply topically at bedtime as needed.       rosuvastatin (CRESTOR) 10 MG tablet Take 1 tablet (10 mg) by mouth at bedtime. 90 tablet 3    Vitamin D3 (CHOLECALCIFEROL) 25 mcg  "(1000 units) tablet Take 1 tablet by mouth daily      senna-docusate (SENOKOT-S/PERICOLACE) 8.6-50 MG tablet Take 1-2 tablets by mouth 2 times daily Take while on oral narcotics to prevent or treat constipation. (Patient not taking: Reported on 2/5/2025) 30 tablet 0     Allergies   Allergen Reactions    Contrast Dye Hives and Swelling     CT contrast dye    Cats Claw (Uncaria [Uncaria Tomentosa (Cats Claw)] Unknown    Dust Mites Unknown    Ragweed Pollen [Ragweeds] Unknown      Social History     Tobacco Use    Smoking status: Never    Smokeless tobacco: Never   Substance Use Topics    Alcohol use: Yes     Comment: rarely     Family History   Problem Relation Age of Onset    Breast Cancer Paternal Grandmother     Prostate Cancer Father     Heart Disease Other         many on fathers side    Ovarian Cancer Mother     Heart Disease Maternal Grandfather     Brain Cancer Cousin 47.00     History   Drug Use No     Review of Systems  See HPI    Objective      PHYSICAL EXAM:  General Appearance: In no acute distress  Vitals:    02/05/25 1508   BP: 116/78   BP Location: Left arm   Patient Position: Sitting   Cuff Size: Adult Regular   Pulse: 81   Resp: 16   Temp: 97.8  F (36.6  C)   TempSrc: Temporal   SpO2: 95%   Weight: 81.2 kg (179 lb)   Height: 1.626 m (5' 4\")     Estimated body mass index is 30.73 kg/m  as calculated from the following:    Height as of this encounter: 1.626 m (5' 4\").    Weight as of this encounter: 81.2 kg (179 lb).  EYES: Clear, fundi are unremarkable   HEENT: nose and throat clear, ears normal   NECK:  without cervical or axillary adenopathy  RESPIRATORY: Clear   CARDIOVASCULAR: S1, S2  ABDOMEN: soft, flat, and non-tender, there is soft peristomal hernia functioning colostomy.  EXTREMITIES:  no significant inflammation or edema  NEUROLOGIC: Speech is clear,  gait is normal  PSYCHIATRIC: Oriented X 3, thinking is clear     Diagnostics  Recent Results (from the past 48 hours)   CBC with platelets    " Collection Time: 02/05/25  4:06 PM   Result Value Ref Range    WBC Count 7.5 4.0 - 11.0 10e3/uL    RBC Count 5.86 (H) 3.80 - 5.20 10e6/uL    Hemoglobin 16.7 (H) 11.7 - 15.7 g/dL    Hematocrit 50.8 (H) 35.0 - 47.0 %    MCV 87 78 - 100 fL    MCH 28.5 26.5 - 33.0 pg    MCHC 32.9 31.5 - 36.5 g/dL    RDW 13.8 10.0 - 15.0 %    Platelet Count 259 150 - 450 10e3/uL   Comprehensive metabolic panel    Collection Time: 02/05/25  4:06 PM   Result Value Ref Range    Sodium 139 135 - 145 mmol/L    Potassium 4.2 3.4 - 5.3 mmol/L    Carbon Dioxide (CO2) 25 22 - 29 mmol/L    Anion Gap 11 7 - 15 mmol/L    Urea Nitrogen 26.8 (H) 8.0 - 23.0 mg/dL    Creatinine 0.74 0.51 - 0.95 mg/dL    GFR Estimate 81 >60 mL/min/1.73m2    Calcium 10.3 8.8 - 10.4 mg/dL    Chloride 103 98 - 107 mmol/L    Glucose 91 70 - 99 mg/dL    Alkaline Phosphatase 155 (H) 40 - 150 U/L    AST 23 0 - 45 U/L    ALT 18 0 - 50 U/L    Protein Total 7.1 6.4 - 8.3 g/dL    Albumin 4.3 3.5 - 5.2 g/dL    Bilirubin Total 0.5 <=1.2 mg/dL   EKG 12-lead, tracing only    Collection Time: 02/05/25  5:44 PM   Result Value Ref Range    Systolic Blood Pressure  mmHg    Diastolic Blood Pressure  mmHg    Ventricular Rate 72 BPM    Atrial Rate 72 BPM    WI Interval 214 ms    QRS Duration 78 ms     ms    QTc 444 ms    P Axis 24 degrees    R AXIS -37 degrees    T Axis 8 degrees    Interpretation ECG       Sinus rhythm with 1st degree A-V block  Left axis deviation  Inferior infarct (cited on or before 19-Feb-2007)  Anteroseptal infarct (cited on or before 19-Feb-2007)  Abnormal ECG  When compared with ECG of 14-Jan-2021 10:49,  Questionable change in initial forces of Anterior leads  T wave inversion no longer evident in Anterior leads  Confirmed by DIANA MORENO, LORETTA LOC:SELAM ([id number]) on 2/5/2025 5:17:36 PM        Revised Cardiac Risk Index (RCRI)  The patient has the following serious cardiovascular risks for perioperative complications:   - No serious cardiac risks = 0 points     RCRI  Interpretation: 0 points: Class I (very low risk - 0.4% complication rate)    Signed Electronically by: Roni Horowitz MD  A copy of this evaluation report is provided to the requesting physician.

## 2025-02-24 ENCOUNTER — ANESTHESIA EVENT (OUTPATIENT)
Dept: SURGERY | Facility: CLINIC | Age: 80
End: 2025-02-24
Payer: COMMERCIAL

## 2025-02-25 ENCOUNTER — APPOINTMENT (OUTPATIENT)
Dept: RADIOLOGY | Facility: CLINIC | Age: 80
End: 2025-02-25
Attending: PHYSICIAN ASSISTANT
Payer: COMMERCIAL

## 2025-02-25 ENCOUNTER — HOSPITAL ENCOUNTER (OUTPATIENT)
Facility: CLINIC | Age: 80
Discharge: HOME | End: 2025-02-26
Attending: ORTHOPAEDIC SURGERY | Admitting: ORTHOPAEDIC SURGERY
Payer: COMMERCIAL

## 2025-02-25 ENCOUNTER — ANESTHESIA (OUTPATIENT)
Dept: SURGERY | Facility: CLINIC | Age: 80
End: 2025-02-25
Payer: COMMERCIAL

## 2025-02-25 DIAGNOSIS — Z96.612 S/P REVERSE TOTAL SHOULDER ARTHROPLASTY, LEFT: Primary | ICD-10-CM

## 2025-02-25 PROBLEM — Z96.619 STATUS POST TOTAL SHOULDER ARTHROPLASTY: Status: ACTIVE | Noted: 2025-02-25

## 2025-02-25 LAB
ABO + RH BLD: NORMAL
BLD GP AB SCN SERPL QL: NEGATIVE
SPECIMEN EXP DATE BLD: NORMAL

## 2025-02-25 PROCEDURE — 258N000001 HC RX 258: Performed by: ORTHOPAEDIC SURGERY

## 2025-02-25 PROCEDURE — 999N000065 XR SHOULDER LEFT PORT G/E 2 VIEWS: Mod: LT

## 2025-02-25 PROCEDURE — 250N000011 HC RX IP 250 OP 636: Performed by: PHYSICIAN ASSISTANT

## 2025-02-25 PROCEDURE — 360N000077 HC SURGERY LEVEL 4, PER MIN: Performed by: ORTHOPAEDIC SURGERY

## 2025-02-25 PROCEDURE — 250N000009 HC RX 250: Performed by: ORTHOPAEDIC SURGERY

## 2025-02-25 PROCEDURE — 250N000011 HC RX IP 250 OP 636: Performed by: ANESTHESIOLOGY

## 2025-02-25 PROCEDURE — 258N000003 HC RX IP 258 OP 636: Performed by: ANESTHESIOLOGY

## 2025-02-25 PROCEDURE — 250N000009 HC RX 250: Performed by: ANESTHESIOLOGY

## 2025-02-25 PROCEDURE — 272N000001 HC OR GENERAL SUPPLY STERILE: Performed by: ORTHOPAEDIC SURGERY

## 2025-02-25 PROCEDURE — 370N000017 HC ANESTHESIA TECHNICAL FEE, PER MIN: Performed by: ORTHOPAEDIC SURGERY

## 2025-02-25 PROCEDURE — 710N000010 HC RECOVERY PHASE 1, LEVEL 2, PER MIN: Performed by: ORTHOPAEDIC SURGERY

## 2025-02-25 PROCEDURE — 258N000003 HC RX IP 258 OP 636: Performed by: PHYSICIAN ASSISTANT

## 2025-02-25 PROCEDURE — 36415 COLL VENOUS BLD VENIPUNCTURE: CPT | Performed by: PHYSICIAN ASSISTANT

## 2025-02-25 PROCEDURE — 93010 ELECTROCARDIOGRAM REPORT: CPT | Performed by: INTERNAL MEDICINE

## 2025-02-25 PROCEDURE — C1713 ANCHOR/SCREW BN/BN,TIS/BN: HCPCS | Performed by: ORTHOPAEDIC SURGERY

## 2025-02-25 PROCEDURE — 93005 ELECTROCARDIOGRAM TRACING: CPT | Mod: XU

## 2025-02-25 PROCEDURE — 250N000013 HC RX MED GY IP 250 OP 250 PS 637: Performed by: HOSPITALIST

## 2025-02-25 PROCEDURE — 64415 NJX AA&/STRD BRCH PLXS IMG: CPT | Mod: XU,LT

## 2025-02-25 PROCEDURE — 86850 RBC ANTIBODY SCREEN: CPT | Performed by: PHYSICIAN ASSISTANT

## 2025-02-25 PROCEDURE — 999N000141 HC STATISTIC PRE-PROCEDURE NURSING ASSESSMENT: Performed by: ORTHOPAEDIC SURGERY

## 2025-02-25 PROCEDURE — 99214 OFFICE O/P EST MOD 30 MIN: CPT | Performed by: HOSPITALIST

## 2025-02-25 PROCEDURE — C1776 JOINT DEVICE (IMPLANTABLE): HCPCS | Performed by: ORTHOPAEDIC SURGERY

## 2025-02-25 PROCEDURE — 86900 BLOOD TYPING SEROLOGIC ABO: CPT | Performed by: PHYSICIAN ASSISTANT

## 2025-02-25 PROCEDURE — 250N000013 HC RX MED GY IP 250 OP 250 PS 637: Performed by: PHYSICIAN ASSISTANT

## 2025-02-25 PROCEDURE — 93005 ELECTROCARDIOGRAM TRACING: CPT | Mod: 25 | Performed by: STUDENT IN AN ORGANIZED HEALTH CARE EDUCATION/TRAINING PROGRAM

## 2025-02-25 DEVICE — IMPLANTABLE DEVICE
Type: IMPLANTABLE DEVICE | Site: SHOULDER | Status: FUNCTIONAL
Brand: TORNIER PERFORM™ HUMERAL SYSTEM

## 2025-02-25 DEVICE — SCREW PERIPHERAL 5.0X30MM DWJ330: Type: IMPLANTABLE DEVICE | Site: SHOULDER | Status: FUNCTIONAL

## 2025-02-25 DEVICE — SCREW CENTRAL 6.5X30MM DWJ130: Type: IMPLANTABLE DEVICE | Site: SHOULDER | Status: FUNCTIONAL

## 2025-02-25 DEVICE — SCREW PERIPHERAL 14MM DWJ314: Type: IMPLANTABLE DEVICE | Site: SHOULDER | Status: FUNCTIONAL

## 2025-02-25 DEVICE — IMPLANTABLE DEVICE
Type: IMPLANTABLE DEVICE | Site: SHOULDER | Status: FUNCTIONAL
Brand: TORNIER PERFORM® REVERSED AUGMENTED GLENOID

## 2025-02-25 DEVICE — SP 2.6 MM KNOTLESS FIBERTAK ANCHOR
Type: IMPLANTABLE DEVICE | Site: SHOULDER | Status: FUNCTIONAL
Brand: ARTHREX®

## 2025-02-25 DEVICE — IMPLANTABLE DEVICE
Type: IMPLANTABLE DEVICE | Site: SHOULDER | Status: FUNCTIONAL
Brand: TORNIER PERFORM™ REVERSED

## 2025-02-25 RX ORDER — OXYCODONE HYDROCHLORIDE 5 MG/1
5 TABLET ORAL
Status: DISCONTINUED | OUTPATIENT
Start: 2025-02-25 | End: 2025-02-25 | Stop reason: HOSPADM

## 2025-02-25 RX ORDER — AMOXICILLIN 250 MG
1 CAPSULE ORAL 2 TIMES DAILY
Status: DISCONTINUED | OUTPATIENT
Start: 2025-02-25 | End: 2025-02-26 | Stop reason: HOSPADM

## 2025-02-25 RX ORDER — PROPOFOL 10 MG/ML
INJECTION, EMULSION INTRAVENOUS PRN
Status: DISCONTINUED | OUTPATIENT
Start: 2025-02-25 | End: 2025-02-25

## 2025-02-25 RX ORDER — ROSUVASTATIN CALCIUM 10 MG/1
10 TABLET, COATED ORAL AT BEDTIME
Status: DISCONTINUED | OUTPATIENT
Start: 2025-02-25 | End: 2025-02-26 | Stop reason: HOSPADM

## 2025-02-25 RX ORDER — CELECOXIB 200 MG/1
400 CAPSULE ORAL ONCE
Status: COMPLETED | OUTPATIENT
Start: 2025-02-25 | End: 2025-02-25

## 2025-02-25 RX ORDER — MAGNESIUM HYDROXIDE 1200 MG/15ML
LIQUID ORAL PRN
Status: DISCONTINUED | OUTPATIENT
Start: 2025-02-25 | End: 2025-02-25 | Stop reason: HOSPADM

## 2025-02-25 RX ORDER — ONDANSETRON 2 MG/ML
4 INJECTION INTRAMUSCULAR; INTRAVENOUS EVERY 30 MIN PRN
Status: DISCONTINUED | OUTPATIENT
Start: 2025-02-25 | End: 2025-02-25 | Stop reason: HOSPADM

## 2025-02-25 RX ORDER — ACETAMINOPHEN 325 MG/1
650 TABLET ORAL EVERY 4 HOURS PRN
Qty: 100 TABLET | Refills: 0 | Status: SHIPPED | OUTPATIENT
Start: 2025-02-25

## 2025-02-25 RX ORDER — ONDANSETRON 4 MG/1
4 TABLET, ORALLY DISINTEGRATING ORAL EVERY 6 HOURS PRN
Status: DISCONTINUED | OUTPATIENT
Start: 2025-02-25 | End: 2025-02-26 | Stop reason: HOSPADM

## 2025-02-25 RX ORDER — POLYETHYLENE GLYCOL 3350 17 G/17G
17 POWDER, FOR SOLUTION ORAL DAILY
Status: DISCONTINUED | OUTPATIENT
Start: 2025-02-26 | End: 2025-02-26 | Stop reason: HOSPADM

## 2025-02-25 RX ORDER — SODIUM CHLORIDE, SODIUM LACTATE, POTASSIUM CHLORIDE, CALCIUM CHLORIDE 600; 310; 30; 20 MG/100ML; MG/100ML; MG/100ML; MG/100ML
INJECTION, SOLUTION INTRAVENOUS CONTINUOUS
Status: DISCONTINUED | OUTPATIENT
Start: 2025-02-25 | End: 2025-02-25 | Stop reason: HOSPADM

## 2025-02-25 RX ORDER — CEFAZOLIN SODIUM 1 G/3ML
INJECTION, POWDER, FOR SOLUTION INTRAMUSCULAR; INTRAVENOUS PRN
Status: DISCONTINUED | OUTPATIENT
Start: 2025-02-25 | End: 2025-02-25 | Stop reason: HOSPADM

## 2025-02-25 RX ORDER — DOXYCYCLINE 100 MG/1
100 CAPSULE ORAL 2 TIMES DAILY
Qty: 20 CAPSULE | Refills: 0 | Status: SHIPPED | OUTPATIENT
Start: 2025-02-25 | End: 2025-03-07

## 2025-02-25 RX ORDER — DEXAMETHASONE SODIUM PHOSPHATE 4 MG/ML
4 INJECTION, SOLUTION INTRA-ARTICULAR; INTRALESIONAL; INTRAMUSCULAR; INTRAVENOUS; SOFT TISSUE
Status: DISCONTINUED | OUTPATIENT
Start: 2025-02-25 | End: 2025-02-25 | Stop reason: HOSPADM

## 2025-02-25 RX ORDER — CALCIUM CARBONATE 500 MG/1
500 TABLET, CHEWABLE ORAL 4 TIMES DAILY PRN
Status: DISCONTINUED | OUTPATIENT
Start: 2025-02-25 | End: 2025-02-26 | Stop reason: HOSPADM

## 2025-02-25 RX ORDER — FENTANYL CITRATE 50 UG/ML
25-100 INJECTION, SOLUTION INTRAMUSCULAR; INTRAVENOUS
Status: DISCONTINUED | OUTPATIENT
Start: 2025-02-25 | End: 2025-02-25 | Stop reason: HOSPADM

## 2025-02-25 RX ORDER — ACETAMINOPHEN 325 MG/1
975 TABLET ORAL EVERY 8 HOURS
Status: DISCONTINUED | OUTPATIENT
Start: 2025-02-25 | End: 2025-02-26 | Stop reason: HOSPADM

## 2025-02-25 RX ORDER — OXYCODONE HYDROCHLORIDE 5 MG/1
10 TABLET ORAL
Status: DISCONTINUED | OUTPATIENT
Start: 2025-02-25 | End: 2025-02-25 | Stop reason: HOSPADM

## 2025-02-25 RX ORDER — GLYCOPYRROLATE 0.2 MG/ML
INJECTION, SOLUTION INTRAMUSCULAR; INTRAVENOUS PRN
Status: DISCONTINUED | OUTPATIENT
Start: 2025-02-25 | End: 2025-02-25

## 2025-02-25 RX ORDER — TRANEXAMIC ACID 650 MG/1
1950 TABLET ORAL ONCE
Status: COMPLETED | OUTPATIENT
Start: 2025-02-25 | End: 2025-02-25

## 2025-02-25 RX ORDER — FENTANYL CITRATE 50 UG/ML
50 INJECTION, SOLUTION INTRAMUSCULAR; INTRAVENOUS EVERY 5 MIN PRN
Status: DISCONTINUED | OUTPATIENT
Start: 2025-02-25 | End: 2025-02-25 | Stop reason: HOSPADM

## 2025-02-25 RX ORDER — HYDROMORPHONE HCL IN WATER/PF 6 MG/30 ML
0.4 PATIENT CONTROLLED ANALGESIA SYRINGE INTRAVENOUS EVERY 5 MIN PRN
Status: DISCONTINUED | OUTPATIENT
Start: 2025-02-25 | End: 2025-02-25 | Stop reason: HOSPADM

## 2025-02-25 RX ORDER — LIDOCAINE 40 MG/G
CREAM TOPICAL
Status: DISCONTINUED | OUTPATIENT
Start: 2025-02-25 | End: 2025-02-26 | Stop reason: HOSPADM

## 2025-02-25 RX ORDER — CELECOXIB 100 MG/1
100 CAPSULE ORAL 2 TIMES DAILY
Status: DISCONTINUED | OUTPATIENT
Start: 2025-02-25 | End: 2025-02-26 | Stop reason: HOSPADM

## 2025-02-25 RX ORDER — NALOXONE HYDROCHLORIDE 0.4 MG/ML
0.1 INJECTION, SOLUTION INTRAMUSCULAR; INTRAVENOUS; SUBCUTANEOUS
Status: DISCONTINUED | OUTPATIENT
Start: 2025-02-25 | End: 2025-02-25 | Stop reason: HOSPADM

## 2025-02-25 RX ORDER — OXYCODONE HYDROCHLORIDE 5 MG/1
5 TABLET ORAL EVERY 4 HOURS PRN
Qty: 26 TABLET | Refills: 0 | Status: SHIPPED | OUTPATIENT
Start: 2025-02-25

## 2025-02-25 RX ORDER — FLUMAZENIL 0.1 MG/ML
0.2 INJECTION, SOLUTION INTRAVENOUS
Status: DISCONTINUED | OUTPATIENT
Start: 2025-02-25 | End: 2025-02-25 | Stop reason: HOSPADM

## 2025-02-25 RX ORDER — PROPOFOL 10 MG/ML
INJECTION, EMULSION INTRAVENOUS CONTINUOUS PRN
Status: DISCONTINUED | OUTPATIENT
Start: 2025-02-25 | End: 2025-02-25

## 2025-02-25 RX ORDER — CEFAZOLIN SODIUM 2 G/100ML
2 INJECTION, SOLUTION INTRAVENOUS EVERY 8 HOURS
Status: COMPLETED | OUTPATIENT
Start: 2025-02-25 | End: 2025-02-26

## 2025-02-25 RX ORDER — SODIUM CHLORIDE, SODIUM LACTATE, POTASSIUM CHLORIDE, CALCIUM CHLORIDE 600; 310; 30; 20 MG/100ML; MG/100ML; MG/100ML; MG/100ML
INJECTION, SOLUTION INTRAVENOUS CONTINUOUS
Status: DISCONTINUED | OUTPATIENT
Start: 2025-02-25 | End: 2025-02-26 | Stop reason: HOSPADM

## 2025-02-25 RX ORDER — HYDROXYZINE HYDROCHLORIDE 10 MG/1
10 TABLET, FILM COATED ORAL EVERY 6 HOURS PRN
Status: DISCONTINUED | OUTPATIENT
Start: 2025-02-25 | End: 2025-02-26 | Stop reason: HOSPADM

## 2025-02-25 RX ORDER — LIDOCAINE 40 MG/G
CREAM TOPICAL
Status: DISCONTINUED | OUTPATIENT
Start: 2025-02-25 | End: 2025-02-25 | Stop reason: HOSPADM

## 2025-02-25 RX ORDER — OXYCODONE HYDROCHLORIDE 5 MG/1
5 TABLET ORAL EVERY 4 HOURS PRN
Status: DISCONTINUED | OUTPATIENT
Start: 2025-02-25 | End: 2025-02-26 | Stop reason: HOSPADM

## 2025-02-25 RX ORDER — CELECOXIB 100 MG/1
100 CAPSULE ORAL 2 TIMES DAILY PRN
Qty: 30 CAPSULE | Refills: 0 | Status: SHIPPED | OUTPATIENT
Start: 2025-02-25 | End: 2025-03-12

## 2025-02-25 RX ORDER — HYDROMORPHONE HCL IN WATER/PF 6 MG/30 ML
0.4 PATIENT CONTROLLED ANALGESIA SYRINGE INTRAVENOUS
Status: DISCONTINUED | OUTPATIENT
Start: 2025-02-25 | End: 2025-02-26 | Stop reason: HOSPADM

## 2025-02-25 RX ORDER — BISACODYL 10 MG
10 SUPPOSITORY, RECTAL RECTAL DAILY PRN
Status: DISCONTINUED | OUTPATIENT
Start: 2025-02-25 | End: 2025-02-26 | Stop reason: HOSPADM

## 2025-02-25 RX ORDER — ONDANSETRON 2 MG/ML
INJECTION INTRAMUSCULAR; INTRAVENOUS PRN
Status: DISCONTINUED | OUTPATIENT
Start: 2025-02-25 | End: 2025-02-25

## 2025-02-25 RX ORDER — ONDANSETRON 4 MG/1
4 TABLET, ORALLY DISINTEGRATING ORAL EVERY 30 MIN PRN
Status: DISCONTINUED | OUTPATIENT
Start: 2025-02-25 | End: 2025-02-25 | Stop reason: HOSPADM

## 2025-02-25 RX ORDER — ASPIRIN 81 MG/1
81 TABLET ORAL 2 TIMES DAILY
Qty: 60 TABLET | Refills: 0 | Status: SHIPPED | OUTPATIENT
Start: 2025-02-25

## 2025-02-25 RX ORDER — HYDROMORPHONE HCL IN WATER/PF 6 MG/30 ML
0.2 PATIENT CONTROLLED ANALGESIA SYRINGE INTRAVENOUS EVERY 5 MIN PRN
Status: DISCONTINUED | OUTPATIENT
Start: 2025-02-25 | End: 2025-02-25 | Stop reason: HOSPADM

## 2025-02-25 RX ORDER — LIDOCAINE HYDROCHLORIDE 10 MG/ML
INJECTION, SOLUTION INFILTRATION; PERINEURAL PRN
Status: DISCONTINUED | OUTPATIENT
Start: 2025-02-25 | End: 2025-02-25

## 2025-02-25 RX ORDER — HYDROMORPHONE HCL IN WATER/PF 6 MG/30 ML
0.2 PATIENT CONTROLLED ANALGESIA SYRINGE INTRAVENOUS
Status: DISCONTINUED | OUTPATIENT
Start: 2025-02-25 | End: 2025-02-26 | Stop reason: HOSPADM

## 2025-02-25 RX ORDER — AMOXICILLIN 250 MG
1-2 CAPSULE ORAL 2 TIMES DAILY PRN
Qty: 30 TABLET | Refills: 0 | Status: SHIPPED | OUTPATIENT
Start: 2025-02-25

## 2025-02-25 RX ORDER — CEFAZOLIN SODIUM/WATER 2 G/20 ML
2 SYRINGE (ML) INTRAVENOUS SEE ADMIN INSTRUCTIONS
Status: DISCONTINUED | OUTPATIENT
Start: 2025-02-25 | End: 2025-02-25 | Stop reason: HOSPADM

## 2025-02-25 RX ORDER — ASPIRIN 81 MG/1
81 TABLET ORAL 2 TIMES DAILY
Status: DISCONTINUED | OUTPATIENT
Start: 2025-02-25 | End: 2025-02-26 | Stop reason: HOSPADM

## 2025-02-25 RX ORDER — OXYCODONE HYDROCHLORIDE 5 MG/1
10 TABLET ORAL EVERY 4 HOURS PRN
Status: DISCONTINUED | OUTPATIENT
Start: 2025-02-25 | End: 2025-02-26 | Stop reason: HOSPADM

## 2025-02-25 RX ORDER — LISINOPRIL AND HYDROCHLOROTHIAZIDE 10; 12.5 MG/1; MG/1
1 TABLET ORAL DAILY
Status: DISCONTINUED | OUTPATIENT
Start: 2025-02-26 | End: 2025-02-26

## 2025-02-25 RX ORDER — DEXAMETHASONE SODIUM PHOSPHATE 10 MG/ML
INJECTION, SOLUTION INTRAMUSCULAR; INTRAVENOUS PRN
Status: DISCONTINUED | OUTPATIENT
Start: 2025-02-25 | End: 2025-02-25

## 2025-02-25 RX ORDER — ONDANSETRON 2 MG/ML
4 INJECTION INTRAMUSCULAR; INTRAVENOUS EVERY 6 HOURS PRN
Status: DISCONTINUED | OUTPATIENT
Start: 2025-02-25 | End: 2025-02-26 | Stop reason: HOSPADM

## 2025-02-25 RX ORDER — FENTANYL CITRATE 50 UG/ML
25 INJECTION, SOLUTION INTRAMUSCULAR; INTRAVENOUS EVERY 5 MIN PRN
Status: DISCONTINUED | OUTPATIENT
Start: 2025-02-25 | End: 2025-02-25 | Stop reason: HOSPADM

## 2025-02-25 RX ORDER — CEFAZOLIN SODIUM/WATER 2 G/20 ML
2 SYRINGE (ML) INTRAVENOUS
Status: COMPLETED | OUTPATIENT
Start: 2025-02-25 | End: 2025-02-25

## 2025-02-25 RX ORDER — PROCHLORPERAZINE MALEATE 5 MG/1
5 TABLET ORAL EVERY 6 HOURS PRN
Status: DISCONTINUED | OUTPATIENT
Start: 2025-02-25 | End: 2025-02-26 | Stop reason: HOSPADM

## 2025-02-25 RX ORDER — HYDROXYZINE HYDROCHLORIDE 10 MG/1
10 TABLET, FILM COATED ORAL EVERY 6 HOURS PRN
Qty: 30 TABLET | Refills: 0 | Status: SHIPPED | OUTPATIENT
Start: 2025-02-25

## 2025-02-25 RX ORDER — ACETAMINOPHEN 325 MG/1
975 TABLET ORAL ONCE
Status: COMPLETED | OUTPATIENT
Start: 2025-02-25 | End: 2025-02-25

## 2025-02-25 RX ORDER — BUPIVACAINE HYDROCHLORIDE 5 MG/ML
INJECTION, SOLUTION EPIDURAL; INTRACAUDAL PRN
Status: DISCONTINUED | OUTPATIENT
Start: 2025-02-25 | End: 2025-02-25

## 2025-02-25 RX ADMIN — ROCURONIUM BROMIDE 10 MG: 10 INJECTION, SOLUTION INTRAVENOUS at 08:36

## 2025-02-25 RX ADMIN — FENTANYL CITRATE 50 MCG: 50 INJECTION INTRAMUSCULAR; INTRAVENOUS at 07:10

## 2025-02-25 RX ADMIN — ACETAMINOPHEN 975 MG: 325 TABLET ORAL at 23:17

## 2025-02-25 RX ADMIN — ROSUVASTATIN CALCIUM 10 MG: 10 TABLET, FILM COATED ORAL at 20:45

## 2025-02-25 RX ADMIN — Medication 200 MG: at 09:25

## 2025-02-25 RX ADMIN — ONDANSETRON 4 MG: 2 INJECTION INTRAMUSCULAR; INTRAVENOUS at 09:10

## 2025-02-25 RX ADMIN — CELECOXIB 100 MG: 100 CAPSULE ORAL at 20:45

## 2025-02-25 RX ADMIN — PHENYLEPHRINE HYDROCHLORIDE 100 MCG: 10 INJECTION INTRAVENOUS at 08:40

## 2025-02-25 RX ADMIN — CEFAZOLIN SODIUM 2 G: 2 INJECTION, SOLUTION INTRAVENOUS at 16:46

## 2025-02-25 RX ADMIN — SENNOSIDES AND DOCUSATE SODIUM 1 TABLET: 50; 8.6 TABLET ORAL at 13:48

## 2025-02-25 RX ADMIN — PROPOFOL 150 MG: 10 INJECTION, EMULSION INTRAVENOUS at 07:33

## 2025-02-25 RX ADMIN — PHENYLEPHRINE HYDROCHLORIDE 0.3 MCG/KG/MIN: 10 INJECTION INTRAVENOUS at 07:43

## 2025-02-25 RX ADMIN — PHENYLEPHRINE HYDROCHLORIDE 100 MCG: 10 INJECTION INTRAVENOUS at 08:55

## 2025-02-25 RX ADMIN — FENTANYL CITRATE 25 MCG: 50 INJECTION INTRAMUSCULAR; INTRAVENOUS at 10:02

## 2025-02-25 RX ADMIN — SODIUM CHLORIDE, POTASSIUM CHLORIDE, SODIUM LACTATE AND CALCIUM CHLORIDE: 600; 310; 30; 20 INJECTION, SOLUTION INTRAVENOUS at 20:13

## 2025-02-25 RX ADMIN — PHENYLEPHRINE HYDROCHLORIDE 50 MCG: 10 INJECTION INTRAVENOUS at 09:15

## 2025-02-25 RX ADMIN — LIDOCAINE HYDROCHLORIDE 50 MG: 10 INJECTION, SOLUTION INFILTRATION; PERINEURAL at 07:33

## 2025-02-25 RX ADMIN — SODIUM CHLORIDE, SODIUM LACTATE, POTASSIUM CHLORIDE, AND CALCIUM CHLORIDE: .6; .31; .03; .02 INJECTION, SOLUTION INTRAVENOUS at 06:27

## 2025-02-25 RX ADMIN — PROPOFOL 40 MG: 10 INJECTION, EMULSION INTRAVENOUS at 09:20

## 2025-02-25 RX ADMIN — BUPIVACAINE HYDROCHLORIDE 30 ML: 5 INJECTION, SOLUTION EPIDURAL; INTRACAUDAL at 07:10

## 2025-02-25 RX ADMIN — SODIUM CHLORIDE, SODIUM LACTATE, POTASSIUM CHLORIDE, AND CALCIUM CHLORIDE: .6; .31; .03; .02 INJECTION, SOLUTION INTRAVENOUS at 10:08

## 2025-02-25 RX ADMIN — ACETAMINOPHEN 975 MG: 325 TABLET ORAL at 06:11

## 2025-02-25 RX ADMIN — CELECOXIB 400 MG: 200 CAPSULE ORAL at 06:11

## 2025-02-25 RX ADMIN — PHENYLEPHRINE HYDROCHLORIDE 100 MCG: 10 INJECTION INTRAVENOUS at 09:08

## 2025-02-25 RX ADMIN — ROCURONIUM BROMIDE 60 MG: 10 INJECTION, SOLUTION INTRAVENOUS at 07:33

## 2025-02-25 RX ADMIN — DEXAMETHASONE SODIUM PHOSPHATE 4 MG: 10 INJECTION, SOLUTION INTRAMUSCULAR; INTRAVENOUS at 07:48

## 2025-02-25 RX ADMIN — PROPOFOL 150 MCG/KG/MIN: 10 INJECTION, EMULSION INTRAVENOUS at 07:37

## 2025-02-25 RX ADMIN — MIDAZOLAM HYDROCHLORIDE 1 MG: 1 INJECTION, SOLUTION INTRAMUSCULAR; INTRAVENOUS at 07:10

## 2025-02-25 RX ADMIN — TRANEXAMIC ACID 1950 MG: 650 TABLET ORAL at 06:11

## 2025-02-25 RX ADMIN — ASPIRIN 81 MG: 81 TABLET, COATED ORAL at 20:45

## 2025-02-25 RX ADMIN — Medication 2 G: at 07:25

## 2025-02-25 RX ADMIN — GLYCOPYRROLATE 0.2 MG: 0.2 INJECTION INTRAMUSCULAR; INTRAVENOUS at 07:57

## 2025-02-25 RX ADMIN — FENTANYL CITRATE 25 MCG: 50 INJECTION INTRAMUSCULAR; INTRAVENOUS at 10:07

## 2025-02-25 RX ADMIN — BUPIVACAINE 10 ML: 13.3 INJECTION, SUSPENSION, LIPOSOMAL INFILTRATION at 07:10

## 2025-02-25 RX ADMIN — PHENYLEPHRINE HYDROCHLORIDE 100 MCG: 10 INJECTION INTRAVENOUS at 07:46

## 2025-02-25 RX ADMIN — ASPIRIN 81 MG: 81 TABLET, COATED ORAL at 13:48

## 2025-02-25 ASSESSMENT — ACTIVITIES OF DAILY LIVING (ADL)
ADLS_ACUITY_SCORE: 38
ADLS_ACUITY_SCORE: 43
ADLS_ACUITY_SCORE: 40
ADLS_ACUITY_SCORE: 41
ADLS_ACUITY_SCORE: 43
ADLS_ACUITY_SCORE: 39
ADLS_ACUITY_SCORE: 43
ADLS_ACUITY_SCORE: 43
ADLS_ACUITY_SCORE: 41
ADLS_ACUITY_SCORE: 41
ADLS_ACUITY_SCORE: 38
ADLS_ACUITY_SCORE: 40
ADLS_ACUITY_SCORE: 41
ADLS_ACUITY_SCORE: 41
ADLS_ACUITY_SCORE: 43
ADLS_ACUITY_SCORE: 38
ADLS_ACUITY_SCORE: 43
ADLS_ACUITY_SCORE: 38

## 2025-02-25 NOTE — PHARMACY-ADMISSION MEDICATION HISTORY
Pharmacist Admission Medication History    Admission medication history is complete. The information provided in this note is only as accurate as the sources available at the time of the update.    Information Source(s): Patient and CareEverywhere/SureScripts via in-person    Pertinent Information: None    Allergies reviewed with patient and updates made in EHR: yes    Medication History Completed By: Tawanda Beavers RPH 2/25/2025 6:39 AM    PTA Med List   Medication Sig Last Dose/Taking    acetaminophen (TYLENOL) 325 MG tablet Take 2 tablets (650 mg) by mouth every 4 hours as needed for other (mild pain) Past Week    lisinopril-hydrochlorothiazide (ZESTORETIC) 10-12.5 MG tablet Take 1 tablet by mouth daily. 2/24/2025 Morning    menthol (BIOFREEZE, MENTHOL,) 4 % Gel [MENTHOL (BIOFREEZE, MENTHOL,) 4 % GEL] Apply topically at bedtime as needed.  Past Week    rosuvastatin (CRESTOR) 10 MG tablet Take 1 tablet (10 mg) by mouth at bedtime. 2/24/2025 Evening    Vitamin D3 (CHOLECALCIFEROL) 25 mcg (1000 units) tablet Take 1 tablet by mouth daily 2/18/2025

## 2025-02-25 NOTE — OP NOTE
DATE OF SERVICE: 2/25/2025    SURGEON   JACQUELINE GONZALEZ MD     ASSISTANT   Kelly Schmitt, BETO ; BETO assist was essential and required for all portions of the case, including patient positioning, soft tissue retraction, patient safety, assistance with closure of the wound, and postoperative care    PREOPERATIVE DIAGNOSIS   Left shoulder rotator cuff arthropathy.     POSTOPERATIVE DIAGNOSIS   Left shoulder rotator cuff arthropathy.     PROCEDURE   1. Left reverse total shoulder arthroplasty.   2. Left shoulder long head of Biceps Tenodesis    ANESTHESIA   General plus scalene block     COMPLICATIONS   None.     IMPLANTS   Tornier Reverse shoulder system, size 3 Inlay stem, 36 +3 poly  25 + 3mm baseplate, 36 mm glenosphere.     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 They  had failed to respond to treatment including injections and physical therapy.   It was recommended he undergo the above procedure. The risks, including   bleeding, infection, nerve injury, fracture, implant dislocation, implant   failure, medical complications and anesthesia complications were discussed at   length. He had no further questions. Consent was obtained.      DESCRIPTION OF PROCEDURE: The patient was seen and evaluated in the preop area. Discussion of the surgery and any further questions were answered with the patient and family using easily understandable non-medical terms. The correct site was identified with the patient, and I placed my initials at the surgical site. Pre-procedure verification was completed. Relevant information / documentation available, they were reviewed and properly matched to the patient.  I verified the consent was accurate and complete.  I verified that the proper surgical equipment and supplies were available. The patient was taken to the operating room and given  successful General anesthesia. The patient was placed in a beach chair position according to the procedure in consideration with all extremities well padded.  The operative upper extremity was prepped and draped in sterile fashion. The patient received preoperative prophylactic antibiotics based on the patient s procedure, BMI, and allergy profile.  A Time Out was conducted just prior to starting procedure to verify the eight required elements: 1-patient identity, 2-consent accurate and complete, 3-position, 4-correct side/site marked (if applicable), 5-procedure, 6-relevant images / results properly labeled and displayed (if applicable), 7-antibiotics / irrigation fluids (if applicable), 8-safety precautions.     We then prepped and draped the operative upper extremity in sterile fashion. A longitudinal incision was made over the deltopectoral interval. It was extended from the AC joint lateral to the axillary area. Once through the skin and subcutaneous tissue, I encountered the deltoid muscle. The cephalic vein was identified. A delto pectoral approach to the shoulder was performed. A deep shoulder retractor was placed. We then encountered the proximal humerus. The supraspinatus was torn.  The infraspinatus was intact. The teres minor tendon was intact.  A subscapularis tenotomy was performed. It was tagged for later repair. We then performed some releases of the capsule around the proximal humerus. I was careful to palpate the axillary nerve and protect it during this period.   We then debrided the greater tuberosity. A starting guide pin was placed at the greater tuberosity. We then began our reaming. We reamed up until there was good cortical chatter. We then placed the cutting guide on the humerus set at 30 degrees of retroversion. We then made our humeral head cut without complication. We then began our broaching. We broached up to the appropriate size broach. This had good fit and fill with good rotational  stability. We then placed a cap on the proximal humerus. We then turned our attention to glenoid preparation. Retractors were placed about the glenoid.   I excised any remaining labral tissue. We then placed the starting guide pin at the center of the glenoid at a 15 degree down angle. We then reamed to bleeding bone in the glenoid. Slightly more bone was reamed inferiorly than superiorly. We then placed the mini baseplate into the glenoid. A central screw was placed with excellent purchase. Locking screws were then placed at the 12-o'clock, 3-o'clock, 6-o'clock and 9-o'clock positions. We then placed the glenosphere offset slightly inferiorly onto the baseplate without complication. We then trialed a standard humeral tray. I was able to reduce the shoulder with good tension on the surrounding musculature. I was able to elevate the shoulder to 140 degrees, externally rotate to 45, internally rotate to 45 without any instability. We then removed the trial components. The Appropriate size mini stem was placed into the humerus. A standard humeral tray was then placed onto the stem. The shoulder was reduced. Once again, I was able to elevate the arm to 140 degrees, internally and externally rotate to 45 degrees and place some axial load without any instability of the shoulder. I then placed two suture anchors in the lesser tuberosity area. The long head of the biceps tendon was tenodesed down with one of the suture anchors.   The subscapularis tendon was then repaired back to this area. We then irrigated the shoulder with antibiotic saline. The deltopectoral interval was closed with O vicryl sutures, the subcutaneous tissue with 2-0 Vicryl, skin with 3-0 Monocryl and Dermabond. A sterile dressing and sling were placed on the right upper extremity. The patient left the operating room in stable   condition.     POST OP PLAN:   Pain Control:  Scalene block as per Anesthesia service, IV narcotics, transition to oral  narcotics as bowel function returns, Gabapentin, Ice.  DVT Prophylaxis: ECASA 81mg PO BID x 1 month, mechanical devices, early ambulation  Infection Prophylaxis: IV Abx as per pt's allergy profile and BMI x 24 hrs, then discharge on Doxycycline 100 mg PO BID x 10 days  Incision/Dressing Care: Keep on, clean. Reniforce prn.  OK to shower.    Activity: Up/OOB ad roger, Sling operative upper extremity, no lifting > 1lb operative upper   PT/OT: Eval and Treat. Sling operative upper extremity at all times except hygiene, elbow/wrist/hand ROM  Medical Cares: Primary medical consult  Dispostion:  consult, D/C likely tomorrow    Implant Name Type Inv. Item Serial No.  Lot No. LRB No. Used Action   BASEPLATE LATERAL RVRS 25MM OFFS +3MM [id number] - [id number] Total Joint Component/Insert BASEPLATE LATERAL RVRS 25MM OFFS +3MM [id number] [facility]  Left 1 Implanted   SPHERE GLND 36MM STD COCR [id number] - [id number] Total Joint Component/Insert SPHERE GLND 36MM STD COCR [facility] [id number] [employer] [facility]  Left 1 Implanted   SCREW CENTRAL 6.5X30MM [id number] - [id number] Metallic Hardware/[place] SCREW CENTRAL 6.5X30MM [id number]  [employer] [facility] NA Left 1 Implanted   SCREW PERIPHERAL 5.0X30MM [id number] - [id number] Metallic Hardware/[place] SCREW PERIPHERAL 5.0X30MM [id number]  [employer] NA Left 2 Implanted   SCREW PERIPHERAL 14MM [id number] - [id number] Metallic Hardware/[place] SCREW PERIPHERAL 14MM [id number]  [employer] [facility] NA Left 2 Implanted   ANCHOR SUT 5 FIBERTAK KNTLS SLF PNCH 2.6MM - [id number] Metallic Hardware/[place] ANCHOR SUT 5 FIBERTAK KNTLS SLF PNCH 2.6MM  ARTHREX [id number] Left 1 Implanted   ANCHOR SUT 5 FIBERTAK KNTLS SLF PNCH 2.6MM - [id number] Metallic Hardware/[place] ANCHOR SUT 5 FIBERTAK KNTLS SLF PNCH 2.6MM  ARTHREX [id number] Left 1 Implanted   STEM HUM PRFRM 3 SHLDR STRL LF [id number] - [id number] Total Joint Component/Insert STEM HUM PRFRM 3 SHLDR STRL LF [id number] [facility]  Left  1 Implanted   INSERT HUM 36MM PRFRM THK+3MM .75 SHLDR STRL  [id number] - [id number] Total Joint Component/Insert INSERT HUM 36MM PRFRM THK+3MM .75 SHLDR STRL LF [id number] [employer]  Left 1 Implanted         @C(1)@  Alexander Yancey MD    @C(2)@  Roni Horowitz

## 2025-02-25 NOTE — ANESTHESIA PROCEDURE NOTES
Brachial plexus Procedure Note    Pre-Procedure   Staff -        Anesthesiologist:  Jona Valdez MD       Performed By: anesthesiologist       Location: pre-op       Procedure Start/Stop Times: 2/25/2025 7:05 AM and 2/25/2025 7:10 AM       Pre-Anesthestic Checklist: patient identified, IV checked, site marked, risks and benefits discussed, informed consent, monitors and equipment checked, pre-op evaluation, at physician/surgeon's request and post-op pain management  Timeout:       Correct Patient: Yes        Correct Procedure: Yes        Correct Site: Yes        Correct Position: Yes        Correct Laterality: Yes        Site Marked: Yes  Procedure Documentation  Procedure: Brachial plexus         Laterality: left       Patient Position: supine       Skin prep: Chloraprep       Local skin infiltrated with 3 mL of 1% lidocaine.  (interscalene approach).       Needle Type: other       Needle Gauge: 20.        Needle Length (Inches): 6        Ultrasound guided       1. Ultrasound was used to identify targeted nerve, plexus, vascular marker, or fascial plane and place a needle adjacent to it in real-time.       2. Ultrasound was used to visualize the spread of anesthetic in close proximity to the above referenced structure.       3. A permanent image is entered into the patient's record.       4. The visualized anatomic structures appeared normal.       5. There were no apparent abnormal pathologic findings.    Assessment/Narrative         The placement was negative for: blood aspirated, painful injection and site bleeding       Paresthesias: No.       Test dose of 3 mL at.         Test dose negative, 3 minutes after injection, for signs of intravascular, subdural, or intrathecal injection.       Bolus given via needle. no blood aspirated via catheter.        Secured via.        Insertion/Infusion Method: Single Shot       Complications: none       Injection made incrementally with aspirations every 5  "mL.    Medication(s) Administered   Medication Administration Time: 2/25/2025 7:05 AM      FOR [facility] ONLY:   Pain Team Contact information: please page the Pain Team Via [employer]. Search \"Pain\". During daytime hours, please page the attending first. At night please page the resident first.      "

## 2025-02-25 NOTE — PLAN OF CARE
Patient vital signs are at baseline: Yes  Patient able to ambulate as they were prior to admission or with assist devices provided by therapies during their stay:  Yes  Patient MUST void prior to discharge:  No,  still has yet to void  Patient able to tolerate oral intake:  Yes  Pain has adequate pain control using Oral analgesics:  Yes  Does patient have an identified :  Yes  Has goal D/C date and time been discussed with patient:  Yes    Pt has some numbness in fingers otherwise CMS intact. Dressing C/D/I. Immobilizer on. LR @100ml/hr.   Sara Perea RN on 2/25/2025 at 2:00 PM

## 2025-02-25 NOTE — ANESTHESIA CARE TRANSFER NOTE
Patient: Cyndie Marin    Procedure: Procedure(s):  Left reverse total shoulder arthroplasty       Diagnosis: Osteoarthritis, shoulder [M19.019]  Pain in left shoulder [M25.512]  Diagnosis Additional Information: No value filed.    Anesthesia Type:   General     Note:    Oropharynx: oropharynx clear of all foreign objects  Level of Consciousness: awake  Oxygen Supplementation: face mask  Level of Supplemental Oxygen (L/min / FiO2): 8  Independent Airway: airway patency satisfactory and stable  Dentition: dentition unchanged    Report to RN Given: handoff report given  Patient transferred to: PACU    Handoff Report: Identifed the Patient, Identified the Reponsible Provider, Reviewed the pertinent medical history, Discussed the surgical course, Reviewed Intra-OP anesthesia mangement and issues during anesthesia, Set expectations for post-procedure period and Allowed opportunity for questions and acknowledgement of understanding      Vitals:  Vitals Value Taken Time   /53 02/25/25 0932   Temp 36.1    Pulse 68 02/25/25 0934   Resp 30 02/25/25 0934   SpO2 98 % 02/25/25 0934   Vitals shown include unfiled device data.    Electronically Signed By: CORRINE Geiger CRNA  February 25, 2025  9:36 AM

## 2025-02-25 NOTE — ANESTHESIA PROCEDURE NOTES
Airway       Patient location during procedure: OR       Procedure Start/Stop Times: 2/25/2025 7:36 AM  Staff -        Anesthesiologist:  Jona Valdez MD       CRNA: Roz Mora APRN CRNA       Other Anesthesia Staff: Olive Castro       Performed By: SRNAIndications and Patient Condition       Indications for airway management: philip-procedural       Induction type:intravenous       Mask difficulty assessment: 2 - vent by mask + OA or adjuvant +/- NMBA    Final Airway Details       Final airway type: endotracheal airway       Successful airway: ETT - single  Endotracheal Airway Details        ETT size (mm): 7.0       Cuffed: yes       Successful intubation technique: direct laryngoscopy       DL Blade Type: Castro 2       Grade View of Cords: 2       Adjucts: stylet       Measured from: gums/teeth       Secured at (cm): 22       Bite block used: None    Post intubation assessment        Placement verified by: capnometry, equal breath sounds and chest rise        Number of attempts at approach: 1       Number of other approaches attempted: 0       Secured with: tape and commercial tube schultz       Ease of procedure: easy       Dentition: Intact and Unchanged       Dental guard used and removed. Dental Guard Type: Standard White.    Medication(s) Administered   Medication Administration Time: 2/25/2025 7:36 AM

## 2025-02-25 NOTE — PROGRESS NOTES
Patient's lunch ordered. Awaiting ready room on [address] in [address]. Denies any pain. Call light within reach. Notified son of room on orthopedic unit.

## 2025-02-25 NOTE — ANESTHESIA PREPROCEDURE EVALUATION
Anesthesia Pre-Procedure Evaluation    Patient: Cyndie Marin   MRN: [id number] : 1945        Procedure : Procedure(s):  Left reverse total shoulder arthroplasty          Past Medical History:   Diagnosis Date    Calculus of gallbladder without cholecystitis without obstruction 2022    Claustrophobia     Colostomy in place (H)     DDD (degenerative disc disease), lumbar 2024    Essential hypertension     GERD (gastroesophageal reflux disease)     History of blood clots     History of urinary tract obstruction 2022    Hyperlipidemia LDL goal <100 2022    Obese     Parastomal hernia 2023    Perirectal abscess 10/29/2019    PONV (postoperative nausea and vomiting)     Primary osteoarthritis of both hips     Renal disease     S/P total hip arthroplasty     bilateral    Seasonal allergies     Sleep apnea     uses CPAP    RAJ (stress urinary incontinence, female) 2022    Thrombosis       Past Surgical History:   Procedure Laterality Date    APPLY WOUND VAC Right 2024    Procedure: CLOSURE WITH PREVENA DRESSING;  Surgeon: Alexander Yancey MD;  Location: [facility] OR    ARTHROPLASTY HIP ANTERIOR Right 2024    Procedure: RIGHT DIRECT ANTERIOR TOTAL HIP ARTHROPLASTY;  Surgeon: Alexander Yancey MD;  Location: [facility] OR     SECTION      COLECTOMY PARTIAL      COLOSTOMY N/A 10/28/2019    Procedure: CREATION, COLOSTOMY;  Surgeon: Mylene Sears MD;  Location: [facility];  Service: General    IRRIGATION AND DEBRIDEMENT HIP, COMBINED Right 2024    Procedure: RIGHT HIP WOUND IRRIGATION AND DEBRIDEMENT,;  Surgeon: Alexander Yancey MD;  Location: [facility] OR    NEPHRECTOMY Left     with rib resection    OR EXCIS BARTHOLIN GLAND/CYST      Description: Excision Of Bartholin's Gland Or Cyst;  Recorded: 2013;    PROCTOSCOPY N/A 10/28/2019    Procedure: RIGID PROCTOSCOPY;  Surgeon: Mylene Sears MD;  Location: [facility]  OR;  Service: General    SIGMOIDOSCOPY FLEXIBLE N/A 10/27/2019    Procedure: SIGMOIDOSCOPY with biopsy;  Surgeon: Tj Gavin MD;  Location: [facility];  Service: Gastroenterology    TOOTH EXTRACTION      TOTAL HIP ARTHROPLASTY        Allergies   Allergen Reactions    Contrast Dye Hives and Swelling     CT contrast dye    Cats Claw (Uncaria [Uncaria Tomentosa (Cats Claw)] Unknown    Dust Mites Unknown    Ragweed Pollen [Ragweeds] Unknown      Social History     Tobacco Use    Smoking status: Never    Smokeless tobacco: Never   Substance Use Topics    Alcohol use: Yes     Comment: rarely      Wt Readings from Last 1 Encounters:   02/05/25 81.2 kg (179 lb)        Anesthesia Evaluation            ROS/MED HX  ENT/Pulmonary:       Neurologic:  - neg neurologic ROS     Cardiovascular:       METS/Exercise Tolerance:     Hematologic:       Musculoskeletal:  - neg musculoskeletal ROS     GI/Hepatic:       Renal/Genitourinary:       Endo:       Psychiatric/Substance Use:  - neg psychiatric ROS     Infectious Disease:  - neg infectious disease ROS     Malignancy:  - neg malignancy ROS     Other:  - neg other ROS          Physical Exam    Airway  airway exam normal      Mallampati: II       Respiratory Devices and Support         Dental  no notable dental history         Cardiovascular   cardiovascular exam normal          Pulmonary   pulmonary exam normal                OUTSIDE LABS:  CBC:   Lab Results   Component Value Date    WBC 7.5 02/05/2025    WBC 11.3 (H) 09/17/2024    HGB 16.7 (H) 02/05/2025    HGB 13.5 09/17/2024    HCT 50.8 (H) 02/05/2025    HCT 43.5 09/17/2024     02/05/2025     09/17/2024     BMP:   Lab Results   Component Value Date     02/05/2025     09/17/2024    POTASSIUM 4.2 02/05/2025    POTASSIUM 4.3 09/17/2024    CHLORIDE 103 02/05/2025    CHLORIDE 105 09/17/2024    CO2 25 02/05/2025    CO2 25 09/17/2024    BUN 26.8 (H) 02/05/2025    BUN 18.9 09/17/2024    CR 0.74  "02/05/2025    CR 0.52 09/17/2024    GLC 91 02/05/2025    GLC 81 09/17/2024     COAGS:   Lab Results   Component Value Date    INR 1.02 01/26/2021     POC: No results found for: \"BGM\", \"HCG\", \"HCGS\"  HEPATIC:   Lab Results   Component Value Date    ALBUMIN 4.3 02/05/2025    PROTTOTAL 7.1 02/05/2025    ALT 18 02/05/2025    AST 23 02/05/2025    ALKPHOS 155 (H) 02/05/2025    BILITOTAL 0.5 02/05/2025     OTHER:   Lab Results   Component Value Date    HALLIE 10.3 02/05/2025    MAG 2.1 08/05/2021    LIPASE 28 12/19/2019    SED 32 (H) 07/31/2024       Anesthesia Plan    ASA Status:  3       Anesthesia Type: General.     - Airway: ETT              Consents    Anesthesia Plan(s) and associated risks, benefits, and realistic alternatives discussed. Questions answered and patient/representative(s) expressed understanding.     - Discussed:     - Discussed with:  Patient            Postoperative Care    Pain management: Peripheral nerve block (Continuous).        Comments:               Jona Valdez MD    I have reviewed the pertinent notes and labs in the chart from the past 30 days and (re)examined the patient.  Any updates or changes from those notes are reflected in this note.    Clinically Significant Risk Factors Present on Admission                   # Hypertension: Noted on problem list           # Obesity: Estimated body mass index is 30.73 kg/m  as calculated from the following:    Height as of 2/5/25: 1.626 m (5' 4\").    Weight as of 2/5/25: 81.2 kg (179 lb).                "

## 2025-02-25 NOTE — INTERVAL H&P NOTE
"I have reviewed the surgical (or preoperative) H&P that is linked to this encounter, and examined the patient. There are no significant changes    Clinical Conditions Present on Arrival:  Clinically Significant Risk Factors Present on Admission                       # Obesity: Estimated body mass index is 30.73 kg/m  as calculated from the following:    Height as of 2/5/25: 1.626 m (5' 4\").    Weight as of 2/5/25: 81.2 kg (179 lb).       "

## 2025-02-25 NOTE — CONSULTS
"[facility]  Consult Note - Hospitalist Service  Date of Admission:  2/25/2025  Consult Requested by: orthopaedics  Reason for Consult: HTN    Assessment & Plan   Cyndie Marin is a 79 year old female admitted s/p L TSA.  She is clinically stable.  Recommendations as below.    # s/p L TSA  - per orthopaedics    # HTN  - lisinopril/hydrochlorothiazide    # SHAUNA  - CPAP         Clinically Significant Risk Factors Present on Admission                   # Hypertension: Noted on problem list           # Obesity: Estimated body mass index is 30.73 kg/m  as calculated from the following:    Height as of 2/5/25: 1.626 m (5' 4\").    Weight as of 2/5/25: 81.2 kg (179 lb).              Dagoberto Palacios MD  Hospitalist Service  Securely message with [employer] (more info)  Text page via [facility] Paging/Directory   ______________________________________________________________________    Chief Complaint   S/p L TSA    History is obtained from the patient    History of Present Illness   Cyndie Marin is a 79 year old female who is admitted s/p L TSA.  She reports her left thumb is numb and tingling.  Denies dyspnea, chest pain, chest pressure, or nausea.  She has an ostomy in place related to removal of a cystic mass.      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)     DDD (degenerative disc disease), lumbar 04/25/2024    Essential hypertension     GERD (gastroesophageal reflux disease)     History of blood clots     History of urinary tract obstruction 08/08/2022    Hyperlipidemia LDL goal <100 08/08/2022    Obese     Parastomal hernia 09/13/2023    Perirectal abscess 10/29/2019    PONV (postoperative nausea and vomiting)     Primary osteoarthritis of both hips     Renal disease     S/P total hip arthroplasty     bilateral    Seasonal allergies     Sleep apnea  "    uses CPAP    RAJ (stress urinary incontinence, female) 2022    Thrombosis        Past Surgical History   Past Surgical History:   Procedure Laterality Date    APPLY WOUND VAC Right 2024    Procedure: CLOSURE WITH PREVENA DRESSING;  Surgeon: Alexander Ynacey MD;  Location: [facility] OR    ARTHROPLASTY HIP ANTERIOR Right 2024    Procedure: RIGHT DIRECT ANTERIOR TOTAL HIP ARTHROPLASTY;  Surgeon: Alexander Yacney MD;  Location: [facility] OR     SECTION      COLECTOMY PARTIAL      COLOSTOMY N/A 10/28/2019    Procedure: CREATION, COLOSTOMY;  Surgeon: Mylene Sears MD;  Location: [facility] OR;  Service: General    IRRIGATION AND DEBRIDEMENT HIP, COMBINED Right 2024    Procedure: RIGHT HIP WOUND IRRIGATION AND DEBRIDEMENT,;  Surgeon: Alexander Yancey MD;  Location: [facility] OR    NEPHRECTOMY Left     with rib resection    NV EXCIS BARTHOLIN GLAND/CYST      Description: Excision Of Bartholin's Gland Or Cyst;  Recorded: 2013;    PROCTOSCOPY N/A 10/28/2019    Procedure: RIGID PROCTOSCOPY;  Surgeon: Mylene Sears MD;  Location: [facility] OR;  Service: General    SIGMOIDOSCOPY FLEXIBLE N/A 10/27/2019    Procedure: SIGMOIDOSCOPY with biopsy;  Surgeon: Tj Gavin MD;  Location: [facility];  Service: Gastroenterology    TOOTH EXTRACTION      TOTAL HIP ARTHROPLASTY         Medications   I have reviewed this patient's current medications          Physical Exam   Vital Signs: Temp: 97.4  F (36.3  C) Temp src: Temporal BP: 111/64 Pulse: 60   Resp: 26 SpO2: 100 % O2 Device: Nasal cannula Oxygen Delivery: 2 LPM  Weight: 0 lbs 0 oz    Gen:  lying in bed in no extremis  Neuro:  alert, conversant; moves fingers in left hand and sensation grossly intact in fingers in left hand  CV:  borderline bradycardia, regular rhythm  Pulm:  no acute resp distress, ctab anteriorly  GI:  abdomen NTTP: LLQ ostomy    Medical Decision Making             Data    Reviewed:    XR L shoulder  There are immediate postoperative changes from left reverse total shoulder arthroplasty, in standard alignment. Diffuse osseous demineralization. No acute displaced periprosthetic fracture. Mild AC degenerative arthrosis. Ossific bodies again project   over the scapula, probably within the subscapular recess.

## 2025-02-25 NOTE — ANESTHESIA PROCEDURE NOTES
Other (superficial cervical plexus) Procedure Note    Pre-Procedure   Staff -        Anesthesiologist:  Jona Valdez MD       Performed By: anesthesiologist       Location: pre-op       Procedure Start/Stop Times: 2/25/2025 7:10 AM and 2/25/2025 7:12 AM       Pre-Anesthestic Checklist: patient identified, IV checked, site marked, risks and benefits discussed, informed consent, monitors and equipment checked, pre-op evaluation, at physician/surgeon's request and post-op pain management  Timeout:       Correct Patient: Yes        Correct Procedure: Yes        Correct Site: Yes        Correct Position: Yes        Correct Laterality: Yes        Site Marked: Yes  Procedure Documentation  Procedure: Other (superficial cervical plexus)         Laterality: left       Patient Position: supine       Skin prep: Chloraprep       Local skin infiltrated with 3 mL of 1% lidocaine.        Needle Type: other       Needle Gauge: 20.        Needle Length (Inches): 6        Ultrasound guided       1. Ultrasound was used to identify targeted nerve, plexus, vascular marker, or fascial plane and place a needle adjacent to it in real-time.       2. Ultrasound was used to visualize the spread of anesthetic in close proximity to the above referenced structure.       3. A permanent image is entered into the patient's record.       4. The visualized anatomic structures appeared normal.       5. There were no apparent abnormal pathologic findings.    Assessment/Narrative         The placement was negative for: blood aspirated, painful injection and site bleeding       Paresthesias: No.       Test dose of 3 mL at.         Test dose negative, 3 minutes after injection, for signs of intravascular, subdural, or intrathecal injection.       Bolus given via needle. no blood aspirated via catheter.        Secured via.        Insertion/Infusion Method: Single Shot       Complications: none       Injection made incrementally with aspirations every  "5 mL.    Medication(s) Administered   Medication Administration Time: 2/25/2025 7:10 AM      FOR [facility] ONLY:   Pain Team Contact information: please page the Pain Team Via [employer]. Search \"Pain\". During daytime hours, please page the attending first. At night please page the resident first.      "

## 2025-02-25 NOTE — ANESTHESIA POSTPROCEDURE EVALUATION
Patient: Cyndie Marin    Procedure: Procedure(s):  Left reverse total shoulder arthroplasty       Anesthesia Type:  General    Note:  Disposition: Inpatient   Postop Pain Control:             Sign Out: Well controlled pain   PONV: No   Neuro/Psych:             Sign Out: Acceptable/Baseline neuro status   Airway/Respiratory:             Sign Out: Acceptable/Baseline resp. status   CV/Hemodynamics:             Sign Out: Acceptable CV status   Other NRE: NONE   DID A NON-ROUTINE EVENT OCCUR?            Last vitals:  Vitals Value Taken Time   /59 02/25/25 1202   Temp 36.1  C (96.9  F) 02/25/25 1200   Pulse 72 02/25/25 1212   Resp 19 02/25/25 1200   SpO2 98 % 02/25/25 1212   Vitals shown include unfiled device data.    Electronically Signed By: Jona Valdez MD  February 25, 2025  1:05 PM

## 2025-02-26 ENCOUNTER — APPOINTMENT (OUTPATIENT)
Dept: OCCUPATIONAL THERAPY | Facility: CLINIC | Age: 80
End: 2025-02-26
Attending: PHYSICIAN ASSISTANT
Payer: COMMERCIAL

## 2025-02-26 VITALS
RESPIRATION RATE: 16 BRPM | BODY MASS INDEX: 34.02 KG/M2 | OXYGEN SATURATION: 98 % | WEIGHT: 198.2 LBS | SYSTOLIC BLOOD PRESSURE: 106 MMHG | HEART RATE: 45 BPM | TEMPERATURE: 98 F | DIASTOLIC BLOOD PRESSURE: 52 MMHG

## 2025-02-26 LAB
ATRIAL RATE - MUSE: 45 BPM
DIASTOLIC BLOOD PRESSURE - MUSE: NORMAL MMHG
HGB BLD-MCNC: 11.9 G/DL (ref 11.7–15.7)
INTERPRETATION ECG - MUSE: NORMAL
P AXIS - MUSE: 23 DEGREES
PR INTERVAL - MUSE: 232 MS
QRS DURATION - MUSE: 68 MS
QT - MUSE: 488 MS
QTC - MUSE: 422 MS
R AXIS - MUSE: -31 DEGREES
SYSTOLIC BLOOD PRESSURE - MUSE: NORMAL MMHG
T AXIS - MUSE: -4 DEGREES
VENTRICULAR RATE- MUSE: 45 BPM

## 2025-02-26 PROCEDURE — 97166 OT EVAL MOD COMPLEX 45 MIN: CPT | Mod: GO

## 2025-02-26 PROCEDURE — 250N000013 HC RX MED GY IP 250 OP 250 PS 637: Performed by: PHYSICIAN ASSISTANT

## 2025-02-26 PROCEDURE — 250N000011 HC RX IP 250 OP 636: Mod: JZ | Performed by: PHYSICIAN ASSISTANT

## 2025-02-26 PROCEDURE — 999N000157 HC STATISTIC RCP TIME EA 10 MIN

## 2025-02-26 PROCEDURE — 97110 THERAPEUTIC EXERCISES: CPT | Mod: GO

## 2025-02-26 PROCEDURE — 94660 CPAP INITIATION&MGMT: CPT

## 2025-02-26 PROCEDURE — 99214 OFFICE O/P EST MOD 30 MIN: CPT | Performed by: HOSPITALIST

## 2025-02-26 PROCEDURE — 36415 COLL VENOUS BLD VENIPUNCTURE: CPT | Performed by: PHYSICIAN ASSISTANT

## 2025-02-26 PROCEDURE — 85018 HEMOGLOBIN: CPT | Performed by: PHYSICIAN ASSISTANT

## 2025-02-26 PROCEDURE — 250N000013 HC RX MED GY IP 250 OP 250 PS 637: Performed by: HOSPITALIST

## 2025-02-26 PROCEDURE — 97535 SELF CARE MNGMENT TRAINING: CPT | Mod: GO

## 2025-02-26 RX ADMIN — ASPIRIN 81 MG: 81 TABLET, COATED ORAL at 09:03

## 2025-02-26 RX ADMIN — SENNOSIDES AND DOCUSATE SODIUM 1 TABLET: 50; 8.6 TABLET ORAL at 09:03

## 2025-02-26 RX ADMIN — ACETAMINOPHEN 975 MG: 325 TABLET ORAL at 06:29

## 2025-02-26 RX ADMIN — CELECOXIB 100 MG: 100 CAPSULE ORAL at 09:03

## 2025-02-26 RX ADMIN — CEFAZOLIN SODIUM 2 G: 2 INJECTION, SOLUTION INTRAVENOUS at 01:21

## 2025-02-26 RX ADMIN — POLYETHYLENE GLYCOL 3350 17 G: 17 POWDER, FOR SOLUTION ORAL at 09:03

## 2025-02-26 ASSESSMENT — ACTIVITIES OF DAILY LIVING (ADL)
ADLS_ACUITY_SCORE: 41
PREVIOUS_RESPONSIBILITIES: MEAL PREP;HOUSEKEEPING;LAUNDRY;SHOPPING;YARDWORK;MEDICATION MANAGEMENT;FINANCES
ADLS_ACUITY_SCORE: 41

## 2025-02-26 NOTE — CONSULTS
CARE MANAGEMENT NOTE:    Care management team consulted for discharge planning. Care manager reviewed patients chart, discussed patient at unit rounds, and discussed discharge needs with provider. No discharge needs identified at this time.     No further care management intervention anticipated at this time.  Please re-consult if further needs arise.  Care management signing off.      MIRA Kumar  2/26/2025 12:57 PM

## 2025-02-26 NOTE — PROVIDER NOTIFICATION
"Pt reports she is having lateral side \"left breast\" tenderness \"3/10\" like the muscles around the shoulder, had left shoulder surgery today. I gave her the scheduled tylenol. pt denies chest pain. do you want us to further communicate to nocturnist Dr. Gupta who is in the hospital to follow up on this ?    Per Dr Cobb: Wait to determine tylenol effects, then reach out to nocturnist as needed. No additional orders at this time.  "

## 2025-02-26 NOTE — DISCHARGE SUMMARY
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 Yancey on . There were no intraoperative complications and the patient was transferred to the recovery room and later the orthopedic unit in stable condition. Once the patient reached the orthopedic floor our orthopedic pain protocol was implemented along with the following:    Anticoagulation Medications: ASA  Therapy: OT  Activity: NWB  Bracing: Slingshot Brace    Consultations during Admission: Hospitalist service for medical management     COMPLICATIONS/SIGNIFICANT FINDINGS    NONE    DISCHARGE INFORMATION   Condition at discharge: Good  Discharge destination: Home   Patient was seen by myself on the date of discharge.    FOLLOW UP CARE   Follow up with orthopedics in 2 weeks or sooner should the need arise. Ortho will continue to manage pain control, post op anticoagulation and incision care.     Follow up with your PCP for management of chronic medical problems and to evaluate for post op medical complications including constipation, nausea/vomiting, DVT/PE, anemia, changes in blood pressure, fevers/chills, urinary retention and atelectasis/pneumonia.     Subjective   Patient is doing well on POD #1. Pain is well controlled with oral medications. Ambulating. Tolerating oral intake.     Physical Exam   /54 (BP Location: Right arm, Patient Position: Chair, Cuff Size: Adult Regular)   Pulse (!) 40   Temp 98  F (36.7  C) (Oral)    Resp 16   Wt 89.9 kg (198 lb 3.2 oz)   LMP  (LMP Unknown)   SpO2 98%   BMI 34.02 kg/m    The patient is A&Ox3. Appears comfortable, sitting up at bedside & dressed. Wearing sling appropriately.  Sensation is intact to light touch in left upper extremity, hand & fingers.  left upper extremity motor strength 5/5 in ulnar, median and radial nerve distributions. Fires axillary. Flexes elbow. Flexes & extends wrist. Full composite fist. Opposes thumb.  Palpable left radial pulse. Hand warm with brisk cap refill in fingers.  Calves are soft and non-tender.   Mild edema & ecchymosis of left shoulder. The incision is covered. Dressing C/D/I.     Pertinent Results at Discharge     Hemoglobin   Date/Time Value Ref Range Status   02/26/2025 05:23 AM 11.9 11.7 - 15.7 g/dL Final   02/05/2025 04:06 PM 16.7 (H) 11.7 - 15.7 g/dL Final   09/17/2024 02:49 PM 13.5 11.7 - 15.7 g/dL Final     INR   Date/Time Value Ref Range Status   01/26/2021 08:07 AM 1.02 0.90 - 1.10 Final   10/27/2019 08:43 AM 1.05 0.90 - 1.10 Final     Platelet Count   Date/Time Value Ref Range Status   02/05/2025 04:06  150 - 450 10e3/uL Final   09/17/2024 02:49  150 - 450 10e3/uL Final   08/11/2024 07:01  150 - 450 10e3/uL Final       Problem List   Active Problems:    Status post total shoulder arthroplasty      Nicolasa Sánchez PA-C/Dr. Yancey  [facility]  [phone]  Date: 2/26/2025  Time: 11:31 AM

## 2025-02-26 NOTE — PLAN OF CARE
Patient ready for discharge. Discharge instructions discussed and questions answered. All belongings sent with patient.

## 2025-02-26 NOTE — PLAN OF CARE
Note for RN cares provided 1900 - 2300:    Patient vital signs are at baseline: Yes  Exception: page sent to Dr. Cobb at 2108 regarding bradycardic HR in upper 40s-low 50s intermittent - pt denies symptoms of bradycardia. Dr. Cobb ordered routine EKG, yet to be completed.    Patient able to ambulate as they were prior to admission or with assist devices provided by therapies during their stay:  Yes  Patient MUST void prior to discharge:  Yes  Patient able to tolerate oral intake:  Yes  Pain has adequate pain control using Oral analgesics:  Yes  Does patient have an identified :  Yes  Has goal D/C date and time been discussed with patient:  Yes

## 2025-02-26 NOTE — PROGRESS NOTES
02/26/25 0847   Appointment Info   Signing Clinician's Name / Credentials (OT) TAWNY Bowen   Student Supervision Therapy services provided with the co-signing licensed therapist guiding and directing the services, and providing the skilled judgement and assessment throughout the session   Quick Adds   Quick Adds Certification   Living Environment   People in Home child(alexis), adult   Current Living Arrangements house   Living Environment Comments Bedroom and bathroom upstairs, WIS w/GB and shower chair, RTS w/GB   Self-Care   Usual Activity Tolerance good   Current Activity Tolerance good   Equipment Currently Used at Home colostomy/ostomy supplies;cane, straight;dressing device;grab bar, toilet;grab bar, tub/shower;raised toilet seat;shower chair;walker, rolling   Activity/Exercise/Self-Care Comment Pt independent in ADLs   Instrumental Activities of Daily Living (IADL)   Previous Responsibilities meal prep;housekeeping;laundry;shopping;yardwork;medication management;finances   IADL Comments Pt independent in IADLs   General Information   Onset of Illness/Injury or Date of Surgery 02/25/25   Referring Physician Alexander Yancey MD   Patient/Family Therapy Goal Statement (OT) To go home   Additional Occupational Profile Info/Pertinent History of Current Problem Admitted for L TSA. PMH of colostomy, HTN, SHAUNA, and previous ESTHELA.   Existing Precautions/Restrictions (S)  shoulder   Left Upper Extremity (Weight-bearing Status) non weight-bearing (NWB)   Cognitive Status Examination   Follows Commands WNL   Visual Perception   Visual Impairment/Limitations corrective lenses for reading   Sensory   Sensory Quick Adds left UE  (Decreased sensation in LUE d/t nerve block.)   Bed Mobility   Bed Mobility supine-sit;sit-supine   Supine-Sit [place] (Bed Mobility) supervision;verbal cues   Sit-Supine [place] (Bed Mobility) supervision;verbal cues   Assistive Device (Bed Mobility) other (see comments)  (HOB  elevated, but pt has mechanical bed at home with this ability.)   Transfers   Transfers bed-chair transfer;sit-stand transfer;toilet transfer   Transfer Skill: Bed to Chair/Chair to Bed   Bed-Chair [place] (Transfers) supervision;verbal cues   Assistive Device (Bed-Chair Transfers) straight cane   Sit-Stand Transfer   Sit-Stand [place] (Transfers) supervision;verbal cues   Assistive Device (Sit-Stand Transfers) cane, straight   Toilet Transfer   Type (Toilet Transfer) sit-stand;stand-sit   [place] Level (Toilet Transfer) supervision;verbal cues   Assistive Device (Toilet Transfer) cane, straight   Balance   Balance Assessment standing dynamic balance   Standing Balance: Dynamic supervision;verbal cues   Position/Device Used, Standing Balance cane, straight   Activities of Daily Living   BADL Assessment/Intervention upper body dressing;lower body dressing   Upper Body Dressing Assessment/Training   Position (Upper Body Dressing) supported sitting   [place] Level (Upper Body Dressing) doff;don;front opening garment;maximum assist (25% patient effort);verbal cues   Lower Body Dressing Assessment/Training   Position (Lower Body Dressing) supported sitting   [place] Level (Lower Body Dressing) doff;don;pants/bottoms;socks;dependent (less than 25% patient effort)   Clinical Impression   Criteria for Skilled Therapeutic Interventions Met (OT) Yes, treatment indicated   OT Diagnosis Decreased independence in ADLs and transfers d/t L TSA.   OT Problem List-Impairments impacting ADL problems related to;mobility;pain;post-surgical precautions   Assessment of Occupational Performance 3-5 Performance Deficits   Identified Performance Deficits UB dresssing, LB dressing, bathing, G/H   Planned Therapy Interventions (OT) ADL retraining;progressive activity/exercise   Clinical Decision Making Complexity (OT) detailed assessment/moderate complexity   Risk & Benefits of therapy have been explained  evaluation/treatment results reviewed;participants included;patient   OT Total Evaluation Time   OT Eval, Moderate Complexity Minutes (65522) 15   Therapy Certification   Medical Diagnosis L TSA   Start of Care Date 02/26/25   Certification date from 02/26/25   Certification date to 02/27/25   OT Goals   Therapy Frequency (OT) One time eval and treatment   OT Goals Upper Body Dressing;Lower Body Dressing;OT Goal 1   OT: Upper Body Dressing Moderate assist;within precautions   OT: Lower Body Dressing within precautions;using adaptive equipment;Minimal assist   OT: Goal 1 Pt will demonstrate understanding of L TSA HEP to be completed 2x/day.   Interventions   Interventions Quick Adds Self-Care/Home Management;Therapeutic Procedures/Exercise   Self-Care/Home Management   Self-Care/Home Mgmt/ADL, Compensatory, Meal Prep Minutes (65420) 30   Treatment Detail/Skilled Intervention Pt seated in chair upon arrival. Initial evaluation completed. Immobilizer with pillow donned per MD order. Educated on shoulder precautions handout and pt verbalized understanding. Pt dep in LB dressing, but progressed to min A through session using AE. Educated on sling doffing and pt demonstrated understanding. Max A and education on UB dressing and sling donning and pt verbalized understanding. Pt transferred to toilet and bed w/straight cane and SBA. Educated on bed mobility, pillow placement, car transfer, and icing and pt verbalized understanding. Pt in chair at end of session, alarm on, call light in reach, RN aware.   Therapeutic Procedures/Exercise   Therapeutic Procedure: strength, endurance, ROM, flexibillity minutes (29295) 15   Treatment Detail/Skilled Intervention Pt completed L TSA HEP exercises (10 reps/exercise) seated in chair. Pt tolerated exercises well and understood education. Therapist demonstrated codman's and instructed pt to only complete when numbness is worn off of LUE. Pt verbalized understanding.   OT Discharge  Planning   OT Plan D/C OT   OT Discharge Recommendation (DC Rec) (S)  home with assist   OT Rationale for DC Rec Pt transferring w/SBA and demonstrated good balance during transfers. Pt will have family to assist at home as needed and demonstrated good understanding of education during session. No PT needed.   OT Brief overview of current status SBA during transfers, Min A LB dressing by end of session, Max A donning sling and UB dressing. No PT needed.   OT Total Distance Amb During Session (feet) 25   Total Session Time   Timed Code Treatment Minutes 45   Total Session Time (sum of timed and untimed services) 60   [facility]                                                                                   OUTPATIENT OCCUPATIONAL THERAPY    PLAN OF TREATMENT FOR OUTPATIENT REHABILITATION   Patient's Last Name, First Name, NICHOLASCyndie Romano YOB: 1945   Provider's Name   [facility]   Medical Record No.  [id number]     Onset Date: 02/25/25 Start of Care Date: 02/26/25     Medical Diagnosis:  L TSA               OT Diagnosis:  Decreased independence in ADLs and transfers d/t L TSA. Certification Dates:  From: 02/26/25  To: 02/27/25     See note for plan of treatment, functional goals, and certification details.    I CERTIFY THE NEED FOR THESE SERVICES FURNISHED UNDER        THIS PLAN OF TREATMENT AND WHILE UNDER MY CARE (Physician co-signature of this document indicates review and certification of the therapy plan).

## 2025-02-26 NOTE — PROGRESS NOTES
Patient vital signs are at baseline: Yes  Patient able to ambulate as they were prior to admission or with assist devices provided by therapies during their stay:  Yes Mild dizziness with ambulation this evening.   Patient MUST void prior to discharge:  Yes  Patient able to tolerate oral intake:  Yes  Pain has adequate pain control using Oral analgesics:  Yes  Does patient have an identified :  Yes  Has goal D/C date and time been discussed with patient:  Yes Help from son and daughter in law.    Patient will need to work with OT to make sure she can manage her ADLs and her colostomy for discharge.

## 2025-02-26 NOTE — PROGRESS NOTES
"[facility]    Medicine Progress Note - Hospitalist Service    Date of Admission:  2/25/2025    Assessment & Plan   Cyndie Marin is a 79 year old female admitted s/p L TSA.  She is clinically stable.  Chest pain is reproduced with palpation on exam, therefore consistent with musculoskeletal pain.  This may also be related to surgery on the same side.  Blood pressure in 90s this morning (though with MAP 65), with improvement in blood pressure noted during exam.  Held blood pressure medication this morning with plan to resume after discharge.  Recommendations otherwise as below.    # s/p L TSA  - per orthopaedics    # HTN  - lisinopril/hydrochlorothiazide held this morning for lower blood pressure, plan to resume after discharge    # SHAUNA  - CPAP    # Sinus bradycardia  - outpatient followup            Diet: Advance Diet as Tolerated: Regular Diet Adult  Discharge Instruction - Regular Diet Adult      Su Catheter: Not present  Lines: None     Cardiac Monitoring: None  Code Status: Full Code      Clinically Significant Risk Factors Present on Admission                   # Hypertension: Noted on problem list           # Obesity: Estimated body mass index is 34.02 kg/m  as calculated from the following:    Height as of 2/5/25: 1.626 m (5' 4\").    Weight as of this encounter: 89.9 kg (198 lb 3.2 oz).              Social Drivers of Health    Physical Activity: Insufficiently Active (9/17/2024)    Exercise Vital Sign     Days of Exercise per Week: 3 days     Minutes of Exercise per Session: 30 min   Stress: Stress Concern Present (9/17/2024)    [employer] [place] [employer] - Occupational Stress Questionnaire     Feeling of Stress : To some extent   Social Connections: Unknown (9/17/2024)    Social Connection and Isolation Panel [NHANES]     Frequency of Social Gatherings with Friends and Family: Twice a week          Disposition Plan     Medically Ready for Discharge: Anticipated " Today             Dagoberto Palacios MD  Hospitalist Service  [facility]  Securely message with [employer] (more info)  Text page via [employer] Paging/Directory   ______________________________________________________________________    Interval History   Reports having back pain last night, which is a chronic problem for her.  Denies dyspnea.  Reports left sided chest pain with deep breathing.  Reports left thumb numbness.    Physical Exam   Vital Signs: Temp: 98.1  F (36.7  C) Temp src: Oral BP: 122/59 Pulse: 53   Resp: 16 SpO2: 97 % O2 Device: BiPAP/CPAP Oxygen Delivery: 1 LPM  Weight: 198 lbs 3.2 oz    Gen:  sitting in chair in no extremis  Neuro:  alert, conversant; able to  with left hand; sensation grossly intact in fingers in left hand  CV:  left radial pulse intact with bradycardia on palpation  Pulm:  no acute resp distress, ctab anteriorly  MSK:  left chest tenderness to palpation    Medical Decision Making             Data   Reviewed:    Hgb 12

## 2025-02-26 NOTE — PLAN OF CARE
Problem: Adult Inpatient Plan of Care  Goal: Readiness for Transition of Care  Outcome: Progressing   Goal Outcome Evaluation:                      /59 (BP Location: Right arm)   Pulse 53   Temp 98.1  F (36.7  C) (Oral)   Resp 16   Wt 89.9 kg (198 lb 3.2 oz)   LMP  (LMP Unknown)   SpO2 97%   BMI 34.02 kg/m      Patient vital signs are at baseline: Yes  Patient able to ambulate as they were prior to admission or with assist devices provided by therapies during their stay:  Yes  Patient MUST void prior to discharge:  Yes  Patient able to tolerate oral intake:  Yes  Pain has adequate pain control using Oral analgesics:  Yes  Does patient have an identified :  Yes  Has goal D/C date and time been discussed with patient:  Yes    Merrick Vaughn RN

## 2025-02-26 NOTE — PROGRESS NOTES
Orthopedic Progress Note      Assessment: 1 Day Post-Op  S/P Procedure(s):  Left reverse total shoulder arthroplasty @    Plan:   - Continue OT.  - Weightbearing status: NWB left Upper Extremity  - Continue sling & abduction pillow  - Anticoagulation: Aspirin 81 mg bid x30 days, in addition to SCDs, jose stockings and early ambulation.  - Discharge planning: Discharge to home today pending OT eval.    Subjective:  Pain: Left shoulder pain well controlled on Tylenol and aspirin.  Nausea, Vomiting:  No  Chest pain: No  Lightheadedness, Dizziness:  No  Neuro: Block in effect    Patient is doing well on POD #1. Tolerating oral intake, pain well controlled on oral medications, voiding & ambulating. Ready for discharge. Hgb 11.9 (pre-op 16.7).    Objective:  BP 95/50 (BP Location: Right arm)   Pulse (!) 44   Temp 98  F (36.7  C) (Oral)   Resp 16   Wt 89.9 kg (198 lb 3.2 oz)   LMP  (LMP Unknown)   SpO2 98%   BMI 34.02 kg/m    The patient is A&Ox3. Appears comfortable, sitting up at bedside & dressed. Wearing sling appropriately.  Sensation is intact to light touch in left upper extremity, hand & fingers.  left upper extremity motor strength 5/5 in ulnar, median and radial nerve distributions. Fires axillary. Flexes elbow. Flexes & extends wrist. Full composite fist. Opposes thumb.  Palpable left radial pulse. Hand warm with brisk cap refill in fingers.  Calves are soft and non-tender.   Mild edema & ecchymosis of left shoulder. The incision is covered. Dressing C/D/I.        Pertinent Labs   Lab Results: personally reviewed.   Lab Results   Component Value Date    INR 1.02 01/26/2021    INR 1.05 10/27/2019     Lab Results   Component Value Date    WBC 7.5 02/05/2025    HGB 11.9 02/26/2025    HCT 50.8 (H) 02/05/2025    MCV 87 02/05/2025     02/05/2025     Lab Results   Component Value Date     02/05/2025    CO2 25 02/05/2025         Report completed by:  Nicolasa Sánchez PA-C/Dr. Bc [facility]  Orthopedics    Date: 2/26/2025  Time: 8:30 AM

## 2025-03-03 ENCOUNTER — TELEPHONE (OUTPATIENT)
Dept: INTERNAL MEDICINE | Facility: CLINIC | Age: 80
End: 2025-03-03
Payer: COMMERCIAL

## 2025-03-03 NOTE — TELEPHONE ENCOUNTER
March 3, 2025    Durable medical equipment/medical supply order was received via fax for Dr. Horowitz.  Patient label was attached to paperwork and placed in provider's inbox to be signed.    Elizabeth Faye

## 2025-03-05 NOTE — TELEPHONE ENCOUNTER
March 5, 2025    Durable medical equipment/medical supply order was picked up from outbox of Dr. Horowitz and sent via fax to [phone].    aMrlen Birch

## 2025-03-10 ENCOUNTER — TRANSFERRED RECORDS (OUTPATIENT)
Dept: HEALTH INFORMATION MANAGEMENT | Facility: CLINIC | Age: 80
End: 2025-03-10
Payer: COMMERCIAL

## 2025-03-15 ENCOUNTER — HEALTH MAINTENANCE LETTER (OUTPATIENT)
Age: 80
End: 2025-03-15

## 2025-04-25 ENCOUNTER — TRANSFERRED RECORDS (OUTPATIENT)
Dept: HEALTH INFORMATION MANAGEMENT | Facility: CLINIC | Age: 80
End: 2025-04-25
Payer: COMMERCIAL

## 2025-05-23 ENCOUNTER — TRANSFERRED RECORDS (OUTPATIENT)
Dept: HEALTH INFORMATION MANAGEMENT | Facility: CLINIC | Age: 80
End: 2025-05-23
Payer: COMMERCIAL

## (undated) DEVICE — GLOVE UNDER INDICATOR PI SZ 8.5 LF 41685

## (undated) DEVICE — DECANTER VIAL 2006S

## (undated) DEVICE — GOWN IMPERVIOUS BREATHABLE 2XL/XLONG

## (undated) DEVICE — SOL NACL 0.9% IRRIG 1000ML BOTTLE 2F7124

## (undated) DEVICE — BONE CLEANING TIP INTERPULSE  0210-010-000

## (undated) DEVICE — DRILL BIT PERIPHERAL SCREW 3.2MM MWJ126

## (undated) DEVICE — GLOVE BIOGEL PI SZ 8.5 40885

## (undated) DEVICE — HOLDER LIMB VELCRO OR 0814-1533

## (undated) DEVICE — HOOD SURG T7PLUS PEEL AWAY FACE SHIELD STRL LF 0416-801-100

## (undated) DEVICE — SOLUTION IRRIG 2B7127 .9NS 3000ML BAG

## (undated) DEVICE — SUCTION MANIFOLD NEPTUNE 2 SYS 4 PORT 0702-020-000

## (undated) DEVICE — GUIDE PIN STERILE 3X75MM

## (undated) DEVICE — GLOVE BIOGEL INDICATOR 7.5 LF 41675

## (undated) DEVICE — SUTURE VICRYL+ 0 27IN CT-1 UND VCP260H

## (undated) DEVICE — SU FIBERWIRE 2 38" T-8 NDL  AR-7206

## (undated) DEVICE — SUTURE VICRYL+ 1 27IN CT-1 UND VCP261H

## (undated) DEVICE — ESU ELEC BLADE 6" COATED E1450-6

## (undated) DEVICE — FILTER HEPA FLUID TRAP NEPTUNE 0703-040-001

## (undated) DEVICE — PREP CHLORAPREP W/ORANGE TINT 10.5ML 930715

## (undated) DEVICE — Device

## (undated) DEVICE — CUSTOM PACK OPEN SHOULDER SOP5BOSHEB

## (undated) DEVICE — KIT PATIENT CARE HANA TABLE PROFX SUPINE 6855

## (undated) DEVICE — GLOVE BIOGEL PI ULTRATOUCH G SZ 7.5 42175

## (undated) DEVICE — SU ETHIBOND 1 CT-1 30" X425H

## (undated) DEVICE — CANISTER PREVENA NEGATIVE PRESSURE 150ML PRE4095.S

## (undated) DEVICE — SOL WATER IRRIG 1000ML BOTTLE 2F7114

## (undated) DEVICE — NEEDLE SPINAL DISP 18GA X 3.5" QUINCKE 333350

## (undated) DEVICE — GUIDEWIRE TORNIER AEQUALIS PERFORM +  2.5X220MM DWD017

## (undated) DEVICE — DRAPE STERI TOWEL LG 1010

## (undated) DEVICE — SUCTION IRR SYSTEM W/O TIP INTERPULSE HANDPIECE 0210-100-000

## (undated) DEVICE — SU STRATAFIX PDS PLUS 1 CT-1 18" SXPP1A404

## (undated) DEVICE — DRAPE SHEET REV FOLD 3/4 9349

## (undated) DEVICE — PADDING CAST 4IN WEBRIL STRL 2502

## (undated) DEVICE — ELECTRODE PATIENT RETURN ADULT L10 FT 2 PLATE CORD 0855C

## (undated) DEVICE — SUTURE VICRYL+ 2-0 27IN CT-1 UND VCP259H

## (undated) DEVICE — DRSG WND NEGATIVE PRESSURE PREVENA 20CM PRE1055US.S

## (undated) DEVICE — SUTURE MONOCRYL 3-0 18 PS2 UND MCP497G

## (undated) DEVICE — DRAPE CONVERTORS U-DRAPE 60X72" 8476

## (undated) DEVICE — DRSG FOAM MEPILEX BORDER SILVER 8X4 POSTOP 498400

## (undated) DEVICE — HOOD SURG T7 PLUS STRL LF DISP 0416-801-200

## (undated) DEVICE — DRAPE SHEET SHOULDER ARTHROSCOPY 89070

## (undated) DEVICE — CUSTOM PACK TOTAL HIP SOP5BTHHEA

## (undated) DEVICE — CUSTOM PACK ANTERIOR HIP SOP5BAHHED

## (undated) DEVICE — DRAPE IOBAN ISOLATION VERTICAL 320X21CM 6617

## (undated) DEVICE — MAT FLOOR SURGICAL 40X38 0702140238

## (undated) DEVICE — SOL NACL 0.9% INJ 1000ML BAG 2B1324X

## (undated) DEVICE — STPL SKIN PROXIMATE 35 WIDE PMW35

## (undated) DEVICE — BLADE PRECISION 25X1.27X9 6725127090

## (undated) DEVICE — SU DERMABOND ADVANCED .7ML DNX12

## (undated) DEVICE — BLADE SAW SAGITTAL STRK WIDE 25.4X85X1.2MM 2108-151-000

## (undated) DEVICE — SUCTION TIP FLEXI CLEAR TIP DISP K62

## (undated) DEVICE — SU NDL MAYO 1824-4

## (undated) DEVICE — KIT DRAIN CLOSED WOUND SUCTION MED 400ML RESVR

## (undated) DEVICE — PREP CHLORAPREP 26ML TINTED HI-LITE ORANGE 930815

## (undated) RX ORDER — FENTANYL CITRATE 50 UG/ML
INJECTION, SOLUTION INTRAMUSCULAR; INTRAVENOUS
Status: DISPENSED
Start: 2024-08-09

## (undated) RX ORDER — TRIAMCINOLONE ACETONIDE 40 MG/ML
INJECTION, SUSPENSION INTRA-ARTICULAR; INTRAMUSCULAR
Status: DISPENSED
Start: 2022-09-12

## (undated) RX ORDER — FENTANYL CITRATE-0.9 % NACL/PF 10 MCG/ML
PLASTIC BAG, INJECTION (ML) INTRAVENOUS
Status: DISPENSED
Start: 2025-02-25

## (undated) RX ORDER — LIDOCAINE HYDROCHLORIDE 10 MG/ML
INJECTION, SOLUTION EPIDURAL; INFILTRATION; INTRACAUDAL; PERINEURAL
Status: DISPENSED
Start: 2023-03-06

## (undated) RX ORDER — PROPOFOL 10 MG/ML
INJECTION, EMULSION INTRAVENOUS
Status: DISPENSED
Start: 2025-02-25

## (undated) RX ORDER — PROPOFOL 10 MG/ML
INJECTION, EMULSION INTRAVENOUS
Status: DISPENSED
Start: 2024-08-09

## (undated) RX ORDER — LIDOCAINE HYDROCHLORIDE 10 MG/ML
INJECTION, SOLUTION EPIDURAL; INFILTRATION; INTRACAUDAL; PERINEURAL
Status: DISPENSED
Start: 2023-08-17

## (undated) RX ORDER — TRIAMCINOLONE ACETONIDE 40 MG/ML
INJECTION, SUSPENSION INTRA-ARTICULAR; INTRAMUSCULAR
Status: DISPENSED
Start: 2024-03-25

## (undated) RX ORDER — DEXAMETHASONE SODIUM PHOSPHATE 10 MG/ML
INJECTION, SOLUTION INTRAMUSCULAR; INTRAVENOUS
Status: DISPENSED
Start: 2025-02-25

## (undated) RX ORDER — TRIAMCINOLONE ACETONIDE 40 MG/ML
INJECTION, SUSPENSION INTRA-ARTICULAR; INTRAMUSCULAR
Status: DISPENSED
Start: 2023-08-17

## (undated) RX ORDER — LIDOCAINE HYDROCHLORIDE 10 MG/ML
INJECTION, SOLUTION EPIDURAL; INFILTRATION; INTRACAUDAL; PERINEURAL
Status: DISPENSED
Start: 2024-03-25

## (undated) RX ORDER — PHENYLEPHRINE HYDROCHLORIDE 10 MG/ML
INJECTION INTRAVENOUS
Status: DISPENSED
Start: 2024-06-25

## (undated) RX ORDER — PROPOFOL 10 MG/ML
INJECTION, EMULSION INTRAVENOUS
Status: DISPENSED
Start: 2024-06-25

## (undated) RX ORDER — FENTANYL CITRATE 50 UG/ML
INJECTION, SOLUTION INTRAMUSCULAR; INTRAVENOUS
Status: DISPENSED
Start: 2025-02-25

## (undated) RX ORDER — LIDOCAINE HYDROCHLORIDE 10 MG/ML
INJECTION, SOLUTION EPIDURAL; INFILTRATION; INTRACAUDAL; PERINEURAL
Status: DISPENSED
Start: 2024-08-09

## (undated) RX ORDER — TRIAMCINOLONE ACETONIDE 40 MG/ML
INJECTION, SUSPENSION INTRA-ARTICULAR; INTRAMUSCULAR
Status: DISPENSED
Start: 2024-01-29

## (undated) RX ORDER — TRIAMCINOLONE ACETONIDE 40 MG/ML
INJECTION, SUSPENSION INTRA-ARTICULAR; INTRAMUSCULAR
Status: DISPENSED
Start: 2023-03-06

## (undated) RX ORDER — LIDOCAINE HYDROCHLORIDE 10 MG/ML
INJECTION, SOLUTION EPIDURAL; INFILTRATION; INTRACAUDAL; PERINEURAL
Status: DISPENSED
Start: 2022-09-12

## (undated) RX ORDER — TRIAMCINOLONE ACETONIDE 40 MG/ML
INJECTION, SUSPENSION INTRA-ARTICULAR; INTRAMUSCULAR
Status: DISPENSED
Start: 2024-03-09

## (undated) RX ORDER — LIDOCAINE HYDROCHLORIDE 10 MG/ML
INJECTION, SOLUTION EPIDURAL; INFILTRATION; INTRACAUDAL; PERINEURAL
Status: DISPENSED
Start: 2025-02-25

## (undated) RX ORDER — LIDOCAINE HYDROCHLORIDE 10 MG/ML
INJECTION, SOLUTION EPIDURAL; INFILTRATION; INTRACAUDAL; PERINEURAL
Status: DISPENSED
Start: 2024-03-09

## (undated) RX ORDER — ONDANSETRON 2 MG/ML
INJECTION INTRAMUSCULAR; INTRAVENOUS
Status: DISPENSED
Start: 2025-02-25

## (undated) RX ORDER — ONDANSETRON 2 MG/ML
INJECTION INTRAMUSCULAR; INTRAVENOUS
Status: DISPENSED
Start: 2024-08-09

## (undated) RX ORDER — LIDOCAINE HYDROCHLORIDE 10 MG/ML
INJECTION, SOLUTION EPIDURAL; INFILTRATION; INTRACAUDAL; PERINEURAL
Status: DISPENSED
Start: 2024-01-29

## (undated) RX ORDER — ONDANSETRON 2 MG/ML
INJECTION INTRAMUSCULAR; INTRAVENOUS
Status: DISPENSED
Start: 2024-06-25

## (undated) RX ORDER — GLYCOPYRROLATE 0.2 MG/ML
INJECTION, SOLUTION INTRAMUSCULAR; INTRAVENOUS
Status: DISPENSED
Start: 2025-02-25

## (undated) RX ORDER — DEXAMETHASONE SODIUM PHOSPHATE 4 MG/ML
INJECTION, SOLUTION INTRA-ARTICULAR; INTRALESIONAL; INTRAMUSCULAR; INTRAVENOUS; SOFT TISSUE
Status: DISPENSED
Start: 2024-08-09